# Patient Record
Sex: MALE | Race: OTHER | Employment: OTHER | URBAN - METROPOLITAN AREA
[De-identification: names, ages, dates, MRNs, and addresses within clinical notes are randomized per-mention and may not be internally consistent; named-entity substitution may affect disease eponyms.]

---

## 2017-01-03 ENCOUNTER — HOSPITAL ENCOUNTER (OUTPATIENT)
Dept: RADIOLOGY | Facility: HOSPITAL | Age: 62
Discharge: HOME | End: 2017-01-03
Attending: INTERNAL MEDICINE
Payer: COMMERCIAL

## 2017-01-03 ENCOUNTER — OFFICE VISIT (OUTPATIENT)
Dept: INTERNAL MEDICINE | Facility: CLINIC | Age: 62
End: 2017-01-03
Payer: COMMERCIAL

## 2017-01-03 VITALS
SYSTOLIC BLOOD PRESSURE: 136 MMHG | WEIGHT: 224.63 LBS | DIASTOLIC BLOOD PRESSURE: 82 MMHG | HEART RATE: 89 BPM | TEMPERATURE: 99 F | BODY MASS INDEX: 31.45 KG/M2 | OXYGEN SATURATION: 95 % | HEIGHT: 71 IN

## 2017-01-03 DIAGNOSIS — R05.9 COUGH: ICD-10-CM

## 2017-01-03 DIAGNOSIS — J18.9 PNEUMONIA DUE TO INFECTIOUS ORGANISM, UNSPECIFIED LATERALITY, UNSPECIFIED PART OF LUNG: Primary | ICD-10-CM

## 2017-01-03 DIAGNOSIS — J18.9 PNEUMONIA DUE TO INFECTIOUS ORGANISM, UNSPECIFIED LATERALITY, UNSPECIFIED PART OF LUNG: ICD-10-CM

## 2017-01-03 DIAGNOSIS — J01.00 ACUTE MAXILLARY SINUSITIS, RECURRENCE NOT SPECIFIED: ICD-10-CM

## 2017-01-03 PROCEDURE — 99999 PR PBB SHADOW E&M-EST. PATIENT-LVL IV: CPT | Mod: PBBFAC,,, | Performed by: INTERNAL MEDICINE

## 2017-01-03 PROCEDURE — 1159F MED LIST DOCD IN RCRD: CPT | Mod: S$GLB,,, | Performed by: INTERNAL MEDICINE

## 2017-01-03 PROCEDURE — 99214 OFFICE O/P EST MOD 30 MIN: CPT | Mod: S$GLB,,, | Performed by: INTERNAL MEDICINE

## 2017-01-03 PROCEDURE — 71020 XR CHEST PA AND LATERAL: CPT | Mod: TC

## 2017-01-03 PROCEDURE — 71020 XR CHEST PA AND LATERAL: CPT | Mod: 26,,, | Performed by: RADIOLOGY

## 2017-01-03 RX ORDER — AMOXICILLIN 500 MG/1
CAPSULE ORAL
COMMUNITY
Start: 2016-12-31 | End: 2017-01-03

## 2017-01-03 NOTE — PROGRESS NOTES
Subjective:       Patient ID: Dhruv Fine is a 61 y.o. male.    Chief Complaint: Sinusitis    HPI Comments: UC appt    URI sx for > 1 week.  Went skiing.  Tried vitamin C and Nyquil, Dayquil, nasal sprays.  However had some pain R side of face and behind R eye.  Went to an  and got some prescriptions- was given a liquid cough syrup with codeine and amoxicillin and tessalon.  This was Saturday.    Overall feeling better but not 100%.    Had similar sx a few months ago. Got augmentin- this was in September.    Main sx now sniffling, cough, dizzy and tired.  Also has had cough.  Denies chest pain.  Minimal SOB.    Patient Active Problem List:     Vitamin D deficiency disease     Erectile dysfunction     Hyperlipidemia     Bleeding hemorrhoids     Anxiety     Sleep apnea needs CPAP 10 see report 2010: declines tx     Nuclear sclerosis - Both Eyes     DJD (degenerative joint disease) of lumbar spine     DJD (degenerative joint disease) of cervical spine     Genu varum (acquired)     Former smoker 1-2 packs daily for < 10 years     S/P L knee surgery, medial/lateral menisectomy, chondroplasty, synovectomy (2/10/15)     Primary localized osteoarthrosis, lower leg     Non morbid obesity due to excess calories          Sinusitis   Associated symptoms include congestion, coughing and ear pain. Pertinent negatives include no chills or shortness of breath.     Review of Systems   Constitutional: Positive for fatigue and fever. Negative for chills.   HENT: Positive for congestion, ear pain and postnasal drip.    Eyes: Negative.    Respiratory: Positive for cough. Negative for chest tightness, shortness of breath and wheezing.    Cardiovascular: Negative.    Gastrointestinal: Negative.        Objective:      Physical Exam   Constitutional: He is oriented to person, place, and time. He appears well-developed and well-nourished.   HENT:   Head: Normocephalic and atraumatic.   Right Ear: External ear normal.   Left Ear:  External ear normal.   Mouth/Throat: Oropharynx is clear and moist.   Neck: Normal range of motion. Neck supple. No thyromegaly present.   Cardiovascular: Normal rate and regular rhythm.    Pulmonary/Chest: No respiratory distress. He has no wheezes. He has no rales.   Abdominal: Soft. Bowel sounds are normal. He exhibits no distension. There is no tenderness.   Musculoskeletal: He exhibits no edema.   Neurological: He is alert and oriented to person, place, and time.   Skin: Skin is warm and dry. No rash noted. No erythema.   Psychiatric: He has a normal mood and affect.       Assessment:       1. Pneumonia due to infectious organism, unspecified laterality, unspecified part of lung    2. Acute maxillary sinusitis, recurrence not specified    3. Cough        Plan:         Pneumonia due to infectious organism, unspecified laterality, unspecified part of lung  -     CBC auto differential; Future; Expected date: 1/3/17  -     Comprehensive metabolic panel; Future; Expected date: 1/3/17  -     X-Ray Chest PA And Lateral; Future; Expected date: 1/3/17: reviewed with patient- acceptable as were labs    Acute maxillary sinusitis, recurrence not specified:  Continue meds and antibiotics, natural history reviewed    Cough: continue meds    Rest, fluids, acetaminophen, mucinex and follow up poor results.    Patient evaluated for over 30 minutes with this appt, all questions answered,  chart reviewed and care coordinated.

## 2017-01-03 NOTE — LETTER
January 3, 2017    Dhruv Fine  [address] [place] [address]         [facility]  [address]  [facility] [address]  Phone: [phone]  Fax: [phone] January 3, 2017     Patient: Dhruv Fine   YOB: 1955   Date of Visit: 1/3/2017       To Whom It May Concern:    It is my medical opinion that Dhruv Fine has been acutely ill.  He was seen today for acute medical illness.   He will need to be out of work from today through January 8th and may return to work Monday January 9th 2017.    If you have any questions or concerns, please don't hesitate to call.    Sincerely,        Cassandra Cruz MD, FACP

## 2017-01-03 NOTE — MR AVS SNAPSHOT
[facility]  [address]  [facility] [address]  Phone: [phone]  Fax: [phone]                  Dhruv Fine   1/3/2017 10:00 AM   Office Visit    Description:  Male : 1955   Provider:  Cassandra Cruz MD   Department:  [facility] Internal Medicine           Reason for Visit     Sinusitis           Diagnoses this Visit        Comments    Pneumonia due to infectious organism, unspecified laterality, unspecified part of lung    -  Primary            To Do List           Future Appointments        Provider Department Dept Phone    1/3/2017 10:30 AM LAB, SAME DAY [facility] INTMED [facility] [phone]    1/3/2017 10:45 AM [facility] [id number] LB LIMIT [facility] [phone]      Goals (5 Years of Data)     None      [facility]     [facility] Care [facility] -  Assistance  Registered nurses in the [facility] provide clinical advisement, health education, appointment booking, and other advisory services.  Call for this free service at [phone].             Medications           Message regarding Medications     Verify the changes and/or additions to your medication regime listed below are the same as discussed with your clinician today.  If any of these changes or additions are incorrect, please notify your healthcare provider.        STOP taking these medications     amoxicillin (AMOXIL) 500 MG capsule            Verify that the below list of medications is an accurate representation of the medications you are currently taking.  If none reported, the list may be blank. If incorrect, please contact your healthcare provider. Carry this list with you in case of emergency.           Current Medications     amoxicillin (AMOXIL) 500 MG Tab Take 1 tablet (500 mg total) by mouth every 8 (eight) hours.    aspirin 81 MG Chew Take 1 tablet (81 mg total) by mouth once daily.    atorvastatin (LIPITOR) 20 MG tablet  "Take 1 tablet (20 mg total) by mouth once daily.    benzonatate (TESSALON) 200 MG capsule Take 1 capsule (200 mg total) by mouth 3 (three) times daily as needed for Cough.    diazepam (VALIUM) 5 MG tablet Take 5 mg by mouth Use as needed. 1 Tablet Oral .  may take as needed for back spasm, no more than 3-4 times weekly    guaifenesin (MUCINEX) 600 mg 12 hr tablet Take 1 tablet (600 mg total) by mouth 2 (two) times daily.    levocetirizine (XYZAL) 5 MG tablet TAKE 1 TABLET(5 MG) BY MOUTH EVERY EVENING    lidocaine (LIDODERM) 5 %(700 mg/patch) Place 1-2 patches onto the skin once daily. Wear patch for 12 hours and  must have a 12 hour period without a patch    methocarbamol (ROBAXIN-750) 750 MG Tab Take 1 tablet (750 mg total) by mouth 3 (three) times daily.    methylPREDNISolone (MEDROL DOSEPACK) 4 mg tablet use as directed    multivitamin (ONE DAILY MULTIVITAMIN) per tablet Take 1 tablet by mouth once daily.    promethazine-codeine 6.25-10 mg/5 ml (PHENERGAN WITH CODEINE) 6.25-10 mg/5 mL syrup Take 5 mLs by mouth nightly as needed for Cough (May take every 6 hours as needed.).    valacyclovir (VALTREX) 1000 MG tablet TAKE 1 TABLET BY MOUTH THREE TIMES DAILY    VITAMIN D2 50,000 unit capsule TAKE ONE CAPSULE BY MOUTH EVERY 7 DAYS           Clinical Reference Information           Vital Signs - Last Recorded  Most recent update: 1/3/2017 10:05 AM by Kajal Nunez MA    BP Pulse Temp Ht Wt SpO2    (!) 134/102 89 98.7 °F (37.1 °C) (Oral) 5' 11" (1.803 m) 101.9 kg (224 lb 10.4 oz) 95%    BMI                31.33 kg/m2          Blood Pressure          Most Recent Value    BP  (!)  134/102      Allergies as of 1/3/2017     No Known Allergies      Immunizations Administered on Date of Encounter - 1/3/2017     None      Orders Placed During Today's Visit     Future Labs/Procedures Expected by Expires    CBC auto differential  1/3/2017 1/3/2018    Comprehensive metabolic panel  1/3/2017 1/3/2018    X-Ray Chest PA And " Lateral  1/3/2017 1/3/2018

## 2017-01-09 RX ORDER — PROMETHAZINE HYDROCHLORIDE AND CODEINE PHOSPHATE 6.25; 1 MG/5ML; MG/5ML
SOLUTION ORAL
Qty: 120 ML | Refills: 0 | OUTPATIENT
Start: 2017-01-09

## 2017-01-09 RX ORDER — ERGOCALCIFEROL 1.25 MG/1
CAPSULE ORAL
Qty: 12 CAPSULE | Refills: 0 | Status: SHIPPED | OUTPATIENT
Start: 2017-01-09 | End: 2017-04-03 | Stop reason: SDUPTHER

## 2017-01-10 ENCOUNTER — TELEPHONE (OUTPATIENT)
Dept: INTERNAL MEDICINE | Facility: CLINIC | Age: 62
End: 2017-01-10

## 2017-01-10 DIAGNOSIS — J32.4 CHRONIC PANSINUSITIS: ICD-10-CM

## 2017-01-10 DIAGNOSIS — R05.9 COUGH: ICD-10-CM

## 2017-01-10 RX ORDER — PROMETHAZINE HYDROCHLORIDE AND CODEINE PHOSPHATE 6.25; 1 MG/5ML; MG/5ML
5 SOLUTION ORAL EVERY 4 HOURS PRN
OUTPATIENT
Start: 2017-01-10 | End: 2017-01-20

## 2017-01-10 RX ORDER — PROMETHAZINE HYDROCHLORIDE AND CODEINE PHOSPHATE 6.25; 1 MG/5ML; MG/5ML
5 SOLUTION ORAL NIGHTLY PRN
Qty: 120 ML | Refills: 0 | Status: SHIPPED | OUTPATIENT
Start: 2017-01-10 | End: 2017-01-15

## 2017-01-10 NOTE — TELEPHONE ENCOUNTER
----- Message from Mary Ann Teresa sent at 1/10/2017  1:35 PM CST -----  Contact: call [phone]  Pt is requesting a cough medication to be called into his pharmacy, he states that he is coughing until vomits, and cough is keeping him up at night   [facility] [address] [place] [address] [facility] [phone] (Phone

## 2017-02-13 ENCOUNTER — OFFICE VISIT (OUTPATIENT)
Dept: SPORTS MEDICINE | Facility: CLINIC | Age: 62
End: 2017-02-13
Payer: COMMERCIAL

## 2017-02-13 ENCOUNTER — HOSPITAL ENCOUNTER (OUTPATIENT)
Dept: RADIOLOGY | Facility: HOSPITAL | Age: 62
Discharge: HOME | End: 2017-02-13
Attending: ORTHOPAEDIC SURGERY
Payer: COMMERCIAL

## 2017-02-13 VITALS
WEIGHT: 224 LBS | HEART RATE: 86 BPM | SYSTOLIC BLOOD PRESSURE: 137 MMHG | DIASTOLIC BLOOD PRESSURE: 86 MMHG | BODY MASS INDEX: 31.36 KG/M2 | HEIGHT: 71 IN

## 2017-02-13 DIAGNOSIS — G89.29 CHRONIC PAIN OF LEFT KNEE: Primary | ICD-10-CM

## 2017-02-13 DIAGNOSIS — G89.29 CHRONIC PAIN OF LEFT KNEE: ICD-10-CM

## 2017-02-13 DIAGNOSIS — M94.261 CHONDROMALACIA, RIGHT KNEE: ICD-10-CM

## 2017-02-13 DIAGNOSIS — M25.562 CHRONIC PAIN OF LEFT KNEE: ICD-10-CM

## 2017-02-13 DIAGNOSIS — M17.32 POST-TRAUMATIC OSTEOARTHRITIS OF LEFT KNEE: ICD-10-CM

## 2017-02-13 DIAGNOSIS — M25.562 CHRONIC PAIN OF LEFT KNEE: Primary | ICD-10-CM

## 2017-02-13 PROCEDURE — 73564 X-RAY EXAM KNEE 4 OR MORE: CPT | Mod: 26,50,, | Performed by: RADIOLOGY

## 2017-02-13 PROCEDURE — 99214 OFFICE O/P EST MOD 30 MIN: CPT | Mod: S$GLB,,, | Performed by: ORTHOPAEDIC SURGERY

## 2017-02-13 PROCEDURE — 73564 X-RAY EXAM KNEE 4 OR MORE: CPT | Mod: TC,50,PO,LT

## 2017-02-13 PROCEDURE — 99999 PR PBB SHADOW E&M-EST. PATIENT-LVL IV: CPT | Mod: PBBFAC,,, | Performed by: ORTHOPAEDIC SURGERY

## 2017-02-13 NOTE — PATIENT INSTRUCTIONS
DESCRIPTION  Synvisc-One (hylan G-F 20) is an elastoviscous high molecular weight fluid containing hylan A and hylan B polymers produced from chicken harrison. Hylans are derivatives of hyaluronan (sodium hyaluronate). Hylan G-F 20 is unique in that the hyaluronan is chemically cross linked. Hyaluronan is a long-chain polymer containing repeating disaccharide units of Nw-gcrtjepospe-M-acetylglucosamine.     INDICATIONS FOR USE  Synvisc-One is indicated for the treatment of pain in osteoarthritis (OA) of the knee in patients who have failed to respond adequately to conservative nonpharmacologic therapy and simple analgesics, e.g., acetaminophen.     Who is a candidate for Synvisc-One  Patients with knee osteoarthritis, who have tried diet, exercise and over-the-counter pain medication but still have pain, should talk to their doctor to see if Synvisc-One is right for them.     How Synvisc-One is administered  Synvisc-One is a single injection. It's a simple, in-office procedure that only takes a few minutes.     What you can expect following a Synvisc-One knee injection Synvisc-One can provide up to six months of osteoarthritis knee pain relief. Everyone responds differently, but in a medical study* patients experienced relief starting one month after the injection.     After the injection, you can resume normal day-to-day activities, but you should avoid any strenuous activities for about 48 hours.     Contraindication  Do not administer to patients with known hypersensitivity (allergy) to hyaluronan (sodium hyaluronate) preparations.   Do not inject Synvisc-One in the knees of patients having knee joint infections or skin diseases or infections in the area of theinjection site.    What are possible side effects?  Synvisc may occur short-term pain, swelling at the injection site and / or the appearance of synovial exudate after injection. In some cases, exudation may be significant and cause more prolonged pain.      Important Safety Information  Before trying Synvisc-One or SYNVISC, tell your doctor if you are allergic to products from birds - such as feathers, eggs or poultry - or if your leg is swollen or infected. Synvisc-One and SYNVISC are only for injection into the knee, performed by a doctor or other qualified health care professional. Synvisc-One and SYNVISC have not been tested to show pain relief in joints other than the knee. Talk to your doctor before resuming strenuous weight-bearing activities after treatment. Synvisc-One and SYNVISC have not been tested in children, pregnant women or women who are nursing. You should tell your doctor if you think you are pregnant or if you are nursing a child. The side effects most commonly seen when Synvisc-One or SYNVISC is injected into the knee were pain, swelling and/or fluid buildup in or around the knee. Cases where the swelling is extensive or painful should be discussed with your doctor. Allergic reactions such as rash and hives have been reported rarely.

## 2017-02-13 NOTE — PROGRESS NOTES
Subjective:          Chief Complaint: Dhruv Fine is a 61 y.o. male who had concerns including Follow-up of the Left Knee.    HPI Comments: Patient is here for a follow up for his left knee. Was doing good until snowboarding twisted his knee 1.5mo ago. Improved since.    DATE OF PROCEDURE:  02/10/2015     ATTENDING SURGEON:  Sam Fine M.D.     FIRST ASSISTANT:  Colton Guadarrama M.D. (RES)     SECOND ASSISTANT:  PARKER Abdalla.     PREOPERATIVE DIAGNOSES:  1.  Left knee lateral meniscus tear.  2.  Left knee medial meniscus tear.  3.  Left knee chondromalacia.  4.  Left knee synovitis.     OPERATION PERFORMED:  1.  Left knee medial and lateral arthroscopic meniscectomy.  2.  Left knee arthroscopic chondroplasty.  3.  Left knee arthroscopic synovectomy (complete).    Handedness: right-handed  Sport played:    Level:                Review of Systems   Constitution: Negative for fever and night sweats.   HENT: Negative for hearing loss.    Eyes: Negative for blurred vision and visual disturbance.   Cardiovascular: Negative for chest pain and leg swelling.   Respiratory: Negative for shortness of breath.    Endocrine: Negative for polyuria.   Hematologic/Lymphatic: Negative for bleeding problem.   Skin: Negative for rash.   Musculoskeletal: Negative for back pain, joint pain, joint swelling, muscle cramps and muscle weakness.   Gastrointestinal: Negative for melena.   Genitourinary: Negative for hematuria.   Neurological: Negative for loss of balance, numbness and paresthesias.   Psychiatric/Behavioral: Negative for altered mental status.       Pain Related Questions  Over the past 3 days, what was your average pain during activity? (I.e. running, jogging, walking, climbing stairs, getting dressed, ect.): 4  Over the past 3 days, what was your highest pain level?: 4  Over the past 3 days, what was your lowest pain level? : 2    Other  How many nights a week are you awakened by your affected body part?: 0  Was  the patient's HEIGHT measured or patient reported?: Patient Reported  Was the patient's WEIGHT measured or patient reported?: Measured      Objective:        General: Dhruv is well-developed, well-nourished, appears stated age, in no acute distress, alert and oriented to time, place and person.     General    Vitals reviewed.  Constitutional: He is oriented to person, place, and time. He appears well-developed and well-nourished. No distress.   HENT:   Head: Normocephalic and atraumatic.   Mouth/Throat: No oropharyngeal exudate.   Eyes: Right eye exhibits no discharge. Left eye exhibits no discharge.   Neck: Normal range of motion. No tracheal deviation present.   Cardiovascular: Normal rate and intact distal pulses.    Pulmonary/Chest: Effort normal and breath sounds normal. No respiratory distress.   Abdominal: He exhibits no distension.   Neurological: He is alert and oriented to person, place, and time. He has normal reflexes. No cranial nerve deficit. He exhibits normal muscle tone. Coordination normal.   Psychiatric: He has a normal mood and affect. His behavior is normal. Judgment and thought content normal.     General Musculoskeletal Exam   Gait: normal       Right Knee Exam     Inspection   Erythema: absent  Scars: absent  Swelling: absent  Effusion: effusion  Deformity: deformity  Bruising: absent    Tenderness   The patient is experiencing no tenderness.         Range of Motion   Extension: 0   Flexion: 140     Tests   Meniscus   Rudy:  Medial - negative Lateral - negative  Ligament Examination Lachman: normal (-1 to 2mm) PCL-Posterior Drawer: normal (0 to 2mm)     MCL - Valgus: normal (0 to 2mm)  LCL - Varus: normalPivot Shift: normal (Equal)Reverse Pivot Shift: normal (Equal)Dial Test at 30 degrees: normal (< 5 degrees)Dial Test at 90 degrees: normal (< 5 degrees)  Posterior Sag Test: negative  Posterolateral Corner: unstable (>15 degrees difference)  Patella   Patellar Apprehension:  negative  Passive Patellar Tilt: neutral  Patellar Tracking: normal  Patellar Glide (quadrants): Lateral - 1   Medial - 2  Q-Angle at 90 degrees: normal  Patellar Grind: negative  J-Sign: none    Other   Meniscal Cyst: absent  Popliteal (Baker's) Cyst: absent  Sensation: normal    Left Knee Exam     Inspection   Erythema: absent  Scars: present  Swelling: absent  Effusion: absent  Deformity: deformity  Bruising: absent    Tenderness   The patient tender to palpation of the condyle.    Range of Motion   Extension: 0 (0)   Flexion: 140     Tests   Meniscus   Rudy:  Medial - negative Lateral - negative  Stability Lachman: normal (-1 to 2mm) PCL-Posterior Drawer: normal (0 to 2mm)  MCL - Valgus: normal (0 to 2mm)  LCL - Varus: normal (0 to 2mm)Pivot Shift: normal (Equal)Reverse Pivot Shift: normal (Equal)Dial Test at 30 degrees: normal (< 5 degrees)Dial Test at 90 degrees: normal (< 5 degrees)  Posterior Sag Test: negative  Posterolateral Corner: unstable (>15 degrees difference)  Patella   Patellar Apprehension: negative  Passive Patellar Tilt: neutral  Patellar Tracking: normal  Patellar Glide (Quadrants): Lateral - 1 Medial - 2  Q-Angle at 90 degrees: normal  Patellar Grind: negative  J-Sign: J sign absent    Other   Meniscal Cyst: absent  Popliteal (Baker's) Cyst: absent  Sensation: normal    Comments:  Tender over medial femoral condyle      Right Hip Exam     Tests   Patricia: negative  Left Hip Exam     Tests   Patricia: negative          Muscle Strength   Right Lower Extremity   Hip Abduction: 5/5   Quadriceps:  5/5   Hamstrin/5   Left Lower Extremity   Hip Abduction: 5/5   Quadriceps:  5/5   Hamstrin/5     Reflexes     Left Side  Quadriceps:  2+  Achilles:  2+    Right Side   Quadriceps:  2+  Achilles:  2+    Vascular Exam     Right Pulses  Dorsalis Pedis:      2+  Posterior Tibial:      2+        Left Pulses  Dorsalis Pedis:      2+  Posterior Tibial:      2+        Edema  Right Lower Leg: absent  Left  Lower Leg: absent  Radiographs today:  Left knee KL 3 with moderate narrowing noted vs. Right knee with KL 2 (early osteophyte formation)        Assessment:       Encounter Diagnoses   Name Primary?    Chronic pain of left knee Yes    Chondromalacia, right knee     Post-traumatic osteoarthritis of left knee           Plan:         1. IKDC, SF-12 and KOOS was filled out today in clinic.     RTC in 2-3 weeks with Dr. Sam Fine for Synvisc one injection. Patient will fill out IKDC, SF-12 and KOOS on return.    2. Continue coasmine asu    3. [id number] authorization for synvisc one repeat recommended    4. Recommend that he start using the  brace again ( he has an  OSSUR brace)    5. [facility] to [facility]          Patient questionnaires may have been collected.

## 2017-02-13 NOTE — MR AVS SNAPSHOT
[facility]  [address]  [facility] [address]  Phone: [phone]                  Dhruv Fine   2017 4:15 PM   Office Visit    Description:  Male : 1955   Provider:  Sam Fine MD   Department:  [facility]           Reason for Visit     Left Knee - Follow-up           Diagnoses this Visit        Comments    Chronic pain of left knee    -  Primary     Chondromalacia, right knee         Post-traumatic osteoarthritis of left knee                To Do List           Goals (5 Years of Data)     None      Follow-Up and Disposition     Return in about 3 weeks (around 3/6/2017), or RTC in 2-3 weeks with Dr. Sam Fine for Synvisc one injection. Patient will fill out IKDC, SF-12 a, for RTC in 2-3 weeks with Dr. Sam Fine for Synvisc one injection. Patient will fill out IKDC, SF-12 a.      [facility]     [facility] [place] [facility] -  Assistance  Registered nurses in the [facility] provide clinical advisement, health education, appointment booking, and other advisory services.  Call for this free service at [phone].             Medications           Message regarding Medications     Verify the changes and/or additions to your medication regime listed below are the same as discussed with your clinician today.  If any of these changes or additions are incorrect, please notify your healthcare provider.             Verify that the below list of medications is an accurate representation of the medications you are currently taking.  If none reported, the list may be blank. If incorrect, please contact your healthcare provider. Carry this list with you in case of emergency.           Current Medications     aspirin 81 MG Chew Take 1 tablet (81 mg total) by mouth once daily.    atorvastatin (LIPITOR) 20 MG tablet Take 1 tablet (20 mg total) by mouth once daily.    diazepam (VALIUM) 5 MG tablet Take 5 mg by mouth Use as  "needed. 1 Tablet Oral .  may take as needed for back spasm, no more than 3-4 times weekly    ergocalciferol (ERGOCALCIFEROL) 50,000 unit Cap TAKE 1 CAPSULE BY MOUTH EVERY 7 DAYS    levocetirizine (XYZAL) 5 MG tablet TAKE 1 TABLET(5 MG) BY MOUTH EVERY EVENING    lidocaine (LIDODERM) 5 %(700 mg/patch) Place 1-2 patches onto the skin once daily. Wear patch for 12 hours and  must have a 12 hour period without a patch    methocarbamol (ROBAXIN-750) 750 MG Tab Take 1 tablet (750 mg total) by mouth 3 (three) times daily.    methylPREDNISolone (MEDROL DOSEPACK) 4 mg tablet use as directed    multivitamin (ONE DAILY MULTIVITAMIN) per tablet Take 1 tablet by mouth once daily.    valacyclovir (VALTREX) 1000 MG tablet TAKE 1 TABLET BY MOUTH THREE TIMES DAILY           Clinical Reference Information           Your Vitals Were     BP Pulse Height Weight BMI    137/86 86 5' 11" (1.803 m) 101.6 kg (224 lb) 31.24 kg/m2      Blood Pressure          Most Recent Value    BP  137/86      Allergies as of 2/13/2017     No Known Allergies      Immunizations Administered on Date of Encounter - 2/13/2017     None      Orders Placed During Today's Visit      Normal Orders This Visit    Ambulatory Referral to [facility]     Medication Pre-Authorization     Future Labs/Procedures Expected by Expires    X-ray Knee Ortho Bilateral with Flexion  2/13/2017 2/13/2018      Instructions        DESCRIPTION  Synvisc-One (hylan G-F 20) is an elastoviscous high molecular weight fluid containing hylan A and hylan B polymers produced from chicken harrison. Hylans are derivatives of hyaluronan (sodium hyaluronate). Hylan G-F 20 is unique in that the hyaluronan is chemically cross linked. Hyaluronan is a long-chain polymer containing repeating disaccharide units of Sz-tjveaoajlqk-F-acetylglucosamine.     INDICATIONS FOR USE  Synvisc-One is indicated for the treatment of pain in osteoarthritis (OA) of the knee in patients who have failed to " respond adequately to conservative nonpharmacologic therapy and simple analgesics, e.g., acetaminophen.     Who is a candidate for Synvisc-One  Patients with knee osteoarthritis, who have tried diet, exercise and over-the-counter pain medication but still have pain, should talk to their doctor to see if Synvisc-One is right for them.     How Synvisc-One is administered  Synvisc-One is a single injection. It's a simple, in-office procedure that only takes a few minutes.     What you can expect following a Synvisc-One knee injection Synvisc-One can provide up to six months of osteoarthritis knee pain relief. Everyone responds differently, but in a medical study* patients experienced relief starting one month after the injection.     After the injection, you can resume normal day-to-day activities, but you should avoid any strenuous activities for about 48 hours.     Contraindication  Do not administer to patients with known hypersensitivity (allergy) to hyaluronan (sodium hyaluronate) preparations.   Do not inject Synvisc-One in the knees of patients having knee joint infections or skin diseases or infections in the area of theinjection site.    What are possible side effects?  Synvisc may occur short-term pain, swelling at the injection site and / or the appearance of synovial exudate after injection. In some cases, exudation may be significant and cause more prolonged pain.     Important Safety Information  Before trying Synvisc-One or SYNVISC, tell your doctor if you are allergic to products from birds - such as feathers, eggs or poultry - or if your leg is swollen or infected. Synvisc-One and SYNVISC are only for injection into the knee, performed by a doctor or other qualified health care professional. Synvisc-One and SYNVISC have not been tested to show pain relief in joints other than the knee. Talk to your doctor before resuming strenuous weight-bearing activities after treatment. Synvisc-One and SYNVISC have  not been tested in children, pregnant women or women who are nursing. You should tell your doctor if you think you are pregnant or if you are nursing a child. The side effects most commonly seen when Synvisc-One or SYNVISC is injected into the knee were pain, swelling and/or fluid buildup in or around the knee. Cases where the swelling is extensive or painful should be discussed with your doctor. Allergic reactions such as rash and hives have been reported rarely.            Language Assistance Services     ATTENTION: Language assistance services are available, free of charge. Please call [phone].      ATENCIÓN: Si habla vivian, tiene a srivastava disposición servicios gratuitos de asistencia lingüística. Llame al [phone].     JOANNE Ý: N?u b?n nói Ti?ng Vi?t, có các d?ch v? h? tr? ngôn ng? mi?n phí dành cho b?n. G?i s? [phone].         [facility] complies with applicable Federal civil rights laws and does not discriminate on the basis of race, color, national origin, age, disability, or sex.

## 2017-03-06 ENCOUNTER — OFFICE VISIT (OUTPATIENT)
Dept: SPORTS MEDICINE | Facility: CLINIC | Age: 62
End: 2017-03-06
Payer: COMMERCIAL

## 2017-03-06 VITALS
BODY MASS INDEX: 31.36 KG/M2 | HEART RATE: 87 BPM | DIASTOLIC BLOOD PRESSURE: 92 MMHG | SYSTOLIC BLOOD PRESSURE: 144 MMHG | HEIGHT: 71 IN | WEIGHT: 224 LBS

## 2017-03-06 DIAGNOSIS — M17.12 PRIMARY OSTEOARTHRITIS OF LEFT KNEE: Primary | ICD-10-CM

## 2017-03-06 PROCEDURE — 20611 DRAIN/INJ JOINT/BURSA W/US: CPT | Mod: LT,S$GLB,, | Performed by: ORTHOPAEDIC SURGERY

## 2017-03-06 PROCEDURE — 99499 UNLISTED E&M SERVICE: CPT | Mod: S$GLB,,, | Performed by: ORTHOPAEDIC SURGERY

## 2017-03-06 PROCEDURE — 99999 PR PBB SHADOW E&M-EST. PATIENT-LVL III: CPT | Mod: PBBFAC,,, | Performed by: ORTHOPAEDIC SURGERY

## 2017-03-06 NOTE — PROGRESS NOTES
Subjective:          Chief Complaint: Dhruv Fine is a 61 y.o. male who had concerns including Injections of the Left Knee.    HPI Comments: Patient is here for a follow up for his left knee to receive a Synvisc-One injection.    DATE OF PROCEDURE:  02/10/2015     ATTENDING SURGEON:  Sam Fine M.D.     FIRST ASSISTANT:  Colton Guadarrama M.D. (RES)     SECOND ASSISTANT:  PARKER Abdalla.     PREOPERATIVE DIAGNOSES:  1.  Left knee lateral meniscus tear.  2.  Left knee medial meniscus tear.  3.  Left knee chondromalacia.  4.  Left knee synovitis.     OPERATION PERFORMED:  1.  Left knee medial and lateral arthroscopic meniscectomy.  2.  Left knee arthroscopic chondroplasty.  3.  Left knee arthroscopic synovectomy (complete).    Handedness: right-handed  Sport played:    Level:                Review of Systems   Constitution: Negative for fever and night sweats.   HENT: Negative for hearing loss.    Eyes: Negative for blurred vision and visual disturbance.   Cardiovascular: Negative for chest pain and leg swelling.   Respiratory: Negative for shortness of breath.    Endocrine: Negative for polyuria.   Hematologic/Lymphatic: Negative for bleeding problem.   Skin: Negative for rash.   Musculoskeletal: Negative for back pain, joint pain, joint swelling, muscle cramps and muscle weakness.   Gastrointestinal: Negative for melena.   Genitourinary: Negative for hematuria.   Neurological: Negative for loss of balance, numbness and paresthesias.   Psychiatric/Behavioral: Negative for altered mental status.       Pain Related Questions  Over the past 3 days, what was your average pain during activity? (I.e. running, jogging, walking, climbing stairs, getting dressed, ect.): 0  Over the past 3 days, what was your highest pain level?: 0  Over the past 3 days, what was your lowest pain level? : 0    Other  How many nights a week are you awakened by your affected body part?: 0  Was the patient's HEIGHT measured or patient  reported?: Patient Reported  Was the patient's WEIGHT measured or patient reported?: Measured      Objective:        General: Dhruv is well-developed, well-nourished, appears stated age, in no acute distress, alert and oriented to time, place and person.     General    Vitals reviewed.  Constitutional: He is oriented to person, place, and time. He appears well-developed and well-nourished. No distress.   HENT:   Head: Normocephalic and atraumatic.   Mouth/Throat: No oropharyngeal exudate.   Eyes: Right eye exhibits no discharge. Left eye exhibits no discharge.   Neck: Normal range of motion. No tracheal deviation present.   Cardiovascular: Normal rate and intact distal pulses.    Pulmonary/Chest: Effort normal and breath sounds normal. No respiratory distress.   Abdominal: He exhibits no distension.   Neurological: He is alert and oriented to person, place, and time. He has normal reflexes. No cranial nerve deficit. He exhibits normal muscle tone. Coordination normal.   Psychiatric: He has a normal mood and affect. His behavior is normal. Judgment and thought content normal.     General Musculoskeletal Exam   Gait: normal       Right Knee Exam     Inspection   Erythema: absent  Scars: absent  Swelling: absent  Effusion: effusion  Deformity: deformity  Bruising: absent    Tenderness   The patient is experiencing no tenderness.         Range of Motion   Extension: 0   Flexion: 140     Tests   Meniscus   Rudy:  Medial - negative Lateral - negative  Ligament Examination Lachman: normal (-1 to 2mm) PCL-Posterior Drawer: normal (0 to 2mm)     MCL - Valgus: normal (0 to 2mm)  LCL - Varus: normalPivot Shift: normal (Equal)Reverse Pivot Shift: normal (Equal)Dial Test at 30 degrees: normal (< 5 degrees)Dial Test at 90 degrees: normal (< 5 degrees)  Posterior Sag Test: negative  Posterolateral Corner: unstable (>15 degrees difference)  Patella   Patellar Apprehension: negative  Passive Patellar Tilt: neutral  Patellar  Tracking: normal  Patellar Glide (quadrants): Lateral - 1   Medial - 2  Q-Angle at 90 degrees: normal  Patellar Grind: negative  J-Sign: none    Other   Meniscal Cyst: absent  Popliteal (Baker's) Cyst: absent  Sensation: normal    Left Knee Exam     Inspection   Erythema: absent  Scars: present  Swelling: absent  Effusion: absent  Deformity: deformity  Bruising: absent    Tenderness   The patient tender to palpation of the condyle.    Range of Motion   Extension: 0 (0)   Flexion: 140     Tests   Meniscus   Rudy:  Medial - negative Lateral - negative  Stability Lachman: normal (-1 to 2mm) PCL-Posterior Drawer: normal (0 to 2mm)  MCL - Valgus: normal (0 to 2mm)  LCL - Varus: normal (0 to 2mm)Pivot Shift: normal (Equal)Reverse Pivot Shift: normal (Equal)Dial Test at 30 degrees: normal (< 5 degrees)Dial Test at 90 degrees: normal (< 5 degrees)  Posterior Sag Test: negative  Posterolateral Corner: unstable (>15 degrees difference)  Patella   Patellar Apprehension: negative  Passive Patellar Tilt: neutral  Patellar Tracking: normal  Patellar Glide (Quadrants): Lateral - 1 Medial - 2  Q-Angle at 90 degrees: normal  Patellar Grind: negative  J-Sign: J sign absent    Other   Meniscal Cyst: absent  Popliteal (Baker's) Cyst: absent  Sensation: normal    Comments:  Tender over medial femoral condyle      Right Hip Exam     Tests   Patricia: negative  Left Hip Exam     Tests   Patricia: negative          Muscle Strength   Right Lower Extremity   Hip Abduction: 5/5   Quadriceps:  5/5   Hamstrin/5   Left Lower Extremity   Hip Abduction: 5/5   Quadriceps:  5/5   Hamstrin/5     Reflexes     Left Side  Quadriceps:  2+  Achilles:  2+    Right Side   Quadriceps:  2+  Achilles:  2+    Vascular Exam     Right Pulses  Dorsalis Pedis:      2+  Posterior Tibial:      2+        Left Pulses  Dorsalis Pedis:      2+  Posterior Tibial:      2+        Edema  Right Lower Leg: absent  Left Lower Leg: absent          Assessment:        Encounter Diagnosis   Name Primary?    Primary osteoarthritis of left knee Yes          Plan:         1. IKDC, SF-12 and KOOS was filled out today in clinic.     RTC in 3 months with Dr. Sam Fine Patient will fill out IKDC, SF-12 and KOOS and bilateral knee series on return.    2. Injection Procedure left knee    After time out was performed, including verification of patient ID, procedure, site and side, availability of information and equipment, review of safety issues, and agreement with consent, the procedure site was marked and the patient was prepped aseptically. A diagnostic and therapeutic injection of 6cc SynviscOne and ethyl chloride spray was given under sterile technique using a 22g x 1.5 needle into the left Knee Joint in seated position.     The patient had no adverse reactions to the medication. Pain decreased. The patient was instructed to apply ice to the joint for 20 minutes and avoid strenuous activities for 24-36 hours following the injection. Patient was warned of possible blood sugar and/or blood pressure changes during that time. Following that time, patient can resume regular activities.    Patient was reminded to call the clinic immediately for any adverse side effects as explained in clinic today.    Ultrasound Guidance Procedure  left Knee Joint visualized with documented DJD. Dynamic visualization of the 22g x 1.5 needle performed.                Patient questionnaires may have been collected.

## 2017-03-06 NOTE — PATIENT INSTRUCTIONS
DESCRIPTION  Synvisc-One (hylan G-F 20) is an elastoviscous high molecular weight fluid containing hylan A and hylan B polymers produced from chicken harrison. Hylans are derivatives of hyaluronan (sodium hyaluronate). Hylan G-F 20 is unique in that the hyaluronan is chemically cross linked. Hyaluronan is a long-chain polymer containing repeating disaccharide units of Sx-orrrxlnvhyv-F-acetylglucosamine.     INDICATIONS FOR USE  Synvisc-One is indicated for the treatment of pain in osteoarthritis (OA) of the knee in patients who have failed to respond adequately to conservative nonpharmacologic therapy and simple analgesics, e.g., acetaminophen.     Who is a candidate for Synvisc-One  Patients with knee osteoarthritis, who have tried diet, exercise and over-the-counter pain medication but still have pain, should talk to their doctor to see if Synvisc-One is right for them.     How Synvisc-One is administered  Synvisc-One is a single injection. It's a simple, in-office procedure that only takes a few minutes.     What you can expect following a Synvisc-One knee injection Synvisc-One can provide up to six months of osteoarthritis knee pain relief. Everyone responds differently, but in a medical study* patients experienced relief starting one month after the injection.     After the injection, you can resume normal day-to-day activities, but you should avoid any strenuous activities for about 48 hours.     Contraindication  Do not administer to patients with known hypersensitivity (allergy) to hyaluronan (sodium hyaluronate) preparations.   Do not inject Synvisc-One in the knees of patients having knee joint infections or skin diseases or infections in the area of theinjection site.    What are possible side effects?  Synvisc may occur short-term pain, swelling at the injection site and / or the appearance of synovial exudate after injection. In some cases, exudation may be significant and cause more prolonged pain.      Important Safety Information  Before trying Synvisc-One or SYNVISC, tell your doctor if you are allergic to products from birds - such as feathers, eggs or poultry - or if your leg is swollen or infected. Synvisc-One and SYNVISC are only for injection into the knee, performed by a doctor or other qualified health care professional. Synvisc-One and SYNVISC have not been tested to show pain relief in joints other than the knee. Talk to your doctor before resuming strenuous weight-bearing activities after treatment. Synvisc-One and SYNVISC have not been tested in children, pregnant women or women who are nursing. You should tell your doctor if you think you are pregnant or if you are nursing a child. The side effects most commonly seen when Synvisc-One or SYNVISC is injected into the knee were pain, swelling and/or fluid buildup in or around the knee. Cases where the swelling is extensive or painful should be discussed with your doctor. Allergic reactions such as rash and hives have been reported rarely.

## 2017-03-06 NOTE — MR AVS SNAPSHOT
[facility]  [address]  [facility] [address]  Phone: [phone]                  Dhruv Fine   3/6/2017 4:45 PM   Office Visit    Description:  Male : 1955   Provider:  Sam Fine MD   Department:  [facility]           Reason for Visit     Left Knee - Injections           Diagnoses this Visit        Comments    Primary osteoarthritis of left knee    -  Primary            To Do List           Goals (5 Years of Data)     None      Follow-Up and Disposition     Return in about 3 months (around 2017) for RTC in 3 months with Dr. Sam Fine Patient will fill out IKDC, SF-12 and KOOS and bilateral knee s.      [facility] On Call     [facility] On Call Nurse [place] [facility] Line -  Assistance  Registered nurses in the [facility] On Call Center provide clinical advisement, health education, appointment booking, and other advisory services.  Call for this free service at [phone].             Medications           Message regarding Medications     Verify the changes and/or additions to your medication regime listed below are the same as discussed with your clinician today.  If any of these changes or additions are incorrect, please notify your healthcare provider.        These medications were administered today        Dose Freq    hylan g-f 20 48 mg/6 mL injection 48 mg 48 mg Clinic/HOD 1 time    Sig: Inject 48 mg into the articular space one time.    Class: Normal    Route: Intra-articular           Verify that the below list of medications is an accurate representation of the medications you are currently taking.  If none reported, the list may be blank. If incorrect, please contact your healthcare provider. Carry this list with you in case of emergency.           Current Medications     aspirin 81 MG Chew Take 1 tablet (81 mg total) by mouth once daily.    atorvastatin (LIPITOR) 20 MG tablet Take 1 tablet (20 mg total) by mouth once daily.    diazepam  "(VALIUM) 5 MG tablet Take 5 mg by mouth Use as needed. 1 Tablet Oral .  may take as needed for back spasm, no more than 3-4 times weekly    ergocalciferol (ERGOCALCIFEROL) 50,000 unit Cap TAKE 1 CAPSULE BY MOUTH EVERY 7 DAYS    levocetirizine (XYZAL) 5 MG tablet TAKE 1 TABLET(5 MG) BY MOUTH EVERY EVENING    lidocaine (LIDODERM) 5 %(700 mg/patch) Place 1-2 patches onto the skin once daily. Wear patch for 12 hours and  must have a 12 hour period without a patch    methocarbamol (ROBAXIN-750) 750 MG Tab Take 1 tablet (750 mg total) by mouth 3 (three) times daily.    methylPREDNISolone (MEDROL DOSEPACK) 4 mg tablet use as directed    multivitamin (ONE DAILY MULTIVITAMIN) per tablet Take 1 tablet by mouth once daily.    valacyclovir (VALTREX) 1000 MG tablet TAKE 1 TABLET BY MOUTH THREE TIMES DAILY           Clinical Reference Information           Your Vitals Were     BP Pulse Height Weight BMI    144/92 87 5' 11" (1.803 m) 101.6 kg (224 lb) 31.24 kg/m2      Blood Pressure          Most Recent Value    BP  (!)  144/92      Allergies as of 3/6/2017     No Known Allergies      Immunizations Administered on Date of Encounter - 3/6/2017     None      Orders Placed During Today's Visit      Normal Orders This Visit    Sports Medicine US - Guidance for Needle Placement       Administrations This Visit     hylan g-f 20 48 mg/6 mL injection 48 mg     Admin Date Action Dose Route Administered By             03/06/2017 Given 48 mg Intra-articular Sam Fine MD                      Instructions        DESCRIPTION  Synvisc-One (hylan G-F 20) is an elastoviscous high molecular weight fluid containing hylan A and hylan B polymers produced from chicken harrison. Hylans are derivatives of hyaluronan (sodium hyaluronate). Hylan G-F 20 is unique in that the hyaluronan is chemically cross linked. Hyaluronan is a long-chain polymer containing repeating disaccharide units of Xc-qsbfexstorx-H-acetylglucosamine.     INDICATIONS FOR " USE  Synvisc-One is indicated for the treatment of pain in osteoarthritis (OA) of the knee in patients who have failed to respond adequately to conservative nonpharmacologic therapy and simple analgesics, e.g., acetaminophen.     Who is a candidate for Synvisc-One  Patients with knee osteoarthritis, who have tried diet, exercise and over-the-counter pain medication but still have pain, should talk to their doctor to see if Synvisc-One is right for them.     How Synvisc-One is administered  Synvisc-One is a single injection. It's a simple, in-office procedure that only takes a few minutes.     What you can expect following a Synvisc-One knee injection Synvisc-One can provide up to six months of osteoarthritis knee pain relief. Everyone responds differently, but in a medical study* patients experienced relief starting one month after the injection.     After the injection, you can resume normal day-to-day activities, but you should avoid any strenuous activities for about 48 hours.     Contraindication  Do not administer to patients with known hypersensitivity (allergy) to hyaluronan (sodium hyaluronate) preparations.   Do not inject Synvisc-One in the knees of patients having knee joint infections or skin diseases or infections in the area of theinjection site.    What are possible side effects?  Synvisc may occur short-term pain, swelling at the injection site and / or the appearance of synovial exudate after injection. In some cases, exudation may be significant and cause more prolonged pain.     Important Safety Information  Before trying Synvisc-One or SYNVISC, tell your doctor if you are allergic to products from birds - such as feathers, eggs or poultry - or if your leg is swollen or infected. Synvisc-One and SYNVISC are only for injection into the knee, performed by a doctor or other qualified health care professional. Synvisc-One and SYNVISC have not been tested to show pain relief in joints other than the  knee. Talk to your doctor before resuming strenuous weight-bearing activities after treatment. Synvisc-One and SYNVISC have not been tested in children, pregnant women or women who are nursing. You should tell your doctor if you think you are pregnant or if you are nursing a child. The side effects most commonly seen when Synvisc-One or SYNVISC is injected into the knee were pain, swelling and/or fluid buildup in or around the knee. Cases where the swelling is extensive or painful should be discussed with your doctor. Allergic reactions such as rash and hives have been reported rarely.            Language Assistance Services     ATTENTION: Language assistance services are available, free of charge. Please call [phone].      ATENCIÓN: Si natalie park, tiene a srivastava disposición servicios gratuitos de asistencia lingüística. Llame al [phone].     JOANNE Ý: N?u b?n nói Ti?ng Vi?t, có các d?ch v? h? tr? ngôn ng? mi?n phí dành cho b?n. G?i s? [phone].         [facility] complies with applicable Federal civil rights laws and does not discriminate on the basis of race, color, national origin, age, disability, or sex.

## 2017-04-03 RX ORDER — VALACYCLOVIR HYDROCHLORIDE 1 G/1
1000 TABLET, FILM COATED ORAL 3 TIMES DAILY
Qty: 21 TABLET | Refills: 0 | Status: SHIPPED | OUTPATIENT
Start: 2017-04-03 | End: 2017-11-06 | Stop reason: SDUPTHER

## 2017-04-03 RX ORDER — ERGOCALCIFEROL 1.25 MG/1
CAPSULE ORAL
Qty: 12 CAPSULE | Refills: 0 | Status: SHIPPED | OUTPATIENT
Start: 2017-04-03 | End: 2017-06-19 | Stop reason: SDUPTHER

## 2017-04-03 NOTE — TELEPHONE ENCOUNTER
----- Message from Connor Vasquez sent at 4/3/2017  3:12 PM CDT -----  Contact: Self [phone]  Type: Rx    Name of medication(s): valacyclovir (VALTREX) 1000 MG tablet    Is this a refill? New rx? Refill    Who prescribed medication? Dr Cruz    Pharmacy Name, Phone, & Location:[facility]    Comments:Advice     Thanks

## 2017-05-31 ENCOUNTER — OFFICE VISIT (OUTPATIENT)
Dept: SPORTS MEDICINE | Facility: CLINIC | Age: 62
End: 2017-05-31
Payer: COMMERCIAL

## 2017-05-31 ENCOUNTER — HOSPITAL ENCOUNTER (OUTPATIENT)
Dept: RADIOLOGY | Facility: HOSPITAL | Age: 62
Discharge: HOME | End: 2017-05-31
Attending: ORTHOPAEDIC SURGERY
Payer: COMMERCIAL

## 2017-05-31 VITALS
BODY MASS INDEX: 31.5 KG/M2 | DIASTOLIC BLOOD PRESSURE: 82 MMHG | SYSTOLIC BLOOD PRESSURE: 130 MMHG | HEIGHT: 71 IN | HEART RATE: 82 BPM | WEIGHT: 225 LBS

## 2017-05-31 DIAGNOSIS — M25.562 LEFT KNEE PAIN, UNSPECIFIED CHRONICITY: ICD-10-CM

## 2017-05-31 DIAGNOSIS — M25.562 LEFT KNEE PAIN, UNSPECIFIED CHRONICITY: Primary | ICD-10-CM

## 2017-05-31 DIAGNOSIS — M17.12 PRIMARY LOCALIZED OSTEOARTHROSIS, LOWER LEG, LEFT: ICD-10-CM

## 2017-05-31 DIAGNOSIS — M21.169 GENU VARUM (ACQUIRED): ICD-10-CM

## 2017-05-31 PROCEDURE — 99214 OFFICE O/P EST MOD 30 MIN: CPT | Mod: S$GLB,,, | Performed by: ORTHOPAEDIC SURGERY

## 2017-05-31 PROCEDURE — 73564 X-RAY EXAM KNEE 4 OR MORE: CPT | Mod: 26,50,, | Performed by: RADIOLOGY

## 2017-05-31 PROCEDURE — 99999 PR PBB SHADOW E&M-EST. PATIENT-LVL III: CPT | Mod: PBBFAC,,, | Performed by: ORTHOPAEDIC SURGERY

## 2017-05-31 PROCEDURE — 73564 X-RAY EXAM KNEE 4 OR MORE: CPT | Mod: TC,50,PO

## 2017-05-31 NOTE — PROGRESS NOTES
Subjective:          Chief Complaint: Dhruv Fine is a 61 y.o. male who had concerns including Follow-up of the Left Knee.    Patient is here for a follow up for his left knee, had Synvisc-One injection 3mo ago.    DATE OF PROCEDURE:  02/10/2015     ATTENDING SURGEON:  Sam Fine M.D.     FIRST ASSISTANT:  Colton Guadarrama M.D. (RES)     SECOND ASSISTANT:  PARKER Abdalla.     PREOPERATIVE DIAGNOSES:  1.  Left knee lateral meniscus tear.  2.  Left knee medial meniscus tear.  3.  Left knee chondromalacia.  4.  Left knee synovitis.     OPERATION PERFORMED:  1.  Left knee medial and lateral arthroscopic meniscectomy.  2.  Left knee arthroscopic chondroplasty.  3.  Left knee arthroscopic synovectomy (complete).      Handedness: right-handed  Sport played:    Level:                Review of Systems   Constitution: Negative for fever and night sweats.   HENT: Negative for hearing loss.    Eyes: Negative for blurred vision and visual disturbance.   Cardiovascular: Negative for chest pain and leg swelling.   Respiratory: Negative for shortness of breath.    Endocrine: Negative for polyuria.   Hematologic/Lymphatic: Negative for bleeding problem.   Skin: Negative for rash.   Musculoskeletal: Negative for back pain, joint pain, joint swelling, muscle cramps and muscle weakness.   Gastrointestinal: Negative for melena.   Genitourinary: Negative for hematuria.   Neurological: Negative for loss of balance, numbness and paresthesias.   Psychiatric/Behavioral: Negative for altered mental status.       Pain Related Questions  Over the past 3 days, what was your average pain during activity? (I.e. running, jogging, walking, climbing stairs, getting dressed, ect.): 0  Over the past 3 days, what was your highest pain level?: 0  Over the past 3 days, what was your lowest pain level? : 0    Other  How many nights a week are you awakened by your affected body part?: 0  Was the patient's HEIGHT measured or patient reported?:  Patient Reported  Was the patient's WEIGHT measured or patient reported?: Measured      Objective:        General: Dhruv is well-developed, well-nourished, appears stated age, in no acute distress, alert and oriented to time, place and person.     General    Vitals reviewed.  Constitutional: He is oriented to person, place, and time. He appears well-developed and well-nourished. No distress.   HENT:   Head: Normocephalic and atraumatic.   Mouth/Throat: No oropharyngeal exudate.   Eyes: Right eye exhibits no discharge. Left eye exhibits no discharge.   Neck: Normal range of motion. No tracheal deviation present.   Cardiovascular: Normal rate and intact distal pulses.    Pulmonary/Chest: Effort normal and breath sounds normal. No respiratory distress.   Abdominal: He exhibits no distension.   Neurological: He is alert and oriented to person, place, and time. He has normal reflexes. No cranial nerve deficit. He exhibits normal muscle tone. Coordination normal.   Psychiatric: He has a normal mood and affect. His behavior is normal. Judgment and thought content normal.     General Musculoskeletal Exam   Gait: normal       Right Knee Exam     Inspection   Erythema: absent  Scars: absent  Swelling: absent  Effusion: effusion  Deformity: deformity  Bruising: absent    Tenderness   The patient is experiencing no tenderness.         Range of Motion   Extension: 0   Flexion: 140     Tests   Meniscus   Rudy:  Medial - negative Lateral - negative  Ligament Examination Lachman: normal (-1 to 2mm) PCL-Posterior Drawer: normal (0 to 2mm)     MCL - Valgus: normal (0 to 2mm)  LCL - Varus: normalPivot Shift: normal (Equal)Reverse Pivot Shift: normal (Equal)Dial Test at 30 degrees: normal (< 5 degrees)Dial Test at 90 degrees: normal (< 5 degrees)  Posterior Sag Test: negative  Posterolateral Corner: unstable (>15 degrees difference)  Patella   Patellar Apprehension: negative  Passive Patellar Tilt: neutral  Patellar Tracking:  normal  Patellar Glide (quadrants): Lateral - 1   Medial - 2  Q-Angle at 90 degrees: normal  Patellar Grind: negative  J-Sign: none    Other   Meniscal Cyst: absent  Popliteal (Baker's) Cyst: absent  Sensation: normal    Left Knee Exam     Inspection   Erythema: absent  Scars: present  Swelling: absent  Effusion: absent  Deformity: deformity  Bruising: absent    Tenderness   The patient tender to palpation of the condyle.    Range of Motion   Extension: 0 (0)   Flexion: 140     Tests   Meniscus   Rudy:  Medial - negative Lateral - negative  Stability Lachman: normal (-1 to 2mm) PCL-Posterior Drawer: normal (0 to 2mm)  MCL - Valgus: normal (0 to 2mm)  LCL - Varus: normal (0 to 2mm)Pivot Shift: normal (Equal)Reverse Pivot Shift: normal (Equal)Dial Test at 30 degrees: normal (< 5 degrees)Dial Test at 90 degrees: normal (< 5 degrees)  Posterior Sag Test: negative  Posterolateral Corner: unstable (>15 degrees difference)  Patella   Patellar Apprehension: negative  Passive Patellar Tilt: neutral  Patellar Tracking: normal  Patellar Glide (Quadrants): Lateral - 1 Medial - 2  Q-Angle at 90 degrees: normal  Patellar Grind: negative  J-Sign: J sign absent    Other   Meniscal Cyst: absent  Popliteal (Baker's) Cyst: absent  Sensation: normal    Comments:  Tender over medial femoral condyle      Right Hip Exam     Tests   Patricia: negative  Left Hip Exam     Tests   Patricia: negative          Muscle Strength   Right Lower Extremity   Hip Abduction: 5/5   Quadriceps:  5/5   Hamstrin/5   Left Lower Extremity   Hip Abduction: 5/5   Quadriceps:  5/5   Hamstrin/5     Reflexes     Left Side  Quadriceps:  2+  Achilles:  2+    Right Side   Quadriceps:  2+  Achilles:  2+    Vascular Exam     Right Pulses  Dorsalis Pedis:      2+  Posterior Tibial:      2+        Left Pulses  Dorsalis Pedis:      2+  Posterior Tibial:      2+        Edema  Right Lower Leg: absent  Left Lower Leg: absent          Assessment:       Encounter  Diagnoses   Name Primary?    Left knee pain, unspecified chronicity Yes    Primary localized osteoarthrosis, lower leg, left     Genu varum (acquired)           Plan:         1. IKDC, SF-12 and KOOS was filled out today in clinic.     RTC in 3 months with Dr. Sam Fine Patient will fill out IKDC, SF-12 and KOOS on return.    2. HEP 98466 - Dr. Fine and Holly Dalal, instructed and demonstrated a CORE HEP. The patient then demonstrated understanding of exercises and proper technique. This program was performed for 15 minutes.     3. New authorization placed for left knee Synvisc one injection            Patient questionnaires may have been collected.

## 2017-05-31 NOTE — PATIENT INSTRUCTIONS
DESCRIPTION  Synvisc-One (hylan G-F 20) is an elastoviscous high molecular weight fluid containing hylan A and hylan B polymers produced from chicken harrison. Hylans are derivatives of hyaluronan (sodium hyaluronate). Hylan G-F 20 is unique in that the hyaluronan is chemically cross linked. Hyaluronan is a long-chain polymer containing repeating disaccharide units of Qv-mmgapmcmked-M-acetylglucosamine.     INDICATIONS FOR USE  Synvisc-One is indicated for the treatment of pain in osteoarthritis (OA) of the knee in patients who have failed to respond adequately to conservative nonpharmacologic therapy and simple analgesics, e.g., acetaminophen.     Who is a candidate for Synvisc-One  Patients with knee osteoarthritis, who have tried diet, exercise and over-the-counter pain medication but still have pain, should talk to their doctor to see if Synvisc-One is right for them.     How Synvisc-One is administered  Synvisc-One is a single injection. It's a simple, in-office procedure that only takes a few minutes.     What you can expect following a Synvisc-One knee injection Synvisc-One can provide up to six months of osteoarthritis knee pain relief. Everyone responds differently, but in a medical study* patients experienced relief starting one month after the injection.     After the injection, you can resume normal day-to-day activities, but you should avoid any strenuous activities for about 48 hours.     Contraindication  Do not administer to patients with known hypersensitivity (allergy) to hyaluronan (sodium hyaluronate) preparations.   Do not inject Synvisc-One in the knees of patients having knee joint infections or skin diseases or infections in the area of theinjection site.    What are possible side effects?  Synvisc may occur short-term pain, swelling at the injection site and / or the appearance of synovial exudate after injection. In some cases, exudation may be significant and cause more prolonged  pain.     Important Safety Information  Before trying Synvisc-One or SYNVISC, tell your doctor if you are allergic to products from birds - such as feathers, eggs or poultry - or if your leg is swollen or infected. Synvisc-One and SYNVISC are only for injection into the knee, performed by a doctor or other qualified health care professional. Synvisc-One and SYNVISC have not been tested to show pain relief in joints other than the knee. Talk to your doctor before resuming strenuous weight-bearing activities after treatment. Synvisc-One and SYNVISC have not been tested in children, pregnant women or women who are nursing. You should tell your doctor if you think you are pregnant or if you are nursing a child. The side effects most commonly seen when Synvisc-One or SYNVISC is injected into the knee were pain, swelling and/or fluid buildup in or around the knee. Cases where the swelling is extensive or painful should be discussed with your doctor. Allergic reactions such as rash and hives have been reported rarely.

## 2017-06-19 DIAGNOSIS — E78.2 MIXED HYPERLIPIDEMIA: ICD-10-CM

## 2017-06-19 DIAGNOSIS — Z00.00 ANNUAL PHYSICAL EXAM: ICD-10-CM

## 2017-06-19 DIAGNOSIS — E55.9 VITAMIN D DEFICIENCY DISEASE: Primary | ICD-10-CM

## 2017-06-19 RX ORDER — ERGOCALCIFEROL 1.25 MG/1
CAPSULE ORAL
Qty: 12 CAPSULE | Refills: 0 | Status: SHIPPED | OUTPATIENT
Start: 2017-06-19 | End: 2018-02-26

## 2017-07-07 ENCOUNTER — OFFICE VISIT (OUTPATIENT)
Dept: OPTOMETRY | Facility: CLINIC | Age: 62
End: 2017-07-07
Payer: COMMERCIAL

## 2017-07-07 DIAGNOSIS — H52.03 HYPEROPIA WITH ASTIGMATISM AND PRESBYOPIA, BILATERAL: ICD-10-CM

## 2017-07-07 DIAGNOSIS — H52.203 HYPEROPIA WITH ASTIGMATISM AND PRESBYOPIA, BILATERAL: ICD-10-CM

## 2017-07-07 DIAGNOSIS — H52.4 HYPEROPIA WITH ASTIGMATISM AND PRESBYOPIA, BILATERAL: ICD-10-CM

## 2017-07-07 DIAGNOSIS — H25.13 NUCLEAR SCLEROSIS, BILATERAL: Primary | ICD-10-CM

## 2017-07-07 PROCEDURE — 92014 COMPRE OPH EXAM EST PT 1/>: CPT | Mod: S$GLB,,, | Performed by: OPTOMETRIST

## 2017-07-07 PROCEDURE — 99999 PR PBB SHADOW E&M-EST. PATIENT-LVL II: CPT | Mod: PBBFAC,,, | Performed by: OPTOMETRIST

## 2017-07-07 PROCEDURE — 92015 DETERMINE REFRACTIVE STATE: CPT | Mod: S$GLB,,, | Performed by: OPTOMETRIST

## 2017-07-07 NOTE — PROGRESS NOTES
SHEYLA SOLARES 05/2015. Glasses about 2 yr. Old.  Wants new glasses.    Last edited by Jena Bran on 7/7/2017  1:52 PM. (History)            Assessment /Plan     For exam results, see Encounter Report.    Nuclear sclerosis, bilateral    Hyperopia with astigmatism and presbyopia, bilateral      1. Educated pt on presence of cataracts and effects on vision. No surgery at this time. Recheck in one year.  2. Spec Rx given. Different lens options discussed with patient. RTC 1 year full exam.

## 2017-07-24 ENCOUNTER — TELEPHONE (OUTPATIENT)
Dept: INTERNAL MEDICINE | Facility: CLINIC | Age: 62
End: 2017-07-24

## 2017-07-24 DIAGNOSIS — K64.9 BLEEDING HEMORRHOIDS: Primary | ICD-10-CM

## 2017-07-24 NOTE — TELEPHONE ENCOUNTER
OK order signed thanks    Please make sure that he keeps his follow-up appointment with me which is scheduled for next month.

## 2017-07-24 NOTE — TELEPHONE ENCOUNTER
Spoke to pt---c/o hemorrhoid pain that has been increasing for 1 week. Pt denied active bleeding or blood in stool. Pt is requesting referral to Colon and Rectal dept.

## 2017-07-25 ENCOUNTER — OFFICE VISIT (OUTPATIENT)
Dept: SLEEP MEDICINE | Facility: CLINIC | Age: 62
End: 2017-07-25
Payer: COMMERCIAL

## 2017-07-25 ENCOUNTER — OFFICE VISIT (OUTPATIENT)
Dept: SURGERY | Facility: CLINIC | Age: 62
End: 2017-07-25
Payer: COMMERCIAL

## 2017-07-25 VITALS
OXYGEN SATURATION: 95 % | BODY MASS INDEX: 32.93 KG/M2 | DIASTOLIC BLOOD PRESSURE: 90 MMHG | WEIGHT: 230 LBS | HEIGHT: 70 IN | SYSTOLIC BLOOD PRESSURE: 122 MMHG | HEART RATE: 95 BPM

## 2017-07-25 VITALS
SYSTOLIC BLOOD PRESSURE: 127 MMHG | BODY MASS INDEX: 32.25 KG/M2 | DIASTOLIC BLOOD PRESSURE: 94 MMHG | HEIGHT: 71 IN | HEART RATE: 80 BPM | WEIGHT: 230.38 LBS

## 2017-07-25 DIAGNOSIS — K64.8 INTERNAL HEMORRHOIDS WITH COMPLICATION: Primary | ICD-10-CM

## 2017-07-25 DIAGNOSIS — E66.9 OBESITY, UNSPECIFIED OBESITY SEVERITY, UNSPECIFIED OBESITY TYPE: ICD-10-CM

## 2017-07-25 DIAGNOSIS — G47.33 OSA (OBSTRUCTIVE SLEEP APNEA): Primary | ICD-10-CM

## 2017-07-25 PROCEDURE — 99204 OFFICE O/P NEW MOD 45 MIN: CPT | Mod: S$GLB,,, | Performed by: INTERNAL MEDICINE

## 2017-07-25 PROCEDURE — 99999 PR PBB SHADOW E&M-EST. PATIENT-LVL III: CPT | Mod: PBBFAC,,, | Performed by: COLON & RECTAL SURGERY

## 2017-07-25 PROCEDURE — 99213 OFFICE O/P EST LOW 20 MIN: CPT | Mod: S$GLB,,, | Performed by: COLON & RECTAL SURGERY

## 2017-07-25 PROCEDURE — 99999 PR PBB SHADOW E&M-EST. PATIENT-LVL III: CPT | Mod: PBBFAC,,, | Performed by: INTERNAL MEDICINE

## 2017-07-25 NOTE — LETTER
July 25, 2017      Cassandra Cruz MD  [address]  [facility] [address]           [facility]  [address]  [facility] [address]  Phone: [phone]          Patient: Dhruv Fine   MR Number: [id number]   YOB: 1955   Date of Visit: 7/25/2017       Dear Dr. Cassandra Cruz:    Thank you for referring Dhruv Fine to me for evaluation. Attached you will find relevant portions of my assessment and plan of care.    If you have questions, please do not hesitate to call me. I look forward to following Dhruv Fine along with you.    Sincerely,    Brian Walker MD    Enclosure  CC:  No Recipients    If you would like to receive this communication electronically, please contact [email or web address] or [phone] to request more information on [employer] access.    For providers and/or their staff who would like to refer a patient to [facility], please contact us through our one-stop-shop provider referral line, [facility], at [phone].    If you feel you have received this communication in error or would no longer like to receive these types of communications, please e-mail [email or web address]

## 2017-07-25 NOTE — PROGRESS NOTES
CRS Office Visit    SUBJECTIVE:     Chief Complaint: Anal pain    History of Present Illness:  Patient is a 61 y.o. male presents with a one week history of anal pain that is now resolving.  Patient reports pain is similar to his past hemorrhoidal pain.  Patient states pain was a 10/10 1 week prior and is now down to a 3-4/10.  He denies any bleeding associated with the pain.  He believes his diet has worsened over the past months.  Had a normal CSP in 2014.      Review of patient's allergies indicates:   Allergen Reactions    No known allergies        Past Medical History:   Diagnosis Date    Anemia 10/11/2013    Anxiety     Bleeding hemorrhoids     Borderline hypertension 10/19/2013    Degenerative disc disease     Former smoker 1-2 packs daily for < 10 years 1/23/2015    Hyperlipidemia     Hypertension     Iron deficiency anemia 5/21/2015    Non morbid obesity due to excess calories 6/17/2016    Nuclear sclerosis - Both Eyes 2/25/2013    Other specified anemias 5/25/2015    Sleep apnea 7/26/2012     Past Surgical History:   Procedure Laterality Date    KNEE SURGERY Left 2/10/15    Dr. Fine     Family History   Problem Relation Age of Onset    Kidney disease Mother     Glaucoma Paternal Aunt     Cataracts Paternal Aunt     Cancer Sister     Heart disease Brother      rheumatic fever    Hepatitis Brother     Peripheral vascular disease Brother     Blindness Neg Hx     Amblyopia Neg Hx     Macular degeneration Neg Hx     Retinal detachment Neg Hx     Strabismus Neg Hx     Diabetes Neg Hx     Hypertension Neg Hx     Stroke Neg Hx     Thyroid disease Neg Hx      Social History   Substance Use Topics    Smoking status: Former Smoker    Smokeless tobacco: Not on file    Alcohol use Not on file        Review of Systems:  Constitutional: no fever or chills  Eyes: no visual changes  ENT: no nasal congestion or sore throat  Respiratory: no cough or shortness of breath  Cardiovascular: no  chest pain or palpitations  Gastrointestinal: no nausea or vomiting, tolerating diet    OBJECTIVE:     Vital Signs (Most Recent)  Pulse: 80 (07/25/17 0859)  BP: (!) 127/94 (07/25/17 0859)    Physical Exam:  General: Patient Refused male in NAD sitting in chair in clinic  Neuro: aaox4 maex4 perrl  Respiratory: resps even unlabored  Cardiac: cap refill <2 sec  Abdomen: Normal, benign.  Extremities: Warm dry and intact  Anorectal: tenderness, resolving thrombose external hemorrhoid noted, JUAN A with tenderness, no masses    ASSESSMENT/PLAN:     Resolving external thrombosed hemorrhoid    Patient will continue fiber therapy. A high fiber diet with plenty of fluids (up to 8 glasses of water daily) is suggested to relieve these symptoms.  Metamucil, 1 tablespoon once or twice daily can be used to keep bowels regular if needed.  Will prescribe Anusol cream today  Follow up as needed    Gaudencio Lang MD  Colon and Rectal Surgery Fellow  [phone]  Have seen and examined the patient with the fellow and agree with their plan.  ANTHONY BURCH

## 2017-07-25 NOTE — PROGRESS NOTES
Dhruv Fine  was seen as a new patient for the evaluation of  alan.    CHIEF COMPLAINT:    Chief Complaint   Patient presents with    Sleep Apnea       HISTORY OF PRESENT ILLNESS: Dhruv Fine is a 61 y.o. male is here for sleep evaluation.     S/p sleep study in 2010 at [facility].  Patient was started on cpap 10 cm H20 but did not tolerated cpap.  Machine was loud and uncomfortable.  Patient has seen Dr. Khan in 2013 but did not get along.       Currently, patient with loud snoring and witnessed apnea.  Feeling tired upon awake.  No parasomnia.  No cataplexy.  +bruxism.    +occasional leg discomfort.  Once every six months.    Kinde Sleepiness Scale score during initial sleep evaluation was 7.    SLEEP ROUTINE:  Activity the hour prior to sleep: watch tv in bed    Bed partner:  alone  Time to bed:  9 pm   Lights off:  tv is on timer   Sleep onset latency:  30 minutes of tv         Disruptions or awakenings:    1 time (no difficulty going back to sleep)  Wakeup time:      4 am   Perceived sleep quality:  tire       Daytime naps:      none  Weekend sleep routine:      12 am to 1 am till 8-9 am   Caffeine use: 1/2 coke per day  exercise habit:   minimal      PAST MEDICAL HISTORY:    Active Ambulatory Problems     Diagnosis Date Noted    Vitamin D deficiency disease 07/26/2012    Erectile dysfunction 07/26/2012    Mixed hyperlipidemia 07/26/2012    Bleeding hemorrhoids 07/26/2012    Anxiety     Sleep apnea needs CPAP 10 see report 2010: declines tx 07/26/2012    Nuclear sclerosis - Both Eyes 02/25/2013    DJD (degenerative joint disease) of lumbar spine 05/24/2013    DJD (degenerative joint disease) of cervical spine 05/24/2013    Genu varum (acquired) 01/19/2015    Former smoker 1-2 packs daily for < 10 years 01/23/2015    S/P L knee surgery, medial/lateral menisectomy, chondroplasty, synovectomy (2/10/15) 02/20/2015    Primary localized osteoarthrosis, lower leg 03/16/2015    Non morbid  obesity due to excess calories 06/17/2016    Chronic pain of left knee 02/13/2017    Chondromalacia, right knee 02/13/2017    Post-traumatic osteoarthritis of left knee 02/13/2017    Primary osteoarthritis of left knee 03/06/2017     Resolved Ambulatory Problems     Diagnosis Date Noted    Back pain 07/26/2012    Borderline hypertension 10/19/2013    Acute medial meniscal tear 01/19/2015    Knee internal derangement 02/05/2015    Other specified anemias 05/25/2015     Past Medical History:   Diagnosis Date    Anemia 10/11/2013    Anxiety     Bleeding hemorrhoids     Borderline hypertension 10/19/2013    Degenerative disc disease     Former smoker 1-2 packs daily for < 10 years 1/23/2015    Hyperlipidemia     Iron deficiency anemia 5/21/2015    Non morbid obesity due to excess calories 6/17/2016    Nuclear sclerosis - Both Eyes 2/25/2013    Sleep apnea 7/26/2012                PAST SURGICAL HISTORY:    Past Surgical History:   Procedure Laterality Date    KNEE SURGERY Left 2/10/15    Dr. Fine         FAMILY HISTORY:                Family History   Problem Relation Age of Onset    Kidney disease Mother     Glaucoma Paternal Aunt     Cataracts Paternal Aunt     Cancer Sister     Heart disease Brother      rheumatic fever    Hepatitis Brother     Peripheral vascular disease Brother     Blindness Neg Hx     Amblyopia Neg Hx     Macular degeneration Neg Hx     Retinal detachment Neg Hx     Strabismus Neg Hx     Diabetes Neg Hx     Hypertension Neg Hx     Stroke Neg Hx     Thyroid disease Neg Hx        SOCIAL HISTORY:          Tobacco:   History   Smoking Status    Former Smoker    Packs/day: 1.50    Years: 8.00    Quit date: 7/25/1984   Smokeless Tobacco    Never Used       alcohol use:    History   Alcohol Use    Yes     Comment: occasionally                 Occupation:  [employer]     ALLERGIES:    Review of patient's allergies indicates:   Allergen Reactions     "No known allergies        CURRENT MEDICATIONS:    Current Outpatient Prescriptions   Medication Sig Dispense Refill    diazepam (VALIUM) 5 MG tablet Take 5 mg by mouth Use as needed. 1 Tablet Oral .  may take as needed for back spasm, no more than 3-4 times weekly      ergocalciferol (ERGOCALCIFEROL) 50,000 unit Cap TAKE 1 CAPSULE BY MOUTH EVERY 7 DAYS 12 capsule 0    levocetirizine (XYZAL) 5 MG tablet TAKE 1 TABLET(5 MG) BY MOUTH EVERY EVENING 90 tablet 3    lidocaine (LIDODERM) 5 %(700 mg/patch) Place 1-2 patches onto the skin once daily. Wear patch for 12 hours and  must have a 12 hour period without a patch 60 patch 2    methocarbamol (ROBAXIN-750) 750 MG Tab Take 1 tablet (750 mg total) by mouth 3 (three) times daily. 90 tablet 2    methylPREDNISolone (MEDROL DOSEPACK) 4 mg tablet use as directed 1 Package 0    multivitamin (ONE DAILY MULTIVITAMIN) per tablet Take 1 tablet by mouth once daily.      valacyclovir (VALTREX) 1000 MG tablet Take 1 tablet (1,000 mg total) by mouth 3 (three) times daily. 21 tablet 0     Current Facility-Administered Medications   Medication Dose Route Frequency Provider Last Rate Last Dose    hylan g-f 20 48 mg/6 mL injection 48 mg  48 mg Intra-articular 1 time in Clinic/HOD Cate Maria NP                      REVIEW OF SYSTEMS:     Sleep related symptoms as per HPI.  CONST:Denies weight gain    HEENT: Denies sinus congestion  PULM: Denies dyspnea  CARD:  Denies palpitations   GI:  Denies acid reflux  : Denies polyuria  NEURO: Denies headaches  PSYCH: Denies mood disturbance  HEME: Denies anemia   Otherwise, a balance of systems reviewed is negative.          PHYSICAL EXAM:  Vitals:    07/25/17 1035   BP: (!) 122/90   Pulse: 95   SpO2: 95%   Weight: 104.3 kg (230 lb)   Height: 5' 10" (1.778 m)   PainSc: 0-No pain     Body mass index is 33 kg/m².     GENERAL: Normal development, well groomed  HEENT:  Conjunctivae are non-erythematous; Pupils equal, round, and reactive " to light; Nose is symmetrical; Nasal mucosa is pink and moist; Septum is midline; Inferior turbinates are normal; Nasal airflow is normal; Posterior pharynx is pink; Modified Mallampati: 4; Posterior palate is normal; Tonsils +1; Uvula is normal and pink;Tongue is normal; Dentition is fair; No TMJ tenderness; Jaw opening and protrusion without click and without discomfort.  NECK: Supple. Neck circumference is 17 inches. No thyromegaly. No palpable nodes.     SKIN: On face and neck: No abrasions, no rashes, no lesions.  No subcutaneous nodules are palpable.  RESPIRATORY: Chest is clear to auscultation.  Normal chest expansion and non-labored breathing at rest.  CARDIOVASCULAR: Normal S1, S2.  No murmurs, gallops or rubs. No carotid bruits bilaterally.  EXTREMITIES: No edema. No clubbing. No cyanosis. Station normal. Gait normal.        NEURO/PSYCH: Oriented to time, place and person. Normal attention span and concentration. Affect is full. Mood is normal.                                              DATA   PSG/ SPLIT night study  In 1/27/2010 showed significant RICKI with the AHI of 46.8/hour and SaO2 minimum of 76 %. Effective control of respiratory events was achieved at 10 cm H2O. Sleep efficiency was 76.6 %.    Lab Results   Component Value Date    TSH 1.940 06/17/2016     ASSESSMENT    ICD-10-CM ICD-9-CM    1. RICKI (obstructive sleep apnea) G47.33 327.23 Ambulatory consult to Dentistry      CPAP/BIPAP SUPPLIES   2. Obesity, unspecified obesity severity, unspecified obesity type E66.9 278.00        PLAN:    Sleep Apnea - result of sleep study d/w patient.  Recommend treatment due to severity.  Desensitization protocol d/w patient.  Will provide new cpap supplies.  Patient also inquire about oral appliance.  Patient is awared of lower efficacy of oral appliance in comparison to CPAP.  Advice patient to bring cpap to next visit.      Obesity - aware of need for weight loss.      Diagnostic: Polysomnogram. The nature  of this procedure and its indication was discussed with the patient.     Education: During our discussion today, we talked about the etiology of obstructive sleep apnea as well as the potential ramifications of untreated sleep apnea, which could include daytime sleepiness, hypertension, heart disease and/or stroke.     Precautions: The patient was advised to abstain from driving should they feel sleepy or drowsy.       Patient will Return in about 3 months (around 10/25/2017). with md/np.      This is 45 minutes visit, over 50% of time spent in direct consultation with patient.

## 2017-07-25 NOTE — PATIENT INSTRUCTIONS
Dentists for dental appliance  [facility]    - Dr. Carlos Potts     [phone], [address]  [place], [place] [address]    - Jefry Rivera Jr      [phone]  [address]   [place] [address]    - Dr. Nick Beaver       [phone]  Nick Beaver   [address] [place] [address]    -Dr. Dilan Austin   [phone]  [facility]    -Dr. Power Randall   [phone]      [facility]    -[facility]   [address]  [place] [address]    - Felecia Knapp [phone]      Step 1:  Wear the CPAP mask at home while awake for one hour each day. Practice breathing through the mask while watching television, reading, or performing another sedentary activity that keeps your mind off your anxiety.     Step 2: Use the CPAP mask  during scheduled one hour naps at home.     Step 3: Use CPAP mask  during the initial 3-4 hours of nocturnal sleep.     Step 4: Use CPAP mask  through the entire night of sleep.

## 2017-07-26 ENCOUNTER — TELEPHONE (OUTPATIENT)
Dept: SURGERY | Facility: CLINIC | Age: 62
End: 2017-07-26

## 2017-07-26 RX ORDER — HYDROCORTISONE 25 MG/G
CREAM TOPICAL 2 TIMES DAILY
Qty: 28 G | Refills: 1 | Status: SHIPPED | OUTPATIENT
Start: 2017-07-26 | End: 2018-08-15

## 2017-07-26 NOTE — TELEPHONE ENCOUNTER
----- Message from Mylene Salgado sent at 7/26/2017 11:01 AM CDT -----  Contact: pt#[phone]                                Prescription Refill Request  Name of medication and dose [facility] [id number] [id number] [address]    Are you completely out of your medication     Additional Information:

## 2017-07-30 RX ORDER — ATORVASTATIN CALCIUM 20 MG/1
TABLET, FILM COATED ORAL
Qty: 90 TABLET | Refills: 0 | Status: SHIPPED | OUTPATIENT
Start: 2017-07-30 | End: 2018-10-10

## 2017-08-18 ENCOUNTER — TELEPHONE (OUTPATIENT)
Dept: INTERNAL MEDICINE | Facility: CLINIC | Age: 62
End: 2017-08-18

## 2017-08-18 NOTE — TELEPHONE ENCOUNTER
----- Message from Ruben Mcdowell sent at 8/18/2017  9:05 AM CDT -----  Contact: self  Pt called in about wanting to see if there is any later appt on 8/21/17.Pt will be at work, if not pt would like to rs appt. Next appt is 11/2017. Pt would like to be seen before than. Pt would like the nurse to give him a call back in regards to this matter        Pt can be reached at [phone]      Thank You

## 2017-08-21 ENCOUNTER — LAB VISIT (OUTPATIENT)
Dept: LAB | Facility: HOSPITAL | Age: 62
End: 2017-08-21
Attending: INTERNAL MEDICINE
Payer: COMMERCIAL

## 2017-08-21 ENCOUNTER — OFFICE VISIT (OUTPATIENT)
Dept: INTERNAL MEDICINE | Facility: CLINIC | Age: 62
End: 2017-08-21
Payer: COMMERCIAL

## 2017-08-21 VITALS
WEIGHT: 229.25 LBS | DIASTOLIC BLOOD PRESSURE: 80 MMHG | SYSTOLIC BLOOD PRESSURE: 120 MMHG | HEIGHT: 71 IN | BODY MASS INDEX: 32.1 KG/M2

## 2017-08-21 DIAGNOSIS — G47.33 OBSTRUCTIVE SLEEP APNEA SYNDROME: ICD-10-CM

## 2017-08-21 DIAGNOSIS — E66.09 NON MORBID OBESITY DUE TO EXCESS CALORIES: ICD-10-CM

## 2017-08-21 DIAGNOSIS — E78.2 MIXED HYPERLIPIDEMIA: ICD-10-CM

## 2017-08-21 DIAGNOSIS — Z00.00 ANNUAL PHYSICAL EXAM: Primary | ICD-10-CM

## 2017-08-21 DIAGNOSIS — Z87.891 FORMER SMOKER: ICD-10-CM

## 2017-08-21 DIAGNOSIS — Z00.00 ANNUAL PHYSICAL EXAM: ICD-10-CM

## 2017-08-21 PROBLEM — G89.29 CHRONIC PAIN OF LEFT KNEE: Status: RESOLVED | Noted: 2017-02-13 | Resolved: 2017-08-21

## 2017-08-21 PROBLEM — M25.562 CHRONIC PAIN OF LEFT KNEE: Status: RESOLVED | Noted: 2017-02-13 | Resolved: 2017-08-21

## 2017-08-21 PROBLEM — M17.32 POST-TRAUMATIC OSTEOARTHRITIS OF LEFT KNEE: Status: RESOLVED | Noted: 2017-02-13 | Resolved: 2017-08-21

## 2017-08-21 LAB
25(OH)D3+25(OH)D2 SERPL-MCNC: 46 NG/ML
ALBUMIN SERPL BCP-MCNC: 3.8 G/DL
ALP SERPL-CCNC: 103 U/L
ALT SERPL W/O P-5'-P-CCNC: 35 U/L
ANION GAP SERPL CALC-SCNC: 10 MMOL/L
AST SERPL-CCNC: 42 U/L
BASOPHILS # BLD AUTO: 0.02 K/UL
BASOPHILS NFR BLD: 0.4 %
BILIRUB SERPL-MCNC: 0.3 MG/DL
BUN SERPL-MCNC: 9 MG/DL
CALCIUM SERPL-MCNC: 9.3 MG/DL
CHLORIDE SERPL-SCNC: 107 MMOL/L
CHOLEST/HDLC SERPL: 4.4 {RATIO}
CO2 SERPL-SCNC: 26 MMOL/L
COMPLEXED PSA SERPL-MCNC: 0.84 NG/ML
CREAT SERPL-MCNC: 1 MG/DL
DIFFERENTIAL METHOD: ABNORMAL
EOSINOPHIL # BLD AUTO: 0.1 K/UL
EOSINOPHIL NFR BLD: 2.4 %
ERYTHROCYTE [DISTWIDTH] IN BLOOD BY AUTOMATED COUNT: 16.6 %
EST. GFR  (AFRICAN AMERICAN): >60 ML/MIN/1.73 M^2
EST. GFR  (NON AFRICAN AMERICAN): >60 ML/MIN/1.73 M^2
GLUCOSE SERPL-MCNC: 84 MG/DL
HCT VFR BLD AUTO: 44 %
HDL/CHOLESTEROL RATIO: 22.5 %
HDLC SERPL-MCNC: 173 MG/DL
HDLC SERPL-MCNC: 39 MG/DL
HGB BLD-MCNC: 14.5 G/DL
LDLC SERPL CALC-MCNC: 98 MG/DL
LYMPHOCYTES # BLD AUTO: 2.2 K/UL
LYMPHOCYTES NFR BLD: 48.7 %
MCH RBC QN AUTO: 28.6 PG
MCHC RBC AUTO-ENTMCNC: 33 G/DL
MCV RBC AUTO: 87 FL
MONOCYTES # BLD AUTO: 0.4 K/UL
MONOCYTES NFR BLD: 8.1 %
NEUTROPHILS # BLD AUTO: 1.8 K/UL
NEUTROPHILS NFR BLD: 40.4 %
NONHDLC SERPL-MCNC: 134 MG/DL
PLATELET # BLD AUTO: 246 K/UL
PMV BLD AUTO: 9.2 FL
POTASSIUM SERPL-SCNC: 4.2 MMOL/L
PROT SERPL-MCNC: 8.3 G/DL
RBC # BLD AUTO: 5.07 M/UL
SODIUM SERPL-SCNC: 143 MMOL/L
TRIGL SERPL-MCNC: 180 MG/DL
TSH SERPL DL<=0.005 MIU/L-ACNC: 2.91 UIU/ML
WBC # BLD AUTO: 4.56 K/UL

## 2017-08-21 PROCEDURE — 83036 HEMOGLOBIN GLYCOSYLATED A1C: CPT

## 2017-08-21 PROCEDURE — 80061 LIPID PANEL: CPT

## 2017-08-21 PROCEDURE — 84153 ASSAY OF PSA TOTAL: CPT

## 2017-08-21 PROCEDURE — 36415 COLL VENOUS BLD VENIPUNCTURE: CPT

## 2017-08-21 PROCEDURE — 80053 COMPREHEN METABOLIC PANEL: CPT

## 2017-08-21 PROCEDURE — 84443 ASSAY THYROID STIM HORMONE: CPT

## 2017-08-21 PROCEDURE — 85025 COMPLETE CBC W/AUTO DIFF WBC: CPT

## 2017-08-21 PROCEDURE — 82306 VITAMIN D 25 HYDROXY: CPT

## 2017-08-21 PROCEDURE — 99396 PREV VISIT EST AGE 40-64: CPT | Mod: S$GLB,,, | Performed by: INTERNAL MEDICINE

## 2017-08-21 PROCEDURE — 99999 PR PBB SHADOW E&M-EST. PATIENT-LVL III: CPT | Mod: PBBFAC,,, | Performed by: INTERNAL MEDICINE
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
## 2017-08-22 ENCOUNTER — PATIENT MESSAGE (OUTPATIENT)
Dept: INTERNAL MEDICINE | Facility: CLINIC | Age: 62
End: 2017-08-22

## 2017-08-22 LAB
ESTIMATED AVG GLUCOSE: 126 MG/DL
HBA1C MFR BLD HPLC: 6 %

## 2017-08-28 ENCOUNTER — OFFICE VISIT (OUTPATIENT)
Dept: SPORTS MEDICINE | Facility: CLINIC | Age: 62
End: 2017-08-28
Payer: COMMERCIAL

## 2017-08-28 VITALS
BODY MASS INDEX: 30.8 KG/M2 | WEIGHT: 220 LBS | HEART RATE: 76 BPM | HEIGHT: 71 IN | DIASTOLIC BLOOD PRESSURE: 93 MMHG | SYSTOLIC BLOOD PRESSURE: 134 MMHG

## 2017-08-28 DIAGNOSIS — M17.12 PRIMARY LOCALIZED OSTEOARTHROSIS, LOWER LEG, LEFT: Primary | ICD-10-CM

## 2017-08-28 DIAGNOSIS — M94.261 CHONDROMALACIA, RIGHT KNEE: ICD-10-CM

## 2017-08-28 DIAGNOSIS — E66.09 NON MORBID OBESITY DUE TO EXCESS CALORIES: ICD-10-CM

## 2017-08-28 DIAGNOSIS — M21.169 GENU VARUM (ACQUIRED): ICD-10-CM

## 2017-08-28 DIAGNOSIS — M17.12 PRIMARY OSTEOARTHRITIS OF LEFT KNEE: ICD-10-CM

## 2017-08-28 PROCEDURE — 3008F BODY MASS INDEX DOCD: CPT | Mod: S$GLB,,, | Performed by: ORTHOPAEDIC SURGERY

## 2017-08-28 PROCEDURE — 99999 PR PBB SHADOW E&M-EST. PATIENT-LVL III: CPT | Mod: PBBFAC,,, | Performed by: ORTHOPAEDIC SURGERY

## 2017-08-28 PROCEDURE — 99214 OFFICE O/P EST MOD 30 MIN: CPT | Mod: S$GLB,,, | Performed by: ORTHOPAEDIC SURGERY

## 2017-08-28 NOTE — PATIENT INSTRUCTIONS
DESCRIPTION  Synvisc-One (hylan G-F 20) is an elastoviscous high molecular weight fluid containing hylan A and hylan B polymers produced from chicken harrison. Hylans are derivatives of hyaluronan (sodium hyaluronate). Hylan G-F 20 is unique in that the hyaluronan is chemically cross linked. Hyaluronan is a long-chain polymer containing repeating disaccharide units of Uj-tvtvxvoyfxu-I-acetylglucosamine.     INDICATIONS FOR USE  Synvisc-One is indicated for the treatment of pain in osteoarthritis (OA) of the knee in patients who have failed to respond adequately to conservative nonpharmacologic therapy and simple analgesics, e.g., acetaminophen.     Who is a candidate for Synvisc-One  Patients with knee osteoarthritis, who have tried diet, exercise and over-the-counter pain medication but still have pain, should talk to their doctor to see if Synvisc-One is right for them.     How Synvisc-One is administered  Synvisc-One is a single injection. It's a simple, in-office procedure that only takes a few minutes.     What you can expect following a Synvisc-One knee injection Synvisc-One can provide up to six months of osteoarthritis knee pain relief. Everyone responds differently, but in a medical study* patients experienced relief starting one month after the injection.     After the injection, you can resume normal day-to-day activities, but you should avoid any strenuous activities for about 48 hours.     Contraindication  Do not administer to patients with known hypersensitivity (allergy) to hyaluronan (sodium hyaluronate) preparations.   Do not inject Synvisc-One in the knees of patients having knee joint infections or skin diseases or infections in the area of theinjection site.    What are possible side effects?  Synvisc may occur short-term pain, swelling at the injection site and / or the appearance of synovial exudate after injection. In some cases, exudation may be significant and cause more prolonged pain.      Important Safety Information  Before trying Synvisc-One or SYNVISC, tell your doctor if you are allergic to products from birds - such as feathers, eggs or poultry - or if your leg is swollen or infected. Synvisc-One and SYNVISC are only for injection into the knee, performed by a doctor or other qualified health care professional. Synvisc-One and SYNVISC have not been tested to show pain relief in joints other than the knee. Talk to your doctor before resuming strenuous weight-bearing activities after treatment. Synvisc-One and SYNVISC have not been tested in children, pregnant women or women who are nursing. You should tell your doctor if you think you are pregnant or if you are nursing a child. The side effects most commonly seen when Synvisc-One or SYNVISC is injected into the knee were pain, swelling and/or fluid buildup in or around the knee. Cases where the swelling is extensive or painful should be discussed with your doctor. Allergic reactions such as rash and hives have been reported rarely.

## 2017-08-28 NOTE — PROGRESS NOTES
Subjective:          Chief Complaint: Dhruv Fine is a 61 y.o. male who had concerns including Follow-up of the Left Knee.    Patient is here for a follow up for his left knee.     DATE OF PROCEDURE:  02/10/2015     ATTENDING SURGEON:  Sam Fine M.D.     FIRST ASSISTANT:  Colton Guadarrama M.D. (RES)     SECOND ASSISTANT:  PARKER Abdalla.     PREOPERATIVE DIAGNOSES:  1.  Left knee lateral meniscus tear.  2.  Left knee medial meniscus tear.  3.  Left knee chondromalacia.  4.  Left knee synovitis.     OPERATION PERFORMED:  1.  Left knee medial and lateral arthroscopic meniscectomy.  2.  Left knee arthroscopic chondroplasty.  3.  Left knee arthroscopic synovectomy (complete).      Handedness: right-handed  Sport played:    Level:                Review of Systems   Constitution: Negative for fever and night sweats.   HENT: Negative for hearing loss.    Eyes: Negative for blurred vision and visual disturbance.   Cardiovascular: Negative for chest pain and leg swelling.   Respiratory: Negative for shortness of breath.    Endocrine: Negative for polyuria.   Hematologic/Lymphatic: Negative for bleeding problem.   Skin: Negative for rash.   Musculoskeletal: Negative for back pain, joint pain, joint swelling, muscle cramps and muscle weakness.   Gastrointestinal: Negative for melena.   Genitourinary: Negative for hematuria.   Neurological: Negative for loss of balance, numbness and paresthesias.   Psychiatric/Behavioral: Negative for altered mental status.       Pain Related Questions  Over the past 3 days, what was your average pain during activity? (I.e. running, jogging, walking, climbing stairs, getting dressed, ect.): 1  Over the past 3 days, what was your highest pain level?: 2  Over the past 3 days, what was your lowest pain level? : 1    Other  How many nights a week are you awakened by your affected body part?: 0  Was the patient's HEIGHT measured or patient reported?: Patient Reported  Was the patient's  WEIGHT measured or patient reported?: Measured      Objective:        General: Dhruv is well-developed, well-nourished, appears stated age, in no acute distress, alert and oriented to time, place and person.     General    Vitals reviewed.  Constitutional: He is oriented to person, place, and time. He appears well-developed and well-nourished. No distress.   HENT:   Head: Normocephalic and atraumatic.   Mouth/Throat: No oropharyngeal exudate.   Eyes: Right eye exhibits no discharge. Left eye exhibits no discharge.   Neck: Normal range of motion. No tracheal deviation present.   Cardiovascular: Normal rate and intact distal pulses.    Pulmonary/Chest: Effort normal and breath sounds normal. No respiratory distress.   Abdominal: He exhibits no distension.   Neurological: He is alert and oriented to person, place, and time. He has normal reflexes. No cranial nerve deficit. He exhibits normal muscle tone. Coordination normal.   Psychiatric: He has a normal mood and affect. His behavior is normal. Judgment and thought content normal.     General Musculoskeletal Exam   Gait: normal       Right Knee Exam     Inspection   Erythema: absent  Scars: absent  Swelling: absent  Effusion: effusion  Deformity: deformity  Bruising: absent    Tenderness   The patient is experiencing no tenderness.         Range of Motion   Extension: 0   Flexion: 140     Tests   Meniscus   Rudy:  Medial - negative Lateral - negative  Ligament Examination Lachman: normal (-1 to 2mm) PCL-Posterior Drawer: normal (0 to 2mm)     MCL - Valgus: normal (0 to 2mm)  LCL - Varus: normalPivot Shift: normal (Equal)Reverse Pivot Shift: normal (Equal)Dial Test at 30 degrees: normal (< 5 degrees)Dial Test at 90 degrees: normal (< 5 degrees)  Posterior Sag Test: negative  Posterolateral Corner: unstable (>15 degrees difference)  Patella   Patellar Apprehension: negative  Passive Patellar Tilt: neutral  Patellar Tracking: normal  Patellar Glide (quadrants):  Lateral - 1   Medial - 2  Q-Angle at 90 degrees: normal  Patellar Grind: negative  J-Sign: none    Other   Meniscal Cyst: absent  Popliteal (Baker's) Cyst: absent  Sensation: normal    Left Knee Exam     Inspection   Erythema: absent  Scars: present  Swelling: absent  Effusion: absent  Deformity: deformity  Bruising: absent    Tenderness   The patient tender to palpation of the condyle.    Range of Motion   Extension: 0 (0)   Flexion: 140     Tests   Meniscus   Rudy:  Medial - negative Lateral - negative  Stability Lachman: normal (-1 to 2mm) PCL-Posterior Drawer: normal (0 to 2mm)  MCL - Valgus: normal (0 to 2mm)  LCL - Varus: normal (0 to 2mm)Pivot Shift: normal (Equal)Reverse Pivot Shift: normal (Equal)Dial Test at 30 degrees: normal (< 5 degrees)Dial Test at 90 degrees: normal (< 5 degrees)  Posterior Sag Test: negative  Posterolateral Corner: unstable (>15 degrees difference)  Patella   Patellar Apprehension: negative  Passive Patellar Tilt: neutral  Patellar Tracking: normal  Patellar Glide (Quadrants): Lateral - 1 Medial - 2  Q-Angle at 90 degrees: normal  Patellar Grind: negative  J-Sign: J sign absent    Other   Meniscal Cyst: absent  Popliteal (Baker's) Cyst: absent  Sensation: normal    Comments:  Tender over medial femoral condyle      Right Hip Exam     Tests   Patricia: negative  Left Hip Exam     Tests   Patricia: negative          Muscle Strength   Right Lower Extremity   Hip Abduction: 5/5   Quadriceps:  5/5   Hamstrin/5   Left Lower Extremity   Hip Abduction: 5/5   Quadriceps:  5/5   Hamstrin/5     Reflexes     Left Side  Quadriceps:  2+  Achilles:  2+    Right Side   Quadriceps:  2+  Achilles:  2+    Vascular Exam     Right Pulses  Dorsalis Pedis:      2+  Posterior Tibial:      2+        Left Pulses  Dorsalis Pedis:      2+  Posterior Tibial:      2+        Edema  Right Lower Leg: absent  Left Lower Leg: absent          Assessment:       Encounter Diagnoses   Name Primary?    Primary  localized osteoarthrosis, lower leg, left Yes    Primary osteoarthritis of left knee     Chondromalacia, right knee     Genu varum (acquired)     Non morbid obesity due to excess calories           Plan:         1. IKDC, SF-12 and KOOS was filled out today in clinic.     RTC in 3 months with Dr. Sam Fine Patient will fill out IKDC, SF-12 and KOOS and bilateral knee series on return.    2. Synvisc-One injection left knee at next visit    3. Continue with activities as tolerated              Patient questionnaires may have been collected.

## 2017-11-06 RX ORDER — VALACYCLOVIR HYDROCHLORIDE 1 G/1
TABLET, FILM COATED ORAL
Qty: 21 TABLET | Refills: 0 | Status: SHIPPED | OUTPATIENT
Start: 2017-11-06 | End: 2018-01-05 | Stop reason: SDUPTHER

## 2017-11-06 RX ORDER — ATORVASTATIN CALCIUM 20 MG/1
TABLET, FILM COATED ORAL
Qty: 90 TABLET | Refills: 0 | Status: SHIPPED | OUTPATIENT
Start: 2017-11-06 | End: 2018-01-05 | Stop reason: SDUPTHER

## 2017-11-06 RX ORDER — VALACYCLOVIR HYDROCHLORIDE 1 G/1
TABLET, FILM COATED ORAL
Qty: 21 TABLET | Refills: 0 | Status: SHIPPED | OUTPATIENT
Start: 2017-11-06 | End: 2018-12-07

## 2017-11-20 ENCOUNTER — PATIENT MESSAGE (OUTPATIENT)
Dept: PULMONOLOGY | Facility: CLINIC | Age: 62
End: 2017-11-20

## 2018-01-05 ENCOUNTER — OFFICE VISIT (OUTPATIENT)
Dept: INTERNAL MEDICINE | Facility: CLINIC | Age: 63
End: 2018-01-05
Payer: COMMERCIAL

## 2018-01-05 VITALS
WEIGHT: 227.06 LBS | RESPIRATION RATE: 16 BRPM | SYSTOLIC BLOOD PRESSURE: 138 MMHG | BODY MASS INDEX: 31.79 KG/M2 | TEMPERATURE: 99 F | HEIGHT: 71 IN | DIASTOLIC BLOOD PRESSURE: 82 MMHG | HEART RATE: 89 BPM

## 2018-01-05 DIAGNOSIS — R09.82 POST-NASAL DRIP: ICD-10-CM

## 2018-01-05 DIAGNOSIS — J20.8 ACUTE BACTERIAL BRONCHITIS: Primary | ICD-10-CM

## 2018-01-05 DIAGNOSIS — B96.89 ACUTE BACTERIAL BRONCHITIS: Primary | ICD-10-CM

## 2018-01-05 PROCEDURE — 99213 OFFICE O/P EST LOW 20 MIN: CPT | Mod: S$GLB,,, | Performed by: INTERNAL MEDICINE

## 2018-01-05 PROCEDURE — 99999 PR PBB SHADOW E&M-EST. PATIENT-LVL III: CPT | Mod: PBBFAC,,, | Performed by: INTERNAL MEDICINE

## 2018-01-05 RX ORDER — DOXYCYCLINE 100 MG/1
100 CAPSULE ORAL 2 TIMES DAILY
Qty: 20 CAPSULE | Refills: 0 | Status: SHIPPED | OUTPATIENT
Start: 2018-01-05 | End: 2018-01-15

## 2018-01-05 NOTE — PROGRESS NOTES
"Subjective:       Patient ID: Dhruv Fine is a 62 y.o. male.    Chief Complaint: Cough    HPI    Pt reports onset of coughing several weeks ago. It has been gradually worsening. He felt really bad after judd so started taking vitamin C, otc NSAIDs, warm liquids. He takes xyzal. He initially had improvement, but then had sudden worsening. A/w voice change. Cough is productive. He had a fever of 100.5F Wednesday.     Review of Systems   Constitutional: Positive for fever. Negative for chills.   HENT: Positive for congestion. Negative for postnasal drip, rhinorrhea, sinus pain and sore throat.    Eyes: Negative for discharge and redness.   Respiratory: Positive for cough, chest tightness, shortness of breath and wheezing.    Cardiovascular: Negative for chest pain.   Gastrointestinal: Negative for diarrhea, nausea and vomiting.   Musculoskeletal: Negative for arthralgias and myalgias.   Skin: Negative for rash.   Allergic/Immunologic: Negative for immunocompromised state.   Neurological: Negative for headaches.   Hematological: Negative for adenopathy.       Objective:        Vitals:    01/05/18 1143   BP: 138/82   BP Location: Right arm   Patient Position: Sitting   BP Method: Medium (Manual)   Pulse: 89   Resp: 16   Temp: 99 °F (37.2 °C)   TempSrc: Oral   Weight: 103 kg (227 lb 1.2 oz)   Height: 5' 11" (1.803 m)       Body mass index is 31.67 kg/m².    Physical Exam   Constitutional: He is oriented to person, place, and time. He appears well-developed and well-nourished.   HENT:   Head: Normocephalic and atraumatic.   Right Ear: External ear and ear canal normal. Tympanic membrane is not injected, not erythematous and not bulging.   Left Ear: External ear and ear canal normal. Tympanic membrane is not injected, not erythematous and not bulging.   Nose: Mucosal edema and rhinorrhea present. Right sinus exhibits no maxillary sinus tenderness and no frontal sinus tenderness. Left sinus exhibits no maxillary " sinus tenderness and no frontal sinus tenderness.   Mouth/Throat: Posterior oropharyngeal erythema present. No posterior oropharyngeal edema. No tonsillar exudate.   Cobblestoning of posterior oropharynx     Eyes: Conjunctivae are normal. Right eye exhibits no discharge. Left eye exhibits no discharge. Right conjunctiva is not injected. Left conjunctiva is not injected.   Neck: Normal range of motion.   Cardiovascular: Normal rate, regular rhythm, normal heart sounds and intact distal pulses.    Pulmonary/Chest: Effort normal. No respiratory distress. He has no wheezes. He has rhonchi.   Abdominal: Soft. Bowel sounds are normal.   Lymphadenopathy:     He has no cervical adenopathy.   Neurological: He is alert and oriented to person, place, and time.   Skin: Skin is warm and dry. No rash noted.   Psychiatric: He has a normal mood and affect.       Assessment:     1. Acute bacterial bronchitis    2. Post-nasal drip           Plan:         1. Acute bacterial bronchitis  - doxycycline (VIBRAMYCIN) 100 MG Cap; Take 1 capsule (100 mg total) by mouth 2 (two) times daily.  Dispense: 20 capsule; Refill: 0    2. Post-nasal drip  - discussed otc symptomatic treatment: antihistamine, nasal steroid spray, nasal saline rinse; chloraseptic spray for sore throat; dextromethorphan to suppress coughing.

## 2018-01-05 NOTE — PATIENT INSTRUCTIONS
Take an antihistamine daily (allegra/ zyrtec/ xyzal/ claritin). You may also take a benadryl at night if symptoms are uncontrolled or affecting sleep- only do this for a few nights. Generic medications are okay.  Use a nasal steroid spray such as Flonase or Nasacort daily.   Nasal saline rinse or spray (Ocean spray/ Nasal Mist) will help with congestion. Only use sterile water if using a neti-pot. If using the spray, lean head back and spray each nostril for 2-3 seconds.   Continue this regimen for at least 2 weeks. Expect symptoms to last 7-10 days.   Avoid decongestant medications if you have hypertension, heart disease or abnormal heart rhythm.   Dextromethorphan (DM, delsym) will help to suppress coughing. Do night drive if drowsy.  Warm salt water gargles to alleviate sore throat or lozenges or chloraseptic spray.

## 2018-01-16 ENCOUNTER — OFFICE VISIT (OUTPATIENT)
Dept: INTERNAL MEDICINE | Facility: CLINIC | Age: 63
End: 2018-01-16
Payer: COMMERCIAL

## 2018-01-16 VITALS
HEIGHT: 71 IN | TEMPERATURE: 99 F | HEART RATE: 101 BPM | SYSTOLIC BLOOD PRESSURE: 130 MMHG | BODY MASS INDEX: 31.79 KG/M2 | DIASTOLIC BLOOD PRESSURE: 88 MMHG | WEIGHT: 227.06 LBS | OXYGEN SATURATION: 98 %

## 2018-01-16 DIAGNOSIS — H65.113 ACUTE ALLERGIC MUCOID OTITIS MEDIA, BILATERAL: Primary | ICD-10-CM

## 2018-01-16 DIAGNOSIS — R05.9 COUGH: ICD-10-CM

## 2018-01-16 DIAGNOSIS — R09.81 NASAL CONGESTION: ICD-10-CM

## 2018-01-16 PROCEDURE — 99999 PR PBB SHADOW E&M-EST. PATIENT-LVL IV: CPT | Mod: PBBFAC,,, | Performed by: NURSE PRACTITIONER

## 2018-01-16 PROCEDURE — 99213 OFFICE O/P EST LOW 20 MIN: CPT | Mod: S$GLB,,, | Performed by: NURSE PRACTITIONER

## 2018-01-16 RX ORDER — AMOXICILLIN 500 MG/1
500 TABLET, FILM COATED ORAL EVERY 12 HOURS
Qty: 20 TABLET | Refills: 0 | Status: SHIPPED | OUTPATIENT
Start: 2018-01-16 | End: 2018-01-26

## 2018-01-16 RX ORDER — FLUTICASONE PROPIONATE 50 MCG
1 SPRAY, SUSPENSION (ML) NASAL DAILY
Qty: 1 BOTTLE | Refills: 0 | Status: SHIPPED | OUTPATIENT
Start: 2018-01-16 | End: 2018-01-16 | Stop reason: SDUPTHER

## 2018-01-16 NOTE — PATIENT INSTRUCTIONS
Mucinex (Plain) 600mg twice a day with full glass of water.     Amoxicillin twice daily with food      Flonase 2 squirts to each nostril daily as demonstrated.

## 2018-01-16 NOTE — PROGRESS NOTES
INTERNAL MEDICINE PROGRESS/URGENT CARE NOTE    CHIEF COMPLAINT     Chief Complaint   Patient presents with    Ear Fullness     R ear     Nasal Congestion     post nasal drip, medication is complete       HPI     Dhruv Fine is a 62 y.o. male who presents for an urgent/follow up visit today.  He is an est pt of Dr. Cruz.     Here for follow up after seeing urgent care 1/5/2018 for bronchitis - treated with doxycycline x10 days. Completed yesterday     Still with sinus pain and pressure and left ear fullness. Still with PND and nasal congestion.   +headache.   +sinus pain and pressure  No fever or chills.   +cough - improving   Not using nasal spray or antihistamine.     Past Medical History:  Past Medical History:   Diagnosis Date    Anemia 10/11/2013    Anxiety     Bleeding hemorrhoids     Borderline hypertension 10/19/2013    Degenerative disc disease     cervical and lumbar    Former smoker 1-2 packs daily for < 10 years 1/23/2015    Hyperlipidemia     Iron deficiency anemia 5/21/2015    Non morbid obesity due to excess calories 6/17/2016    Nuclear sclerosis - Both Eyes 2/25/2013    Sleep apnea 7/26/2012       Home Medications:  Prior to Admission medications    Medication Sig Start Date End Date Taking? Authorizing Provider   atorvastatin (LIPITOR) 20 MG tablet TAKE 1 TABLET BY MOUTH EVERY DAY 7/30/17  Yes Cassandra Cruz MD   ergocalciferol (ERGOCALCIFEROL) 50,000 unit Cap TAKE 1 CAPSULE BY MOUTH EVERY 7 DAYS 6/19/17  Yes Cassandra Cruz MD   levocetirizine (XYZAL) 5 MG tablet TAKE 1 TABLET(5 MG) BY MOUTH EVERY EVENING 9/1/16  Yes Linda Pritchett MD   lidocaine (LIDODERM) 5 %(700 mg/patch) Place 1-2 patches onto the skin once daily. Wear patch for 12 hours and  must have a 12 hour period without a patch 1/23/13  Yes Jena Small MD   multivitamin (ONE DAILY MULTIVITAMIN) per tablet Take 1 tablet by mouth once daily.   Yes Historical Provider, MD   valACYclovir (VALTREX) 1000 MG  "tablet TAKE 1 TABLET(1000 MG) BY MOUTH THREE TIMES DAILY 11/6/17  Yes Cassandra Cruz MD   doxycycline (VIBRAMYCIN) 100 MG Cap Take 1 capsule (100 mg total) by mouth 2 (two) times daily. 1/5/18 1/15/18  Tamiko Null MD   hydrocortisone 2.5 % cream Apply topically 2 (two) times daily. 7/26/17 8/5/17  Ion Bass MD       Review of Systems:  Review of Systems   Constitutional: Negative for chills, fatigue and fever.   HENT: Positive for congestion, ear pain, postnasal drip, rhinorrhea, sinus pain, sinus pressure and sore throat.    Respiratory: Positive for cough. Negative for shortness of breath and wheezing.    Cardiovascular: Negative for chest pain and palpitations.   Gastrointestinal: Negative for abdominal pain, constipation, diarrhea, nausea and vomiting.   Skin: Negative for rash.   Neurological: Positive for headaches. Negative for dizziness and light-headedness.       Health Maintainence:   Immunizations:  Health Maintenance       Date Due Completion Date    Zoster Vaccine 10/20/2013 ---    Influenza Vaccine 08/01/2017 1/23/2015 (Declined)    Override on 1/23/2015: Declined    Override on 10/18/2013: Declined    Override on 9/26/2011: Done    Lipid Panel 08/21/2018 8/21/2017    Colonoscopy 07/17/2024 7/17/2014    Override on 7/26/2005: Done    TETANUS VACCINE 08/21/2024 8/21/2014           PHYSICAL EXAM     /88 (BP Location: Right arm, Patient Position: Sitting, BP Method: Large (Manual))   Pulse 101   Temp 98.9 °F (37.2 °C)   Ht 5' 11" (1.803 m)   Wt 103 kg (227 lb 1.2 oz)   SpO2 98%   BMI 31.67 kg/m²     Physical Exam   Constitutional: He is oriented to person, place, and time. He appears well-developed and well-nourished.   HENT:   Head: Normocephalic.   Right Ear: External ear normal. Tympanic membrane is erythematous and retracted.   Left Ear: External ear normal. Tympanic membrane is erythematous and bulging.   Nose: Mucosal edema and rhinorrhea present. Right sinus exhibits " maxillary sinus tenderness and frontal sinus tenderness. Left sinus exhibits maxillary sinus tenderness and frontal sinus tenderness.   Mouth/Throat: Oropharyngeal exudate (Pnd ) and posterior oropharyngeal erythema present. No posterior oropharyngeal edema.   Eyes: Pupils are equal, round, and reactive to light.   Neck: No JVD present. No tracheal deviation present. No thyromegaly present.   Cardiovascular: Normal rate, regular rhythm and intact distal pulses.  Exam reveals no gallop and no friction rub.    No murmur heard.  Pulmonary/Chest: Breath sounds normal. No respiratory distress. He has no wheezes. He has no rales. He exhibits no tenderness.   Abdominal: Soft. Bowel sounds are normal. He exhibits no distension. There is no tenderness.   Musculoskeletal: Normal range of motion. He exhibits no edema or tenderness.   Lymphadenopathy:     He has no cervical adenopathy.   Neurological: He is alert and oriented to person, place, and time.   Skin: Skin is warm and dry. No rash noted.   Psychiatric: He has a normal mood and affect. His behavior is normal.   Vitals reviewed.      LABS     Lab Results   Component Value Date    HGBA1C 6.0 (H) 08/21/2017     CMP  Sodium   Date Value Ref Range Status   08/21/2017 143 136 - 145 mmol/L Final     Potassium   Date Value Ref Range Status   08/21/2017 4.2 3.5 - 5.1 mmol/L Final     Chloride   Date Value Ref Range Status   08/21/2017 107 95 - 110 mmol/L Final     CO2   Date Value Ref Range Status   08/21/2017 26 23 - 29 mmol/L Final     Glucose   Date Value Ref Range Status   08/21/2017 84 70 - 110 mg/dL Final     BUN, Bld   Date Value Ref Range Status   08/21/2017 9 8 - 23 mg/dL Final     Creatinine   Date Value Ref Range Status   08/21/2017 1.0 0.5 - 1.4 mg/dL Final     Calcium   Date Value Ref Range Status   08/21/2017 9.3 8.7 - 10.5 mg/dL Final     Total Protein   Date Value Ref Range Status   08/21/2017 8.3 6.0 - 8.4 g/dL Final     Albumin   Date Value Ref Range Status    08/21/2017 3.8 3.5 - 5.2 g/dL Final     Total Bilirubin   Date Value Ref Range Status   08/21/2017 0.3 0.1 - 1.0 mg/dL Final     Comment:     For infants and newborns, interpretation of results should be based  on gestational age, weight and in agreement with clinical  observations.  Premature Infant recommended reference ranges:  Up to 24 hours.............<8.0 mg/dL  Up to 48 hours............<12.0 mg/dL  3-5 days..................<15.0 mg/dL  6-29 days.................<15.0 mg/dL       Alkaline Phosphatase   Date Value Ref Range Status   08/21/2017 103 55 - 135 U/L Final     AST   Date Value Ref Range Status   08/21/2017 42 (H) 10 - 40 U/L Final     ALT   Date Value Ref Range Status   08/21/2017 35 10 - 44 U/L Final     Anion Gap   Date Value Ref Range Status   08/21/2017 10 8 - 16 mmol/L Final     eGFR if    Date Value Ref Range Status   08/21/2017 >60.0 >60 mL/min/1.73 m^2 Final     eGFR if non    Date Value Ref Range Status   08/21/2017 >60.0 >60 mL/min/1.73 m^2 Final     Comment:     Calculation used to obtain the estimated glomerular filtration  rate (eGFR) is the CKD-EPI equation. Since race is unknown   in our information system, the eGFR values for   -American and Non--American patients are given   for each creatinine result.       Lab Results   Component Value Date    WBC 4.56 08/21/2017    HGB 14.5 08/21/2017    HCT 44.0 08/21/2017    MCV 87 08/21/2017     08/21/2017     Lab Results   Component Value Date    CHOL 173 08/21/2017    CHOL 213 (H) 06/17/2016    CHOL 295 (H) 05/27/2015     Lab Results   Component Value Date    HDL 39 (L) 08/21/2017    HDL 43 06/17/2016    HDL 41 05/27/2015     Lab Results   Component Value Date    LDLCALC 98.0 08/21/2017    LDLCALC 145.6 06/17/2016    LDLCALC 219.6 (H) 05/27/2015     Lab Results   Component Value Date    TRIG 180 (H) 08/21/2017    TRIG 122 06/17/2016    TRIG 172 (H) 05/27/2015     Lab Results   Component  Value Date    CHOLHDL 22.5 08/21/2017    CHOLHDL 20.2 06/17/2016    CHOLHDL 13.9 (L) 05/27/2015     Lab Results   Component Value Date    TSH 2.911 08/21/2017       ASSESSMENT/PLAN     Dhruv Fine is a 62 y.o. male with  Past Medical History:   Diagnosis Date    Anemia 10/11/2013    Anxiety     Bleeding hemorrhoids     Borderline hypertension 10/19/2013    Degenerative disc disease     cervical and lumbar    Former smoker 1-2 packs daily for < 10 years 1/23/2015    Hyperlipidemia     Iron deficiency anemia 5/21/2015    Non morbid obesity due to excess calories 6/17/2016    Nuclear sclerosis - Both Eyes 2/25/2013    Sleep apnea 7/26/2012     Acute allergic mucoid otitis media, bilateral- will start amoxicillin bid x 1 week. Increase fluids and rest. mcuinex with full glass of water.   -     amoxicillin (AMOXIL) 500 MG Tab; Take 1 tablet (500 mg total) by mouth every 12 (twelve) hours.  Dispense: 20 tablet; Refill: 0    Cough- may use Flonase for PND likely cause of cough. Pulmonary PE WNL     Nasal congestion- flonase 2 squirts as demonstrated   -     fluticasone (FLONASE) 50 mcg/actuation nasal spray; 1 spray (50 mcg total) by Each Nare route once daily.  Dispense: 1 Bottle; Refill: 0          Follow up as  Needed     Patient education provided from [facility]. Patient was counseled on when and how to seek emergent care.       Susana Costa-Ayan MELO, APRN, FNP-c   Department of Internal Medicine - [facility]  4:22 PM

## 2018-01-17 RX ORDER — FLUTICASONE PROPIONATE 50 MCG
SPRAY, SUSPENSION (ML) NASAL
Qty: 1 BOTTLE | Refills: 2 | Status: SHIPPED | OUTPATIENT
Start: 2018-01-17 | End: 2018-11-07 | Stop reason: SDUPTHER

## 2018-02-26 ENCOUNTER — OFFICE VISIT (OUTPATIENT)
Dept: INTERNAL MEDICINE | Facility: CLINIC | Age: 63
End: 2018-02-26
Payer: COMMERCIAL

## 2018-02-26 VITALS
DIASTOLIC BLOOD PRESSURE: 80 MMHG | SYSTOLIC BLOOD PRESSURE: 130 MMHG | TEMPERATURE: 100 F | HEIGHT: 72 IN | WEIGHT: 224.88 LBS | BODY MASS INDEX: 30.46 KG/M2

## 2018-02-26 DIAGNOSIS — J01.00 ACUTE NON-RECURRENT MAXILLARY SINUSITIS: Primary | ICD-10-CM

## 2018-02-26 PROCEDURE — 99213 OFFICE O/P EST LOW 20 MIN: CPT | Mod: S$GLB,,, | Performed by: INTERNAL MEDICINE

## 2018-02-26 PROCEDURE — 3008F BODY MASS INDEX DOCD: CPT | Mod: S$GLB,,, | Performed by: INTERNAL MEDICINE

## 2018-02-26 PROCEDURE — 99999 PR PBB SHADOW E&M-EST. PATIENT-LVL III: CPT | Mod: PBBFAC,,, | Performed by: INTERNAL MEDICINE

## 2018-02-26 RX ORDER — AMOXICILLIN AND CLAVULANATE POTASSIUM 875; 125 MG/1; MG/1
1 TABLET, FILM COATED ORAL 2 TIMES DAILY
Qty: 14 TABLET | Refills: 0 | Status: SHIPPED | OUTPATIENT
Start: 2018-02-26 | End: 2018-03-05

## 2018-02-26 NOTE — PATIENT INSTRUCTIONS
Sinusitis (Antibiotic Treatment)    The sinuses are air-filled spaces within the bones of the face. They connect to the inside of the nose. Sinusitis is an inflammation of the tissue lining the sinus cavity. Sinus inflammation can occur during a cold. It can also be due to allergies to pollens and other particles in the air. Sinusitis can cause symptoms of sinus congestion and fullness. A sinus infection causes fever, headache and facial pain. There is often green or yellow drainage from the nose or into the back of the throat (post-nasal drip). You have been given antibiotics to treat this condition.  Home care:  · Take the full course of antibiotics as instructed. Do not stop taking them, even if you feel better.  · Drink plenty of water, hot tea, and other liquids. This may help thin mucus. It also may promote sinus drainage.  · Heat may help soothe painful areas of the face. Use a towel soaked in hot water. Or,  the shower and direct the hot spray onto your face. Using a vaporizer along with a menthol rub at night may also help.   · An expectorant containing guaifenesin may help thin the mucus and promote drainage from the sinuses.  · Over-the-counter decongestants may be used unless a similar medicine was prescribed. Nasal sprays work the fastest. Use one that contains phenylephrine or oxymetazoline. First blow the nose gently. Then use the spray. Do not use these medicines more often than directed on the label or symptoms may get worse. You may also use tablets containing pseudoephedrine. Avoid products that combine ingredients, because side effects may be increased. Read labels. You can also ask the pharmacist for help. (NOTE: Persons with high blood pressure should not use decongestants. They can raise blood pressure.)  · Over-the-counter antihistamines may help if allergies contributed to your sinusitis.    · Do not use nasal rinses or irrigation during an acute sinus infection, unless told to by  your health care provider. Rinsing may spread the infection to other sinuses.  · Use acetaminophen or ibuprofen to control pain, unless another pain medicine was prescribed. (If you have chronic liver or kidney disease or ever had a stomach ulcer, talk with your doctor before using these medicines. Aspirin should never be used in anyone under 18 years of age who is ill with a fever. It may cause severe liver damage.)  · Don't smoke. This can worsen symptoms.  Follow-up care  Follow up with your healthcare provider or our staff if you are not improving within the next week.  When to seek medical advice  Call your healthcare provider if any of these occur:  · Facial pain or headache becoming more severe  · Stiff neck  · Unusual drowsiness or confusion  · Swelling of the forehead or eyelids  · Vision problems, including blurred or double vision  · Fever of 100.4ºF (38ºC) or higher, or as directed by your healthcare provider  · Seizure  · Breathing problems  · Symptoms not resolving within 10 days  Date Last Reviewed: 4/13/2015  © 5093-1346 [employer]. [address] [place] [address], [place] [address]. All rights reserved. This information is not intended as a substitute for professional medical care. Always follow your healthcare professional's instructions.

## 2018-02-26 NOTE — PROGRESS NOTES
Subjective:       Patient ID: Dhruv Fine is a 62 y.o. male.    Chief Complaint: URI and Cough    [facility] appt    URI sx, cough for a few days to weeks worsening on Friday.  Throat pain, worsening cough.  PND.   Low grade fever.  No chest pain or tightness.  Fever high on Sunday to today (100.0 now).   Also pressure in R ear.  Some ill contacts.    No nausea, vomiting or diarrhea.    Dry cough for the most part.    Labs stable, BP doing well.    Patient Active Problem List:     Vitamin D deficiency disease     Erectile dysfunction     Mixed hyperlipidemia     Bleeding hemorrhoids     Anxiety     Obstructive sleep apnea syndrome: needs CPAP- see sleep assessment 7/17     Nuclear sclerosis - Both Eyes     DJD (degenerative joint disease) of lumbar spine     DJD (degenerative joint disease) of cervical spine     Genu varum (acquired)     Former smoker 1-2 packs daily for < 10 years     S/P L knee surgery, medial/lateral menisectomy, chondroplasty, synovectomy (2/10/15)     Primary localized osteoarthrosis, lower leg     Non morbid obesity due to excess calories     Chondromalacia, right knee     Primary osteoarthritis of left knee          Review of Systems   Constitutional: Positive for fatigue and fever. Negative for chills.   HENT: Positive for congestion, ear pain and postnasal drip.    Eyes: Negative.    Respiratory: Positive for cough. Negative for chest tightness, shortness of breath and wheezing.    Cardiovascular: Negative.    Gastrointestinal: Negative.        Objective:      Physical Exam   Constitutional: He is oriented to person, place, and time. He appears well-developed and well-nourished.   HENT:   Head: Normocephalic and atraumatic.   Right Ear: External ear normal.   Left Ear: External ear normal.   Mouth/Throat: Oropharynx is clear and moist.   Eyes: Pupils are equal, round, and reactive to light.   Neck: Normal range of motion. Neck supple. No thyromegaly present.   Cardiovascular: Normal rate and  regular rhythm.    Pulmonary/Chest: No respiratory distress. He has wheezes. He has no rales.   Abdominal: Soft. Bowel sounds are normal. He exhibits no distension. There is no tenderness.   Musculoskeletal: He exhibits no edema.   Neurological: He is alert and oriented to person, place, and time.   Skin: Skin is warm and dry. No rash noted. No erythema.   Psychiatric: He has a normal mood and affect.       Assessment:       1. Acute non-recurrent maxillary sinusitis        Plan:         Acute non-recurrent maxillary sinusitis    Other orders  -     amoxicillin-clavulanate 875-125mg (AUGMENTIN) 875-125 mg per tablet; Take 1 tablet by mouth 2 (two) times daily.  Dispense: 14 tablet; Refill: 0    Rest, fluids, acetaminophen, mucinex and follow up poor results.  He declines flu shot

## 2018-07-19 ENCOUNTER — OFFICE VISIT (OUTPATIENT)
Dept: INTERNAL MEDICINE | Facility: CLINIC | Age: 63
End: 2018-07-19
Payer: COMMERCIAL

## 2018-07-19 VITALS
WEIGHT: 229.25 LBS | SYSTOLIC BLOOD PRESSURE: 120 MMHG | HEIGHT: 71 IN | BODY MASS INDEX: 32.1 KG/M2 | DIASTOLIC BLOOD PRESSURE: 70 MMHG

## 2018-07-19 DIAGNOSIS — G47.33 OBSTRUCTIVE SLEEP APNEA SYNDROME: ICD-10-CM

## 2018-07-19 DIAGNOSIS — Z00.00 ROUTINE GENERAL MEDICAL EXAMINATION AT A HEALTH CARE FACILITY: Primary | ICD-10-CM

## 2018-07-19 DIAGNOSIS — R53.83 FATIGUE, UNSPECIFIED TYPE: ICD-10-CM

## 2018-07-19 DIAGNOSIS — E55.9 VITAMIN D DEFICIENCY DISEASE: ICD-10-CM

## 2018-07-19 DIAGNOSIS — Z87.891 FORMER SMOKER: ICD-10-CM

## 2018-07-19 DIAGNOSIS — E78.2 MIXED HYPERLIPIDEMIA: ICD-10-CM

## 2018-07-19 DIAGNOSIS — Z00.00 ANNUAL PHYSICAL EXAM: Primary | ICD-10-CM

## 2018-07-19 DIAGNOSIS — Z12.5 ENCOUNTER FOR SCREENING FOR MALIGNANT NEOPLASM OF PROSTATE: ICD-10-CM

## 2018-07-19 PROCEDURE — 99999 PR PBB SHADOW E&M-EST. PATIENT-LVL III: CPT | Mod: PBBFAC,,, | Performed by: INTERNAL MEDICINE

## 2018-07-19 PROCEDURE — 99396 PREV VISIT EST AGE 40-64: CPT | Mod: S$GLB,,, | Performed by: INTERNAL MEDICINE

## 2018-07-19 NOTE — PROGRESS NOTES
Subjective:       Patient ID: Dhruv Fine is a 62 y.o. male.    Chief Complaint: Annual Exam    Annual exam    BP doing well.    Due for labs.    Has not been taking his statin (herbalist girlfriend had concerns; he had no side effects).     Trying to work on sleep apnea treatment, has followed in the sleep clinic.  Last seen July 2017 can't tolerate machine.     No syncope, chest pain, pressure, tightness or shortness of breath.  Would like to do the [employer] program.     Ongoing hemorrhoid problems, chronic and stable.  No new issues.    Patient Active Problem List:     Vitamin D deficiency disease     Erectile dysfunction     Mixed hyperlipidemia     Bleeding hemorrhoids     Anxiety     Obstructive sleep apnea syndrome: needs CPAP- see sleep assessment 7/17     Nuclear sclerosis - Both Eyes     DJD (degenerative joint disease) of lumbar spine     DJD (degenerative joint disease) of cervical spine     Genu varum (acquired)     Former smoker 1-2 packs daily for < 10 years     S/P L knee surgery, medial/lateral menisectomy, chondroplasty, synovectomy (2/10/15)     Primary localized osteoarthrosis, lower leg     Non morbid obesity due to excess calories     Chondromalacia, right knee     Primary osteoarthritis of left knee        Review of Systems   Constitutional: Positive for fatigue.        Some tiredness he thinks from sleep issues   HENT: Negative for congestion, postnasal drip, rhinorrhea and sinus pressure.    Eyes: Negative for redness and visual disturbance.   Respiratory: Negative for apnea, choking, shortness of breath and stridor.         Unable to tolerate CPAP- is willing to see Sleep clinic   Cardiovascular: Negative for chest pain, palpitations and leg swelling.   Gastrointestinal: Negative for abdominal pain, constipation, diarrhea and nausea.   Genitourinary: Negative for difficulty urinating, flank pain, frequency, testicular pain and urgency.   Musculoskeletal: Negative for  arthralgias, back pain and myalgias.   Skin: Negative for color change and rash.   Neurological: Negative.    Psychiatric/Behavioral: Negative.        Objective:      Physical Exam   Constitutional: He is oriented to person, place, and time. He appears well-developed and well-nourished.   HENT:   Head: Normocephalic and atraumatic.   Right Ear: External ear normal.   Left Ear: External ear normal.   Eyes: Conjunctivae and EOM are normal. Pupils are equal, round, and reactive to light.   Neck: Normal range of motion. Neck supple. No thyromegaly present.   Cardiovascular: Normal rate and regular rhythm.    No murmur heard.  Pulmonary/Chest: Effort normal and breath sounds normal. No respiratory distress. He has no wheezes.   Abdominal: Soft. He exhibits no distension. There is no tenderness.   Musculoskeletal: He exhibits no edema or tenderness.   Lymphadenopathy:     He has no cervical adenopathy.   Neurological: He is alert and oriented to person, place, and time. No cranial nerve deficit.   Skin: Skin is warm and dry.   Psychiatric: He has a normal mood and affect. His behavior is normal.       Assessment:       1. Annual physical exam    2. Vitamin D deficiency disease    3. Mixed hyperlipidemia    4. Encounter for screening for malignant neoplasm of prostate    5. Obstructive sleep apnea syndrome: needs CPAP- see sleep assessment 7/17    6. Fatigue, unspecified type        Plan:         Annual physical exam    Vitamin D deficiency disease  -     Vitamin D; Future; Expected date: 07/19/2018    Mixed hyperlipidemia  -     CBC auto differential; Future; Expected date: 07/19/2018  -     Comprehensive metabolic panel; Future; Expected date: 07/19/2018  -     Lipid panel; Future; Expected date: 07/19/2018    Encounter for screening for malignant neoplasm of prostate  -     PSA, Screening; Future; Expected date: 07/19/2018    Obstructive sleep apnea syndrome: needs CPAP- see sleep assessment 7/17  -     Ambulatory  consult to Sleep Disorders    Fatigue, unspecified type  -     TSH; Future; Expected date: 07/19/2018    Exercise  Weight loss; 25-30-# weight loss in next 2 years  RICKI issue discussed  ETT or coronary CT recommended  Strongly urged him to resume his statin  Shingrix when available 2019    I will review all studies and determine further tx depending on findings

## 2018-08-07 ENCOUNTER — OFFICE VISIT (OUTPATIENT)
Dept: OPTOMETRY | Facility: CLINIC | Age: 63
End: 2018-08-07
Payer: COMMERCIAL

## 2018-08-07 DIAGNOSIS — H52.203 HYPEROPIA WITH ASTIGMATISM AND PRESBYOPIA, BILATERAL: ICD-10-CM

## 2018-08-07 DIAGNOSIS — H52.03 HYPEROPIA WITH ASTIGMATISM AND PRESBYOPIA, BILATERAL: ICD-10-CM

## 2018-08-07 DIAGNOSIS — H25.13 NUCLEAR SCLEROSIS, BILATERAL: Primary | ICD-10-CM

## 2018-08-07 DIAGNOSIS — H52.4 HYPEROPIA WITH ASTIGMATISM AND PRESBYOPIA, BILATERAL: ICD-10-CM

## 2018-08-07 PROCEDURE — 92015 DETERMINE REFRACTIVE STATE: CPT | Mod: S$GLB,,, | Performed by: OPTOMETRIST

## 2018-08-07 PROCEDURE — 92014 COMPRE OPH EXAM EST PT 1/>: CPT | Mod: S$GLB,,, | Performed by: OPTOMETRIST

## 2018-08-07 PROCEDURE — 99999 PR PBB SHADOW E&M-EST. PATIENT-LVL II: CPT | Mod: PBBFAC,,, | Performed by: OPTOMETRIST

## 2018-08-07 NOTE — PROGRESS NOTES
HPI     One episode of blurred vision  No eye pain  No drops used  No problems driving at night    Last edited by Elias Shepard, OD on 8/7/2018  3:27 PM. (History)            Assessment /Plan     For exam results, see Encounter Report.    Nuclear sclerosis, bilateral    Hyperopia with astigmatism and presbyopia, bilateral      1. Educated pt on presence of cataracts and effects on vision. No surgery at this time. Recheck in one year.  2. Spec Rx given. Different lens options discussed with patient. RTC 1 year full exam.

## 2018-08-09 ENCOUNTER — TELEPHONE (OUTPATIENT)
Dept: INTERNAL MEDICINE | Facility: CLINIC | Age: 63
End: 2018-08-09

## 2018-08-10 NOTE — TELEPHONE ENCOUNTER
----- Message from Juana Moreno sent at 8/10/2018 12:49 PM CDT -----  Contact: self/[phone]  Patient is returning a phone call.  Who left a message for the patient: Rosario  Does patient know what this is regarding: no   Comments: thank you

## 2018-08-14 ENCOUNTER — TELEPHONE (OUTPATIENT)
Dept: SLEEP MEDICINE | Facility: CLINIC | Age: 63
End: 2018-08-14

## 2018-08-14 ENCOUNTER — LAB VISIT (OUTPATIENT)
Dept: LAB | Facility: HOSPITAL | Age: 63
End: 2018-08-14
Attending: INTERNAL MEDICINE
Payer: COMMERCIAL

## 2018-08-14 DIAGNOSIS — R53.83 FATIGUE, UNSPECIFIED TYPE: ICD-10-CM

## 2018-08-14 DIAGNOSIS — E78.2 MIXED HYPERLIPIDEMIA: ICD-10-CM

## 2018-08-14 DIAGNOSIS — Z12.5 ENCOUNTER FOR SCREENING FOR MALIGNANT NEOPLASM OF PROSTATE: ICD-10-CM

## 2018-08-14 DIAGNOSIS — E55.9 VITAMIN D DEFICIENCY DISEASE: ICD-10-CM

## 2018-08-14 LAB
25(OH)D3+25(OH)D2 SERPL-MCNC: 30 NG/ML
ALBUMIN SERPL BCP-MCNC: 4.1 G/DL
ALP SERPL-CCNC: 82 U/L
ALT SERPL W/O P-5'-P-CCNC: 30 U/L
ANION GAP SERPL CALC-SCNC: 6 MMOL/L
AST SERPL-CCNC: 27 U/L
BASOPHILS # BLD AUTO: 0.01 K/UL
BASOPHILS NFR BLD: 0.2 %
BILIRUB SERPL-MCNC: 0.4 MG/DL
BUN SERPL-MCNC: 11 MG/DL
CALCIUM SERPL-MCNC: 10.1 MG/DL
CHLORIDE SERPL-SCNC: 107 MMOL/L
CHOLEST SERPL-MCNC: 301 MG/DL
CHOLEST/HDLC SERPL: 7.7 {RATIO}
CO2 SERPL-SCNC: 27 MMOL/L
COMPLEXED PSA SERPL-MCNC: 0.73 NG/ML
CREAT SERPL-MCNC: 1.1 MG/DL
DIFFERENTIAL METHOD: ABNORMAL
EOSINOPHIL # BLD AUTO: 0.1 K/UL
EOSINOPHIL NFR BLD: 2 %
ERYTHROCYTE [DISTWIDTH] IN BLOOD BY AUTOMATED COUNT: 14.6 %
EST. GFR  (AFRICAN AMERICAN): >60 ML/MIN/1.73 M^2
EST. GFR  (NON AFRICAN AMERICAN): >60 ML/MIN/1.73 M^2
ESTIMATED AVG GLUCOSE: 120 MG/DL
GLUCOSE SERPL-MCNC: 119 MG/DL
HBA1C MFR BLD HPLC: 5.8 %
HCT VFR BLD AUTO: 46.2 %
HDLC SERPL-MCNC: 39 MG/DL
HDLC SERPL: 13 %
HGB BLD-MCNC: 15.3 G/DL
LDLC SERPL CALC-MCNC: 211.4 MG/DL
LYMPHOCYTES # BLD AUTO: 2.4 K/UL
LYMPHOCYTES NFR BLD: 52 %
MCH RBC QN AUTO: 29.7 PG
MCHC RBC AUTO-ENTMCNC: 33.1 G/DL
MCV RBC AUTO: 90 FL
MONOCYTES # BLD AUTO: 0.3 K/UL
MONOCYTES NFR BLD: 7.5 %
NEUTROPHILS # BLD AUTO: 1.7 K/UL
NEUTROPHILS NFR BLD: 38.3 %
NONHDLC SERPL-MCNC: 262 MG/DL
PLATELET # BLD AUTO: 233 K/UL
PMV BLD AUTO: 9.4 FL
POTASSIUM SERPL-SCNC: 5.3 MMOL/L
PROT SERPL-MCNC: 8.3 G/DL
RBC # BLD AUTO: 5.16 M/UL
SODIUM SERPL-SCNC: 140 MMOL/L
TRIGL SERPL-MCNC: 253 MG/DL
TSH SERPL DL<=0.005 MIU/L-ACNC: 2.88 UIU/ML
WBC # BLD AUTO: 4.54 K/UL

## 2018-08-14 PROCEDURE — 36415 COLL VENOUS BLD VENIPUNCTURE: CPT

## 2018-08-14 PROCEDURE — 80053 COMPREHEN METABOLIC PANEL: CPT

## 2018-08-14 PROCEDURE — 82306 VITAMIN D 25 HYDROXY: CPT

## 2018-08-14 PROCEDURE — 84443 ASSAY THYROID STIM HORMONE: CPT

## 2018-08-14 PROCEDURE — 85025 COMPLETE CBC W/AUTO DIFF WBC: CPT

## 2018-08-14 PROCEDURE — 80061 LIPID PANEL: CPT

## 2018-08-14 PROCEDURE — 83036 HEMOGLOBIN GLYCOSYLATED A1C: CPT

## 2018-08-14 PROCEDURE — 84153 ASSAY OF PSA TOTAL: CPT

## 2018-08-15 ENCOUNTER — OFFICE VISIT (OUTPATIENT)
Dept: SLEEP MEDICINE | Facility: CLINIC | Age: 63
End: 2018-08-15
Payer: COMMERCIAL

## 2018-08-15 ENCOUNTER — PATIENT MESSAGE (OUTPATIENT)
Dept: INTERNAL MEDICINE | Facility: CLINIC | Age: 63
End: 2018-08-15

## 2018-08-15 VITALS
WEIGHT: 230.63 LBS | BODY MASS INDEX: 32.29 KG/M2 | DIASTOLIC BLOOD PRESSURE: 85 MMHG | HEART RATE: 74 BPM | SYSTOLIC BLOOD PRESSURE: 130 MMHG | HEIGHT: 71 IN

## 2018-08-15 DIAGNOSIS — G47.33 OSA (OBSTRUCTIVE SLEEP APNEA): Primary | ICD-10-CM

## 2018-08-15 PROCEDURE — 99213 OFFICE O/P EST LOW 20 MIN: CPT | Mod: S$GLB,,, | Performed by: PSYCHIATRY & NEUROLOGY

## 2018-08-15 PROCEDURE — 99999 PR PBB SHADOW E&M-EST. PATIENT-LVL III: CPT | Mod: PBBFAC,,, | Performed by: PSYCHIATRY & NEUROLOGY

## 2018-08-15 PROCEDURE — 3008F BODY MASS INDEX DOCD: CPT | Mod: CPTII,S$GLB,, | Performed by: PSYCHIATRY & NEUROLOGY

## 2018-08-15 NOTE — LETTER
August 15, 2018      Cassandra Cruz MD  [address]  [facility] [address]           [facility]  [address]  [facility] [address]  Phone: [phone]          Patient: Dhruv Fine   MR Number: [id number]   YOB: 1955   Date of Visit: 8/15/2018       Dear Dr. Cassandra Cruz:    Thank you for referring Dhruv Fine to me for evaluation. Attached you will find relevant portions of my assessment and plan of care.    If you have questions, please do not hesitate to call me. I look forward to following Dhruv Fine along with you.    Sincerely,    Mcihael Kelley MD    Enclosure  CC:  No Recipients    If you would like to receive this communication electronically, please contact [email or web address] or [phone] to request more information on [facility] Link access.    For providers and/or their staff who would like to refer a patient to [facility], please contact us through our one-stop-shop provider referral line, [facility], at [phone].    If you feel you have received this communication in error or would no longer like to receive these types of communications, please e-mail [email or web address]

## 2018-08-15 NOTE — PROGRESS NOTES
This male is a 62 y.o. male is here for management of obstructive sleep apnea    Difficulty sleeping with CPAP.  Tends to remove it while sleeping  Sometimes difficulty with exhalation    Denies nasal congestion.    CPAP set at 6 cm; Nuance nasal mask    SPLIT SLEEP STUDY    1/27/2010    AHI 46.8, low sat 76%, split to effective pressure 10 cm, but not in supine REM        EPWORTH SLEEPINESS SCALE 8/15/2018   Sitting and reading 2   Watching TV 3   Sitting, inactive in a public place (e.g. a theatre or a meeting) 1   As a passenger in a car for an hour without a break 0   Lying down to rest in the afternoon when circumstances permit 2   Sitting and talking to someone 1   Sitting quietly after a lunch without alcohol 2   In a car, while stopped for a few minutes in traffic 0   Total score 11     Sleep Clinic New Patient 8/15/2018   What time do you go to bed on a week day? (Give a range) 9pm   What time do you go to bed on a day off? (Give a range) varies when go out.  but try to do 9pm   How long does it take you to fall asleep? (Give a range) half an hour to 45 mins   On average, how many times per night do you wake up? atleast once   How long does it take you to fall back into sleep? (Give a range) it depends. half an hour   What time do you wake up to start your day on a week day? (Give a range) 4 or 5:30am   What time do you wake up to start your day on a day off? (Give a range) 9-10am   What time do you get out of bed? (Give a range) weekday 4-5:30, when off wake up at 9-10 or later   On average, how many hours do you sleep? 7hours   On average, how many naps do you take per day? 0   Rate your sleep quality from 0 to 5 (0-poor, 5-great). 3   Have you experienced:  N/a   How much weight have you lost or gained (in lbs.) in the last year? 0   On average, how many times per night do you go to the bathroom?  atleast once   Have you ever had a sleep study/CPAP machine/surgery for sleep apnea? Yes   Have you ever had  a CPAP machine for sleep apnea? Yes   Have you ever had surgery for sleep apnea? No       PRIOR SLEEP HISTORY:  S/p sleep study in 2010 at [facility].    Patient was started on cpap 10 cm H20 but did not tolerated cpap.  Machine was loud and uncomfortable.  Patient has seen Dr. Khan in 2013 but did not get along.     Currently, patient with loud snoring and witnessed apnea.  Feeling tired upon awake.  No parasomnia.  No cataplexy.  +bruxism.  +occasional leg discomfort.  Once every six months.    [place] Sleepiness Scale score during initial sleep evaluation was 7.    SLEEP ROUTINE:  Activity the hour prior to sleep: watch tv in bed    Bed partner:  alone  Time to bed:  9 pm   Lights off:  tv is on timer   Sleep onset latency:  30 minutes of tv         Disruptions or awakenings:    1 time (no difficulty going back to sleep)  Wakeup time:      4 am   Perceived sleep quality:  tire       Daytime naps:      none  Weekend sleep routine:      12 am to 1 am till 8-9 am   Caffeine use: 1/2 coke per day  exercise habit:   minimal      Past Medical History:   Diagnosis Date    Anemia 10/11/2013    Anxiety     Bleeding hemorrhoids     Borderline hypertension 10/19/2013    Degenerative disc disease     cervical and lumbar    Former smoker 1-2 packs daily for < 10 years 1/23/2015    Hyperlipidemia     Iron deficiency anemia 5/21/2015    Non morbid obesity due to excess calories 6/17/2016    Nuclear sclerosis - Both Eyes 2/25/2013    Sleep apnea 7/26/2012       Past Surgical History:   Procedure Laterality Date    KNEE SURGERY Left 2/10/15    Dr. Fine       Family History   Problem Relation Age of Onset    Kidney disease Mother     Glaucoma Paternal Aunt     Cataracts Paternal Aunt     Cancer Sister     Heart disease Brother         rheumatic fever    Hepatitis Brother     Peripheral vascular disease Brother     Blindness Neg Hx     Amblyopia Neg Hx     Macular degeneration Neg Hx     Retinal detachment  "Neg Hx     Strabismus Neg Hx     Diabetes Neg Hx     Hypertension Neg Hx     Stroke Neg Hx     Thyroid disease Neg Hx        Social History     Tobacco Use    Smoking status: Never Smoker    Smokeless tobacco: Never Used   Substance Use Topics    Alcohol use: Not on file    Drug use: No       ALLERGIES: Reviewed in EPIC    CURRENT MEDICATIONS: Reviewed in EPIC      REVIEW OF SYSTEMS:   Sleep Clinic ROS  8/15/2018   Difficulty breathing through the nose?  Sometimes   Sore throat or dry mouth in the morning? No   Irregular or very fast heart beat?  Sometimes   Shortness of breath?  No   Acid reflux? No   Body aches and pains?  Yes   Morning headaches? No   Dizziness? No   Mood changes?  Sometimes   Do you exercise?  Sometimes   Do you feel like moving your legs a lot?  No    Otherwise, a balance of systems reviewed is negative.          PHYSICAL EXAM:  /85 (BP Location: Left arm, Patient Position: Sitting, BP Method: Large (Automatic))   Pulse 74   Ht 5' 11" (1.803 m)   Wt 104.6 kg (230 lb 9.6 oz)   BMI 32.16 kg/m²                                           ASSESSMENT    Obstructive Sleep Apnea -  Severe by AHI, snoring and excessive daytime sleepiness. INtolerant of CPAP due to difficulty with exhalation.    PLAN:    1. Loaner BIPAP 5EPAP -15IPAP, PS 2-6  2. If intolerant of BIPAP then send for oral appliance.     Education: During our discussion today, we talked about the etiology of obstructive sleep apnea as well as the potential ramifications of untreated sleep apnea, which could include daytime sleepiness, hypertension, heart disease and/or stroke.     Follow up 4 weeks.           "

## 2018-08-23 NOTE — TELEPHONE ENCOUNTER
He has not read his [facility] message.  Please call him.  Is he taking his Lipitor regularly?    If so, we need to increase it to at least 40 mg.  If not, he needs to be compliant with the medication.  Please let me know.

## 2018-08-27 ENCOUNTER — TELEPHONE (OUTPATIENT)
Dept: INTERNAL MEDICINE | Facility: CLINIC | Age: 63
End: 2018-08-27

## 2018-08-27 NOTE — TELEPHONE ENCOUNTER
Pt has not been on Lipitor for 6 months. Pt requesting medication , if possible no statins. [facility] on [address]

## 2018-08-27 NOTE — TELEPHONE ENCOUNTER
Really, the best option is a statin.    If he feels he cannot tolerate a statin, I can refer him to Cardiology for one of the new injections for cholesterol.

## 2018-10-10 ENCOUNTER — CLINICAL SUPPORT (OUTPATIENT)
Dept: INTERNAL MEDICINE | Facility: CLINIC | Age: 63
End: 2018-10-10
Payer: COMMERCIAL

## 2018-10-10 ENCOUNTER — OFFICE VISIT (OUTPATIENT)
Dept: INTERNAL MEDICINE | Facility: CLINIC | Age: 63
End: 2018-10-10

## 2018-10-10 ENCOUNTER — HOSPITAL ENCOUNTER (OUTPATIENT)
Dept: CARDIOLOGY | Facility: CLINIC | Age: 63
Discharge: HOME | End: 2018-10-10

## 2018-10-10 VITALS
HEART RATE: 87 BPM | HEIGHT: 71 IN | WEIGHT: 229.94 LBS | SYSTOLIC BLOOD PRESSURE: 126 MMHG | BODY MASS INDEX: 32.19 KG/M2 | DIASTOLIC BLOOD PRESSURE: 80 MMHG

## 2018-10-10 DIAGNOSIS — Z00.00 ROUTINE GENERAL MEDICAL EXAMINATION AT A HEALTH CARE FACILITY: Primary | ICD-10-CM

## 2018-10-10 DIAGNOSIS — Z00.00 ANNUAL PHYSICAL EXAM: Primary | ICD-10-CM

## 2018-10-10 DIAGNOSIS — E66.09 NON MORBID OBESITY DUE TO EXCESS CALORIES: ICD-10-CM

## 2018-10-10 DIAGNOSIS — M47.816 SPONDYLOSIS OF LUMBAR REGION WITHOUT MYELOPATHY OR RADICULOPATHY: ICD-10-CM

## 2018-10-10 DIAGNOSIS — R94.6 BORDERLINE ABNORMAL THYROID FUNCTION TEST: ICD-10-CM

## 2018-10-10 DIAGNOSIS — Z00.00 ROUTINE GENERAL MEDICAL EXAMINATION AT A HEALTH CARE FACILITY: ICD-10-CM

## 2018-10-10 DIAGNOSIS — T46.6X5A STATIN MYOPATHY: ICD-10-CM

## 2018-10-10 DIAGNOSIS — G72.0 STATIN MYOPATHY: ICD-10-CM

## 2018-10-10 DIAGNOSIS — E78.2 MIXED HYPERLIPIDEMIA: ICD-10-CM

## 2018-10-10 PROBLEM — R73.01 IFG (IMPAIRED FASTING GLUCOSE): Status: ACTIVE | Noted: 2018-10-10

## 2018-10-10 LAB
ALBUMIN SERPL BCP-MCNC: 3.9 G/DL
ALP SERPL-CCNC: 87 U/L
ALT SERPL W/O P-5'-P-CCNC: 45 U/L
ANION GAP SERPL CALC-SCNC: 9 MMOL/L
AST SERPL-CCNC: 38 U/L
BILIRUB SERPL-MCNC: 0.4 MG/DL
BUN SERPL-MCNC: 9 MG/DL
CALCIUM SERPL-MCNC: 10.2 MG/DL
CHLORIDE SERPL-SCNC: 103 MMOL/L
CHOLEST SERPL-MCNC: 298 MG/DL
CHOLEST/HDLC SERPL: 7.8 {RATIO}
CO2 SERPL-SCNC: 27 MMOL/L
COMPLEXED PSA SERPL-MCNC: 0.62 NG/ML
CREAT SERPL-MCNC: 0.9 MG/DL
DIASTOLIC DYSFUNCTION: NO
ERYTHROCYTE [DISTWIDTH] IN BLOOD BY AUTOMATED COUNT: 14.6 %
EST. GFR  (AFRICAN AMERICAN): >60 ML/MIN/1.73 M^2
EST. GFR  (NON AFRICAN AMERICAN): >60 ML/MIN/1.73 M^2
ESTIMATED AVG GLUCOSE: 126 MG/DL
GLUCOSE SERPL-MCNC: 105 MG/DL
HBA1C MFR BLD HPLC: 6 %
HCT VFR BLD AUTO: 46.3 %
HDLC SERPL-MCNC: 38 MG/DL
HDLC SERPL: 12.8 %
HGB BLD-MCNC: 14.8 G/DL
LDLC SERPL CALC-MCNC: 208 MG/DL
MCH RBC QN AUTO: 29.6 PG
MCHC RBC AUTO-ENTMCNC: 32 G/DL
MCV RBC AUTO: 93 FL
NONHDLC SERPL-MCNC: 260 MG/DL
PLATELET # BLD AUTO: 230 K/UL
PMV BLD AUTO: 9.2 FL
POTASSIUM SERPL-SCNC: 4.6 MMOL/L
PROT SERPL-MCNC: 8.1 G/DL
RBC # BLD AUTO: 5 M/UL
SODIUM SERPL-SCNC: 139 MMOL/L
T4 FREE SERPL-MCNC: 0.87 NG/DL
TRIGL SERPL-MCNC: 260 MG/DL
TSH SERPL DL<=0.005 MIU/L-ACNC: 4.49 UIU/ML
WBC # BLD AUTO: 4.57 K/UL

## 2018-10-10 PROCEDURE — 80061 LIPID PANEL: CPT

## 2018-10-10 PROCEDURE — 84153 ASSAY OF PSA TOTAL: CPT

## 2018-10-10 PROCEDURE — 84443 ASSAY THYROID STIM HORMONE: CPT

## 2018-10-10 PROCEDURE — 83036 HEMOGLOBIN GLYCOSYLATED A1C: CPT

## 2018-10-10 PROCEDURE — 93015 CV STRESS TEST SUPVJ I&R: CPT | Mod: S$GLB,,, | Performed by: INTERNAL MEDICINE

## 2018-10-10 PROCEDURE — 85027 COMPLETE CBC AUTOMATED: CPT

## 2018-10-10 PROCEDURE — 99386 PREV VISIT NEW AGE 40-64: CPT | Mod: S$GLB,,, | Performed by: INTERNAL MEDICINE

## 2018-10-10 PROCEDURE — 80053 COMPREHEN METABOLIC PANEL: CPT

## 2018-10-10 PROCEDURE — 84439 ASSAY OF FREE THYROXINE: CPT

## 2018-10-10 PROCEDURE — 99999 PR PBB SHADOW E&M-EST. PATIENT-LVL IV: CPT | Mod: PBBFAC,,, | Performed by: INTERNAL MEDICINE

## 2018-10-10 NOTE — PROGRESS NOTES
"Nutrition Assessment  This is a general nutrition consultation as per the contractual agreement of the client employer's insurance provider.    Dhruv Fine  :  1955  Age:  62 y.o.  Gender:  male    Client states:  While enjoying coffee in the [facility] area, he noticed a box of Doughnuts on top of the refrigerator that were calling his name, although he did not partake. He mentions the last month he has been volunteering and cooking for the [employer] and eating average of 5 donuts on both Saturday and . Additionally most of his meals are eaten out in restaurants and favors Hamburgers and fries. Admits that he is unable to pass by [employer] and not be a customer. Has awareness of elevated Cholesterol and was prescribed a statin which he took for a short period and discontinued against the advice of his MD due to experiencing every side effect documented. He experienced muscle cramps and insomnia among others. For the past 7 months he made a decision to pursue the "natural" route and has been taking supplemental cinnamon, red rice yeast and fish oil 2,000mg daily. Recently he double the doses of cinnamon and yeast based on lack of positive changes in lipids.  After MD visit today and due to continuing elevated lipid levels, Dr. Cruz highly encouraged a statin again today and client refused. She offered the possibility of an injectable medication for cholesterol and in spite of having a fear of needles, he will read the literature provided and make a decision. Has history of DJD and knee pain and recently picked up a bottle of water to replace in the dispenser in his home and injured his back. Dr. Cruz wrote a script for PT. Due to his limitations with exercise, he began swimming this week and plans to increase to 2x/wk. History of RICKI and uses CPAP, and in process of using a different face mask for comfort. Shares that "his stomach has to go" and he wants to lose wt.  Has a specific " "nutrition question re: white vs. Sweet potatoes. By next visit his goals are to lose weight, swim with regularity, improve lipid values and complete his PT orders.    Anthropometrics  Height:  5'10"  Weight: 228  BMI:  32.78  % Body Fat:  33.11    Clinical Signs/Symptoms  N/V/D:  none  Appetite (Good, Fair, or Poor):  good      Past Medical History:   Diagnosis Date    Anemia 10/11/2013    Anxiety     Bleeding hemorrhoids     Borderline hypertension 10/19/2013    Degenerative disc disease     cervical and lumbar    Former smoker 1-2 packs daily for < 10 years 1/23/2015    Hyperlipidemia     Iron deficiency anemia 5/21/2015    Non morbid obesity due to excess calories 6/17/2016    Nuclear sclerosis - Both Eyes 2/25/2013    Sleep apnea 7/26/2012       Past Surgical History:   Procedure Laterality Date    CHONDROPLASTY-KNEE Left 2/10/2015    [facility]    colonoscopy N/A 7/17/2014    [facility]    KNEE SURGERY Left 2/10/15    Dr. Fine    MENISCECTOMY-MEDIAL OR LATERAL Left 2/10/2015    [facility] at [facility]    SYNOVECTOMY-KNEE Left 2/10/2015    [facility] by [facility] at [facility] OR       Medications    has a current medication list which includes the following prescription(s): fluticasone, levocetirizine, lidocaine, multivitamin, and valacyclovir, and the following Facility-Administered Medications: hylan g-f 20.    Vitamins, Minerals, and/or Supplements:  Fish oil 2,000 mg, Glucosamine ASU, Cinnamon, Red rice yeast, gummy MVI, Nutrigenics (promote workout endurance)     Food Allergies or Intolerances:  none     Social History    Marital status:  Legally   Employment:  Networked reference to record [employer]    Social History     Tobacco Use    Smoking status: Never Smoker    Smokeless tobacco: Never Used   Substance Use Topics    Alcohol use: Not on file        Lab " "Reports   Total Cholesterol:  299    Triglycerides:  260  HDL:  38  LDL:  208   Glucose:  105  HbA1c:  6.0  BP:  130/90     Food History  Past month 10+ donuts per week  Dines out: 10+ times week,  Hamburgers and fries, frequents [employer]   Breakfast: frosted mini wheat's with whole milk  Cooks with butter  Loves all sweets and consumes daily  *Fluid intake:  Per CMP - adequate hydration     Exercise History:  This week started swimming 1x/wk    Cultural/Spiritual/Personal Preferences:  None identified     Support System:  Healthcare providers, [facility]    State of Change:  action    Barriers to Change:  Discipline, planning, organization, cooking at home    Diagnosis    Altered nutrition related laboratory values related to excessive saturated fat, simple sugars and lack of aerobic exercise as verbalized by client as evidenced by Chol: 299, Triglycerides: 260, LDL: 208, HAIC: 6.0.    Intervention    RMR (Method:  Body Grand Chain):  2080 kcal  Activity Factor:  1.3  MICHAEL:  2704 - 500 = 2204    Goals:  1.  Body fat: <33.11%, wt. Goal: 201#  2.  Cook and pack lunch from home 2x/wk  3.  Swimming at fitness center - advance to 2x/wk and increase as tolerated  4.  Attend PT appointments  5.  Chol: <299, Trig: <260, HDL: 38, LDL: 208, HAIC: 6.0    Nutrition Education  Reviewed nutrition related labs with client and trending of lipids and a focus of HAIC indicating a pre-diabetes level. Client easily admits to having a sweet tooth and indulging several times daily. Explained how to improve values via food and behaviors using the handout "Choosing Heart Healthy Fats" - reviewing foods with saturated and trans fats. Encouraged plant based oils for butter and less dining out and more cooking at home and bringing leftovers to work. Client agreed to do this minimum of 2x/wk. Explained the three categories of meat with focus on purchasing lean cuts of various animals. Answered and explained the advantages " "of sweet potatoes and whole grain carbohydrates over white for improved lipids, wt. And HAIC. Explained the benefits of aerobic exercise on fat loss and lipid improvement and client agreed to increase swimming from one to 2x/wk. Explained benefits of center cut montesinos and showed visual of packaging. Provided brand names of healthier ice cream and snack bars that contain chocolate and encouraged the use of "Naturals" in place of sugar. Client dislikes 2% milk, however over time was convinced and agreed to trial 1 bottle of Faiflife 2% milk. At conclusion, client thanked me for the information.      Patient verbalized understanding of nutrition education and recommendations received.    Handouts Provided  Meal Planning Guide  Restaurant Guide  Eat Fit Shopping List  Eat Fit [place]  Fast Food Guide  Vitamin/Mineral Guide    Monitoring/Evaluation    Monitor the following:  Weight  BMI  % Body Fat  Caloric intake  Labs:  CMP/Lipids/HAIC    Follow Up Plan:  Communication with referring healthcare provider is unnecessary at this time as patient presented as part of annual wellness exam.  However, will follow up with patient in 1-2 years.  "

## 2018-10-10 NOTE — PATIENT INSTRUCTIONS
Back Basics: A Healthy Spine  A healthy spine supports the body while letting it move freely. It does this with the help of three natural curves. Strong, flexible muscles help, too. They support the spine by keeping its curves properly aligned. The disks that cushion the bones of your spine also play a role in back fitness.    Three natural curves  The spine is made of bones (vertebrae) and pads of soft tissue (disks). These parts are arranged in three curves: cervical, thoracic, and lumbar. When properly aligned, these curves keep your body balanced. They also support your body when you move. By distributing your weight throughout your spine, the curves make back injuries less likely.  Strong, flexible muscles  Strong, flexible back muscles help support the three curves of the spine. They do so by holding the vertebrae and disks in proper alignment. Strong, flexible abdominal, hip, and leg muscles also reduce strain on the back.  The lumbar curve  The lumbar curve is the hardest-working part of the spine. It carries more weight and moves the most. Aligning this curve helps prevent damage to vertebrae, disks, and other parts of the spine.  Cushioning disks  Disks are the soft pads of tissue between the vertebrae. The disks absorb shock caused by movement. Each disk has a spongy center (nucleus) and a tougher outer ring (annulus). Movement within the nucleus allows the vertebrae to rock back and forth on the disks. This provides the flexibility needed to bend and move.       Date Last Reviewed: 10/18/2015  © 7409-5305 [employer]. [address] [place] [address], [place] [address]. All rights reserved. This information is not intended as a substitute for professional medical care. Always follow your healthcare professional's instructions.        Exercises to Strengthen Your Lower Back  Strong lower back and abdominal muscles work together to support your spine. The exercises below will help strengthen the  lower back. It is important that you begin exercising slowly and increase levels gradually.  Always begin any exercise program with stretching. If you feel pain while doing any of these exercises, stop and talk to your doctor about a more specific exercise program that better suits your condition.   Low back stretch  The point of stretching is to make you more flexible and increase your range of motion. Stretch only as much as you are able. Stretch slowly. Do not push your stretch to the limit. If at any point you feel pain while stretching, this is your (temporary) limit.  · Lie on your back with your knees bent and both feet on the ground.  · Slowly raise your left knee to your chest as you flatten your lower back against the floor. Hold for 5 seconds.  · Relax and repeat the exercise with your right knee.  · Do 10 of these exercises for each leg.  · Repeat hugging both knees to your chest at the same time.  Building lower back strength  Start your exercise routine with 10 to 30 minutes a day, 1 to 3 times a day.  Initial exercises  Lying on your back:  1. Ankle pumps: Move your foot up and down, towards your head, and then away. Repeat 10 times with each foot.  2. Heel slides: Slowly bend your knee, drawing the heel of your foot towards you. Then slide your heel/foot from you, straightening your knee. Do not lift your foot off the floor (this is not a leg lift).  3. Abdominal contraction: Bend your knees and put your hands on your stomach. Tighten your stomach muscles. Hold for 5 seconds, then relax. Repeat 10 times.  4. Straight leg raise: Bend one leg at the knee and keep the other leg straight. Tighten your stomach muscles. Slowly lift your straight leg 6 to 12 inches off the floor and hold for up to 5 seconds. Repeat 10 times on each side.  Standin. Wall squats: Stand with your back against the wall. Move your feet about 12 inches away from the wall. Tighten your stomach muscles, and slowly bend your  knees until they are at about a 45 degree angle. Do not go down too far. Hold about 5 seconds. Then slowly return to your starting position. Repeat 10 times.  2. Heel raises: Stand facing the wall. Slowly raise the heels of your feet up and down, while keeping your toes on the floor. If you have trouble balancing, you can touch the wall with your hands. Repeat 10 times.  More advanced exercises  When you feel comfortable enough, try these exercises.  1. Kneeling lumbar extension: Begin on your hands and knees. At the same time, raise and straighten your right arm and left leg until they are parallel to the ground. Hold for 2 seconds and come back slowly to a starting position. Repeat with left arm and right leg, alternating 10 times.  2. Prone lumbar extension: Lie face down, arms extended overhead, palms on the floor. At the same time, raise your right arm and left leg as high as comfortably possible. Hold for 10 seconds and slowly return to start. Repeat with left arm and right leg, alternating 10 times. Gradually build up to 20 times. (Advanced: Repeat this exercise raising both arms and both legs a few inches off the floor at the same time. Hold for 5 seconds and release.)  3. Pelvic tilt: Lie on the floor on your back with your knees bent at 90 degrees. Your feet should be flat on the floor. Inhale, exhale, then slowly contract your abdominal muscles bringing your navel toward your spine. Let your pelvis rock back until your lower back is flat on the floor. Hold for 10 seconds while breathing smoothly.  4. Abdominal crunch: Perform a pelvic tilt (above) flattening your lower back against the floor. Holding the tension in your abdominal muscles, take another breath and raise your shoulder blades off the ground (this is not a full sit-up). Keep your head in line with your body (dont bend your neck forward). Hold for 2 seconds, then slowly lower.  Date Last Reviewed: 6/1/2016  © 6390-6179 [employer],  [employer]. [address] [place] [address]. All rights reserved. This information is not intended as a substitute for professional medical care. Always follow your healthcare professional's instructions.        Back Exercises: Abdominal Lift Brace with Marching    The abdominal lift brace with march strengthens your lower abdominal muscles, helping you keep your pelvis and back stable:  · Lie on the floor with both knees bent. Put your feet flat on the floor and your arms by your sides. Tighten your abdominal muscles. Be sure to continue to breathe.  · Lift one bent knee about 2 inches then return it to the floor and lift the other about 2 inches. Keep your abdominal muscles tight and continue to breathe. These motions should be slow and controlled without your pelvis rocking side to side.  · Repeat 10 times.  Date Last Reviewed: 8/16/2015  © 6825-1969 [employer]. [address] [place] [address]. All rights reserved. This information is not intended as a substitute for professional medical care. Always follow your healthcare professional's instructions.        Back Exercises: Arm Reach    Do this exercise on your hands and knees. Keep your knees under your hips and your hands under your shoulders. Keep your spine in a neutral position (not arched or sagging). Be sure to maintain your necks natural curve:  · Stretch one arm straight out in front of you. Dont raise your head or let your supporting shoulder sag.  · Hold for 5 seconds.  · Return to starting position.  · Repeat 5 times.  · Switch arms.  Date Last Reviewed: 8/16/2015  © 3630-1015 [employer]. [address] [place] [address]. All rights reserved. This information is not intended as a substitute for professional medical care. Always follow your healthcare professional's instructions.        Back Exercises: Back Press    Do this exercise on your hands and knees. Keep your knees under your hips and your  hands under your shoulders. Keep your spine in a neutral position (not arched or sagging). Be sure to maintain your necks natural curve:  · Tighten your stomach and buttock muscles to press your back upward. Let your head drop slightly.  · Hold for 5 seconds. Return to starting position.  · Repeat 5 times.  Date Last Reviewed: 10/11/2015  © 8523-1809 [employer]. [address] [place] [address]. All rights reserved. This information is not intended as a substitute for professional medical care. Always follow your healthcare professional's instructions.        Back Exercises: Hip Lift    To start, lie on your back with your knees bent and feet flat on the floor. Dont press your neck or lower back to the floor. Breathe deeply. You should feel comfortable and relaxed in this position:  · Tighten your abdomen and buttocks.  · Slowly raise your hips upward. Be careful not to arch your back.  · Hold for 5 seconds. Lower your hips to the floor.  · Repeat 10 times.  For your safety, check with your healthcare provider before starting an exercise program.   Date Last Reviewed: 8/16/2015  © 4945-3278 [employer]. [address] [place] [address]. All rights reserved. This information is not intended as a substitute for professional medical care. Always follow your healthcare professional's instructions.        Back Exercises: Lower Back Rotation    To start, lie on your back with your knees bent and feet flat on the floor. Dont press your neck or lower back to the floor. Breathe deeply. You should feel comfortable and relaxed in this position.  · Drop both knees to one side. Turn your head to the other side. Keep your shoulders flat on the floor.  · Do not push through pain.  · Hold for 20 seconds.  · Slowly switch sides.  · Repeat 2 to 5 times.  Date Last Reviewed: 10/11/2015  © 9764-6788 [employer]. [address] [place] [address]. All rights  reserved. This information is not intended as a substitute for professional medical care. Always follow your healthcare professional's instructions.        Back Exercises: Partial Curl-Ups    To start, lie on your back with your knees bent and feet flat on the floor. Dont press your neck or lower back to the floor. Breathe deeply. You should feel comfortable and relaxed in this position:  · Cross your arms loosely.  · Tighten your abdomen and curl [facility] up, keeping your head in line with your shoulders.  · Hold for 5 seconds. Uncurl to lie down.  · Repeat 2 sets of 10.   Date Last Reviewed: 8/16/2015  © 3645-7170 [employer]. [address] [place] [address]. All rights reserved. This information is not intended as a substitute for professional medical care. Always follow your healthcare professional's instructions.        Lumbar Stretch (Flexibility)    1. Lie on your back on the floor, with your knees bent and your feet flat on the floor. Dont press your neck or lower back to the floor.  2. Pull one knee up toward your chest. Clasp your hands under your thigh to help pull.  3. Hold for 30 to 60 seconds. Lower your leg back down to the floor.  4. Repeat 2 times, or as instructed.  5. Switch legs and repeat.  Date Last Reviewed: 3/10/2016  © 0823-8208 [employer]. [address] [place] [address]. All rights reserved. This information is not intended as a substitute for professional medical care. Always follow your healthcare professional's instructions.        Evolocumab injection  What is this medicine?  EVOLOCUMAB (tiki villasenore ДМИТРИЙ ue mab) is known as a PCSK9 inhibitor. It is used to lower the level of cholesterol in the blood. This medicine is only for patients whose cholesterol is not controlled by diet and other cholesterol-lowering therapy.  How should I use this medicine?  This medicine is for injection under the skin. You will be taught how to prepare and give this  medicine. Use exactly as directed. Take your medicine at regular intervals. Do not take your medicine more often than directed.  It is important that you put your used needles and syringes in a special sharps container. Do not put them in a trash can. If you do not have a sharps container, call your pharmacist or health care provider to get one.  Talk to your pediatrician regarding the use of this medicine in children. While this drug may be prescribed for children as young as 13 years for selected conditions, precautions do apply.  What side effects may I notice from receiving this medicine?  Side effects that you should report to your doctor or health care professional as soon as possible:  · allergic reactions like skin rash, itching or hives, swelling of the face, lips, or tongue  · signs and symptoms of infection like fever or chills; cough; sore throat; pain or trouble passing urine  Side effects that usually do not require medical attention (report these to your doctor or health care professional if they continue or are bothersome):  · diarrhea  · nausea  · muscle pain  · pain, redness, or irritation at site where injected  What may interact with this medicine?  Interactions are not expected.  What if I miss a dose?  If you miss a dose, take it as soon as you can if there are more than 7 days until the next scheduled dose, or skip the missed dose and take the next dose according to your original schedule. Do not take double or extra doses.  Where should I keep my medicine?  Keep out of the reach of children.  You will be instructed on how to store this medicine. Throw away any unused medicine after the expiration date on the label.  What should I tell my health care provider before I take this medicine?  They need to know if you have any of these conditions:  · an unusual or allergic reaction to evolocumab, other medicines, foods, dyes, or preservatives  · pregnant or trying to get  pregnant  · breast-feeding  What should I watch for while using this medicine?  You may need blood work while you are taking this medicine.  NOTE:This sheet is a summary. It may not cover all possible information. If you have questions about this medicine, talk to your doctor, pharmacist, or health care provider. Copyright© 2017 [employer]        Alirocumab injection  What is this medicine?  ALIROCUMAB (al i ZBIGNIEW ue mab) is known as a PCSK9 inhibitor. It is used to lower the level of cholesterol in the blood. This medicine is only for patients whose cholesterol is not controlled by diet and statin therapy.  How should I use this medicine?  This medicine is for injection under the skin. You will be taught how to prepare and give this medicine. Use exactly as directed. Take your medicine at regular intervals. Do not take your medicine more often than directed.  It is important that you put your used needles and syringes in a special sharps container. Do not put them in a trash can. If you do not have a sharps container, call your pharmacist or healthcare provider to get one.  Talk to your pediatrician regarding the use of this medicine in children. Special care may be needed.  What side effects may I notice from receiving this medicine?  Side effects that you should report to your doctor or health care professional as soon as possible:  · allergic reactions like skin rash, itching or hives, swelling of the face, lips, or tongue  · signs and symptoms of infection like fever or chills; cough; sore throat; pain or trouble passing urine  · signs and symptoms of liver injury like dark yellow or brown urine; general ill feeling or flu-like symptoms; light-colored stools; loss of appetite; nausea; right upper belly pain; unusually weak or tired; yellowing of the eyes or skin  Side effects that usually do not require medical attention (report these to your doctor or health care professional if they continue or are  bothersome):  · diarrhea  · muscle cramps  · muscle pain  · pain, redness, or irritation at site where injected  What may interact with this medicine?  Interactions are not expected.  What if I miss a dose?  If you miss a dose, take it as soon as you can. If your next dose is to be taken in less than 7 days, then do not take the missed dose. Take the next dose at your regular time. Do not take double or extra doses.  Where should I keep my medicine?  Keep out of the reach of children.  You will be instructed on how to store this medicine. Throw away any unused medicine after the expiration date on the label.  What should I tell my health care provider before I take this medicine?  They need to know if you have any of these conditions:  · any unusual or allergic reaction to alirocumab, other medicines, foods, dyes, or preservatives  · pregnant or trying to get pregnant  · breast-feeding  What should I watch for while using this medicine?  You may need blood work done while you are taking this medicine.  NOTE:This sheet is a summary. It may not cover all possible information. If you have questions about this medicine, talk to your doctor, pharmacist, or health care provider. Copyright© 2017 [employer]

## 2018-10-10 NOTE — PROGRESS NOTES
Subjective:       Patient ID: Dhruv Fine is a 62 y.o. male.    Chief Complaint: No chief complaint on file.    HPI   Pt. Has no significant cardiovascular or pulmonary history. Pt. Blood pressure was elevated and counsled him to keep a blood pressure log and discuss it with Dr. Cruz.     Physical Limitations: Pt. Strained his back yesterday and is in acute pain. He is seeing a chiropractor. The push up, sit up, and sit and reach tests have been deferred due to this issue. Pt. Stated that he had a left ligament repair for which he receives bi-yearly injections. This issue does not physically limit him in any way.  Counseled the patient to ask for a referral to see a physical therapist for his back and to get resistance training and flexibility exercise routine.     Current exercise routine: Patient does not follow any formal exercise or flexibility routine at the current time.    Goals: Pt. Has a long term goal of 185 pounds, and a year goal weight of 208 pounds.   Fun Facts: Pt. Was friendly and engaged. 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 He was not very knowledgeable about exercise.  Pt. Was receptive of all recommendations.     Review of Systems    Objective:     The fitness evaluation results are as follows:  D.O.S. 10/10/2018   Height (in): 70   Weight (lbs): 228   BMI: 32.6641922   Body Fat (%): 33.11   Waist (cm): 116   Hip (cm): 109   WHR: 1.06   RBP (mmHg): 130/90   RHR (bpm): 84    Strength R (lbs)t: 100    Strength Lt (lbs): 103.786635   Push-up Assessment: deferred    Curl-up Assessment: deferred    Flexibility Testing (cm): deferred    REE (kcals): 2080       Physical Exam    Assessment:     Age/gender stratified assessment:  Resting BP: Elevated   Body Fat %: poor   WHR Risk Factor: high risk    Strength R: average    Strength L: above average   Upper Body Endurance: deferred    Abdominal Endurance: deferred    Lower body Flexibiltiy:  deferred        1. Routine general medical examination at a health care facility        Plan:       Recommended fitness guidelines:    -150 minutes of moderate intensity aerobic exercise per week or 75 minutes of vigorous intensity aerobic exercise per week.  Start to swim 2 days a week and supplement with walking or the stationary bike to reach a minimum of a 150 minutes of moderate intensity aerobic activity per week.  Try to reach a minimum of 10,000 steps per day.    -2 to 4 days per week of resistance training for each muscle group.  Consult a physical therapist about a upper and lower body, and core resistance training program.     -Daily stretching with a hold of at least 30 seconds per muscle group.  Consult a physical therapist about a proper stretching routine to practice daily.

## 2018-10-10 NOTE — LETTER
October 10, 2018    Dhruv Fine  [address] [place] [address]             [facility]  [address]  [facility] [address]  Phone: [phone]  Fax: [phone] Dear Mr. Fine:    Thank you for allowing me to serve you and perform your Executive Health exam on 10/10/2018.  This letter will serve a brief summary of the history, physical findings, and laboratory/studies performed and recommendations at that time.    Reason for Visit: Executive Health Preventive Physical Examination    Past Medical History:   Diagnosis Date    Anemia 10/11/2013    Anxiety     Bleeding hemorrhoids     Borderline hypertension 10/19/2013    Degenerative disc disease     cervical and lumbar    Former smoker 1-2 packs daily for < 10 years 1/23/2015    Hyperlipidemia     Iron deficiency anemia 5/21/2015    Non morbid obesity due to excess calories 6/17/2016    Nuclear sclerosis - Both Eyes 2/25/2013    Sleep apnea 7/26/2012       Past Surgical History:   Procedure Laterality Date    CHONDROPLASTY-KNEE Left 2/10/2015    Performed by Sam Fine [facility] at [facility] OR    colonoscopy N/A 7/17/2014    Performed by [facility] at [facility] (4TH F[facility])    KNEE SURGERY Left 2/10/15    Dr. Fine    MENISCECTOMY-MEDIAL OR LATERAL Left 2/10/2015    [facility] at [facility] OR    SYNOVECTOMY-KNEE Left 2/10/2015    [facility] by [facility] at [facility] OR       Family History   Problem Relation Age of Onset    Kidney disease Mother     Glaucoma Paternal Aunt     Cataracts Paternal Aunt     Cancer Sister     Heart disease Brother         rheumatic fever    Hepatitis Brother     Peripheral vascular disease Brother     Blindness Neg Hx     Amblyopia Neg Hx     Macular degeneration Neg Hx     Retinal detachment Neg Hx     Strabismus Neg Hx     Diabetes Neg Hx     Hypertension Neg Hx     Stroke Neg Hx     Thyroid disease Neg Hx             Review of  patient's allergies indicates:   Allergen Reactions    No known allergies          Current Outpatient Medications:     fluticasone (FLONASE) 50 mcg/actuation nasal spray, SHAKE LIQUID AND USE 1 SPRAY(50 MCG) IN EACH NOSTRIL EVERY DAY, Disp: 1 Bottle, Rfl: 2    levocetirizine (XYZAL) 5 MG tablet, TAKE 1 TABLET(5 MG) BY MOUTH EVERY EVENING, Disp: 90 tablet, Rfl: 3    lidocaine (LIDODERM) 5 %(700 mg/patch), Place 1-2 patches onto the skin once daily. Wear patch for 12 hours and  must have a 12 hour period without a patch, Disp: 60 patch, Rfl: 2    multivitamin (ONE DAILY MULTIVITAMIN) per tablet, Take 1 tablet by mouth once daily., Disp: , Rfl:     valACYclovir (VALTREX) 1000 MG tablet, TAKE 1 TABLET(1000 MG) BY MOUTH THREE TIMES DAILY, Disp: 21 tablet, Rfl: 0    Current Facility-Administered Medications:     hylan g-f 20 48 mg/6 mL injection 48 mg, 48 mg, Intra-articular, 1 time in Clinic/HOD, Cate Maria NP     Review of Systems  Review of Systems - Negative except for some ongoing back pain.    Physical Exam:  General: General appearance: alert, well appearing, and in no distress.   Skin: Skin exam - normal coloration and turgor, no rashes, no suspicious skin lesions noted.  HEENT: Ears - bilateral TM's and external ear canals normal. , ENT exam reveals - ENT exam normal, no neck nodes or sinus tenderness.   Lungs: Chest: clear to auscultation, no wheezes, rales or rhonchi, symmetric air entry.   Heart: CVS exam: normal rate, regular rhythm, normal S1, S2, no murmurs, rubs, clicks or gallops.   Extremities: Exam of extremities: peripheral pulses normal, no pedal edema, no clubbing or cyanosis    Labs:  Acceptable other than elevated cholesterol, slightly elevated sugar and borderline thyroid.    Stress test was acceptable.      Assessment/Recommendations:  Routine Health Maintenance is up-to-date.  I recommend an annual flu vaccine.  I also recommend the shingles vaccine after the age of 60  (shingrix).    We discussed physical therapy for your back.  In addition, we discussed a plant based portion controlled low-carbohydrate eating plan and regular exercise with an attempt at 15-20 pounds of weight loss in the next year.  Lastly, I recommend a consultation in Cardiology given your intolerance of statins in the face of very high cholesterol.  A referral is in place, so please schedule that appointment and if you need any help, please contact my office.    We will do follow-up blood work for your thyroid in 3 months.  Please contact me should you have any questions or concerns regarding physical findings, or my recommendations.        Sincerely,            Cassandra Cruz MD, FACP

## 2018-10-10 NOTE — PROGRESS NOTES
Subjective:       Patient ID: Dhruv Fine is a 62 y.o. male.    Chief Complaint: Executive Health    EH PE     BP doing well.     Labs reviewed.  Lipids up.  He really does not feel that he can tolerate statin therapy.  This was discussed.  Other options reviewed.    Stress test done today, results acceptable thus far.      Trying to work on sleep apnea treatment, has followed in the sleep clinic.  Last seen August 2018 and adjustments beng made.     No syncope, chest pain, pressure, tightness or shortness of breath.      Weight issues reviewed.  Borderline thyroid labs.     Ongoing hemorrhoid problems, chronic and stable.  No new issues.    Patient Active Problem List:     Vitamin D deficiency disease     Erectile dysfunction     Mixed hyperlipidemia     Bleeding hemorrhoids     Anxiety     Obstructive sleep apnea syndrome: needs CPAP- see sleep assessment 7/17     Nuclear sclerosis - Both Eyes     Osteoarthritis of lumbar spine     DJD (degenerative joint disease) of cervical spine     Genu varum (acquired)     Former smoker 1-2 packs daily for < 10 years     S/P L knee surgery, medial/lateral menisectomy, chondroplasty, synovectomy (2/10/15)     Primary localized osteoarthrosis, lower leg     Non morbid obesity due to excess calories     Chondromalacia, right knee     Primary osteoarthritis of left knee     IFG (impaired fasting glucose)     Statin myopathy            Review of Systems   Constitutional: Negative.    HENT: Negative for congestion, postnasal drip, rhinorrhea and sinus pressure.    Eyes: Negative for redness and visual disturbance.   Respiratory: Negative for apnea, choking, shortness of breath and stridor.    Cardiovascular: Negative for chest pain, palpitations and leg swelling.   Gastrointestinal: Negative for abdominal pain, constipation, diarrhea and nausea.        Hemorrhoids   Genitourinary: Negative for difficulty urinating, flank pain, frequency, testicular pain and urgency.    Musculoskeletal: Positive for back pain. Negative for arthralgias and myalgias.        Some mild back pain, lifted a bottled water.  Would like some physical therapy.  No fever, chills, sweats, bladder or bowel symptoms.  No radiation or weakness.   Skin: Negative for color change and rash.   Neurological: Negative.    Psychiatric/Behavioral: Negative.        Objective:      Physical Exam   Constitutional: He is oriented to person, place, and time. He appears well-developed and well-nourished.   HENT:   Head: Normocephalic and atraumatic.   Right Ear: External ear normal.   Left Ear: External ear normal.   Eyes: Conjunctivae and EOM are normal. Pupils are equal, round, and reactive to light.   Neck: Normal range of motion. Neck supple. No thyromegaly present.   Cardiovascular: Normal rate and regular rhythm.   No murmur heard.  Pulmonary/Chest: Effort normal and breath sounds normal. No respiratory distress. He has no wheezes.   Abdominal: Soft. He exhibits no distension. There is no tenderness.   Musculoskeletal: He exhibits no edema or tenderness.   Lymphadenopathy:     He has no cervical adenopathy.   Neurological: He is alert and oriented to person, place, and time. No cranial nerve deficit.   Skin: Skin is warm and dry.   Psychiatric: He has a normal mood and affect. His behavior is normal.       Assessment:       1. Annual physical exam    2. Spondylosis of lumbar region without myelopathy or radiculopathy    3. Non morbid obesity due to excess calories    4. Borderline abnormal thyroid function test    5. Mixed hyperlipidemia    6. Statin myopathy        Plan:         Annual physical exam    Spondylosis of lumbar region without myelopathy or radiculopathy  -     Ambulatory consult to Physical Therapy    Non morbid obesity due to excess calories:  Exercise and lifestyle issues reviewed, sleep apnea compliance reviewed    Borderline abnormal thyroid function test:  Signs and symptoms reviewed.  He does not  want medication currently but agrees to follow closely  -     TSH; Future; Expected date: 10/10/2018    Mixed hyperlipidemia  -     Ambulatory referral to Cardiology:  He would like to discuss options including Repatha at this time    Statin myopathy: see above    Declines flu vaccine    I will review all studies and determine further tx depending on findings

## 2018-10-11 ENCOUNTER — TELEPHONE (OUTPATIENT)
Dept: SLEEP MEDICINE | Facility: CLINIC | Age: 63
End: 2018-10-11

## 2018-10-11 DIAGNOSIS — G47.33 OSA (OBSTRUCTIVE SLEEP APNEA): Primary | ICD-10-CM

## 2018-10-11 NOTE — TELEPHONE ENCOUNTER
----- Message from Cate Gomez, CRT sent at 10/11/2018  9:43 AM CDT -----  Regarding: New RX  Contact: [phone]  Good morning. Can you place a new order for Mr. Bartlett for a FFM. I thought we had it on file when he came but it was for a nasal mask. We need it dated for 10/5/2018. Thanks in advance.

## 2018-10-24 ENCOUNTER — TELEPHONE (OUTPATIENT)
Dept: SLEEP MEDICINE | Facility: CLINIC | Age: 63
End: 2018-10-24

## 2018-10-24 DIAGNOSIS — G47.33 OSA (OBSTRUCTIVE SLEEP APNEA): Primary | ICD-10-CM

## 2018-10-25 ENCOUNTER — PATIENT MESSAGE (OUTPATIENT)
Dept: SLEEP MEDICINE | Facility: CLINIC | Age: 63
End: 2018-10-25

## 2018-10-25 NOTE — TELEPHONE ENCOUNTER
Left message for the patient to return the call in order to schedule an appointment with a sleep provider. MD/NP at the earliest available. LOV 08/15/2018

## 2018-10-25 NOTE — TELEPHONE ENCOUNTER
----- Message from Gertrudis Khan MD sent at 10/24/2018  5:45 PM CDT -----  Regarding: RE: New RX and Sleep Study  Contact: [phone]   Serena,    Please schedule with the first available provider.    SONY Frazier  ----- Message -----  From: Cate Gomez, CRT  Sent: 10/5/2018   2:33 PM  To: Gertrudis Khan MD  Subject: New RX and Sleep Study                           Good afternoon. Mr. Fine came in for a mask and decided he wants to go for a sleep study after he did the loaner bipap for 2 weeks. He needs a new RX for FFM instead of nasal. He was a [facility] pt but now he needs to see a new provider and he would like to see someone soon. Thanks in advance

## 2018-10-29 ENCOUNTER — OFFICE VISIT (OUTPATIENT)
Dept: CARDIOLOGY | Facility: CLINIC | Age: 63
End: 2018-10-29
Payer: COMMERCIAL

## 2018-10-29 VITALS
HEART RATE: 70 BPM | BODY MASS INDEX: 31.89 KG/M2 | HEIGHT: 71 IN | WEIGHT: 227.75 LBS | SYSTOLIC BLOOD PRESSURE: 138 MMHG | DIASTOLIC BLOOD PRESSURE: 80 MMHG

## 2018-10-29 DIAGNOSIS — E78.49 FAMILIAL COMBINED HYPERLIPIDEMIA: Primary | ICD-10-CM

## 2018-10-29 PROCEDURE — 3008F BODY MASS INDEX DOCD: CPT | Mod: CPTII,S$GLB,, | Performed by: INTERNAL MEDICINE

## 2018-10-29 PROCEDURE — 1101F PT FALLS ASSESS-DOCD LE1/YR: CPT | Mod: CPTII,S$GLB,, | Performed by: INTERNAL MEDICINE

## 2018-10-29 PROCEDURE — 99999 PR PBB SHADOW E&M-EST. PATIENT-LVL III: CPT | Mod: PBBFAC,,, | Performed by: INTERNAL MEDICINE

## 2018-10-29 PROCEDURE — 99203 OFFICE O/P NEW LOW 30 MIN: CPT | Mod: S$GLB,,, | Performed by: INTERNAL MEDICINE

## 2018-10-29 NOTE — PROGRESS NOTES
"Subjective:   Patient ID:  Dhruv Fine is a 63 y.o. male who presents for follow-up of Hyperlipidemia      HPI:     Has tried lipitor  Has tried crestor  Has tried pravachol    Gets myalgias    Vitals:    10/29/18 0908   BP: 138/80   Pulse: 70   Weight: 103.3 kg (227 lb 11.8 oz)   Height: 5' 11" (1.803 m)     Body mass index is 31.76 kg/m².  CrCl cannot be calculated (Patient's most recent lab result is older than the maximum 7 days allowed.).    Lab Results   Component Value Date     10/10/2018    K 4.6 10/10/2018     10/10/2018    CO2 27 10/10/2018    BUN 9 10/10/2018    CREATININE 0.9 10/10/2018     10/10/2018    HGBA1C 6.0 (H) 10/10/2018    AST 38 10/10/2018    ALT 45 (H) 10/10/2018    ALBUMIN 3.9 10/10/2018    PROT 8.1 10/10/2018    BILITOT 0.4 10/10/2018    WBC 4.57 10/10/2018    HGB 14.8 10/10/2018    HCT 46.3 10/10/2018    MCV 93 10/10/2018     10/10/2018    PSA 0.62 10/10/2018    TSH 4.488 (H) 10/10/2018    CHOL 298 (H) 10/10/2018    HDL 38 (L) 10/10/2018    LDLCALC 208.0 (H) 10/10/2018    TRIG 260 (H) 10/10/2018       Current Outpatient Medications   Medication Sig    fluticasone (FLONASE) 50 mcg/actuation nasal spray SHAKE LIQUID AND USE 1 SPRAY(50 MCG) IN EACH NOSTRIL EVERY DAY    levocetirizine (XYZAL) 5 MG tablet TAKE 1 TABLET(5 MG) BY MOUTH EVERY EVENING    lidocaine (LIDODERM) 5 %(700 mg/patch) Place 1-2 patches onto the skin once daily. Wear patch for 12 hours and  must have a 12 hour period without a patch    multivitamin (ONE DAILY MULTIVITAMIN) per tablet Take 1 tablet by mouth once daily.    valACYclovir (VALTREX) 1000 MG tablet TAKE 1 TABLET(1000 MG) BY MOUTH THREE TIMES DAILY     Current Facility-Administered Medications   Medication    hylan g-f 20 48 mg/6 mL injection 48 mg       ROS    Objective:   Physical Exam    Assessment:     1. Familial combined hyperlipidemia        Plan:   Literature and get back to me            No orders of the defined types " were placed in this encounter.

## 2018-10-29 NOTE — PROGRESS NOTES
"Subjective:   Patient ID:  Dhruv Fine is a 63 y.o. male who presents for follow-up of Hyperlipidemia      HPI:   Pt here to discuss hyperlipidemia therapy.  Baseline Tchol ~ 300, LDL~210, TG ~250, HDL~40.  He denies any exertional chest discomfort, exertional dyspnea, palpitations, TIA's, syncope or presyncope.  Recent treadmill was negative for ischemia.  He states that he has tried atorvastaton, rosuvstatin and pravastatin.  All has cause myalgias and he doesn't want to use statins.  Is interested in injectables.        Vitals:    10/29/18 0908   BP: 138/80   Pulse: 70   Weight: 103.3 kg (227 lb 11.8 oz)   Height: 5' 11" (1.803 m)     Body mass index is 31.76 kg/m².  CrCl cannot be calculated (Patient's most recent lab result is older than the maximum 7 days allowed.).    Lab Results   Component Value Date     10/10/2018    K 4.6 10/10/2018     10/10/2018    CO2 27 10/10/2018    BUN 9 10/10/2018    CREATININE 0.9 10/10/2018     10/10/2018    HGBA1C 6.0 (H) 10/10/2018    AST 38 10/10/2018    ALT 45 (H) 10/10/2018    ALBUMIN 3.9 10/10/2018    PROT 8.1 10/10/2018    BILITOT 0.4 10/10/2018    WBC 4.57 10/10/2018    HGB 14.8 10/10/2018    HCT 46.3 10/10/2018    MCV 93 10/10/2018     10/10/2018    PSA 0.62 10/10/2018    TSH 4.488 (H) 10/10/2018    CHOL 298 (H) 10/10/2018    HDL 38 (L) 10/10/2018    LDLCALC 208.0 (H) 10/10/2018    TRIG 260 (H) 10/10/2018       Current Outpatient Medications   Medication Sig    fluticasone (FLONASE) 50 mcg/actuation nasal spray SHAKE LIQUID AND USE 1 SPRAY(50 MCG) IN EACH NOSTRIL EVERY DAY    levocetirizine (XYZAL) 5 MG tablet TAKE 1 TABLET(5 MG) BY MOUTH EVERY EVENING    lidocaine (LIDODERM) 5 %(700 mg/patch) Place 1-2 patches onto the skin once daily. Wear patch for 12 hours and  must have a 12 hour period without a patch    multivitamin (ONE DAILY MULTIVITAMIN) per tablet Take 1 tablet by mouth once daily.    valACYclovir (VALTREX) 1000 MG tablet " TAKE 1 TABLET(1000 MG) BY MOUTH THREE TIMES DAILY     Current Facility-Administered Medications   Medication    hylan g-f 20 48 mg/6 mL injection 48 mg       Review of Systems   Constitution: Negative for decreased appetite, weakness, malaise/fatigue, weight gain and weight loss.   Eyes: Negative for visual disturbance.   Cardiovascular: Negative for chest pain, claudication, dyspnea on exertion, irregular heartbeat, orthopnea, palpitations, paroxysmal nocturnal dyspnea and syncope.   Respiratory: Negative for cough, shortness of breath and snoring.    Skin: Negative for rash.   Musculoskeletal: Negative for arthritis, muscle cramps, muscle weakness and myalgias.   Gastrointestinal: Negative for abdominal pain, anorexia, change in bowel habit and nausea.   Genitourinary: Negative for dysuria and frequency.   Neurological: Negative for excessive daytime sleepiness, dizziness, headaches, loss of balance and numbness.   Psychiatric/Behavioral: Negative for depression.       Objective:   Physical Exam   Constitutional: He is oriented to person, place, and time. He appears well-developed and well-nourished.   HENT:   Head: Normocephalic and atraumatic.   Cardiovascular: Normal rate, regular rhythm, normal heart sounds and intact distal pulses. Exam reveals no gallop and no friction rub.   No murmur heard.  Pulmonary/Chest: Effort normal and breath sounds normal.   Neurological: He is alert and oriented to person, place, and time.   Psychiatric: He has a normal mood and affect. His behavior is normal. Judgment and thought content normal.       Assessment:     1. Familial combined hyperlipidemia        Plan:   DiscussedPCSK9 inhibitors. Gave literature on praluent and repatha.  Pt will review and let me know if he wants to try.  He will message me through [facility] and if agreeable, will send prescription through [facility].            No orders of the defined types were placed in this encounter.

## 2018-10-29 NOTE — LETTER
October 29, 2018      Cassandra Cruz MD  [address]  [facility] [address]           [facility]  [address]  [facility] [address]  Phone: [phone]          Patient: Dhruv Fine   MR Number: [id number]   YOB: 1955   Date of Visit: 10/29/2018       Dear Dr. Cassandra Cruz:    Thank you for referring Dhruv Fine to me for evaluation. Attached you will find relevant portions of my assessment and plan of care.    If you have questions, please do not hesitate to call me. I look forward to following Dhruv Fine along with you.    Sincerely,    Juan J Amos MD    Enclosure  CC:  No Recipients    If you would like to receive this communication electronically, please contact [email or web address] or [phone] to request more information on [employer] access.    For providers and/or their staff who would like to refer a patient to [facility], please contact us through our one-stop-shop provider referral line, [facility], at [phone].    If you feel you have received this communication in error or would no longer like to receive these types of communications, please e-mail [email or web address]

## 2018-10-31 ENCOUNTER — CLINICAL SUPPORT (OUTPATIENT)
Dept: REHABILITATION | Facility: HOSPITAL | Age: 63
End: 2018-10-31
Attending: INTERNAL MEDICINE
Payer: COMMERCIAL

## 2018-10-31 DIAGNOSIS — G89.29 CHRONIC BILATERAL LOW BACK PAIN WITHOUT SCIATICA: ICD-10-CM

## 2018-10-31 DIAGNOSIS — M54.50 CHRONIC BILATERAL LOW BACK PAIN WITHOUT SCIATICA: ICD-10-CM

## 2018-10-31 PROCEDURE — 97140 MANUAL THERAPY 1/> REGIONS: CPT | Mod: PO

## 2018-10-31 PROCEDURE — 97161 PT EVAL LOW COMPLEX 20 MIN: CPT | Mod: PO

## 2018-10-31 PROCEDURE — 97110 THERAPEUTIC EXERCISES: CPT | Mod: PO
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
## 2018-11-06 ENCOUNTER — TELEPHONE (OUTPATIENT)
Dept: INTERNAL MEDICINE | Facility: CLINIC | Age: 63
End: 2018-11-06

## 2018-11-06 NOTE — TELEPHONE ENCOUNTER
----- Message from Pio Warner sent at 11/6/2018 12:52 PM CST -----  Contact: Patient [phone]  Urgent    Patient calling stating has re-accuring Ear Infection again, also bad tooth ache, no fever would like to know what Dr suggest patient to do? Please send Rx to pharmacy below    [facility] [address] [place] [address] [address] [phone] (Phone) [phone] (Fax    Please call an advise  Thank you

## 2018-11-07 ENCOUNTER — OFFICE VISIT (OUTPATIENT)
Dept: INTERNAL MEDICINE | Facility: CLINIC | Age: 63
End: 2018-11-07
Payer: COMMERCIAL

## 2018-11-07 VITALS
OXYGEN SATURATION: 98 % | HEIGHT: 71 IN | BODY MASS INDEX: 31.89 KG/M2 | DIASTOLIC BLOOD PRESSURE: 88 MMHG | HEART RATE: 76 BPM | SYSTOLIC BLOOD PRESSURE: 134 MMHG | TEMPERATURE: 99 F | WEIGHT: 227.75 LBS

## 2018-11-07 DIAGNOSIS — R09.81 NASAL CONGESTION: ICD-10-CM

## 2018-11-07 DIAGNOSIS — H66.001 ACUTE SUPPURATIVE OTITIS MEDIA OF RIGHT EAR WITHOUT SPONTANEOUS RUPTURE OF TYMPANIC MEMBRANE, RECURRENCE NOT SPECIFIED: Primary | ICD-10-CM

## 2018-11-07 DIAGNOSIS — M54.06 PANNICULITIS INVOLVING LUMBAR REGION: Primary | ICD-10-CM

## 2018-11-07 PROCEDURE — 1101F PT FALLS ASSESS-DOCD LE1/YR: CPT | Mod: CPTII,S$GLB,, | Performed by: NURSE PRACTITIONER

## 2018-11-07 PROCEDURE — 99999 PR PBB SHADOW E&M-EST. PATIENT-LVL IV: CPT | Mod: PBBFAC,,, | Performed by: NURSE PRACTITIONER

## 2018-11-07 PROCEDURE — 3008F BODY MASS INDEX DOCD: CPT | Mod: CPTII,S$GLB,, | Performed by: NURSE PRACTITIONER

## 2018-11-07 PROCEDURE — 99213 OFFICE O/P EST LOW 20 MIN: CPT | Mod: S$GLB,,, | Performed by: NURSE PRACTITIONER

## 2018-11-07 RX ORDER — FLUTICASONE PROPIONATE 50 MCG
SPRAY, SUSPENSION (ML) NASAL
Qty: 1 BOTTLE | Refills: 2 | Status: SHIPPED | OUTPATIENT
Start: 2018-11-07 | End: 2019-08-31 | Stop reason: SDUPTHER

## 2018-11-07 RX ORDER — AMOXICILLIN 875 MG/1
875 TABLET, FILM COATED ORAL 2 TIMES DAILY
Qty: 14 TABLET | Refills: 0 | Status: SHIPPED | OUTPATIENT
Start: 2018-11-07 | End: 2018-12-07

## 2018-11-07 NOTE — PROGRESS NOTES
"  Physical Therapy Daily Treatment Note     Name: Dhruv Fine  Clinic Number: [id number]    Therapy Diagnosis:   Encounter Diagnosis   Name Primary?    Chronic bilateral low back pain without sciatica Yes     Physician: Cassandra Cruz MD    Visit Date: 11/8/2018    Evaluation Date: 10/31/2018  Authorization period Expiration: 12/31/2018  Plan of Care Certification Period: 12/26/2018     Visit #: 2/ Visits authorized: 20  Time In:  9:05  Time Out: 10:00  Treatment time: 50  Total Billable Time: 40 minutes     Precautions: Standard    Subjective     Pt reports: chronic lower back pain over the years. He reports recent aggravated of symptoms after picking up a vzaar water bottle.He reports  Increased pain with carrying objects ,bending over and prolonged standing.    Pain: 5/10 across lower back     Objective     Dhruv received therapeutic exercises to develop lumbar/core, LE  strength, endurance, ROM, flexibility, posture and core stabilization for 30 minutes including:    Mat ex's:   Hamstring stretch 2 x 30" with strap  Piriformis stretch KTOS 2 x 30"  LTR 20x  DKTC 2 x 30 sec  Pelvic tilt x 10 with 5 sec hold  Pelvic tilt with Hooklying clam with GTB x 20  Pelvic tilt with Hooklying hip add iso x 20  Pelvic tilt with march x 10 each  Bridging   x 10 ( stopped due to increased pain)    Dhruv received the following manual therapy techniques: Soft tissue Mobilization were applied to the: lumbar paraspinals for 10 minutes, including:  STM lower lumbar paraspinals      Patient receives IFC electrical stimulation for pain control applied to lower lumbar paraspinals,   frequency = 80-150Hz,  @ 40% scan,  for 10 minutes in conjunction with moist heat.        Written Home Exercises Provided: Patient educated to continue with previously issued HEP to tolerance along with updated HEP for TrA set/PPT.  Exercises were reviewed and Dhruv was able to demonstrate them prior to the end of the session.  Dhruv demonstrated good  " understanding of the education provided.     Assessment     Patient nghia Tx fair. Patient appearing guarded with ex's and mobility due to persistent lower back pain. Min cues for TA activation with ex's. He received trial of IFC with moist heat however, pain level remained about the same post session. He is scheduled to receive an MRI next week. WIll monitor and attempt to progress as tolerated. .    Pt will continue to benefit from skilled outpatient physical therapy to address the deficits listed in the problem list box on initial evaluation, provide pt/family education and to maximize pt's level of independence in the home and community environment.     GOALS: from eval  Short Term Goals:  4 weeks  1.  Dhruv will report 3/10 pain at worst with going from sit to stand.  2.  Dhruv will report 1-2 sleep disturbances per night.  3. Dhruv will demonstrate increased MMT to 4+/5 throughout LEs to increase tolerance for ADL and work activities.  4. Dhruv will demonstrate moderate flexibility limitations in the lower extremities.  5. Dhruv to tolerate HEP to improve ROM and independence with ADL's.     Long Term Goals: 8 weeks  1.  Dhruv will report 1-2/10 pain at worst with prolonged standing or sitting.  2.  Dhruv will report 0-1 sleep disturbances per night.  3.  Dhruv will demonstrate increased MMT to 5/5 throughout LEs to increase tolerance for ADL and work activities.  4.Pt will report at CI level (1-20% impaired) on FOTO lumbar score for low back pain disability to demonstrate decrease in disability and improvement in back pain.  5. Pt to be Independent with final HEP to improve ROM and independence with ADL's    Plan     Continue PT toward established plan of care and goals.     Fuentes Whittington, PTA

## 2018-11-07 NOTE — PROGRESS NOTES
INTERNAL MEDICINE URGENT CARE NOTE    CHIEF COMPLAINT     Chief Complaint   Patient presents with    Otalgia     right       HPI     Dhruv Fine is a 63 y.o. male with vit d def, ED, HLD, anxiety, RICKI with CPAP, OA, DJD of cervical spine, obesity, IFG and statin myopathy who presents for an urgent visit today.  He is an established pt of Dr Cruz     Here with c/o right ear pain x5 days. Dull pain in the right ear. Triggered by pushing on back teeth. No fever. +nasal congestion and rhinorrhea. Denies treatment.  +muffled hearing     Also with continued complaints of lower back pain after lifting 5 gallon water bottle. Dicussed with PCP at annual 10/10/2018- referred for PT. One visit 10/31/2018    Past Medical History:  Past Medical History:   Diagnosis Date    Anemia 10/11/2013    Anxiety     Bleeding hemorrhoids     Borderline hypertension 10/19/2013    Degenerative disc disease     cervical and lumbar    Former smoker 1-2 packs daily for < 10 years 1/23/2015    Hyperlipidemia     Iron deficiency anemia 5/21/2015    Non morbid obesity due to excess calories 6/17/2016    Nuclear sclerosis - Both Eyes 2/25/2013    Sleep apnea 7/26/2012       Home Medications:  Prior to Admission medications    Medication Sig Start Date End Date Taking? Authorizing Provider   fluticasone (FLONASE) 50 mcg/actuation nasal spray SHAKE LIQUID AND USE 1 SPRAY(50 MCG) IN EACH NOSTRIL EVERY DAY 1/17/18   Susana Mays NP   levocetirizine (XYZAL) 5 MG tablet TAKE 1 TABLET(5 MG) BY MOUTH EVERY EVENING 9/1/16   Linda Pritchett MD   lidocaine (LIDODERM) 5 %(700 mg/patch) Place 1-2 patches onto the skin once daily. Wear patch for 12 hours and  must have a 12 hour period without a patch 1/23/13   Jena Small MD   multivitamin (ONE DAILY MULTIVITAMIN) per tablet Take 1 tablet by mouth once daily.    Historical Provider, MD   valACYclovir (VALTREX) 1000 MG tablet TAKE 1 TABLET(1000 MG) BY MOUTH THREE TIMES  DAILY 11/6/17   Cassandra Cruz MD       Review of Systems:  Review of Systems   Constitutional: Negative for chills and fever.   HENT: Positive for congestion (at night ), ear pain and rhinorrhea. Negative for ear discharge, postnasal drip, sinus pressure, sinus pain and sore throat.    Respiratory: Positive for cough. Negative for shortness of breath and wheezing.    Skin: Negative for rash.   Neurological: Positive for dizziness (when first standing in the morning x 2 mornings. ). Negative for light-headedness and headaches.       Health Maintainence:   Immunizations:  Health Maintenance       Date Due Completion Date    Pneumococcal (65+) (1 of 2 - PCV13) 10/20/2018 ---    Abdominal Aortic Aneurysm Screening 10/20/2018 ---    Zoster Vaccine 07/11/2019 (Originally 10/20/2013) ---    Influenza Vaccine 11/20/2019 (Originally 8/1/2018) 2/26/2018 (Declined)    Override on 2/26/2018: Declined    Override on 1/23/2015: Declined    Override on 10/18/2013: Declined    Override on 9/26/2011: Done    PROSTATE-SPECIFIC ANTIGEN 10/10/2019 10/10/2018    Lipid Panel 10/10/2019 10/10/2018    Colonoscopy 07/17/2024 7/17/2014    Override on 7/26/2005: Done    TETANUS VACCINE 08/21/2024 8/21/2014           PHYSICAL EXAM     There were no vitals taken for this visit.    Physical Exam   Constitutional: He is oriented to person, place, and time. He appears well-developed and well-nourished.   HENT:   Head: Normocephalic.   Right Ear: External ear normal. There is tenderness. Tympanic membrane is erythematous and retracted. A middle ear effusion is present.   Left Ear: Tympanic membrane and external ear normal.   Nose: Nose normal. No mucosal edema or rhinorrhea. Right sinus exhibits no maxillary sinus tenderness and no frontal sinus tenderness. Left sinus exhibits no frontal sinus tenderness.   Mouth/Throat: Oropharynx is clear and moist. Dental caries present. No dental abscesses. No oropharyngeal exudate.   Eyes: Pupils are equal,  round, and reactive to light.   Neck: No JVD present. No tracheal deviation present. No thyromegaly present.   Cardiovascular: Normal rate, regular rhythm and intact distal pulses. Exam reveals no gallop and no friction rub.   No murmur heard.  Pulmonary/Chest: Breath sounds normal. No respiratory distress. He has no wheezes. He has no rales. He exhibits no tenderness.   Abdominal: Soft. Bowel sounds are normal. He exhibits no distension. There is no tenderness.   Musculoskeletal: Normal range of motion. He exhibits no edema or tenderness.   Lymphadenopathy:     He has no cervical adenopathy.   Neurological: He is alert and oriented to person, place, and time.   Skin: Skin is warm and dry. No rash noted.   Psychiatric: He has a normal mood and affect. His behavior is normal.   Vitals reviewed.      LABS     Lab Results   Component Value Date    HGBA1C 6.0 (H) 10/10/2018     CMP  Sodium   Date Value Ref Range Status   10/10/2018 139 136 - 145 mmol/L Final     Potassium   Date Value Ref Range Status   10/10/2018 4.6 3.5 - 5.1 mmol/L Final     Chloride   Date Value Ref Range Status   10/10/2018 103 95 - 110 mmol/L Final     CO2   Date Value Ref Range Status   10/10/2018 27 23 - 29 mmol/L Final     Glucose   Date Value Ref Range Status   10/10/2018 105 70 - 110 mg/dL Final     BUN, Bld   Date Value Ref Range Status   10/10/2018 9 8 - 23 mg/dL Final     Creatinine   Date Value Ref Range Status   10/10/2018 0.9 0.5 - 1.4 mg/dL Final     Calcium   Date Value Ref Range Status   10/10/2018 10.2 8.7 - 10.5 mg/dL Final     Total Protein   Date Value Ref Range Status   10/10/2018 8.1 6.0 - 8.4 g/dL Final     Albumin   Date Value Ref Range Status   10/10/2018 3.9 3.5 - 5.2 g/dL Final     Total Bilirubin   Date Value Ref Range Status   10/10/2018 0.4 0.1 - 1.0 mg/dL Final     Comment:     For infants and newborns, interpretation of results should be based  on gestational age, weight and in agreement with  clinical  observations.  Premature Infant recommended reference ranges:  Up to 24 hours.............<8.0 mg/dL  Up to 48 hours............<12.0 mg/dL  3-5 days..................<15.0 mg/dL  6-29 days.................<15.0 mg/dL       Alkaline Phosphatase   Date Value Ref Range Status   10/10/2018 87 55 - 135 U/L Final     AST   Date Value Ref Range Status   10/10/2018 38 10 - 40 U/L Final     ALT   Date Value Ref Range Status   10/10/2018 45 (H) 10 - 44 U/L Final     Anion Gap   Date Value Ref Range Status   10/10/2018 9 8 - 16 mmol/L Final     eGFR if    Date Value Ref Range Status   10/10/2018 >60.0 >60 mL/min/1.73 m^2 Final     eGFR if non    Date Value Ref Range Status   10/10/2018 >60.0 >60 mL/min/1.73 m^2 Final     Comment:     Calculation used to obtain the estimated glomerular filtration  rate (eGFR) is the CKD-EPI equation.        Lab Results   Component Value Date    WBC 4.57 10/10/2018    HGB 14.8 10/10/2018    HCT 46.3 10/10/2018    MCV 93 10/10/2018     10/10/2018     Lab Results   Component Value Date    CHOL 298 (H) 10/10/2018    CHOL 301 (H) 08/14/2018    CHOL 173 08/21/2017     Lab Results   Component Value Date    HDL 38 (L) 10/10/2018    HDL 39 (L) 08/14/2018    HDL 39 (L) 08/21/2017     Lab Results   Component Value Date    LDLCALC 208.0 (H) 10/10/2018    LDLCALC 211.4 (H) 08/14/2018    LDLCALC 98.0 08/21/2017     Lab Results   Component Value Date    TRIG 260 (H) 10/10/2018    TRIG 253 (H) 08/14/2018    TRIG 180 (H) 08/21/2017     Lab Results   Component Value Date    CHOLHDL 12.8 (L) 10/10/2018    CHOLHDL 13.0 (L) 08/14/2018    CHOLHDL 22.5 08/21/2017     Lab Results   Component Value Date    TSH 4.488 (H) 10/10/2018       ASSESSMENT/PLAN     Camilo Rody is a 63 y.o. male with  Past Medical History:   Diagnosis Date    Anemia 10/11/2013    Anxiety     Bleeding hemorrhoids     Borderline hypertension 10/19/2013    Degenerative disc disease      cervical and lumbar    Former smoker 1-2 packs daily for < 10 years 1/23/2015    Hyperlipidemia     Iron deficiency anemia 5/21/2015    Non morbid obesity due to excess calories 6/17/2016    Nuclear sclerosis - Both Eyes 2/25/2013    Sleep apnea 7/26/2012     Acute suppurative otitis media of right ear without spontaneous rupture of tympanic membrane, recurrence not specified- will start flonase as directed daily. Amoxicillin bid x 1 week. mucienx as needed.   -     fluticasone (FLONASE) 50 mcg/actuation nasal spray; SHAKE LIQUID AND USE 1 SPRAY(50 MCG) IN EACH NOSTRIL EVERY DAY  Dispense: 1 Bottle; Refill: 2  -     amoxicillin (AMOXIL) 875 MG tablet; Take 1 tablet (875 mg total) by mouth 2 (two) times daily.  Dispense: 14 tablet; Refill: 0    Nasal congestion  -     fluticasone (FLONASE) 50 mcg/actuation nasal spray; SHAKE LIQUID AND USE 1 SPRAY(50 MCG) IN EACH NOSTRIL EVERY DAY  Dispense: 1 Bottle; Refill: 2      Follow up as needed and with PCP     Patient education provided from [place]. Patient was counseled on when and how to seek emergent care.       Susana MELO, APRN, FNP-c   Department of Internal Medicine - [facility]  8:36 AM

## 2018-11-07 NOTE — PATIENT INSTRUCTIONS
flonase nasal spray 2 squirts daily as directed.     Mucinex 600-1200 mg twice daily with full glass of water.     Increase fluids and rest.    Alert/Awake/Cooperative

## 2018-11-08 ENCOUNTER — CLINICAL SUPPORT (OUTPATIENT)
Dept: REHABILITATION | Facility: HOSPITAL | Age: 63
End: 2018-11-08
Attending: INTERNAL MEDICINE
Payer: COMMERCIAL

## 2018-11-08 DIAGNOSIS — G89.29 CHRONIC BILATERAL LOW BACK PAIN WITHOUT SCIATICA: Primary | ICD-10-CM

## 2018-11-08 DIAGNOSIS — M54.50 CHRONIC BILATERAL LOW BACK PAIN WITHOUT SCIATICA: Primary | ICD-10-CM

## 2018-11-08 PROCEDURE — 97140 MANUAL THERAPY 1/> REGIONS: CPT | Mod: PO

## 2018-11-08 PROCEDURE — 97110 THERAPEUTIC EXERCISES: CPT | Mod: PO

## 2018-11-08 PROCEDURE — 97014 ELECTRIC STIMULATION THERAPY: CPT | Mod: PO

## 2018-11-12 ENCOUNTER — HOSPITAL ENCOUNTER (OUTPATIENT)
Dept: RADIOLOGY | Facility: HOSPITAL | Age: 63
Discharge: HOME | End: 2018-11-12
Attending: CHIROPRACTOR
Payer: COMMERCIAL

## 2018-11-12 DIAGNOSIS — M54.06 PANNICULITIS INVOLVING LUMBAR REGION: ICD-10-CM

## 2018-11-12 PROCEDURE — A9585 GADOBUTROL INJECTION: HCPCS

## 2018-11-12 PROCEDURE — 25500020 PHARM REV CODE 255

## 2018-11-12 PROCEDURE — 72158 MRI LUMBAR SPINE W/O & W/DYE: CPT | Mod: TC

## 2018-11-12 PROCEDURE — 72158 MRI LUMBAR SPINE W/O & W/DYE: CPT | Mod: 26,,, | Performed by: RADIOLOGY

## 2018-11-12 RX ORDER — GADOBUTROL 604.72 MG/ML
10 INJECTION INTRAVENOUS
Status: COMPLETED | OUTPATIENT
Start: 2018-11-12 | End: 2018-11-12

## 2018-11-12 RX ADMIN — GADOBUTROL 10 ML: 604.72 INJECTION INTRAVENOUS at 06:11

## 2018-11-14 ENCOUNTER — CLINICAL SUPPORT (OUTPATIENT)
Dept: REHABILITATION | Facility: HOSPITAL | Age: 63
End: 2018-11-14
Attending: INTERNAL MEDICINE
Payer: COMMERCIAL

## 2018-11-14 DIAGNOSIS — M54.50 CHRONIC BILATERAL LOW BACK PAIN WITHOUT SCIATICA: ICD-10-CM

## 2018-11-14 DIAGNOSIS — G89.29 CHRONIC BILATERAL LOW BACK PAIN WITHOUT SCIATICA: ICD-10-CM

## 2018-11-14 PROCEDURE — 97140 MANUAL THERAPY 1/> REGIONS: CPT | Mod: PO

## 2018-11-14 PROCEDURE — 97110 THERAPEUTIC EXERCISES: CPT | Mod: PO

## 2018-11-14 NOTE — PROGRESS NOTES
"  Physical Therapy Daily Treatment Note     Name: Dhruv Fine  Clinic Number: [id number]    Therapy Diagnosis:   Encounter Diagnosis   Name Primary?    Chronic bilateral low back pain without sciatica      Physician: Cassandra Cruz MD    Visit Date: 11/14/2018    Evaluation Date: 10/31/2018  Authorization period Expiration: 12/31/2018  Plan of Care Certification Period: 12/26/2018     Visit #: 3/ Visits authorized: 20  Time In:  0205  Time Out: 0258  Treatment time: 52  Total Billable Time: 52 minutes     Precautions: Standard    Subjective     Pt reports: that his back does not feel too good today after driving to and from work.    Pain: 5/10 across lower back     Objective     Dhruv received therapeutic exercises to develop lumbar/core, LE  strength, endurance, ROM, flexibility, posture and core stabilization for 30 minutes including:    Mat ex's:   Hamstring stretch 3 x 30" with strap  Piriformis stretch KTOS 2 x 30"  LTR 20x-NP  DKTC 2 x 30 sec  Pelvic tilt x 10 with 5 sec hold  Pelvic tilt with Hooklying clam with GTB 2 x 10  Pelvic tilt with Hooklying hip add iso x 20  Pelvic tilt with march x 10 each  Bridging   x 10    Dhruv received the following manual therapy techniques: Soft tissue Mobilization & LE stretching were applied to the: lumbar paraspinals for 12 minutes, including:  STM lower lumbar paraspinals, LE stretching      Patient receives IFC electrical stimulation for pain control applied to lower lumbar paraspinals,   frequency = 80-150Hz,  @ 40% scan,  for 10 minutes in conjunction with moist heat.        Written Home Exercises Provided: Patient educated that it is important for him to perform his exercise at home to help improve the low back.  Exercises were reviewed and Dhruv was able to demonstrate them prior to the end of the session.  Dhruv demonstrated good  understanding of the education provided.     Assessment     Patient able to tolerate all therex during tx session.  Tightness noted in " L QL proximal to the L SI joint.  Patient also demonstrates significant hip tightness bilaterally.  Patient reported decreased pain levels to 0/10 following treatment session.     Pt will continue to benefit from skilled outpatient physical therapy to address the deficits listed in the problem list box on initial evaluation, provide pt/family education and to maximize pt's level of independence in the home and community environment.     GOALS: from eval  Short Term Goals:  4 weeks  1.  Dhruv will report 3/10 pain at worst with going from sit to stand.  2.  Dhruv will report 1-2 sleep disturbances per night.  3. Dhruv will demonstrate increased MMT to 4+/5 throughout LEs to increase tolerance for ADL and work activities.  4. Dhruv will demonstrate moderate flexibility limitations in the lower extremities.  5. Dhruv to tolerate HEP to improve ROM and independence with ADL's.     Long Term Goals: 8 weeks  1.  Dhruv will report 1-2/10 pain at worst with prolonged standing or sitting.  2.  Dhruv will report 0-1 sleep disturbances per night.  3.  Dhruv will demonstrate increased MMT to 5/5 throughout LEs to increase tolerance for ADL and work activities.  4.Pt will report at CI level (1-20% impaired) on FOTO lumbar score for low back pain disability to demonstrate decrease in disability and improvement in back pain.  5. Pt to be Independent with final HEP to improve ROM and independence with ADL's    Plan     Continue PT toward established plan of care and goals.     Savanna Baumann, PT

## 2018-11-16 ENCOUNTER — CLINICAL SUPPORT (OUTPATIENT)
Dept: REHABILITATION | Facility: HOSPITAL | Age: 63
End: 2018-11-16
Attending: INTERNAL MEDICINE
Payer: COMMERCIAL

## 2018-11-16 DIAGNOSIS — M54.50 CHRONIC BILATERAL LOW BACK PAIN WITHOUT SCIATICA: ICD-10-CM

## 2018-11-16 DIAGNOSIS — G89.29 CHRONIC BILATERAL LOW BACK PAIN WITHOUT SCIATICA: ICD-10-CM

## 2018-11-16 PROCEDURE — 97140 MANUAL THERAPY 1/> REGIONS: CPT | Mod: PO

## 2018-11-16 PROCEDURE — 97110 THERAPEUTIC EXERCISES: CPT | Mod: PO

## 2018-11-16 NOTE — PROGRESS NOTES
"  Physical Therapy Daily Treatment Note     Name: Dhruv Fine  Clinic Number: [id number]    Therapy Diagnosis:   Encounter Diagnosis   Name Primary?    Chronic bilateral low back pain without sciatica      Physician: Cassandra Cruz MD    Visit Date: 11/16/2018    Evaluation Date: 10/31/2018  Authorization period Expiration: 12/31/2018  Plan of Care Certification Period: 12/26/2018     Visit #: 3/ Visits authorized: 20  Time In:  0904  Time Out: 0955  Treatment time: 51  Total Billable Time: 41 minutes     Precautions: Standard    Subjective     Pt reports: that he feels okay today but still has some pain in his back.  Patient reported that he is no longer working because they don't want him to work again until he feels 100%.  Pain: 3/10 across lower back     Objective     Dhruv received therapeutic exercises to develop lumbar/core, LE  strength, endurance, ROM, flexibility, posture and core stabilization for 31 minutes including:    Mat ex's:   Hamstring stretch 3 x 30" with strap  Piriformis stretch KTOS 2 x 30"  LTR 20x-NP  DKTC 2 x 30 sec  Pelvic tilt x 10 with 5 sec hold  Pelvic tilt with Hooklying clam with GTB 2 x 10  Pelvic tilt with Hooklying hip add iso x 20  Pelvic tilt with march x 10 each  Bridging   x 10  Clamshells OTB 3 x 10    Dhruv received the following manual therapy techniques: Soft tissue Mobilization & LE stretching were applied to the: lumbar paraspinals for 1) minutes, including:  STM lower lumbar paraspinals (NP), LE stretching      Patient receives IFC electrical stimulation for pain control applied to lower lumbar paraspinals,   frequency = 80-150Hz,  @ 40% scan,  for 10 minutes in conjunction with moist heat.        Written Home Exercises Provided: Patient educated that it is important for him to perform his exercise at home to help improve the low back.  Exercises were reviewed and Dhruv was able to demonstrate them prior to the end of the session.  Dhruv demonstrated good  " understanding of the education provided.     Assessment     Dhruv was able to tolerate all therex during tx session today.  Continued LE tightness present bilaterally.  Patient again encouraged and educated on the importance of performance of HEP at home.  Patient noted less pain at end of session today.      Pt will continue to benefit from skilled outpatient physical therapy to address the deficits listed in the problem list box on initial evaluation, provide pt/family education and to maximize pt's level of independence in the home and community environment.     GOALS: from eval  Short Term Goals:  4 weeks  1.  Dhruv will report 3/10 pain at worst with going from sit to stand.  2.  Dhruv will report 1-2 sleep disturbances per night.  3. Dhruv will demonstrate increased MMT to 4+/5 throughout LEs to increase tolerance for ADL and work activities.  4. Dhruv will demonstrate moderate flexibility limitations in the lower extremities.  5. Dhruv to tolerate HEP to improve ROM and independence with ADL's.     Long Term Goals: 8 weeks  1.  Dhruv will report 1-2/10 pain at worst with prolonged standing or sitting.  2.  Dhruv will report 0-1 sleep disturbances per night.  3.  Dhruv will demonstrate increased MMT to 5/5 throughout LEs to increase tolerance for ADL and work activities.  4.Pt will report at CI level (1-20% impaired) on FOTO lumbar score for low back pain disability to demonstrate decrease in disability and improvement in back pain.  5. Pt to be Independent with final HEP to improve ROM and independence with ADL's    Plan     Continue PT toward established plan of care and goals.     Svaanna Baumann, PT

## 2018-11-19 ENCOUNTER — TELEPHONE (OUTPATIENT)
Dept: INTERNAL MEDICINE | Facility: CLINIC | Age: 63
End: 2018-11-19

## 2018-11-19 ENCOUNTER — CLINICAL SUPPORT (OUTPATIENT)
Dept: REHABILITATION | Facility: HOSPITAL | Age: 63
End: 2018-11-19
Attending: INTERNAL MEDICINE
Payer: COMMERCIAL

## 2018-11-19 DIAGNOSIS — N28.89 RENAL MASS: Primary | ICD-10-CM

## 2018-11-19 DIAGNOSIS — G89.29 CHRONIC BILATERAL LOW BACK PAIN WITHOUT SCIATICA: ICD-10-CM

## 2018-11-19 DIAGNOSIS — M54.50 CHRONIC BILATERAL LOW BACK PAIN WITHOUT SCIATICA: ICD-10-CM

## 2018-11-19 PROCEDURE — 97140 MANUAL THERAPY 1/> REGIONS: CPT | Mod: PO

## 2018-11-19 PROCEDURE — 97110 THERAPEUTIC EXERCISES: CPT | Mod: PO

## 2018-11-19 NOTE — PROGRESS NOTES
"  Physical Therapy Daily Treatment Note     Name: Dhruv Fine  Clinic Number: [id number]    Therapy Diagnosis:   Encounter Diagnosis   Name Primary?    Chronic bilateral low back pain without sciatica      Physician: Cassandra Cruz MD    Visit Date: 11/19/2018    Evaluation Date: 10/31/2018  Authorization period Expiration: 12/31/2018  Plan of Care Certification Period: 12/26/2018     Visit #: 3/ Visits authorized: 20  Time In:  0207  Time Out: 0302  Treatment time: 55  Total Billable Time: 35 minutes     Precautions: Standard    Subjective     Pt reports: that he just came from the chiropractor where he got adjusted so he doesn't feel too bad.  Pain: 3/10 across lower back     Objective     Dhruv received therapeutic exercises to develop lumbar/core, LE  strength, endurance, ROM, flexibility, posture and core stabilization for 25 minutes including:    Mat ex's:   Hamstring stretch 3 x 30" with strap  Piriformis stretch KTOS 2 x 30"  LTR 20x-NP  DKTC 2 x 30 sec  Pelvic tilt x 10 with 5 sec hold  Pelvic tilt with Hooklying clam with GTB 2 x 10  Pelvic tilt with Hooklying hip add iso x 20  Pelvic tilt with march x 10 each  Bridging   x 10  Clamshells OTB 3 x 10    Dhruv received the following manual therapy techniques: Soft tissue Mobilization & LE stretching were applied to the: lumbar paraspinals for 1) minutes, including:  STM lower lumbar paraspinals (NP), LE stretching for 10 minutes.        Patient receives IFC electrical stimulation for pain control applied to lower lumbar paraspinals,   frequency = 80-150Hz,  @ 40% scan,  for 10 minutes in conjunction with moist heat.        Written Home Exercises Provided: Patient educated that it is important for him to perform his exercise at home to help improve the low back.  Exercises were reviewed and Dhruv was able to demonstrate them prior to the end of the session.  Dhruv demonstrated good  understanding of the education provided.     Assessment     Dhruv noted " increased pain at the end of the session today.  Patient continues to have significant LE tightness and notes he is not fully compliant with HEP.     Pt will continue to benefit from skilled outpatient physical therapy to address the deficits listed in the problem list box on initial evaluation, provide pt/family education and to maximize pt's level of independence in the home and community environment.     GOALS: from eval  Short Term Goals:  4 weeks  1.  Dhruv will report 3/10 pain at worst with going from sit to stand.  2.  Dhruv will report 1-2 sleep disturbances per night.  3. Dhruv will demonstrate increased MMT to 4+/5 throughout LEs to increase tolerance for ADL and work activities.  4. Dhruv will demonstrate moderate flexibility limitations in the lower extremities.  5. Dhruv to tolerate HEP to improve ROM and independence with ADL's.     Long Term Goals: 8 weeks  1.  Dhruv will report 1-2/10 pain at worst with prolonged standing or sitting.  2.  Dhruv will report 0-1 sleep disturbances per night.  3.  Dhruv will demonstrate increased MMT to 5/5 throughout LEs to increase tolerance for ADL and work activities.  4.Pt will report at CI level (1-20% impaired) on FOTO lumbar score for low back pain disability to demonstrate decrease in disability and improvement in back pain.  5. Pt to be Independent with final HEP to improve ROM and independence with ADL's    Plan     Continue PT toward established plan of care and goals.     Savanna Baumann, PT

## 2018-11-19 NOTE — TELEPHONE ENCOUNTER
----- Message from Juana Moreno sent at 11/19/2018  1:53 PM CST -----  Contact: self/[phone]  Patient called in regards needing to place order for ultrasound. Patient stated that PCP can go into his charge and see what is going on with him. Please call and advise. Thank you

## 2018-11-19 NOTE — TELEPHONE ENCOUNTER
"Pt stated that he went to have an MRI for his back   And he was told he needs an Ultrasound because they saw something on his Kidney,   According" to the MRI report pt have     "a large right renal cyst, although follow-up assessment with renal ultrasound is advised given this is only partially visualized and lack of prior cross-sectional imaging for comparison.  Pt have an appointment schedule with you but he wants to know should he have the Ultrasound first before seeing you?  Please advise   "

## 2018-11-21 ENCOUNTER — CLINICAL SUPPORT (OUTPATIENT)
Dept: REHABILITATION | Facility: HOSPITAL | Age: 63
End: 2018-11-21
Attending: INTERNAL MEDICINE
Payer: COMMERCIAL

## 2018-11-21 DIAGNOSIS — M54.50 CHRONIC BILATERAL LOW BACK PAIN WITHOUT SCIATICA: ICD-10-CM

## 2018-11-21 DIAGNOSIS — G89.29 CHRONIC BILATERAL LOW BACK PAIN WITHOUT SCIATICA: ICD-10-CM

## 2018-11-21 PROCEDURE — 97110 THERAPEUTIC EXERCISES: CPT | Mod: PO

## 2018-11-21 PROCEDURE — 97140 MANUAL THERAPY 1/> REGIONS: CPT | Mod: PO

## 2018-11-21 NOTE — PROGRESS NOTES
"  Physical Therapy Daily Treatment Note     Name: Dhruv Fine  Clinic Number: [id number]    Therapy Diagnosis:   Encounter Diagnosis   Name Primary?    Chronic bilateral low back pain without sciatica      Physician: Cassandra Cruz MD    Visit Date: 11/21/2018    Evaluation Date: 10/31/2018  Authorization period Expiration: 12/31/2018  Plan of Care Certification Period: 12/26/2018     Visit #: 4/ Visits authorized: 20  Time In:  0200  Time Out: 0256  Treatment time: 56  Total Billable Time: 36 minutes     Precautions: Standard    Subjective     Pt reports:  That his back was very sore after the last session and thinks it may be due to going straight from the chiropractor to PT.  Patient reports that he feels okay at this moment because he has a lidocaine patch on his back.    Pain: 3/10 across lower back     Objective     Dhruv received therapeutic exercises to develop lumbar/core, LE  strength, endurance, ROM, flexibility, posture and core stabilization for 26 minutes including:    Mat ex's:   Hamstring stretch 3 x 30" with strap  Piriformis stretch KTOS 2 x 30"  LTR 20x-NP  DKTC 2 x 30 sec  Pelvic tilt x 10 with 5 sec hold-NP  Pelvic tilt with Hooklying clam with GTB 2 x 10-NP  Pelvic tilt with Hooklying hip add iso x 20-NP  Pelvic tilt with march x 10 each  Bridging   x 10-NP  Clamshells OTB 3 x 10-NP    Dhruv received the following manual therapy techniques: Soft tissue Mobilization & LE stretching were applied to the: lumbar paraspinals for 1) minutes, including:  STM lower lumbar paraspinals, LE stretching for 10 minutes (NP).        Patient receives IFC electrical stimulation for pain control applied to lower lumbar paraspinals,   frequency = 80-150Hz,  @ 40% scan,  for 10 minutes in conjunction with moist heat.        Written Home Exercises Provided: Patient educated on better scheduling with chiropractics and PT on different days.    Exercises were reviewed and Dhruv was able to demonstrate them prior to " the end of the session.  Dhruv demonstrated good  understanding of the education provided.     Assessment     Dhruv came in to therapy today with reports that he did not feel well after the last session.  Limited therex program performed secondary to elevated pain levels following last session.  Generalized muscle tightness noted in lumbar paraspinals and QL bilaterally.  Continued significant limitations in hamstring flexibility noted today.     Pt will continue to benefit from skilled outpatient physical therapy to address the deficits listed in the problem list box on initial evaluation, provide pt/family education and to maximize pt's level of independence in the home and community environment.     GOALS: from eval  Short Term Goals:  4 weeks  1.  Dhruv will report 3/10 pain at worst with going from sit to stand.  2.  Dhruv will report 1-2 sleep disturbances per night.  3. Dhruv will demonstrate increased MMT to 4+/5 throughout LEs to increase tolerance for ADL and work activities.  4. Dhruv will demonstrate moderate flexibility limitations in the lower extremities.  5. Dhruv to tolerate HEP to improve ROM and independence with ADL's.     Long Term Goals: 8 weeks  1.  Dhruv will report 1-2/10 pain at worst with prolonged standing or sitting.  2.  Dhruv will report 0-1 sleep disturbances per night.  3.  Dhruv will demonstrate increased MMT to 5/5 throughout LEs to increase tolerance for ADL and work activities.  4.Pt will report at CI level (1-20% impaired) on FOTO lumbar score for low back pain disability to demonstrate decrease in disability and improvement in back pain.  5. Pt to be Independent with final HEP to improve ROM and independence with ADL's    Plan     Continue PT toward established plan of care and goals.     Savanna Baumann, PT

## 2018-11-26 ENCOUNTER — CLINICAL SUPPORT (OUTPATIENT)
Dept: REHABILITATION | Facility: HOSPITAL | Age: 63
End: 2018-11-26
Attending: INTERNAL MEDICINE
Payer: COMMERCIAL

## 2018-11-26 DIAGNOSIS — M54.50 CHRONIC BILATERAL LOW BACK PAIN WITHOUT SCIATICA: ICD-10-CM

## 2018-11-26 DIAGNOSIS — G89.29 CHRONIC BILATERAL LOW BACK PAIN WITHOUT SCIATICA: ICD-10-CM

## 2018-11-26 PROCEDURE — 97014 ELECTRIC STIMULATION THERAPY: CPT | Mod: PO

## 2018-11-26 PROCEDURE — 97140 MANUAL THERAPY 1/> REGIONS: CPT | Mod: PO

## 2018-11-26 PROCEDURE — 97110 THERAPEUTIC EXERCISES: CPT | Mod: PO

## 2018-11-26 NOTE — PROGRESS NOTES
"  Physical Therapy Daily Treatment Note     Name: Dhruv Fine  Clinic Number: [id number]    Therapy Diagnosis:   Encounter Diagnosis   Name Primary?    Chronic bilateral low back pain without sciatica      Physician: Cassandra Cruz MD    Visit Date: 11/26/2018    Evaluation Date: 10/31/2018  Authorization period Expiration: 12/31/2018  Plan of Care Certification Period: 12/26/2018     Visit #: 4/ Visits authorized: 20  Time In:  0304  Time Out: 0402  Treatment time: 58  Total Billable Time: 38 minutes     Precautions: Standard    Subjective     Pt reports:  That his back does not feel much better overall.  Patient said that the other day he was having back pain and went on his inversion table and felt much better.  Patient also reported that he went into the pool yesterday and that also gave him some relief.  Pain: 4/10 across lower back     Objective     Dhruv received therapeutic exercises to develop lumbar/core, LE  strength, endurance, ROM, flexibility, posture and core stabilization for 18 minutes including:    Mat ex's:   Hamstring stretch 3 x 30" with strap  Piriformis stretch KTOS 2 x 30"  LTR 20x-NP  DKTC 5" x 20 sec  SKTC 3 x 30"  Pelvic tilt x 10 with 5 sec hold  Pelvic tilt with Hooklying clam with GTB 2 x 10-NP  Pelvic tilt with Hooklying hip add iso x 20-NP  Pelvic tilt with march x 10 each-NP  Bridging   x 10-NP  Clamshells OTB 3 x 10-NP    Dhruv received the following manual therapy techniques: Soft tissue Mobilization & LE stretching were applied to the: lumbar paraspinals for 1) minutes, including:  STM lower lumbar paraspinals, LE stretching for 10 minutes (NP).        Patient receives IFC electrical stimulation for pain control applied to lower lumbar paraspinals,   frequency = 80-150Hz,  @ 40% scan,  for 10 minutes in conjunction with moist heat.        Written Home Exercises Provided: Patient educated on better scheduling with chiropractics and PT on different days.    Exercises were " reviewed and Dhruv was able to demonstrate them prior to the end of the session.  Dhruv demonstrated good  understanding of the education provided.     Assessment     Dhruv continues to report similar pain levels overall since initiating PT.  Given the information he gave in the subjective portion of the session it was suggested to Dhruv to attempt pool therapy to see if that would be more beneficial for him.  Dhruv was in agreement with this plan.       Pt will continue to benefit from skilled outpatient physical therapy to address the deficits listed in the problem list box on initial evaluation, provide pt/family education and to maximize pt's level of independence in the home and community environment.     GOALS: from eval  Short Term Goals:  4 weeks  1.  Dhruv will report 3/10 pain at worst with going from sit to stand.  2.  Dhruv will report 1-2 sleep disturbances per night.  3. Dhruv will demonstrate increased MMT to 4+/5 throughout LEs to increase tolerance for ADL and work activities.  4. Dhruv will demonstrate moderate flexibility limitations in the lower extremities.  5. Dhruv to tolerate HEP to improve ROM and independence with ADL's.     Long Term Goals: 8 weeks  1.  Dhruv will report 1-2/10 pain at worst with prolonged standing or sitting.  2.  Dhruv will report 0-1 sleep disturbances per night.  3.  Dhruv will demonstrate increased MMT to 5/5 throughout LEs to increase tolerance for ADL and work activities.  4.Pt will report at CI level (1-20% impaired) on FOTO lumbar score for low back pain disability to demonstrate decrease in disability and improvement in back pain.  5. Pt to be Independent with final HEP to improve ROM and independence with ADL's    Plan     Continue PT toward established plan of care and goals.     Savanna Baumann, PT

## 2018-11-29 ENCOUNTER — HOSPITAL ENCOUNTER (OUTPATIENT)
Dept: RADIOLOGY | Facility: HOSPITAL | Age: 63
Discharge: HOME | End: 2018-11-29
Attending: INTERNAL MEDICINE
Payer: COMMERCIAL

## 2018-11-29 ENCOUNTER — CLINICAL SUPPORT (OUTPATIENT)
Dept: REHABILITATION | Facility: HOSPITAL | Age: 63
End: 2018-11-29
Attending: INTERNAL MEDICINE
Payer: COMMERCIAL

## 2018-11-29 DIAGNOSIS — N28.89 RENAL MASS: ICD-10-CM

## 2018-11-29 DIAGNOSIS — M54.50 CHRONIC BILATERAL LOW BACK PAIN WITHOUT SCIATICA: ICD-10-CM

## 2018-11-29 DIAGNOSIS — G89.29 CHRONIC BILATERAL LOW BACK PAIN WITHOUT SCIATICA: ICD-10-CM

## 2018-11-29 PROCEDURE — 76770 US EXAM ABDO BACK WALL COMP: CPT | Mod: 26,,, | Performed by: RADIOLOGY

## 2018-11-29 PROCEDURE — 97113 AQUATIC THERAPY/EXERCISES: CPT

## 2018-11-29 PROCEDURE — 76770 US EXAM ABDO BACK WALL COMP: CPT | Mod: TC

## 2018-11-29 NOTE — PROGRESS NOTES
Physical Therapy Aquatic Treatment Note     Name: Dhruv Fine  Clinic Number: [id number]    Therapy Diagnosis:   Encounter Diagnosis   Name Primary?   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 States that he has been using his inversion table and it has helped decrease his sx    Pain: 0/10  Location:  back     Objective     Dhruv received aquatic exercises to develop strength, endurance, ROM, flexibility, posture and core stabilization for 60 minutes including:  Warm-up Laps 3 x each  FWD,BWD,Side    Stretches 3 x 20 sec each  HSS  Quad    LE exs 20x each  Mini Squat with QS  Heel Raise with GS  Hip ext with HS curl  Hip flex with LAQ  Lunges Side/FWD    UE exs 20x each  Shld flex/ext  Shld Horiz ABD/ADD  Lat pull yellow t-band  Horiz Row yellow t-band    Cool Down Laps 1 x each  FWD,BWD,Side    Home Exercises Provided and Patient Education Provided     Education provided:   - progress towards goals   - role of therapy     Written Home Exercises Provided: Patient was not issued HEP for pool.  Exercises were reviewed and Dhruv was able to demonstrate them prior to the end of the session.   Pt received a written copy of exercises to perform at home.     Dhruv demonstrated good  understanding of the education provided.     Assessment     Pt nghia tx with ther ex well, good effort with all activities, few VCs required post instruction. Pt with some minor LB  stiffness post tx but denies pn.  Dhruv is progressing well towards his goals.   Pt prognosis is Good.     Pt will continue to benefit from skilled outpatient physical therapy to address the deficits listed in the problem list box on initial evaluation, provide pt/family education and to maximize pt's level of independence in the home and community environment.     Pt's spiritual, cultural and educational needs considered and pt agreeable to plan of care and goals.    Anticipated barriers to physical therapy: none    Goals:   Short Term Goals:  4 weeks  1.  Dhruv will report 3/10 pain at worst with going from sit to stand.  2.  Dhruv will report 1-2 sleep disturbances per night.  3. Dhruv will demonstrate increased MMT to 4+/5 throughout LEs to increase tolerance for ADL and work activities.  4. Dhruv will demonstrate moderate flexibility limitations in the lower extremities.  5. Dhruv to tolerate HEP to improve ROM and independence with ADL's.     Long Term Goals: 8 weeks  1.  Dhruv will report 1-2/10 pain at worst with prolonged standing or sitting.  2.  Dhruv will report 0-1 sleep disturbances per night.  3.  Dhruv will demonstrate increased MMT to 5/5 throughout LEs to increase tolerance for ADL and work activities.  4.Pt will report at CI level (1-20% impaired) on FOTO lumbar score for low back pain disability to demonstrate decrease in disability and improvement in back pain.  5. Pt to be Independent with final HEP to improve ROM and independence with ADL's    Plan     Continue 2x per week.     Ronen Arana, PTA

## 2018-11-30 ENCOUNTER — PATIENT MESSAGE (OUTPATIENT)
Dept: INTERNAL MEDICINE | Facility: CLINIC | Age: 63
End: 2018-11-30

## 2018-12-03 ENCOUNTER — CLINICAL SUPPORT (OUTPATIENT)
Dept: REHABILITATION | Facility: HOSPITAL | Age: 63
End: 2018-12-03
Attending: INTERNAL MEDICINE
Payer: COMMERCIAL

## 2018-12-03 DIAGNOSIS — G89.29 CHRONIC BILATERAL LOW BACK PAIN WITHOUT SCIATICA: ICD-10-CM

## 2018-12-03 DIAGNOSIS — M54.50 CHRONIC BILATERAL LOW BACK PAIN WITHOUT SCIATICA: ICD-10-CM

## 2018-12-03 PROCEDURE — 97113 AQUATIC THERAPY/EXERCISES: CPT

## 2018-12-03 NOTE — PROGRESS NOTES
Physical Therapy Aquatic Treatment Note     Name: Dhruv Fine  Clinic Number: [id number]    Therapy Diagnosis:   Encounter Diagnosis   Name Primary?    Chronic bilateral low back pain without sciatica      Physician: Cassandra Cruz MD  Visit Date: 12/3/2018  Physician Orders: PT Eval and Treat   Medical Diagnosis:  M47.816 (ICD-10-CM) - Spondylosis of lumbar region without myelopathy or radiculopathy  Evaluation Date: 10/31/2018  Authorization period Expiration: 12/31/2018  Plan of Care Certification Period: 12/26/2018     Visit #:/ Visits authorized: 3/20  Time In: 1430  Time Out: 1530  Total Billable Time: 60 minutes     Precautions: Standard        Subjective     Pt reports: . States that he has been using his inversion table and it has helped decrease his sx 3-4 minutes(not completely upside down)    Pain: 2/10  Location:  back     Objective     Dhruv received aquatic exercises to develop strength, endurance, ROM, flexibility, posture and core stabilization for 60 minutes including:  Warm-up Laps 3 x each  FWD,BWD,Side    Stretches 3 x 20 sec each  HSS  QS L only    LE exs 20x each  Mini Squat with QS  Heel Raise with GS  Hip ext with HS curl  Hip flex with LAQ  Lunges Side/FWD  Hip abd/add  Mini squat  With truncal forward flexion with noodle    UE exs 20x each  Shld flex/ext apo  Shld Horiz ABD/ADD apo  Lat pull yellow t-band-np  Row with red tubing    Cool Down Laps 1 x each  FWD,BWD,Side    Home Exercises Provided and Patient Education Provided     Education provided:   - progress towards goals   - role of therapy     Written Home Exercises Provided: Patient was not issued HEP for pool.  Exercises were reviewed and Dhruv was able to demonstrate them prior to the end of the session.   Pt received a written copy of exercises to perform at home.     Dhruv demonstrated good  understanding of the education provided.     Assessment     Pt nghia tx with ther ex well, good effort with all activities, few VCs  required post instruction. Pt with some minor LB stiffness post tx but denies pn.  Dhruv is progressing well towards his goals.   Pt prognosis is Good.     Pt will continue to benefit from skilled outpatient aquatic physical therapy to address the deficits listed in the problem list box on initial evaluation, provide pt/family education and to maximize pt's level of independence in the home and community environment.     Pt's spiritual, cultural and educational needs considered and pt agreeable to plan of care and goals.    Anticipated barriers to physical therapy: none    Goals:   Short Term Goals:  4 weeks  1.  Dhruv will report 3/10 pain at worst with going from sit to stand.  2.  Dhruv will report 1-2 sleep disturbances per night.  3. Dhruv will demonstrate increased MMT to 4+/5 throughout LEs to increase tolerance for ADL and work activities.  4. Dhruv will demonstrate moderate flexibility limitations in the lower extremities.  5. Dhruv to tolerate HEP to improve ROM and independence with ADL's.     Long Term Goals: 8 weeks  1.  Dhruv will report 1-2/10 pain at worst with prolonged standing or sitting.  2.  Dhruv will report 0-1 sleep disturbances per night.  3.  Dhruv will demonstrate increased MMT to 5/5 throughout LEs to increase tolerance for ADL and work activities.  4.Pt will report at CI level (1-20% impaired) on FOTO lumbar score for low back pain disability to demonstrate decrease in disability and improvement in back pain.  5. Pt to be Independent with final HEP to improve ROM and independence with ADL's    Plan     Continue 2x per week.     Magdalene Jackson, PT

## 2018-12-05 ENCOUNTER — CLINICAL SUPPORT (OUTPATIENT)
Dept: REHABILITATION | Facility: HOSPITAL | Age: 63
End: 2018-12-05
Attending: INTERNAL MEDICINE
Payer: COMMERCIAL

## 2018-12-05 DIAGNOSIS — G89.29 CHRONIC BILATERAL LOW BACK PAIN WITHOUT SCIATICA: ICD-10-CM

## 2018-12-05 DIAGNOSIS — M54.50 CHRONIC BILATERAL LOW BACK PAIN WITHOUT SCIATICA: ICD-10-CM

## 2018-12-05 PROCEDURE — 97113 AQUATIC THERAPY/EXERCISES: CPT

## 2018-12-05 NOTE — PROGRESS NOTES
Physical Therapy Aquatic Treatment Note     Name: Dhruv Fine  Clinic Number: [id number]    Therapy Diagnosis:   No diagnosis found.  Physician: Cassandra Cruz MD  Visit Date: 12/5/2018  Physician Orders: PT Eval and Treat   Medical Diagnosis:  M47.816 (ICD-10-CM) - Spondylosis of lumbar region without myelopathy or radiculopathy  Evaluation Date: 10/31/2018  Authorization period Expiration: 12/31/2018  Plan of Care Certification Period: 12/26/2018     Visit #:/ Visits authorized: 4/20  Time In: 1437  Time Out: 1530  Total Billable Time: 53 minutes     Precautions: Standard        Subjective     Pt reports: . States that he has been using his inversion table and it has helped decrease his sx 3-4 minutes(not completely upside down); fell asleep in recliner chair last night & a bit stiffer    Pain: 2/10  Location:  back     Objective     Dhruv received aquatic exercises to develop strength, endurance, ROM, flexibility, posture and core stabilization for 53 minutes including:  Warm-up Laps 2 x each  FWD,BWD,Side    Stretches 3 x 20 sec each  HSS    LE exs 15x each  Mini Squat with QS  Heel Raise with GS  Hip ext with HS curl  Hip flex with LAQ  Lunges Side/FWD with DB BUE laterally for support  Hip abd/add  Mini squat Hold - chest press with disc    UE exs 20x each  Shld flex/ext alternating apo(narrow RANDY)  Shld Horiz ABD/ADD alternating  apo(narrow RANDY)  Shoulder lateral ext alternating apo  Lat pull orange t-band- staggered stance  Row with red tubing    Cool Down Laps 1 x each  FWD,BWD,Side    Home Exercises Provided and Patient Education Provided none for pool; continue his back stretches daily    Education provided:   - progress towards goals   - role of therapy     Written Home Exercises Provided: Patient was not issued HEP for pool.  Exercises were reviewed and Dhruv was able to demonstrate them prior to the end of the session.   Pt received a written copy of exercises to perform at home.     Dhruv  demonstrated good  understanding of the education provided.     Assessment     Pt nghia tx with ther ex well, good effort with all activities, intermittent VCs required for core stabilization instruction. Denies any increased pain post treatment  Dhruv is progressing well towards his goals.   Pt prognosis is Good.     Pt will continue to benefit from skilled outpatient aquatic physical therapy to address the deficits listed in the problem list box on initial evaluation, provide pt/family education and to maximize pt's level of independence in the home and community environment.     Pt's spiritual, cultural and educational needs considered and pt agreeable to plan of care and goals.    Anticipated barriers to physical therapy: none    Goals:   Short Term Goals:  4 weeks  1.  Dhruv will report 3/10 pain at worst with going from sit to stand.  2.  Dhruv will report 1-2 sleep disturbances per night.  3. Dhruv will demonstrate increased MMT to 4+/5 throughout LEs to increase tolerance for ADL and work activities.  4. Dhruv will demonstrate moderate flexibility limitations in the lower extremities.  5. Dhruv to tolerate HEP to improve ROM and independence with ADL's.     Long Term Goals: 8 weeks  1.  Dhruv will report 1-2/10 pain at worst with prolonged standing or sitting.  2.  Dhruv will report 0-1 sleep disturbances per night.  3.  Dhruv will demonstrate increased MMT to 5/5 throughout LEs to increase tolerance for ADL and work activities.  4.Pt will report at CI level (1-20% impaired) on FOTO lumbar score for low back pain disability to demonstrate decrease in disability and improvement in back pain.  5. Pt to be Independent with final HEP to improve ROM and independence with ADL's    Plan     Continue 2x per week.     Magdalene Jackson, PT

## 2018-12-07 ENCOUNTER — OFFICE VISIT (OUTPATIENT)
Dept: INTERNAL MEDICINE | Facility: CLINIC | Age: 63
End: 2018-12-07
Payer: COMMERCIAL

## 2018-12-07 VITALS
HEIGHT: 71 IN | SYSTOLIC BLOOD PRESSURE: 122 MMHG | WEIGHT: 226.63 LBS | DIASTOLIC BLOOD PRESSURE: 80 MMHG | BODY MASS INDEX: 31.73 KG/M2

## 2018-12-07 DIAGNOSIS — Z87.891 FORMER SMOKER: ICD-10-CM

## 2018-12-07 DIAGNOSIS — G72.0 STATIN MYOPATHY: ICD-10-CM

## 2018-12-07 DIAGNOSIS — N20.0 RENAL STONES: ICD-10-CM

## 2018-12-07 DIAGNOSIS — T46.6X5A STATIN MYOPATHY: ICD-10-CM

## 2018-12-07 DIAGNOSIS — B96.89 ACUTE BACTERIAL SINUSITIS: Primary | ICD-10-CM

## 2018-12-07 DIAGNOSIS — J01.90 ACUTE BACTERIAL SINUSITIS: Primary | ICD-10-CM

## 2018-12-07 DIAGNOSIS — H69.90 DISORDER OF EUSTACHIAN TUBE, UNSPECIFIED LATERALITY: ICD-10-CM

## 2018-12-07 DIAGNOSIS — N28.1 RENAL CYST, ACQUIRED, RIGHT: ICD-10-CM

## 2018-12-07 DIAGNOSIS — E78.49 FAMILIAL COMBINED HYPERLIPIDEMIA: ICD-10-CM

## 2018-12-07 PROCEDURE — 99999 PR PBB SHADOW E&M-EST. PATIENT-LVL III: CPT | Mod: PBBFAC,,, | Performed by: INTERNAL MEDICINE

## 2018-12-07 PROCEDURE — 3008F BODY MASS INDEX DOCD: CPT | Mod: CPTII,S$GLB,, | Performed by: INTERNAL MEDICINE

## 2018-12-07 PROCEDURE — 99214 OFFICE O/P EST MOD 30 MIN: CPT | Mod: S$GLB,,, | Performed by: INTERNAL MEDICINE

## 2018-12-07 PROCEDURE — 1101F PT FALLS ASSESS-DOCD LE1/YR: CPT | Mod: CPTII,S$GLB,, | Performed by: INTERNAL MEDICINE

## 2018-12-07 RX ORDER — AMOXICILLIN AND CLAVULANATE POTASSIUM 875; 125 MG/1; MG/1
1 TABLET, FILM COATED ORAL 2 TIMES DAILY
Qty: 20 TABLET | Refills: 0 | Status: SHIPPED | OUTPATIENT
Start: 2018-12-07 | End: 2019-08-31

## 2018-12-07 NOTE — PATIENT INSTRUCTIONS

## 2018-12-07 NOTE — PROGRESS NOTES
"Subjective:       Patient ID: Dhruv Fine is a 63 y.o. male.    Chief Complaint: Follow-up (ear ache)    Follow-up multiple issues    Had a scan done which revealed a lesion on his kidney, ultrasound confirmed a cyst and nonobstructing renal stones: "Simple right renal cyst.  Possible subcentimeter nonobstructing left nephrolithiasis."    Anticipate recehck 1 year.    Due for AAA screening.    Discussed PCSK9 inhibitors. He was seen in [facility] and given literature on praluent and repatha; he is still quite ambivalent.    Has some ear discomfort  as well; R > L.   He was seen about a month ago.  He denies fever, chills or sweats.  He continues to have sinus congestion. He denies cough, wheezing or shortness of breath.    Patient Active Problem List:     Vitamin D deficiency disease     Erectile dysfunction     Familial combined hyperlipidemia     Bleeding hemorrhoids     Anxiety     Obstructive sleep apnea syndrome: needs CPAP- see sleep assessment 7/17     Nuclear sclerosis - Both Eyes     DJD (degenerative joint disease) of cervical spine     Genu varum (acquired)     Former smoker 1-2 packs daily for < 10 years     S/P L knee surgery, medial/lateral menisectomy, chondroplasty, synovectomy (2/10/15)     Non morbid obesity due to excess calories     Chondromalacia, right knee     Primary osteoarthritis of left knee     IFG (impaired fasting glucose)     Statin myopathy     Chronic bilateral low back pain without sciatica     Renal cyst, acquired, right: see u/s 11/18     Renal stones: left side non obstructing see u/s 11/18        Review of Systems   Constitutional: Positive for fatigue. Negative for chills and fever.   HENT: Positive for congestion, ear pain and postnasal drip.    Eyes: Negative.    Respiratory: Negative for cough, chest tightness, shortness of breath and wheezing.    Cardiovascular: Negative.    Gastrointestinal: Negative.    Musculoskeletal: Positive for back pain.        Ongoing back " issues, overall slightly better       Objective:      Physical Exam   Constitutional: He is oriented to person, place, and time. He appears well-developed and well-nourished.   HENT:   Head: Normocephalic and atraumatic.   Right Ear: External ear normal.   Left Ear: External ear normal.   TMs dull   Eyes: Conjunctivae and EOM are normal. Pupils are equal, round, and reactive to light.   Neck: Normal range of motion. Neck supple. No thyromegaly present.   Cardiovascular: Normal rate and regular rhythm.   No murmur heard.  Pulmonary/Chest: Effort normal and breath sounds normal. No respiratory distress. He has no wheezes.   Abdominal: Soft. He exhibits no distension. There is no tenderness.   Musculoskeletal: He exhibits no edema or tenderness.   Lymphadenopathy:     He has no cervical adenopathy.   Neurological: He is alert and oriented to person, place, and time. No cranial nerve deficit.   Skin: Skin is warm and dry.   Psychiatric: He has a normal mood and affect. His behavior is normal.       Assessment:       1. Acute bacterial sinusitis    2. Renal cyst, acquired, right: see u/s 11/18    3. Renal stones: left side non obstructing see u/s 11/18    4. Former smoker 1-2 packs daily for < 10 years    5. Statin myopathy    6. Familial combined hyperlipidemia    7. Disorder of Eustachian tube, unspecified laterality        Plan:         Dhruv was seen today for follow-up.    Diagnoses and all orders for this visit:    Acute bacterial sinusitis:  Antibiotics, fluids, rest, Mucinex, sparing months of decongestant, follow up poor results    Renal cyst, acquired, right: see u/s 11/18:  Reviewed, anticipate recheck in 1 year    Renal stones: left side non obstructing see u/s 11/18:  No symptoms, he declines urology assessment.  Hydration, will continue to monitor.  Alarm symptoms reviewed    Former smoker 1-2 packs daily for < 10 years  -      Abdominal Aorta; Future    Statin myopathy:  Review    Familial combined  hyperlipidemia:  He is ambivalent about treatment    Disorder of Eustachian tube, unspecified laterality:  Natural history reviewed.  Sparing amounts of low-dose pseudoephedrine, cautions and side effects reviewed    Other orders  -     amoxicillin-clavulanate 875-125mg (AUGMENTIN) 875-125 mg per tablet; Take 1 tablet by mouth 2 (two) times daily.    Rest, fluids, acetaminophen, mucinex and follow up poor results.  Lifestyle issues reviewed, exercise and weight loss which may help with cholesterol although I still think he should be on medication    Patient counseled for over 50% of the 25 minute appt, all questions answered,  chart reviewed and care coordinated.

## 2018-12-10 ENCOUNTER — CLINICAL SUPPORT (OUTPATIENT)
Dept: REHABILITATION | Facility: HOSPITAL | Age: 63
End: 2018-12-10
Attending: INTERNAL MEDICINE
Payer: COMMERCIAL

## 2018-12-10 DIAGNOSIS — M54.50 CHRONIC BILATERAL LOW BACK PAIN WITHOUT SCIATICA: ICD-10-CM

## 2018-12-10 DIAGNOSIS — G89.29 CHRONIC BILATERAL LOW BACK PAIN WITHOUT SCIATICA: ICD-10-CM

## 2018-12-10 PROCEDURE — 97113 AQUATIC THERAPY/EXERCISES: CPT

## 2018-12-10 NOTE — PROGRESS NOTES
Physical Therapy Aquatic Treatment Note     Name: Dhruv Fine  Clinic Number: [id number]    Therapy Diagnosis:   No diagnosis found.  Physician: Cassandra Cruz MD  Visit Date: 12/10/2018  Physician Orders: PT Eval and Treat   Medical Diagnosis:  M47.816 (ICD-10-CM) - Spondylosis of lumbar region without myelopathy or radiculopathy  Evaluation Date: 10/31/2018  Authorization period Expiration: 12/31/2018  Plan of Care Certification Period: 12/26/2018     Visit #:/ Visits authorized: 5/20  Time In: 1545  Time Out: 1645  Total Billable Time: 30 minutes     Precautions: Standard        Subjective     Pt reports: States that he some stiffness/pain in across low back; did not use inversion table this weekend.     Pain: 3/10  Location:  back     Objective     Dhruv received aquatic exercises to develop strength, endurance, ROM, flexibility, posture and core stabilization for 53 minutes including:  Warm-up Laps 2 x each  FWD,BWD,Side    Stretches 3 x 20 sec each  HSS    LE exs 20x each  Mini Squat with QS  Heel Raise with GS  Hip flex with LAQ  Hip ext with HS curl  Hip abd/add  Lunges Side/FWD with DB BUE laterally for support  Mini squat Hold - chest press with disc    UE exs 20x each  Shld flex/ext alternating apo in SLS 10x each LE  Shld Horiz ABD/ADD alternating apo in SLS 2x10 10x each LE  Shoulder lateral ext alternating apo  Lat pull orange t-band- staggered stance  Row with red tubing  Wall sit with aquatic weights    Aquatic weights by side with TA activation 1x30 sec   Aquatic weights out in front with TA activation 1x30 sec    Cool Down Laps 1 x each  FWD,BWD,Side    Home Exercises Provided and Patient Education Provided none for pool; continue his back stretches daily    Education provided:   - progress towards goals   - role of therapy     Written Home Exercises Provided: Patient was not issued HEP for pool.  Exercises were reviewed and Dhruv was able to demonstrate them prior to the end of the session.    Pt received a written copy of exercises to perform at home.     Dhruv demonstrated good  understanding of the education provided.     Assessment     Pt nghia tx with ther ex well, good effort with all activities, intermittent VCs required for core stabilization instruction. Pt was able to to tolerate core exercise progression very well. Pt could benefit from continuing core progression in small increments given his history of increased LBP with land therapy. Denies any increased pain post treatment  Dhruv is progressing well towards his goals.     Pt prognosis is Good.     Pt will continue to benefit from skilled outpatient aquatic physical therapy to address the deficits listed in the problem list box on initial evaluation, provide pt/family education and to maximize pt's level of independence in the home and community environment.     Pt's spiritual, cultural and educational needs considered and pt agreeable to plan of care and goals.    Anticipated barriers to physical therapy: none    Goals:   Short Term Goals:  4 weeks  1.  Dhruv will report 3/10 pain at worst with going from sit to stand.  2.  Dhruv will report 1-2 sleep disturbances per night.  3. Dhruv will demonstrate increased MMT to 4+/5 throughout LEs to increase tolerance for ADL and work activities.  4. Dhruv will demonstrate moderate flexibility limitations in the lower extremities.  5. Dhruv to tolerate HEP to improve ROM and independence with ADL's.     Long Term Goals: 8 weeks  1.  Dhruv will report 1-2/10 pain at worst with prolonged standing or sitting.  2.  Dhruv will report 0-1 sleep disturbances per night.  3.  Dhruv will demonstrate increased MMT to 5/5 throughout LEs to increase tolerance for ADL and work activities.  4.Pt will report at CI level (1-20% impaired) on FOTO lumbar score for low back pain disability to demonstrate decrease in disability and improvement in back pain.  5. Pt to be Independent with final HEP to improve ROM and independence  with ADL's    Plan     Continue 2x per week.     Dhruv Owens, PTA

## 2018-12-12 ENCOUNTER — CLINICAL SUPPORT (OUTPATIENT)
Dept: REHABILITATION | Facility: HOSPITAL | Age: 63
End: 2018-12-12
Attending: INTERNAL MEDICINE
Payer: COMMERCIAL

## 2018-12-12 DIAGNOSIS — G89.29 CHRONIC BILATERAL LOW BACK PAIN WITHOUT SCIATICA: ICD-10-CM

## 2018-12-12 DIAGNOSIS — M54.50 CHRONIC BILATERAL LOW BACK PAIN WITHOUT SCIATICA: ICD-10-CM

## 2018-12-12 PROCEDURE — 97113 AQUATIC THERAPY/EXERCISES: CPT

## 2018-12-12 NOTE — PROGRESS NOTES
Physical Therapy Aquatic Treatment Note     Name: Dhruv Fine  Clinic Number: [id number]    Therapy Diagnosis:   Encounter Diagnosis   Name Primary?    Chronic bilateral low back pain without sciatica      Physician: Cassandra Cruz MD  Visit Date: 12/12/2018  Physician Orders: PT Eval and Treat   Medical Diagnosis:  M47.816 (ICD-10-CM) - Spondylosis of lumbar region without myelopathy or radiculopathy  Evaluation Date: 10/31/2018  Authorization period Expiration: 12/31/2018  Plan of Care Certification Period: 12/26/2018     Visit #:/ Visits authorized: 6/20  Time In: 1345  Time Out: 1445  Total Billable Time: 45 minutes     Precautions: Standard      Subjective     Pt reports: min pain across low back; also reports using inversion table the day before yesterday and yesterday for 3 min each day.   Pain: 1 or 2/10  Location:  back     Objective     Dhruv received aquatic exercises to develop strength, endurance, ROM, flexibility, posture and core stabilization for 53 minutes including:  Warm-up Laps 2 x each  FWD,BWD,Side    Stretches 3 x 20 sec each  HSS    LE exs 25x each  Heel Raise with GS  Mini Squat with QS  Hip ext with HS curl  Hip flex/ext  Hip abd/add  Hip abd walk in mini squat position 2 laps  Lunges Side/FWD with no aquatic DB in order to activate core 15 each LE in each direction     Core: 30x each  Shld flex/ext alternating apo in SLS 2x15 15x each LE  Shld Horiz ABD/ADD alternating apo in SLS 2x15 15x each LE  Shoulder lateral ext alternating apo  Lat pull orange t-band- staggered stance  Row with red tubing  Mini squat Hold - chest press with disc   Aquatic DB submersion with arms extended and TA activation 10x10 sec holds  Wall sit with aquatic weights    Aquatic weights by side with TA activation 1x30 sec   Aquatic weights out in front with TA activation 1x30 sec    Cool Down Laps 1 x each  FWD,BWD,Side    Home Exercises Provided and Patient Education Provided none for pool; continue his back  stretches daily    Education provided:   - progress towards goals   - role of therapy     Written Home Exercises Provided: Patient was not issued HEP for pool.  Exercises were reviewed and Dhruv was able to demonstrate them prior to the end of the session.   Pt received a written copy of exercises to perform at home.     Dhruv demonstrated good  understanding of the education provided.     Assessment     Pt nghia tx with ther ex well, good effort with all activities, intermittent VCs required for core stabilization instruction. Continued to strengthen core and hips in order to provide stability to lumbar spine. Pt was able to to tolerate core exercise progression very well. Pt could benefit from continuing core progression in small increments given his history of increased LBP with land therapy. Denies any increased pain post treatment  Dhruv is progressing well towards his goals.     Pt prognosis is Good.     Pt will continue to benefit from skilled outpatient aquatic physical therapy to address the deficits listed in the problem list box on initial evaluation, provide pt/family education and to maximize pt's level of independence in the home and community environment.     Pt's spiritual, cultural and educational needs considered and pt agreeable to plan of care and goals.    Anticipated barriers to physical therapy: none    Goals:   Short Term Goals:  4 weeks  1.  Dhruv will report 3/10 pain at worst with going from sit to stand.  2.  Dhruv will report 1-2 sleep disturbances per night.  3. Dhruv will demonstrate increased MMT to 4+/5 throughout LEs to increase tolerance for ADL and work activities.  4. Dhruv will demonstrate moderate flexibility limitations in the lower extremities.  5. Dhruv to tolerate HEP to improve ROM and independence with ADL's.     Long Term Goals: 8 weeks  1.  Dhruv will report 1-2/10 pain at worst with prolonged standing or sitting.  2.  Dhruv will report 0-1 sleep disturbances per night.  3.  Dhruv  will demonstrate increased MMT to 5/5 throughout LEs to increase tolerance for ADL and work activities.  4.Pt will report at CI level (1-20% impaired) on FOTO lumbar score for low back pain disability to demonstrate decrease in disability and improvement in back pain.  5. Pt to be Independent with final HEP to improve ROM and independence with ADL's    Plan     Continue 2x per week.     Dhruv Owens, PTA

## 2018-12-17 ENCOUNTER — CLINICAL SUPPORT (OUTPATIENT)
Dept: REHABILITATION | Facility: HOSPITAL | Age: 63
End: 2018-12-17
Attending: INTERNAL MEDICINE
Payer: COMMERCIAL

## 2018-12-17 DIAGNOSIS — M54.50 CHRONIC BILATERAL LOW BACK PAIN WITHOUT SCIATICA: ICD-10-CM

## 2018-12-17 DIAGNOSIS — G89.29 CHRONIC BILATERAL LOW BACK PAIN WITHOUT SCIATICA: ICD-10-CM

## 2018-12-17 PROCEDURE — 97113 AQUATIC THERAPY/EXERCISES: CPT

## 2018-12-17 NOTE — PROGRESS NOTES
Physical Therapy Aquatic Treatment Note     Name: Dhruv Fine  Clinic Number: [id number]    Therapy Diagnosis:   No diagnosis found.  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) - Spondylosis of lumbar region without myelopathy or radiculopathy  Evaluation Date: 10/31/2018  Authorization period Expiration: 12/31/2018  Plan of Care Certification Period: 12/26/2018     Visit #:/ Visits authorized: 7/20  Time In: 1440  Time Out: 1545  Total Billable Time: 30 minutes     Precautions: Standard    Subjective     Pt reports: min pain across low back; also reports using inversion this morning for 2 min    Pain: 2/10  Location: back     Objective     Dhruv received aquatic exercises to develop strength, endurance, ROM, flexibility, posture and core stabilization for 53 minutes including:  Warm-up Laps 2 x each  FWD,BWD,Side    Stretches 3 x 20 sec each  HSS    LE exs 30x each  Heel Raise with GS  Mini Squat with QS  Hip ext with HS curl  Hip flex/ext  Hip abd/add  Hip abd walk in mini squat position 2 laps  Lunges Side/FWD with no aquatic DB in order to activate core 15x each LE in each direction     Core: 30x each  Shld flex/ext alternating apo in SLS 2x15 15x each LE  Shld Horiz ABD/ADD alternating apo in SLS 2x15 15x each LE  Lat pull orange t-band- staggered stance  Row with red tubing  Mini squat Hold - chest press with disc   Wall sit with aquatic weights    Aquatic weights by side with TA activation 1x30 sec   Aquatic weights out in front with TA activation 1x30 sec    Cool Down Laps 1 x each  FWD,BWD,Side    Home Exercises Provided and Patient Education Provided none for pool; continue his back stretches daily    Education provided:   - progress towards goals   - role of therapy     Written Home Exercises Provided: Patient was not issued HEP for pool.  Exercises were reviewed and Dhruv was able to demonstrate them prior to  the end of the session.   Pt received a written copy of exercises to perform at home.     Dhruv demonstrated good  understanding of the education provided.     Assessment     Pt nghia tx progression well, good effort with all activities, intermittent VCs required for core stabilization instruction. Continued to strengthen core and hips in order to provide stability to lumbar spine. Pt was able to to tolerate core exercise progression very well. Pt could benefit from continuing core progression in small increments given his history of increased LBP with land therapy. Denies any increased pain post treatment.  Dhruv is progressing well towards his goals.     Pt prognosis is Good.     Pt will continue to benefit from skilled outpatient aquatic physical therapy to address the deficits listed in the problem list box on initial evaluation, provide pt/family education and to maximize pt's level of independence in the home and community environment.     Pt's spiritual, cultural and educational needs considered and pt agreeable to plan of care and goals.    Anticipated barriers to physical therapy: none    Goals:   Short Term Goals:  4 weeks  1.  Dhruv will report 3/10 pain at worst with going from sit to stand.  2.  Dhruv will report 1-2 sleep disturbances per night.  3. Dhruv will demonstrate increased MMT to 4+/5 throughout LEs to increase tolerance for ADL and work activities.  4. Dhruv will demonstrate moderate flexibility limitations in the lower extremities.  5. Dhruv to tolerate HEP to improve ROM and independence with ADL's.     Long Term Goals: 8 weeks  1.  Dhruv will report 1-2/10 pain at worst with prolonged standing or sitting.  2.  Dhruv will report 0-1 sleep disturbances per night.  3.  Dhruv will demonstrate increased MMT to 5/5 throughout LEs to increase tolerance for ADL and work activities.  4.Pt will report at CI level (1-20% impaired) on FOTO lumbar score for low back pain disability to demonstrate decrease in  disability and improvement in back pain.  5. Pt to be Independent with final HEP to improve ROM and independence with ADL's    Plan     Continue 2x per week.     Dhruv Owens, PTA

## 2018-12-19 ENCOUNTER — HOSPITAL ENCOUNTER (OUTPATIENT)
Dept: RADIOLOGY | Facility: HOSPITAL | Age: 63
Discharge: HOME | End: 2018-12-19
Attending: INTERNAL MEDICINE
Payer: COMMERCIAL

## 2018-12-19 ENCOUNTER — PATIENT MESSAGE (OUTPATIENT)
Dept: INTERNAL MEDICINE | Facility: CLINIC | Age: 63
End: 2018-12-19

## 2018-12-19 ENCOUNTER — CLINICAL SUPPORT (OUTPATIENT)
Dept: REHABILITATION | Facility: HOSPITAL | Age: 63
End: 2018-12-19
Attending: INTERNAL MEDICINE
Payer: COMMERCIAL

## 2018-12-19 DIAGNOSIS — G89.29 CHRONIC BILATERAL LOW BACK PAIN WITHOUT SCIATICA: ICD-10-CM

## 2018-12-19 DIAGNOSIS — M54.50 CHRONIC BILATERAL LOW BACK PAIN WITHOUT SCIATICA: ICD-10-CM

## 2018-12-19 DIAGNOSIS — Z87.891 FORMER SMOKER: ICD-10-CM

## 2018-12-19 PROCEDURE — 76775 US EXAM ABDO BACK WALL LIM: CPT | Mod: 26,,, | Performed by: RADIOLOGY

## 2018-12-19 PROCEDURE — 76775 US EXAM ABDO BACK WALL LIM: CPT | Mod: TC

## 2018-12-19 PROCEDURE — 97113 AQUATIC THERAPY/EXERCISES: CPT

## 2018-12-19 NOTE — PROGRESS NOTES
Physical Therapy Aquatic Treatment Note     Name: Dhruv Fine  Clinic Number: [id number]    Therapy Diagnosis:   Encounter Diagnosis   Name Primary?   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) - Spondylosis of lumbar region without myelopathy or radiculopathy  Evaluation Date: 10/31/2018  Authorization period Expiration: 12/31/2018  Plan of Care Certification Period: 12/26/2018     Visit #:/ Visits authorized: 8/20  Time In: 1450  Time Out: 1545  Total Billable Time: 30 minutes     Precautions: Standard    Subjective     Pt reports: min pain across low back; also reports using inversion yesterday for 2 min    Pain: 2/10  Location: back     Objective     Dhruv received aquatic exercises to develop strength, endurance, ROM, flexibility, posture and core stabilization for 53 minutes including:  Warm-up Laps 2 x each  FWD,BWD,Side    Stretches 3 x 20 sec each  HSS    LE exs 30x each with 2# ankle weights  Heel Raise with GS  Mini Squat with QS  Hip ext with HS curl  Hip flex/ext  Hip abd/add  Hip abd walk in mini squat position 2 laps np  Lunges Side/FWD with no aquatic DB in order to activate core 15x each LE in each direction     Core: 30x each  Shld flex/ext alternating apo in SLS 2x15 15x each LE  Shld Horiz ABD/ADD alternating apo in SLS 2x15 15x each LE  Lat pull orange t-band- staggered stance  Row with red tubing np  Mini squat Hold - chest press with disc np  Wall sit with aquatic weights out in front with TA activation 3x45 sec     Cool Down Laps 2 x each  FWD,BWD,Side    Home Exercises Provided and Patient Education Provided none for pool; continue his back stretches daily    Education provided:   - progress towards goals   - role of therapy     Written Home Exercises Provided: Patient was not issued HEP for pool.  Exercises were reviewed and Dhruv was able to  demonstrate them prior to the end of the session.   Pt received a written copy of exercises to perform at home.     Dhruv demonstrated good  understanding of the education provided.     Assessment     Pt continues to present with mild LBP upon arrival. Pt also continues to provide great effort with all activities and demonstrates good tolerance to aquatic exercises. Continued to strengthen core and hips in order to provide stability to lumbar spine. Pt was able to to tolerate core exercise progression very well. Pt could benefit from continuing core progression in small increments given his history of increased LBP with land therapy. Denies any increased pain post treatment.  Dhruv is progressing well towards his goals.     Pt prognosis is Good.     Pt will continue to benefit from skilled outpatient aquatic physical therapy to address the deficits listed in the problem list box on initial evaluation, provide pt/family education and to maximize pt's level of independence in the home and community environment.     Pt's spiritual, cultural and educational needs considered and pt agreeable to plan of care and goals.    Anticipated barriers to physical therapy: none    Goals:   Short Term Goals:  4 weeks  1.  Dhruv will report 3/10 pain at worst with going from sit to stand.  2.  Dhruv will report 1-2 sleep disturbances per night.  3. Dhruv will demonstrate increased MMT to 4+/5 throughout LEs to increase tolerance for ADL and work activities.  4. Dhruv will demonstrate moderate flexibility limitations in the lower extremities.  5. Dhruv to tolerate HEP to improve ROM and independence with ADL's.     Long Term Goals: 8 weeks  1.  Dhruv will report 1-2/10 pain at worst with prolonged standing or sitting.  2.  Dhruv will report 0-1 sleep disturbances per night.  3.  Dhruv will demonstrate increased MMT to 5/5 throughout LEs to increase tolerance for ADL and work activities.  4.Pt will report at CI level (1-20% impaired) on FOTO  lumbar score for low back pain disability to demonstrate decrease in disability and improvement in back pain.  5. Pt to be Independent with final HEP to improve ROM and independence with ADL's    Plan     Continue 2x per week.     Dhruv Owens, PTA

## 2018-12-26 ENCOUNTER — CLINICAL SUPPORT (OUTPATIENT)
Dept: REHABILITATION | Facility: HOSPITAL | Age: 63
End: 2018-12-26
Attending: INTERNAL MEDICINE
Payer: COMMERCIAL

## 2018-12-26 DIAGNOSIS — G89.29 CHRONIC BILATERAL LOW BACK PAIN WITHOUT SCIATICA: ICD-10-CM

## 2018-12-26 DIAGNOSIS — M54.50 CHRONIC BILATERAL LOW BACK PAIN WITHOUT SCIATICA: ICD-10-CM

## 2018-12-26 PROCEDURE — 97113 AQUATIC THERAPY/EXERCISES: CPT

## 2018-12-26 NOTE — PROGRESS NOTES
Physical Therapy Aquatic Treatment Note     Name: Dhruv Fine  Clinic Number: [id number]    Therapy Diagnosis:   Encounter Diagnosis   Name Primary?   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) - Spondylosis of lumbar region without myelopathy or radiculopathy  Evaluation Date: 10/31/2018  Authorization period Expiration: 12/31/2018  Plan of Care Certification Period: 2/26/2018      Visit #:/ Visits authorized: 9/20  Time In: 1405  Time Out: 1505  Total Billable Time: 30 minutes     Precautions: Standard    Subjective     Pt reports: min pain across low back; did not use inversion table last 4 days  Pain: 2/10  Location: back     Objective   6th visit reassessment: 5/5 B knee & hip strength;   ROM: lumbar ff:60 degrees, lumbar extension 25 degrees; lumbar rotation R 35 degrees,lumbar  Rotation left 20 degrees;   FOTO 44->47 with significant difficulty donning shoes/socks & lifting objects off floor- using long handles shoehorn to assist; made progress with ability to drive without radicular symptoms & ascend/descend steps      Dhruv received aquatic exercises to develop strength, endurance, ROM, flexibility, posture and core stabilization for 53 minutes including:  Warm-up Laps 2 x each  FWD,BWD,Side    Stretches 3 x 20 sec each  HSS    LE exs 30x each with 2# ankle weights  Heel Raise with GS  Mini Squat with QS  Hip ext with HS curl  Hip flex/ext  Hip abd/add  Hip abd walk in mini squat position 2 laps np  Lunges Side/FWD with no aquatic DB in order to activate core 15x each LE in each direction -np    Core: 30x each  Shld flex/ext alternating apo in SLS 2x15 15x each LE-np  Shld Horiz ABD/ADD alternating apo in SLS 2x15 15x each LE-np  Lat pull orange t-band- staggered stance- np  Row with red tubing np  Mini squat Hold - chest press with disc   Wall sit with  aquatic weights out in front with TA activation 3x45 sec  Walking fwd/sideways with @3 ankle weights & holding star board for resistance  Marching fwd/back/side with 2# ankle weights x 2 laps     Cool Down Laps 2 x each  FWD,BWD,Side    Home Exercises Provided and Patient Education Provided none for pool; continue his back stretches daily    Education provided:   - progress towards goals   - role of therapy     Written Home Exercises Provided: Patient was not issued HEP for pool.  Exercises were reviewed and Dhruv was able to demonstrate them prior to the end of the session.   Pt received a written copy of exercises to perform at home.     Dhruv demonstrated good  understanding of the education provided.     Assessment     Pt continues to present with mild LBP upon arrival. Pt also continues to provide great effort with all activities and demonstrates good tolerance to aquatic exercises. Continue to strengthen core & improve back & le flexibility.Pt could benefit from continuing core progression in small increments given his history of increased LBP with land therapy. Denies any increased pain post treatment.  Dhruv is progressing well towards his goals.     Pt prognosis is Good.     Pt will continue to benefit from skilled outpatient aquatic physical therapy to address the deficits listed in the problem list box on initial evaluation, provide pt/family education and to maximize pt's level of independence in the home and community environment.     Pt's spiritual, cultural and educational needs considered and pt agreeable to plan of care and goals.    Anticipated barriers to physical therapy: none    Goals:   Short Term Goals:  4 weeks  1.  Dhruv will report 3/10 pain at worst with going from sit to stand.(MET)  2.  Dhruv will report 1-2 sleep disturbances per night.MET  3. Dhruv will demonstrate increased MMT to 4+/5 throughout LEs to increase tolerance for ADL and work activities.(MET)  4. Dhruv will demonstrate moderate  flexibility limitations in the lower extremities.(progressing- not met)  5. Dhruv to tolerate HEP to improve ROM and independence with ADL's.(progressing- not met)     Long Term Goals: 8 weeks  1.  Dhruv will report 1-2/10 pain at worst with prolonged standing or sitting.(progressing- not met)  2.  Dhruv will report 0-1 sleep disturbances per night.(progressing- not met)  3.  Dhruv will demonstrate increased MMT to 5/5 throughout LEs to increase tolerance for ADL and work activities.(MET)  4.Pt will report at CI level (1-20% impaired) on FOTO lumbar score for low back pain disability to demonstrate decrease in disability and improvement in back pain.(progressing- not met)  5. Pt to be Independent with final HEP to improve ROM and independence with ADL's(progessing- not met)    Plan     Continue 2x per week.     Magdalene Jackson, PT

## 2018-12-28 ENCOUNTER — CLINICAL SUPPORT (OUTPATIENT)
Dept: REHABILITATION | Facility: HOSPITAL | Age: 63
End: 2018-12-28
Attending: INTERNAL MEDICINE
Payer: COMMERCIAL

## 2018-12-28 DIAGNOSIS — M54.50 CHRONIC BILATERAL LOW BACK PAIN WITHOUT SCIATICA: ICD-10-CM

## 2018-12-28 DIAGNOSIS — G89.29 CHRONIC BILATERAL LOW BACK PAIN WITHOUT SCIATICA: ICD-10-CM

## 2018-12-28 PROCEDURE — 97113 AQUATIC THERAPY/EXERCISES: CPT

## 2018-12-28 NOTE — PROGRESS NOTES
Physical Therapy Aquatic Treatment Note     Name: Dhruv Fine  Clinic Number: [id number]    Therapy Diagnosis:   Encounter Diagnosis   Name Primary?   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) - Spondylosis of lumbar region without myelopathy or radiculopathy  Evaluation Date: 10/31/2018  Authorization period Expiration: 12/31/2018  Plan of Care Certification Period: 2/26/2018      Visit #:/ Visits authorized: 9/20  Time In: 1405  Time Out: 1505  Total Billable Time: 30 minutes     Precautions: Standard    Subjective     Pt reports: min pain across low back; did not use inversion table today  Pain: 3/10  Location: back     Objective     Dhruv received aquatic exercises to develop strength, endurance, ROM, flexibility, posture and core stabilization for 53 minutes including:  Warm-up Laps 2 x each  FWD,BWD,Side    Stretches 3 x 20 sec each  HSS    LE exs 30x each with 2# ankle weights  Heel Raise with GS  Mini Squat with QS  Hip ext with HS curl  Hip flex/ext  Hip abd/add  Hip abd walk in mini squat position 2 laps   Lunges Side/FWD with no aquatic DB in order to activate core 15x each LE in each direction -np    Core: 30x each  Shld flex/ext alternating apo in SLS 2x15 15x each LE-np  Shld Horiz ABD/ADD alternating apo in SLS 2x15 15x each LE-np  Lat pull orange t-band- staggered stance- np  Row with red tubing np  Mini squat Hold - chest press with disc -np  Wall sit with aquatic weights out in front with TA activation 3x45 sec  Walking fwd/sideways with @3 ankle weights & holding star board for resistance-np  Marching fwd/back/side with 2# ankle weights x 2 laps     Cool Down Laps 2 x each  FWD,BWD,Side    Home Exercises Provided and Patient Education Provided none for pool; continue his back stretches daily    Education provided:   - progress towards goals   -  role of therapy     Written Home Exercises Provided: Patient was not issued HEP for pool.  Exercises were reviewed and Dhruv was able to demonstrate them prior to the end of the session.   Pt received a written copy of exercises to perform at home.     Dhruv demonstrated good  understanding of the education provided.     Assessment     Pt continues to present with mild LBP upon arrival. Pt also continues to provide great effort with all activities and demonstrates good tolerance to aquatic exercises. Continue to strengthen core & improve back & le flexibility.Pt could benefit from continuing core progression in small increments given his history of increased LBP with land therapy. Denies any increased pain post treatment.  Dhruv is progressing well towards his goals.     Pt prognosis is Good.     Pt will continue to benefit from skilled outpatient aquatic physical therapy to address the deficits listed in the problem list box on initial evaluation, provide pt/family education and to maximize pt's level of independence in the home and community environment.     Pt's spiritual, cultural and educational needs considered and pt agreeable to plan of care and goals.    Anticipated barriers to physical therapy: none    Goals:   Short Term Goals:  4 weeks  1.  Dhruv will report 3/10 pain at worst with going from sit to stand.(MET)  2.  Dhruv will report 1-2 sleep disturbances per night.MET  3. Dhruv will demonstrate increased MMT to 4+/5 throughout LEs to increase tolerance for ADL and work activities.(MET)  4. Dhruv will demonstrate moderate flexibility limitations in the lower extremities.(progressing- not met)  5. Dhruv to tolerate HEP to improve ROM and independence with ADL's.(progressing- not met)     Long Term Goals: 8 weeks  1.  Dhruv will report 1-2/10 pain at worst with prolonged standing or sitting.(progressing- not met)  2.  Dhruv will report 0-1 sleep disturbances per night.(progressing- not met)  3.  Dhruv will  demonstrate increased MMT to 5/5 throughout LEs to increase tolerance for ADL and work activities.(MET)  4.Pt will report at CI level (1-20% impaired) on FOTO lumbar score for low back pain disability to demonstrate decrease in disability and improvement in back pain.(progressing- not met)  5. Pt to be Independent with final HEP to improve ROM and independence with ADL's(progessing- not met)    Plan     Continue 2x per week.     Magdalene Jackson, PT

## 2019-01-04 ENCOUNTER — CLINICAL SUPPORT (OUTPATIENT)
Dept: REHABILITATION | Facility: HOSPITAL | Age: 64
End: 2019-01-04
Attending: INTERNAL MEDICINE
Payer: COMMERCIAL

## 2019-01-04 DIAGNOSIS — M54.50 CHRONIC BILATERAL LOW BACK PAIN WITHOUT SCIATICA: ICD-10-CM

## 2019-01-04 DIAGNOSIS — G89.29 CHRONIC BILATERAL LOW BACK PAIN WITHOUT SCIATICA: ICD-10-CM

## 2019-01-04 PROCEDURE — 97113 AQUATIC THERAPY/EXERCISES: CPT

## 2019-01-04 NOTE — PROGRESS NOTES
Physical Therapy Aquatic Treatment Note     Name: Dhruv Fine  Clinic Number: [id number]    Therapy Diagnosis:   Encounter Diagnosis   Name Primary?   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) - Spondylosis of lumbar region without myelopathy or radiculopathy  Evaluation Date: 10/31/2018  Authorization period Expiration: 12/31/2019  Plan of Care Certification Period: 2/26/2018      Visit #:/ Visits authorized: 1/20  Time In: 1433  Time Out: 1535  Total Billable Time: 30 minutes     Precautions: Standard    Subjective     Pt reports: min pain across low back; scheduled to see his chiropractor later today  Pain: 3/10  Location: back     Objective     Dhruv received aquatic exercises to develop strength, endurance, ROM, flexibility, posture and core stabilization including:  Warm-up Laps 2 x each  FWD,BWD,Side    Stretches 3 x 20 sec each  HSS    LE exs 30x each with 3# ankle weights  Heel Raise with GS  Mini Squat with QS  Hip ext with HS curl  Hip flex/ext  Hip abd/add  Hip abd walk in mini squat position 2 laps   Monster walks x 2 laps  Lunges Side/FWD with no aquatic DB in order to activate core 15x each LE in each direction -np    Core: 30x each  Shld flex/ext alternating apo in SLS 2x15 15x each LE-np  Shld Horiz ABD/ADD alternating apo in SLS 2x15 15x each LE-np  Lat pull orange t-band- staggered stance- np  Row with red tubing  Mini squat Hold - chest press with disc   Wall sit with aquatic weights out in front with TA activation 3x45 sec  Walking fwd/sideways with @3 ankle weights & holding star board for resistance-np  Marching fwd/back/side with 2# ankle weights x 2 laps-np     Cool Down Laps 2 x each  FWD,BWD,Side    Home Exercises Provided and Patient Education Provided none for pool; continue his back stretches daily    Education provided:   - progress  towards goals   - role of therapy     Written Home Exercises Provided: Patient was not issued HEP for pool.  Exercises were reviewed and Dhruv was able to demonstrate them prior to the end of the session.   Pt received a written copy of exercises to perform at home.     Dhruv demonstrated good  understanding of the education provided.     Assessment     Pt continues to present with mild LBP upon arrival. Pt also continues to provide great effort with all activities and demonstrates good tolerance to aquatic exercises. Continue to strengthen core & improve back & le flexibility.Pt could benefit from continuing core progression in small increments given his history of increased LBP with land therapy. Denies any increased pain post treatment.  Dhruv is progressing well towards his goals.     Pt prognosis is Good.     Pt will continue to benefit from skilled outpatient aquatic physical therapy to address the deficits listed in the problem list box on initial evaluation, provide pt/family education and to maximize pt's level of independence in the home and community environment.     Pt's spiritual, cultural and educational needs considered and pt agreeable to plan of care and goals.    Anticipated barriers to physical therapy: none    Goals:   Short Term Goals:  4 weeks  1.  Dhruv will report 3/10 pain at worst with going from sit to stand.(MET)  2.  Dhruv will report 1-2 sleep disturbances per night.MET  3. Dhruv will demonstrate increased MMT to 4+/5 throughout LEs to increase tolerance for ADL and work activities.(MET)  4. Dhruv will demonstrate moderate flexibility limitations in the lower extremities.(progressing- not met)  5. Dhruv to tolerate HEP to improve ROM and independence with ADL's.(progressing- not met)     Long Term Goals: 8 weeks  1.  Dhruv will report 1-2/10 pain at worst with prolonged standing or sitting.(progressing- not met)  2.  Dhruv will report 0-1 sleep disturbances per night.(progressing- not met)  3.   Dhruv will demonstrate increased MMT to 5/5 throughout LEs to increase tolerance for ADL and work activities.(MET)  4.Pt will report at CI level (1-20% impaired) on FOTO lumbar score for low back pain disability to demonstrate decrease in disability and improvement in back pain.(progressing- not met)  5. Pt to be Independent with final HEP to improve ROM and independence with ADL's(progessing- not met)    Plan     Continue 2x per week.     Magdalene Jackson, PT

## 2019-01-11 ENCOUNTER — PATIENT MESSAGE (OUTPATIENT)
Dept: INTERNAL MEDICINE | Facility: CLINIC | Age: 64
End: 2019-01-11

## 2019-01-11 ENCOUNTER — CLINICAL SUPPORT (OUTPATIENT)
Dept: REHABILITATION | Facility: HOSPITAL | Age: 64
End: 2019-01-11
Attending: INTERNAL MEDICINE
Payer: COMMERCIAL

## 2019-01-11 DIAGNOSIS — G89.29 CHRONIC BILATERAL LOW BACK PAIN WITHOUT SCIATICA: ICD-10-CM

## 2019-01-11 DIAGNOSIS — M54.50 CHRONIC BILATERAL LOW BACK PAIN WITHOUT SCIATICA: ICD-10-CM

## 2019-01-11 PROCEDURE — 97113 AQUATIC THERAPY/EXERCISES: CPT

## 2019-01-11 NOTE — PROGRESS NOTES
Physical Therapy Aquatic Treatment Note     Name: Dhruv Fine  Clinic Number: [id number]    Therapy Diagnosis:   Encounter Diagnosis   Name Primary?    Chronic bilateral low back pain without sciatica      Physician: Cassandra Cruz MD  Visit Date: 1/11/2019  Physician Orders: PT Eval and Treat   Medical Diagnosis:  M47.816 (ICD-10-CM) - Spondylosis of lumbar region without myelopathy or radiculopathy  Evaluation Date: 10/31/2018  Authorization period Expiration: 12/31/2019  Plan of Care Certification Period: 2/26/2019     Visit #:/ Visits authorized: 2/20  Time In: 0703  Time Out: 0806  Total Billable Time: 63 minutes     Precautions: Standard    Subjective     Pt reports:accupunture 1/10/2019 wht good relief of back pain  Pain: 0/10  Location: back     Objective     Dhruv received aquatic exercises to develop strength, endurance, ROM, flexibility, posture and core stabilization including:  Warm-up Laps 2 x each  FWD,BWD,Side    Stretches 3 x 20 sec each  HSS    LE exs 30x each with 3# ankle weights  Heel Raise with GS-np  Mini Squat with QS-np  Hip ext with HS curl-np  Hip flex/ext-np  Hip abd/add-np  Hip abd walk in mini squat position 2 laps   Monster walks x 2 laps  Lunges Side with ball chest press  Lunge walk with ball truncal rotation   Starboard for flutter kicking x 1 lap with rest @ 1/2 lap 2/2 L knee discomfort    Core: 30x each  Shld flex/ext alternating apo in SLS 2x15 15x each LE  Shld Horiz ABD/ADD alternating apo in SLS 2x15 15x each LE  Lat pull orange t-band- staggered stance- np  Row with green tubing  Mini squat Hold - chest press with disc   Wall sit with aquatic weights out in front with TA activation 3x45 sec  Walking fwd/sideways with @3 ankle weights & holding star board for resistance-np  Marching fwd/back/side with 2# ankle weights x 2 laps-np    Manual spinal stretch for 3x 15 sec     Cool Down Laps 2 x each  FWD,BWD,Side    Home Exercises Provided and Patient Education Provided  none for pool; continue his back stretches daily    Education provided:   - progress towards goals   - role of therapy     Written Home Exercises Provided: Patient was not issued HEP for pool.  Exercises were reviewed and Dhruv was able to demonstrate them prior to the end of the session.   Pt received a written copy of exercises to perform at home.     Dhruv demonstrated good  understanding of the education provided.     Assessment     Pt continues to present with no LBP upon arrival after receiving acupuncture yesterday Pt also continues to provide great effort with all activities and demonstrates good tolerance to aquatic exercises. Continue to strengthen core & improve back & le flexibility.Pt could benefit from continuing core progression in small increments given his history of increased LBP with land therapy. Denies any increased pain post treatment.  Dhruv is progressing well towards his goals.     Pt prognosis is Good.     Pt will continue to benefit from skilled outpatient aquatic physical therapy to address the deficits listed in the problem list box on initial evaluation, provide pt/family education and to maximize pt's level of independence in the home and community environment.     Pt's spiritual, cultural and educational needs considered and pt agreeable to plan of care and goals.    Anticipated barriers to physical therapy: none    Goals:   Short Term Goals:  4 weeks  1.  Dhruv will report 3/10 pain at worst with going from sit to stand.(MET)  2.  Dhruv will report 1-2 sleep disturbances per night.MET  3. Dhruv will demonstrate increased MMT to 4+/5 throughout LEs to increase tolerance for ADL and work activities.(MET)  4. Dhruv will demonstrate moderate flexibility limitations in the lower extremities.(progressing- not met)  5. Dhruv to tolerate HEP to improve ROM and independence with ADL's.(progressing- not met)     Long Term Goals: 8 weeks  1.  Dhruv will report 1-2/10 pain at worst with prolonged  standing or sitting.(progressing- not met)  2.  Dhruv will report 0-1 sleep disturbances per night.(progressing- not met)  3.  Dhruv will demonstrate increased MMT to 5/5 throughout LEs to increase tolerance for ADL and work activities.(MET)  4.Pt will report at CI level (1-20% impaired) on FOTO lumbar score for low back pain disability to demonstrate decrease in disability and improvement in back pain.(progressing- not met)  5. Pt to be Independent with final HEP to improve ROM and independence with ADL's(progessing- not met)    Plan     Continue 2x per week.     Magdalene Jackson, PT

## 2019-01-21 ENCOUNTER — CLINICAL SUPPORT (OUTPATIENT)
Dept: REHABILITATION | Facility: HOSPITAL | Age: 64
End: 2019-01-21
Attending: INTERNAL MEDICINE
Payer: COMMERCIAL

## 2019-01-21 DIAGNOSIS — M54.50 CHRONIC BILATERAL LOW BACK PAIN WITHOUT SCIATICA: ICD-10-CM

## 2019-01-21 DIAGNOSIS — G89.29 CHRONIC BILATERAL LOW BACK PAIN WITHOUT SCIATICA: ICD-10-CM

## 2019-01-21 PROCEDURE — 97113 AQUATIC THERAPY/EXERCISES: CPT

## 2019-01-21 NOTE — PROGRESS NOTES
Physical Therapy Aquatic Treatment Note     Name: Dhruv Fine  Clinic Number: [id number]    Therapy Diagnosis:   Encounter Diagnosis   Name Primary?    Chronic bilateral low back pain without sciatica      Physician: Cassandra Cruz MD  Visit Date: 1/21/2019  Physician Orders: PT Eval and Treat   Medical Diagnosis:  M47.816 (ICD-10-CM) - Spondylosis of lumbar region without myelopathy or radiculopathy  Evaluation Date: 10/31/2018  Authorization period Expiration: 12/31/2019  Plan of Care Certification Period: 2/26/2019     Visit #:/ Visits authorized: 3/20  Time In: 1500  Time Out: 1600  Total Billable Time: 60 minutes     Precautions: Standard    Subjective     Pt reports:accupunture 1/10/2019 wht good relief of back pain; had to cancel last week's session 2/2 death of family friend  Pain: 2/10  Location: back     Objective     Dhruv received aquatic exercises to develop strength, endurance, ROM, flexibility, posture and core stabilization including:  Warm-up Laps 2 x each  FWD,BWD,Side    Stretches 3 x 20 sec each  HSS    LE exs 30x each with 3# ankle weights  Heel Raise with GS  Mini Squat with QS  Hip ext with HS curl  Hip flex/ext  Hip abd/add  Hip abd walk in mini squat position 2 laps   Monster walks x 2 laps  Lunges Side with ball chest press x 30 each  Lunge walk with ball truncal rotation  X 2 laps  Starboard for flutter kicking x 1 lap with rest @ 1/2 lap 2/2 L knee discomfort-np    Core: 30x each  Shld flex/ext alternating apo in SLS 2x15 15x each LE  Shld Horiz ABD/ADD alternating apo in SLS 2x15 15x each LE  Lat pull orange t-band- staggered stance- np  Row with green tubing  Mini squat Hold - chest press with disc   Wall sit with aquatic weights out in front with TA activation 3x45 sec  Walking fwd/sideways with @3 ankle weights & holding star board for resistance-np  Marching fwd/back/side with 2# ankle weights x 2 laps-np    Manual spinal stretch for 3x 15 sec- np     Cool Down Laps 2 x  each  FWD,BWD,Side    Home Exercises Provided and Patient Education Provided none for pool; continue his back stretches daily    Education provided:   - progress towards goals   - role of therapy     Written Home Exercises Provided: Patient was not issued HEP for pool.  Exercises were reviewed and Dhruv was able to demonstrate them prior to the end of the session.   Pt received a written copy of exercises to perform at home.     Dhruv demonstrated good  understanding of the education provided.     Assessment      Pt also continues to provide great effort with all activities and demonstrates good tolerance to aquatic exercises. Continue to strengthen core & improve back & le flexibility.Pt could benefit from continuing core progression in small increments given his history of increased LBP with land therapy. Denies any increased pain post treatment.  Dhruv is progressing well towards his goals.     Pt prognosis is Good.     Pt will continue to benefit from skilled outpatient aquatic physical therapy to address the deficits listed in the problem list box on initial evaluation, provide pt/family education and to maximize pt's level of independence in the home and community environment.     Pt's spiritual, cultural and educational needs considered and pt agreeable to plan of care and goals.    Anticipated barriers to physical therapy: none    Goals:   Short Term Goals:  4 weeks  1.  Dhruv will report 3/10 pain at worst with going from sit to stand.(MET)  2.  Dhruv will report 1-2 sleep disturbances per night.MET  3. Dhruv will demonstrate increased MMT to 4+/5 throughout LEs to increase tolerance for ADL and work activities.(MET)  4. Dhruv will demonstrate moderate flexibility limitations in the lower extremities.(progressing- not met)  5. Dhruv to tolerate HEP to improve ROM and independence with ADL's.(progressing- not met)     Long Term Goals: 8 weeks  1.  Dhruv will report 1-2/10 pain at worst with prolonged standing or  sitting.(progressing- not met)  2.  Dhruv will report 0-1 sleep disturbances per night.(progressing- not met)  3.  Dhruv will demonstrate increased MMT to 5/5 throughout LEs to increase tolerance for ADL and work activities.(MET)  4.Pt will report at CI level (1-20% impaired) on FOTO lumbar score for low back pain disability to demonstrate decrease in disability and improvement in back pain.(progressing- not met)  5. Pt to be Independent with final HEP to improve ROM and independence with ADL's(progessing- not met)    Plan     Continue 2x per week.     Magdalene Jackson, PT

## 2019-01-25 NOTE — PROGRESS NOTES
Physical Therapy Aquatic Treatment Note     Name: Dhruv Fine  Clinic Number: [id number]    Therapy Diagnosis:   Low back pain  Physician: Cassandra Cruz MD  Visit Date: 1/28/2019  Physician Orders: PT Eval and Treat   Medical Diagnosis:  M47.816 (ICD-10-CM) - Spondylosis of lumbar region without myelopathy or radiculopathy  Evaluation Date: 10/31/2018  Authorization period Expiration: 12/31/2019  Plan of Care Certification Period: 2/26/2019     Visit #:/ Visits authorized: 3/20  Time In: 1510  Time Out: 1610  Total Billable Time: 60 minutes     Precautions: Standard    Subjective     Pt reports:improvement of low back pain & has received acupunture x2 with some relief   Pain: 1/10  Location: back     Objective     Dhruv received aquatic exercises to develop strength, endurance, ROM, flexibility, posture and core stabilization including:  Warm-up Laps 2 x each  FWD,BWD,Side    Stretches 3 x 20 sec each  HSS    LE exs 30x each with 30 oz ankle weights  Heel Raise with GS  Mini Squat with QS  Hip ext with HS curl  Hip flex/ext  Hip abd/add  Hip abd walk in mini squat position 2 laps   Monster walks x 2 laps  Lunges Side with ball chest press x 30 each  Lunge walk with ball truncal rotation  X 2 laps  Starboard for flutter kicking x 1 lap with rest @ 1/2 lap 2/2 L knee discomfort-np    Core: 30x each  Shld flex/ext alternating apo in SLS 2x15 15x each LE  Shld Horiz ABD/ADD alternating apo in SLS 2x15 15x each LE  Lat pull green t-band-  Row with green tubing  Mini squat Hold - chest press with disc   Wall sit with aquatic weights out in front with TA activation 3x45 sec-np  Walking fwd/sideways with @3 ankle weights & holding star board for resistance-np  Marching fwd/back/side with 2# ankle weights x 2 laps-np    Manual spinal stretch for 3x 15 sec- np     Cool Down Laps 1 x each  FWD,BWD,Side    Home Exercises Provided and Patient Education Provided none for pool; continue his back stretches daily    Education  provided:   - progress towards goals   - role of therapy     Written Home Exercises Provided: Patient was not issued HEP for pool.  Exercises were reviewed and Dhruv was able to demonstrate them prior to the end of the session.   Pt received a written copy of exercises to perform at home.     Dhruv demonstrated good  understanding of the education provided.     Assessment      Pt also continues to provide great effort with all activities and demonstrates good tolerance to aquatic exercises. Continue to strengthen core & improve back & le flexibility.Pt could benefit from continuing core progression in small increments given his history of increased LBP with land therapy. Denies any increased pain post treatment.  Dhruv is progressing well towards his goals.     Pt prognosis is Good.     Pt will continue to benefit from skilled outpatient aquatic physical therapy to address the deficits listed in the problem list box on initial evaluation, provide pt/family education and to maximize pt's level of independence in the home and community environment.     Pt's spiritual, cultural and educational needs considered and pt agreeable to plan of care and goals.    Anticipated barriers to physical therapy: none    Goals:   Short Term Goals:  4 weeks  1.  Dhruv will report 3/10 pain at worst with going from sit to stand.(MET)  2.  Dhruv will report 1-2 sleep disturbances per night.MET  3. hDruv will demonstrate increased MMT to 4+/5 throughout LEs to increase tolerance for ADL and work activities.(MET)  4. Dhruv will demonstrate moderate flexibility limitations in the lower extremities.(progressing- not met)  5. Dhruv to tolerate HEP to improve ROM and independence with ADL's.(progressing- not met)     Long Term Goals: 8 weeks  1.  Dhruv will report 1-2/10 pain at worst with prolonged standing or sitting.(progressing- not met)  2.  Dhruv will report 0-1 sleep disturbances per night.(progressing- not met)  3.  Dhruv will demonstrate  increased MMT to 5/5 throughout LEs to increase tolerance for ADL and work activities.(MET)  4.Pt will report at CI level (1-20% impaired) on FOTO lumbar score for low back pain disability to demonstrate decrease in disability and improvement in back pain.(progressing- not met)  5. Pt to be Independent with final HEP to improve ROM and independence with ADL's(progessing- not met)    Plan     Continue 2x per week.     Magdalene Jackson, PT

## 2019-01-28 ENCOUNTER — CLINICAL SUPPORT (OUTPATIENT)
Dept: REHABILITATION | Facility: HOSPITAL | Age: 64
End: 2019-01-28
Attending: INTERNAL MEDICINE
Payer: COMMERCIAL

## 2019-01-28 DIAGNOSIS — G89.29 CHRONIC BILATERAL LOW BACK PAIN WITHOUT SCIATICA: ICD-10-CM

## 2019-01-28 DIAGNOSIS — M54.50 CHRONIC BILATERAL LOW BACK PAIN WITHOUT SCIATICA: ICD-10-CM

## 2019-01-28 PROCEDURE — 97113 AQUATIC THERAPY/EXERCISES: CPT

## 2019-01-30 ENCOUNTER — CLINICAL SUPPORT (OUTPATIENT)
Dept: REHABILITATION | Facility: HOSPITAL | Age: 64
End: 2019-01-30
Attending: INTERNAL MEDICINE
Payer: COMMERCIAL

## 2019-01-30 DIAGNOSIS — M54.50 CHRONIC BILATERAL LOW BACK PAIN WITHOUT SCIATICA: ICD-10-CM

## 2019-01-30 DIAGNOSIS — G89.29 CHRONIC BILATERAL LOW BACK PAIN WITHOUT SCIATICA: ICD-10-CM

## 2019-01-30 PROCEDURE — 97113 AQUATIC THERAPY/EXERCISES: CPT

## 2019-01-30 NOTE — PROGRESS NOTES
Physical Therapy Aquatic Treatment Note     Name: Dhruv Fine  Clinic Number: [id number]    Therapy Diagnosis:   Low back pain  Physician: Cassandra Cruz MD  Visit Date: 1/30/2019  Physician Orders: PT Eval and Treat   Medical Diagnosis:  M47.816 (ICD-10-CM) - Spondylosis of lumbar region without myelopathy or radiculopathy  Evaluation Date: 10/31/2018  Authorization period Expiration: 12/31/2019  Plan of Care Certification Period: 2/26/2019     Visit #:/ Visits authorized: 4/20  Time In: 1500  Time Out: 1600  Total Billable Time: 60 minutes     Precautions: Standard    Subjective     Pt reports he is going back to work 2/1/2019 if cleared by doctor  Pain: 2/10  Location: back     Objective     Dhruv received aquatic exercises to develop strength, endurance, ROM, flexibility, posture and core stabilization including:  Warm-up Laps 2 x each  FWD,BWD,Side    Stretches 3 x 20 sec each  HSS    LE exs 30x each with 30 oz ankle weights  Heel Raise with GS-np  Mini Squat with QS-np  Hip ext with HS curl-np  Hip flex/ext-np  Hip abd/add-np  Hip abd walk in mini squat position 2 laps   Monster walks x 2 laps  Lunges Side with ball chest press x 30 each  Lunge walk with ball truncal rotation  X 2 laps  Starboard for flutter kicking x 1 lap with rest @ 1/2 lap 2/2 L knee discomfort-np  Seated wonderboard for propelling fwd/back/side using LE for 2 laps each    Core: 30x each  Shld flex/ext alternating apo in SLS 2x15 15x each LE  Shld Horiz ABD/ADD alternating apo in SLS 2x15 15x each LE  Lat pull green t-band-np  Row with green tubing  Mini squat Hold - chest press with disc   Wall sit with aquatic weights out in front with TA activation 3x60 sec  Walking fwd/sideways with @3 ankle weights & holding star board for resistance-np  Marching fwd/back/side with 2# ankle weights x 2 laps-np  Catch/throw modified tandem 10 each LE(no LOB)    Manual spinal stretch for 3x 15 sec- np     Cool Down Laps 1 x  each  FWD,BWD,Side    Home Exercises Provided and Patient Education Provided none for pool; continue his back stretches daily    Education provided:   - progress towards goals   - role of therapy     Written Home Exercises Provided: Patient was not issued HEP for pool.  Exercises were reviewed and Dhruv was able to demonstrate them prior to the end of the session.   Pt received a written copy of exercises to perform at home.     Dhruv demonstrated good  understanding of the education provided.     Assessment      Pt also continues to provide great effort with all activities and demonstrates good tolerance to aquatic exercises. Continue to strengthen core & improve back & le flexibility.Pt could benefit from continuing core progression in small increments given his history of increased LBP with land therapy. Denies any increased pain post treatment.  Dhruv is progressing well towards his goals.     Pt prognosis is Good.     Pt will continue to benefit from skilled outpatient aquatic physical therapy to address the deficits listed in the problem list box on initial evaluation, provide pt/family education and to maximize pt's level of independence in the home and community environment.     Pt's spiritual, cultural and educational needs considered and pt agreeable to plan of care and goals.    Anticipated barriers to physical therapy: none    Goals:   Short Term Goals:  4 weeks  1.  Dhruv will report 3/10 pain at worst with going from sit to stand.(MET)  2.  Dhruv will report 1-2 sleep disturbances per night.MET  3. Dhruv will demonstrate increased MMT to 4+/5 throughout LEs to increase tolerance for ADL and work activities.(MET)  4. Dhruv will demonstrate moderate flexibility limitations in the lower extremities.(progressing- not met)  5. Dhruv to tolerate HEP to improve ROM and independence with ADL's.(progressing- not met)     Long Term Goals: 8 weeks  1.  Dhruv will report 1-2/10 pain at worst with prolonged standing or  sitting.(progressing- not met)  2.  Dhruv will report 0-1 sleep disturbances per night.(progressing- not met)  3.  Dhruv will demonstrate increased MMT to 5/5 throughout LEs to increase tolerance for ADL and work activities.(MET)  4.Pt will report at CI level (1-20% impaired) on FOTO lumbar score for low back pain disability to demonstrate decrease in disability and improvement in back pain.(progressing- not met)  5. Pt to be Independent with final HEP to improve ROM and independence with ADL's(progessing- not met)    Plan     Continue 2x per week.     Magdalene Jackson, PT

## 2019-02-13 ENCOUNTER — CLINICAL SUPPORT (OUTPATIENT)
Dept: REHABILITATION | Facility: HOSPITAL | Age: 64
End: 2019-02-13
Attending: INTERNAL MEDICINE
Payer: COMMERCIAL

## 2019-02-13 DIAGNOSIS — M54.50 CHRONIC BILATERAL LOW BACK PAIN WITHOUT SCIATICA: ICD-10-CM

## 2019-02-13 DIAGNOSIS — G89.29 CHRONIC BILATERAL LOW BACK PAIN WITHOUT SCIATICA: ICD-10-CM

## 2019-02-13 PROCEDURE — 97113 AQUATIC THERAPY/EXERCISES: CPT

## 2019-02-13 NOTE — PROGRESS NOTES
Physical Therapy Aquatic Treatment Note     Name: Dhruv Fine  Clinic Number: [id number]    Therapy Diagnosis:   Low back pain  Physician: Cassandra Cruz MD  Visit Date: 2/13/2019  Physician Orders: PT Eval and Treat   Medical Diagnosis:  M47.816 (ICD-10-CM) - Spondylosis of lumbar region without myelopathy or radiculopathy  Evaluation Date: 10/31/2018  Authorization period Expiration: 12/31/2019  Plan of Care Certification Period: 2/26/2019     Visit #:/ Visits authorized: 4/20  Time In:1600  Time Out: 1700  Total Billable Time: 60 minutes     Precautions: Standard    Subjective     Pt reports he is back to work & has increased low back pain today  Pain: 4/10, 3/10 after therapy  Location: back     Objective     Dhruv received aquatic exercises to develop strength, endurance, ROM, flexibility, posture and core stabilization including:  Warm-up Laps 2 x each  FWD,BWD,Side    Stretches 3 x 20 sec each  HSS    LE exs 30x each with 30 oz ankle weights  Heel Raise with GS-  Mini Squat with QS-  Hip ext with HS curl-np  Hip flex/ext-np  Hip abd/add  Hip abd walk in mini squat position 2 laps   Monster walks x 2 laps  Lunges Side with ball chest press x 30 each  Lunge walk with ball truncal rotation  X 2 laps  Starboard for flutter kicking x 1 lap with rest @ 1/2 lap 2/2 L knee discomfort-np  Seated wonderboard for propelling fwd/back/side using LE for 2 laps each  Side plank with barbell x 20 each    Core: 30x each  Shld flex/ext alternating apo in SLS 2x15 15x each LE-np  Shld Horiz ABD/ADD alternating apo in SLS 2x15 15x each LE-np  Lat pull green t-band-np  Row with green tubing-np  Mini squat Hold - chest press with disc   Wall sit with blue disc out in front with TA activation 3x60 sec  Walking fwd/sideways with @3 ankle weights & holding star board for resistance-np  Marching fwd/back/side with 2# ankle weights x 2 laps-np  Catch/throw modified tandem 10 each LE(no LOB)-np  Kickboard with  Frog kicking x 2  laps(increased L knee pain)    Manual spinal stretch for 3x 15 sec- np     Cool Down Laps 1 x each  FWD,BWD,Side    Home Exercises Provided and Patient Education Provided none for pool; continue his back stretches daily    Education provided:   - progress towards goals   - role of therapy     Written Home Exercises Provided: Patient was not issued HEP for pool.  Exercises were reviewed and Dhruv was able to demonstrate them prior to the end of the session.   Pt received a written copy of exercises to perform at home.     Dhruv demonstrated good  understanding of the education provided.     Assessment      Pt also continues to provide great effort with all activities and demonstrates good tolerance to aquatic exercises. Continue to strengthen core & improve back & BLE flexibility Did get pain relief from aquatic environment..Pt could benefit from continuing core progression in small increments given his history of increased LBP with land therapy. Denies any increased pain post treatment.  Dhruv is progressing well towards his goals.     Pt prognosis is Good.     Pt will continue to benefit from skilled outpatient aquatic physical therapy to address the deficits listed in the problem list box on initial evaluation, provide pt/family education and to maximize pt's level of independence in the home and community environment.     Pt's spiritual, cultural and educational needs considered and pt agreeable to plan of care and goals.    Anticipated barriers to physical therapy: none    Goals:   Short Term Goals:  4 weeks  1.  Dhruv will report 3/10 pain at worst with going from sit to stand.(MET)  2.  Dhruv will report 1-2 sleep disturbances per night.MET  3. Dhruv will demonstrate increased MMT to 4+/5 throughout LEs to increase tolerance for ADL and work activities.(MET)  4. Dhruv will demonstrate moderate flexibility limitations in the lower extremities.(progressing- not met)  5. Dhruv to tolerate HEP to improve ROM and  independence with ADL's.(progressing- not met)     Long Term Goals: 8 weeks  1.  Dhruv will report 1-2/10 pain at worst with prolonged standing or sitting.(progressing- not met)  2.  Dhruv will report 0-1 sleep disturbances per night.(progressing- not met)  3.  Dhruv will demonstrate increased MMT to 5/5 throughout LEs to increase tolerance for ADL and work activities.(MET)  4.Pt will report at CI level (1-20% impaired) on FOTO lumbar score for low back pain disability to demonstrate decrease in disability and improvement in back pain.(progressing- not met)  5. Pt to be Independent with final HEP to improve ROM and independence with ADL's(progessing- not met)    Plan     Continue 2x per week.     Magdalene Jackson, PT

## 2019-02-18 ENCOUNTER — CLINICAL SUPPORT (OUTPATIENT)
Dept: REHABILITATION | Facility: HOSPITAL | Age: 64
End: 2019-02-18
Attending: INTERNAL MEDICINE
Payer: COMMERCIAL

## 2019-02-18 DIAGNOSIS — M54.50 CHRONIC BILATERAL LOW BACK PAIN WITHOUT SCIATICA: ICD-10-CM

## 2019-02-18 DIAGNOSIS — G89.29 CHRONIC BILATERAL LOW BACK PAIN WITHOUT SCIATICA: ICD-10-CM

## 2019-02-18 PROCEDURE — 97113 AQUATIC THERAPY/EXERCISES: CPT

## 2019-02-18 NOTE — PROGRESS NOTES
Physical Therapy Aquatic Treatment Note     Name: Dhruv Fine  Clinic Number: [id number]    Therapy Diagnosis:   Low back pain  Physician: Cassandra Cruz MD  Visit Date: 2/18/2019  Physician Orders: PT Eval and Treat   Medical Diagnosis:  M47.816 (ICD-10-CM) - Spondylosis of lumbar region without myelopathy or radiculopathy  Evaluation Date: 10/31/2018  Authorization period Expiration: 12/31/2019  Plan of Care Certification Period: 2/26/2019     Visit #:/ Visits authorized: 5/20  Time In:1500  Time Out: 1600    Total Billable Time: 60 minutes     Precautions: Standard    Subjective     Pt reports he is back to work  Pain: 2/10,   Location: back     Objective     Dhruv received aquatic exercises to develop strength, endurance, ROM, flexibility, posture and core stabilization including:  Warm-up Laps 2 x each  FWD,BWD,Side    Stretches 3 x 20 sec each  HSS    LE exs 30x each with 30 oz ankle weights  Heel Raise with GS-  Mini Squat with QS-  Hip ext with HS curl-np  Hip flex/ext-  Hip abd/add  Hip abd walk in mini squat position 2 laps   Monster walks x 2 laps  Lunges Side with ball chest press x 30 each-np  Lunge walk with ball truncal rotation  X 2 laps-np  Starboard for flutter kicking x 1 lap with rest @- 1/2 lap 2/2 L knee discomfort-np  Seated wonderboard for propelling fwd/back/side using LE for 2 laps each  Side plank with barbell x 20 each-np  Resistive walking orange tband fwd/side/back  X 3 each direction    Core: 30x each  Shld flex/ext alternating apo with single LE on box   Shld Horiz ABD/ADD alternating apo with single LE on box   Lat pull green t-band-np  Row with green tubing-np  Mini squat Hold - chest press with disc   Wall sit with blue disc out in front with TA activation 3x60 sec-np  Walking fwd/sideways with @3 ankle weights & holding star board for resistance-np  Marching fwd/back/side with 2# ankle weights x 2 laps-np  Catch/throw modified tandem 10 each LE(no LOB)-np  Kickboard with   Frog kicking x 2 laps(increased L knee pain)    Manual spinal stretch for 3x 15 sec- np     Cool Down Laps 1 x each  FWD,BWD,Side    Home Exercises Provided and Patient Education Provided none for pool; continue his back stretches daily    Education provided:   - progress towards goals   - role of therapy     Written Home Exercises Provided: Patient was not issued HEP for pool.  Exercises were reviewed and Dhruv was able to demonstrate them prior to the end of the session.   Pt received a written copy of exercises to perform at home.     Dhruv demonstrated good  understanding of the education provided.     Assessment      Pt  continues to provide great effort with all activities and demonstrates good tolerance to aquatic exercises. Continue to strengthen core & improve back & BLE flexibility Did get pain relief from aquatic environment..Pt could benefit from continuing core progression in small increments given his history of increased LBP with land therapy. Denies any increased pain post treatment.  Dhruv is progressing well towards his goals.     Pt prognosis is Good.     Pt will continue to benefit from skilled outpatient aquatic physical therapy to address the deficits listed in the problem list box on initial evaluation, provide pt/family education and to maximize pt's level of independence in the home and community environment.     Pt's spiritual, cultural and educational needs considered and pt agreeable to plan of care and goals.    Anticipated barriers to physical therapy: none    Goals:   Short Term Goals:  4 weeks  1.  Dhruv will report 3/10 pain at worst with going from sit to stand.(MET)  2.  Dhruv will report 1-2 sleep disturbances per night.MET  3. Dhruv will demonstrate increased MMT to 4+/5 throughout LEs to increase tolerance for ADL and work activities.(MET)  4. Dhruv will demonstrate moderate flexibility limitations in the lower extremities.(progressing- not met)  5. Dhruv to tolerate HEP to improve ROM  and independence with ADL's.(progressing- not met)     Long Term Goals: 8 weeks  1.  Dhruv will report 1-2/10 pain at worst with prolonged standing or sitting.(progressing- not met)  2.  Dhruv will report 0-1 sleep disturbances per night.(progressing- not met)  3.  Dhruv will demonstrate increased MMT to 5/5 throughout LEs to increase tolerance for ADL and work activities.(MET)  4.Pt will report at CI level (1-20% impaired) on FOTO lumbar score for low back pain disability to demonstrate decrease in disability and improvement in back pain.(progressing- not met)  5. Pt to be Independent with final HEP to improve ROM and independence with ADL's(progessing- not met)    Plan     Continue 2x per week.     Magdalene Jackson, PT

## 2019-02-19 ENCOUNTER — OFFICE VISIT (OUTPATIENT)
Dept: INTERNAL MEDICINE | Facility: CLINIC | Age: 64
End: 2019-02-19
Payer: COMMERCIAL

## 2019-02-19 VITALS
DIASTOLIC BLOOD PRESSURE: 70 MMHG | OXYGEN SATURATION: 97 % | BODY MASS INDEX: 32.84 KG/M2 | HEIGHT: 71 IN | TEMPERATURE: 98 F | SYSTOLIC BLOOD PRESSURE: 126 MMHG | HEART RATE: 72 BPM | WEIGHT: 234.56 LBS

## 2019-02-19 DIAGNOSIS — H91.91 DIMINISHED HEARING, RIGHT: ICD-10-CM

## 2019-02-19 DIAGNOSIS — Z20.2 EXPOSURE TO STD: Primary | ICD-10-CM

## 2019-02-19 PROCEDURE — 3008F PR BODY MASS INDEX (BMI) DOCUMENTED: ICD-10-PCS | Mod: CPTII,S$GLB,, | Performed by: INTERNAL MEDICINE

## 2019-02-19 PROCEDURE — 99213 OFFICE O/P EST LOW 20 MIN: CPT | Mod: S$GLB,,, | Performed by: INTERNAL MEDICINE

## 2019-02-19 PROCEDURE — 99213 PR OFFICE/OUTPT VISIT, EST, LEVL III, 20-29 MIN: ICD-10-PCS | Mod: S$GLB,,, | Performed by: INTERNAL MEDICINE

## 2019-02-19 PROCEDURE — 99999 PR PBB SHADOW E&M-EST. PATIENT-LVL IV: CPT | Mod: PBBFAC,,, | Performed by: INTERNAL MEDICINE

## 2019-02-19 PROCEDURE — 3008F BODY MASS INDEX DOCD: CPT | Mod: CPTII,S$GLB,, | Performed by: INTERNAL MEDICINE

## 2019-02-19 PROCEDURE — 1101F PT FALLS ASSESS-DOCD LE1/YR: CPT | Mod: CPTII,S$GLB,, | Performed by: INTERNAL MEDICINE

## 2019-02-19 PROCEDURE — 99999 PR PBB SHADOW E&M-EST. PATIENT-LVL IV: ICD-10-PCS | Mod: PBBFAC,,, | Performed by: INTERNAL MEDICINE

## 2019-02-19 PROCEDURE — 87491 CHLMYD TRACH DNA AMP PROBE: CPT

## 2019-02-19 PROCEDURE — 1101F PR PT FALLS ASSESS DOC 0-1 FALLS W/OUT INJ PAST YR: ICD-10-PCS | Mod: CPTII,S$GLB,, | Performed by: INTERNAL MEDICINE

## 2019-02-19 NOTE — PROGRESS NOTES
Subjective:       Patient ID: Dhruv Fine is a 63 y.o. male.    Chief Complaint: Otitis Media    Complains of right ear fullness and decreased hearing.  Has had antibiotics for otitis media, but is left with this problem.  No sore throat or headache or fever      Review of Systems   Constitutional: Negative for activity change, appetite change and fever.   HENT: Negative for congestion, postnasal drip and sore throat.    Respiratory: Negative for cough, shortness of breath and wheezing.    Cardiovascular: Negative for chest pain and palpitations.   Gastrointestinal: Negative for abdominal pain, blood in stool, constipation, diarrhea, nausea and vomiting.   Genitourinary: Negative for decreased urine volume, difficulty urinating, flank pain and frequency.   Musculoskeletal: Negative for arthralgias.   Neurological: Negative for dizziness, weakness and headaches.       Objective:      Physical Exam   Constitutional: He is oriented to person, place, and time. He appears well-developed and well-nourished. No distress.   HENT:   Head: Normocephalic and atraumatic.   Right Ear: External ear and ear canal normal. A middle ear effusion is present.   Left Ear: External ear and ear canal normal. A middle ear effusion is present.   Ears:    Mouth/Throat: Uvula is midline, oropharynx is clear and moist and mucous membranes are normal.   Eyes: Conjunctivae and EOM are normal. Pupils are equal, round, and reactive to light.   Neck: Normal range of motion. Neck supple. No thyromegaly present.   Cardiovascular: Normal rate and regular rhythm.   Pulmonary/Chest: Effort normal and breath sounds normal.   Abdominal: Soft. Bowel sounds are normal. He exhibits no mass. There is no tenderness. There is no rebound and no guarding.   Musculoskeletal: Normal range of motion.   Lymphadenopathy:     He has no cervical adenopathy.   Neurological: He is alert and oriented to person, place, and time. He has normal reflexes. He displays  normal reflexes. No cranial nerve deficit. He exhibits normal muscle tone. Coordination normal.   Skin: Skin is warm and dry.       Assessment:       1. Exposure to STD    2. Diminished hearing, right        Plan:   Dhruv was seen today for otitis media.    Diagnoses and all orders for this visit:    Exposure to STD  -     C. trachomatis/N. gonorrhoeae by AMP DNA  -     HIV 1/2 Ag/Ab (4th Gen); Future  -     HERPES SIMPLEX 1&2 IGG; Future  -     RPR; Future  -     Hepatitis C antibody; Future    Diminished hearing, right  -     Ambulatory consult to Audiology

## 2019-02-20 LAB
C TRACH DNA SPEC QL NAA+PROBE: NOT DETECTED
N GONORRHOEA DNA SPEC QL NAA+PROBE: NOT DETECTED

## 2019-02-26 ENCOUNTER — CLINICAL SUPPORT (OUTPATIENT)
Dept: REHABILITATION | Facility: HOSPITAL | Age: 64
End: 2019-02-26
Attending: INTERNAL MEDICINE
Payer: COMMERCIAL

## 2019-02-26 DIAGNOSIS — M54.50 CHRONIC BILATERAL LOW BACK PAIN WITHOUT SCIATICA: ICD-10-CM

## 2019-02-26 DIAGNOSIS — G89.29 CHRONIC BILATERAL LOW BACK PAIN WITHOUT SCIATICA: ICD-10-CM

## 2019-02-26 PROCEDURE — 97113 AQUATIC THERAPY/EXERCISES: CPT

## 2019-02-26 NOTE — PROGRESS NOTES
Physical Therapy Aquatic Treatment Note     Name: Dhruv Fine  Clinic Number: [id number]    Therapy Diagnosis:   Low back pain  Physician: Cassandra Cruz MD  Visit Date: 2/26/2019  Physician Orders: PT Eval and Treat   Medical Diagnosis:  M47.816 (ICD-10-CM) - Spondylosis of lumbar region without myelopathy or radiculopathy  Evaluation Date: 10/31/2018  Authorization period Expiration: 12/31/2019  Plan of Care Certification Period: 2/26/2019     Visit #:/ Visits authorized: 6/20  Time In:1550  Time Out: 1650    Total Billable Time: 60 minutes     Precautions: Standard    Subjective     Pt reports he is back to work; not using inversion table much  Pain: 2/10,   Location: back     Objective     Dhruv received aquatic exercises to develop strength, endurance, ROM, flexibility, posture and core stabilization including:  Warm-up Laps 2 x each  FWD,BWD,Side    Stretches 3 x 20 sec each  HSS    LE exs 30x each with 30 oz ankle weights( no weights 2/26/19)  Heel Raise with GS  Mini Squat with QS  Hip ext with HS curl-np  Hip flex/ext-np  Hip abd/add-np  Hip abd walk in mini squat position 2 laps   Monster walks x 2 laps  Lunges Side with ball chest press x 30 each-np  Lunge walk with ball truncal rotation  X 2 laps-np  Starboard for flutter kicking x 1 lap with rest @- 1/2 lap 2/2 L knee discomfort-np  Seated wonderboard for propelling fwd/back/side using LE for 2 laps each  Side plank with barbell x 20 each-np  Resistive walking orange tband fwd/side/back  X 3 each direction-np    Core: 30x each  Shld flex/ext modfied tandem DB   Shld Horiz Abd/add modified tandem DB  Lat pull green t-band  Row with green tubing  Mini squat Hold - chest press with disc   Wall sit with blue disc out in front with TA activation 3x60 sec-np  Walking fwd/sideways with @3 ankle weights & holding star board for resistance-np  Marching fwd/back/side with 2# ankle weights x 2 laps-np  Catch/throw modified tandem 10 each LE(no  LOB)-np  Kickboard with  Frog kicking x 2 laps(increased L knee pain)-np  Obliques- elbow to opposite knee 15 each  Obliques knee to chest with rotation 10 reps x 2  Abdominal: posterior support on railing for knee to chest 10 reps x 2          Manual spinal stretch for 3x 15 sec- np     Cool Down Laps 1 x each  FWD,BWD,Side    Home Exercises Provided and Patient Education Provided none for pool; continue his back stretches daily    Education provided:   - progress towards goals   - role of therapy     Written Home Exercises Provided: Patient was not issued HEP for pool.  Exercises were reviewed and Dhruv was able to demonstrate them prior to the end of the session.   Pt received a written copy of exercises to perform at home.     Dhrvu demonstrated good  understanding of the education provided.     Assessment      Pt  continues to provide great effort with all activities and demonstrates good tolerance to aquatic exercises. Continue to strengthen core & improve back & BLE flexibility Did get pain relief from aquatic environment..Pt able to tolerate increased core exercises. in small increments given his history of increased LBP with land therapy. Denies any increased pain post treatment.  Dhruv is progressing well towards his goals.     Pt prognosis is Good.     Pt will continue to benefit from skilled outpatient aquatic physical therapy to address the deficits listed in the problem list box on initial evaluation, provide pt/family education and to maximize pt's level of independence in the home and community environment.     Pt's spiritual, cultural and educational needs considered and pt agreeable to plan of care and goals.    Anticipated barriers to physical therapy: none    Goals:   Short Term Goals:  4 weeks  1.  Dhruv will report 3/10 pain at worst with going from sit to stand.(MET)  2.  Dhruv will report 1-2 sleep disturbances per night.MET  3. Dhruv will demonstrate increased MMT to 4+/5 throughout LEs to  increase tolerance for ADL and work activities.(MET)  4. Dhruv will demonstrate moderate flexibility limitations in the lower extremities.(progressing- not met)  5. Dhruv to tolerate HEP to improve ROM and independence with ADL's.(progressing- not met)     Long Term Goals: 8 weeks  1.  Dhruv will report 1-2/10 pain at worst with prolonged standing or sitting.(progressing- not met)  2.  Dhruv will report 0-1 sleep disturbances per night.(progressing- not met)  3.  Dhruv will demonstrate increased MMT to 5/5 throughout LEs to increase tolerance for ADL and work activities.(MET)  4.Pt will report at CI level (1-20% impaired) on FOTO lumbar score for low back pain disability to demonstrate decrease in disability and improvement in back pain.(progressing- not met)  5. Pt to be Independent with final HEP to improve ROM and independence with ADL's(progessing- not met)    Plan     PT 1-2x/wk for 8 weeks for aquatic therapy, therapeutic exercise, patient education     Magdalene Jackson, PT

## 2019-03-12 ENCOUNTER — CLINICAL SUPPORT (OUTPATIENT)
Dept: REHABILITATION | Facility: HOSPITAL | Age: 64
End: 2019-03-12
Attending: INTERNAL MEDICINE
Payer: COMMERCIAL

## 2019-03-12 DIAGNOSIS — G89.29 CHRONIC BILATERAL LOW BACK PAIN WITHOUT SCIATICA: ICD-10-CM

## 2019-03-12 DIAGNOSIS — M54.50 CHRONIC BILATERAL LOW BACK PAIN WITHOUT SCIATICA: ICD-10-CM

## 2019-03-12 PROCEDURE — 97113 AQUATIC THERAPY/EXERCISES: CPT

## 2019-03-12 NOTE — PROGRESS NOTES
Physical Therapy Aquatic Treatment Note     Name: Dhruv Fine  Clinic Number: [id number]    Therapy Diagnosis:   Low back pain  Physician: Cassandra Cruz MD  Visit Date: 3/12/2019  Physician Orders: PT Eval and Treat   Medical Diagnosis:  M47.816 (ICD-10-CM) - Spondylosis of lumbar region without myelopathy or radiculopathy  Evaluation Date: 10/31/2018  Authorization period Expiration: 12/31/2019  Plan of Care Certification Period: 2/26/2019     Visit #:/ Visits authorized: 7/20  Time In:1500  Time Out: 1600    Total Billable Time: 60 minutes     Precautions: Standard    Subjective     Pt reports he is back to work; not using inversion table much  Pain: 2/10,   Location: back     Objective     Dhruv received aquatic exercises to develop strength, endurance, ROM, flexibility, posture and core stabilization including:  Warm-up Laps 2 x each  FWD,BWD,Side    Stretches 3 x 20 sec each  HSS    LE exs 30x each with 30 oz ankle weights( no weights 2/26/19)  Heel Raise with GS  Mini Squat with QS  Hip ext with HS curl-np  Hip flex/ext-np  Hip abd/add-np  Hip abd walk in mini squat position 2 laps   Monster walks x 2 laps  Lunges Side with ball chest press x 30 each-np  Lunge walk with ball truncal rotation  X 2 laps-np  Starboard for flutter kicking x 1 lap with rest @- 1/2 lap 2/2 L knee discomfort-np  Seated wonderboard for propelling fwd using oar x 6 min  Side plank with barbell x 20 each-np  Resistive walking fwd/back using jogger belt & PT holding red tubing for resistance x 3 each direction    Core: 30x each  Shld flex/ext modfied tandem DB   Shld Horiz Abd/add modified tandem DB  Lat pull green t-band  Row with green tubing  Mini squat Hold - chest press with disc   Wall sit with blue disc out in front with TA activation 3x60 sec-np  Walking fwd/sideways with @3 ankle weights & holding star board for resistance-np  Marching fwd/back/side with 2# ankle weights x 2 laps-np  Catch/throw modified tandem 10 each  LE(no LOB)-np  Kickboard with  Frog kicking x 2 laps(increased L knee pain)-np  Obliques- elbow to opposite knee 15 each-np  Obliques knee to chest with rotation 10 reps x 2-np  Abdominal: posterior support on railing for knee to chest 10 reps x 2  Wobble board fwd/back x 20  Wobble board laterally x 20  Wobble board single leg fwd/back x 20 each LE  Wobble board single leg laterally x 20 each LE        Manual spinal stretch for 3x 15 sec- np     Cool Down Laps 1 x each  FWD,BWD,Side    Home Exercises Provided and Patient Education Provided none for pool; continue his back stretches daily    Education provided:   - progress towards goals   - role of therapy     Written Home Exercises Provided: Patient  issued HEP for pool.  Exercises were reviewed and Dhruv was able to demonstrate them prior to the end of the session.   Pt received a written copy of exercises to perform at home.     Dhruv demonstrated good  understanding of the education provided.   See Media section under EMR for exercises 3/12/2019    Assessment      Pt  continues to provide great effort with all activities and demonstrates good tolerance to aquatic exercises. Continue to strengthen core & improve back & BLE flexibility Did get pain relief from aquatic environment..Pt able to tolerate increased core exercises. in small increments given his history of increased LBP with land therapy. Denies any increased pain post treatment.  Dhruv is progressing well towards his goals.     Pt prognosis is Good.     Pt will continue to benefit from skilled outpatient aquatic physical therapy to address the deficits listed in the problem list box on initial evaluation, provide pt/family education and to maximize pt's level of independence in the home and community environment.     Pt's spiritual, cultural and educational needs considered and pt agreeable to plan of care and goals.    Anticipated barriers to physical therapy: none    Goals:   Short Term Goals:  4  weeks  1.  Dhruv will report 3/10 pain at worst with going from sit to stand.(MET)  2.  Dhruv will report 1-2 sleep disturbances per night.MET  3. Dhruv will demonstrate increased MMT to 4+/5 throughout LEs to increase tolerance for ADL and work activities.(MET)  4. Dhruv will demonstrate moderate flexibility limitations in the lower extremities.(progressing- not met)  5. Dhruv to tolerate HEP to improve ROM and independence with ADL's.(progressing- not met)     Long Term Goals: 8 weeks  1.  Dhruv will report 1-2/10 pain at worst with prolonged standing or sitting.(progressing- not met)  2.  Dhruv will report 0-1 sleep disturbances per night.(progressing- not met)  3.  Dhruv will demonstrate increased MMT to 5/5 throughout LEs to increase tolerance for ADL and work activities.(MET)  4.Pt will report at CI level (1-20% impaired) on FOTO lumbar score for low back pain disability to demonstrate decrease in disability and improvement in back pain.(progressing- not met)  5. Pt to be Independent with final HEP to improve ROM and independence with ADL's(progessing- not met)    Plan     PT 1-2x/wk for 8 weeks for aquatic therapy, therapeutic exercise, patient education     Magdalene Jackson, PT

## 2019-03-21 ENCOUNTER — CLINICAL SUPPORT (OUTPATIENT)
Dept: REHABILITATION | Facility: HOSPITAL | Age: 64
End: 2019-03-21
Attending: INTERNAL MEDICINE
Payer: COMMERCIAL

## 2019-03-21 DIAGNOSIS — G89.29 CHRONIC BILATERAL LOW BACK PAIN WITHOUT SCIATICA: ICD-10-CM

## 2019-03-21 DIAGNOSIS — M54.50 CHRONIC BILATERAL LOW BACK PAIN WITHOUT SCIATICA: ICD-10-CM

## 2019-03-21 PROCEDURE — 97113 AQUATIC THERAPY/EXERCISES: CPT

## 2019-03-21 NOTE — PROGRESS NOTES
Physical Therapy Aquatic Treatment Note     Name: Dhruv Fine  Clinic Number: [id number]    Therapy Diagnosis:   Low back pain  Physician: Cassandra Cruz MD  Visit Date: 3/21/2019  Physician Orders: PT Eval and Treat   Medical Diagnosis:  M47.816 (ICD-10-CM) - Spondylosis of lumbar region without myelopathy or radiculopathy  Evaluation Date: 10/31/2018  Authorization period Expiration: 12/31/2019  Plan of Care Certification Period: 2/26/2019     Visit #:/ Visits authorized: 8/20  Time In:1600  Time Out: 1700    Total Billable Time: 60 minutes     Precautions: Standard    Subjective     Pt reports he is back to working daily @ [employer] but has worked last 2 weeks @ night not using inversion table much  Pain: 3/10,   Location: back     Objective     Dhruv received aquatic exercises to develop strength, endurance, ROM, flexibility, posture and core stabilization including:  Warm-up Laps 2 x each  FWD,BWD,Side    Stretches 3 x 20 sec each  HSS    LE exs 30x each with 35oz weights  Heel Raise with GS  Mini Squat with QS  Hip ext with HS curl-np  Hip flex/ext-np  Hip abd/add-np  Hip abd walk in mini squat position 2 laps   Monster walks x 2 laps  Lunges Side with blue disc chest press x 30 each-np  Lunge walk with ball truncal rotation  X 2 laps  Starboard for flutter kicking x 1 lap with rest @- 1/2 lap 2/2 L knee discomfort-np  Seated wonderboard for propelling fwd using oar x 6 min-np  Side plank with barbell x 20 each-np  Resistive walking fwd/back using jogger belt & PT holding red tubing for resistance x 3 each direction-np  Step up on box & step down with reverse lunge x 20 each LE      Core: 30x each  Shld flex/ext modfied tandem DB alternating  Shld Horiz Abd/add modified tandem DB alternating  Lat pull green t-band-np  Row with green tubing-np  Mini squat Hold - chest press with disc   Wall sit with blue disc out in front with TA activation 3x60 sec-np  Walking fwd/sideways with @3 ankle weights &  holding star board for resistance-np  Marching fwd/back/side with 2# ankle weights x 2 laps-np  Catch/throw modified tandem 10 each LE(no LOB)-np  Kickboard with  Frog kicking x 2 laps(increased L knee pain)-np  Obliques- elbow/hand to opposite knee 15 each  Obliques knee to chest with rotation 10 reps x 2-np  Abdominal: posterior support on railing for knee to chest 10 reps x 2-np  Wobble board fwd/back x 20  Wobble board laterally x 20  Wobble board single leg fwd/back x 20 each LE-np  Wobble board single leg laterally x 20 each LE-np  Tennis racket with core stability stance to hit ball in all planes x 20         Cool Down Laps 1 x each  FWD,BWD,Side    Home Exercises Provided and Patient Education Provided none for pool; continue his back stretches daily    Education provided:   - progress towards goals   - role of therapy     Written Home Exercises Provided: Patient  issued HEP for pool.  Exercises were reviewed and Dhruv was able to demonstrate them prior to the end of the session.   Pt received a written copy of exercises to perform at home.     Dhruv demonstrated good  understanding of the education provided.   See Media section under EMR for exercises 3/12/2019    Assessment      Pt  continues to provide great effort with all activities and demonstrates good tolerance to aquatic exercises. Continue to strengthen core & improve back & BLE flexibility Did get pain relief from aquatic environment..Pt able to tolerate increased core exercises & increased weights.. Denies any increased pain post treatment.  Dhruv is progressing well towards his goals.     Pt prognosis is Good.     Pt will continue to benefit from skilled outpatient aquatic physical therapy to address the deficits listed in the problem list box on initial evaluation, provide pt/family education and to maximize pt's level of independence in the home and community environment.     Pt's spiritual, cultural and educational needs considered and pt  agreeable to plan of care and goals.    Anticipated barriers to physical therapy: none    Goals:   Short Term Goals:  4 weeks  1.  Dhruv will report 3/10 pain at worst with going from sit to stand.(MET)  2.  Dhruv will report 1-2 sleep disturbances per night.MET  3. Dhruv will demonstrate increased MMT to 4+/5 throughout LEs to increase tolerance for ADL and work activities.(MET)  4. Dhruv will demonstrate moderate flexibility limitations in the lower extremities.(progressing- not met)  5. Dhruv to tolerate HEP to improve ROM and independence with ADL's.(progressing- not met)     Long Term Goals: 8 weeks  1.  Dhruv will report 1-2/10 pain at worst with prolonged standing or sitting.(progressing- not met)  2.  Dhruv will report 0-1 sleep disturbances per night.(progressing- not met)  3.  Dhruv will demonstrate increased MMT to 5/5 throughout LEs to increase tolerance for ADL and work activities.(MET)  4.Pt will report at CI level (1-20% impaired) on FOTO lumbar score for low back pain disability to demonstrate decrease in disability and improvement in back pain.(progressing- not met)  5. Pt to be Independent with final HEP to improve ROM and independence with ADL's(progessing- not met)    Plan   Continue to increase weights & focus on cre stability  PT 1-2x/wk for 8 weeks for aquatic therapy, therapeutic exercise, patient education     Magdalene Jackson, PT

## 2019-08-01 ENCOUNTER — TELEPHONE (OUTPATIENT)
Dept: INTERNAL MEDICINE | Facility: CLINIC | Age: 64
End: 2019-08-01

## 2019-08-01 DIAGNOSIS — E55.9 VITAMIN D DEFICIENCY DISEASE: ICD-10-CM

## 2019-08-01 DIAGNOSIS — E78.2 MIXED HYPERLIPIDEMIA: ICD-10-CM

## 2019-08-01 DIAGNOSIS — Z12.5 ENCOUNTER FOR SCREENING FOR MALIGNANT NEOPLASM OF PROSTATE: ICD-10-CM

## 2019-08-01 DIAGNOSIS — R73.01 IFG (IMPAIRED FASTING GLUCOSE): Primary | ICD-10-CM

## 2019-08-01 NOTE — TELEPHONE ENCOUNTER
He is due for his and exam with me in December or January, orders in to get done before his visit, thank you (NOT PSA sorry) marichuy

## 2019-08-28 ENCOUNTER — OFFICE VISIT (OUTPATIENT)
Dept: OPTOMETRY | Facility: CLINIC | Age: 64
End: 2019-08-28
Payer: COMMERCIAL

## 2019-08-28 DIAGNOSIS — H25.13 NUCLEAR SCLEROSIS, BILATERAL: Primary | ICD-10-CM

## 2019-08-28 DIAGNOSIS — H52.203 HYPEROPIA WITH ASTIGMATISM AND PRESBYOPIA, BILATERAL: ICD-10-CM

## 2019-08-28 DIAGNOSIS — H52.03 HYPEROPIA WITH ASTIGMATISM AND PRESBYOPIA, BILATERAL: ICD-10-CM

## 2019-08-28 DIAGNOSIS — H52.4 HYPEROPIA WITH ASTIGMATISM AND PRESBYOPIA, BILATERAL: ICD-10-CM

## 2019-08-28 PROCEDURE — 92014 PR EYE EXAM, EST PATIENT,COMPREHESV: ICD-10-PCS | Mod: S$GLB,,, | Performed by: OPTOMETRIST

## 2019-08-28 PROCEDURE — 92014 COMPRE OPH EXAM EST PT 1/>: CPT | Mod: S$GLB,,, | Performed by: OPTOMETRIST

## 2019-08-28 PROCEDURE — 92015 DETERMINE REFRACTIVE STATE: CPT | Mod: S$GLB,,, | Performed by: OPTOMETRIST

## 2019-08-28 PROCEDURE — 99999 PR PBB SHADOW E&M-EST. PATIENT-LVL II: CPT | Mod: PBBFAC,,, | Performed by: OPTOMETRIST

## 2019-08-28 PROCEDURE — 99999 PR PBB SHADOW E&M-EST. PATIENT-LVL II: ICD-10-PCS | Mod: PBBFAC,,, | Performed by: OPTOMETRIST

## 2019-08-28 PROCEDURE — 92015 PR REFRACTION: ICD-10-PCS | Mod: S$GLB,,, | Performed by: OPTOMETRIST

## 2019-08-28 NOTE — PROGRESS NOTES
HPI     No problems  Getting eye exam before snf  No night vision problems    Last edited by Elias Shepard, OD on 8/28/2019  8:47 AM. (History)            Assessment /Plan     For exam results, see Encounter Report.    Nuclear sclerosis, bilateral    Hyperopia with astigmatism and presbyopia, bilateral      1. Educated pt on presence of cataracts and effects on vision. No surgery at this time. Recheck in one year.  2. New Spec Rx given. Different lens options discussed with patient. RTC 1 year full exam.

## 2019-08-30 ENCOUNTER — PATIENT MESSAGE (OUTPATIENT)
Dept: SPINE | Facility: CLINIC | Age: 64
End: 2019-08-30

## 2019-08-30 ENCOUNTER — TELEPHONE (OUTPATIENT)
Dept: INTERNAL MEDICINE | Facility: CLINIC | Age: 64
End: 2019-08-30

## 2019-08-30 NOTE — TELEPHONE ENCOUNTER
Spoke to pt----pt stated that he was stung by bees last Friday and is having trouble with his ear.  appt scheduled for tomorrow at 2pm.

## 2019-08-31 ENCOUNTER — OFFICE VISIT (OUTPATIENT)
Dept: INTERNAL MEDICINE | Facility: CLINIC | Age: 64
End: 2019-08-31
Payer: COMMERCIAL

## 2019-08-31 VITALS
HEART RATE: 100 BPM | WEIGHT: 223.75 LBS | OXYGEN SATURATION: 96 % | SYSTOLIC BLOOD PRESSURE: 112 MMHG | HEIGHT: 71 IN | DIASTOLIC BLOOD PRESSURE: 72 MMHG | BODY MASS INDEX: 31.32 KG/M2

## 2019-08-31 DIAGNOSIS — M54.2 NECK PAIN: ICD-10-CM

## 2019-08-31 DIAGNOSIS — M47.812 OSTEOARTHRITIS OF CERVICAL SPINE, UNSPECIFIED SPINAL OSTEOARTHRITIS COMPLICATION STATUS: ICD-10-CM

## 2019-08-31 DIAGNOSIS — H66.001 ACUTE SUPPURATIVE OTITIS MEDIA OF RIGHT EAR WITHOUT SPONTANEOUS RUPTURE OF TYMPANIC MEMBRANE, RECURRENCE NOT SPECIFIED: ICD-10-CM

## 2019-08-31 DIAGNOSIS — F41.9 ANXIETY: ICD-10-CM

## 2019-08-31 DIAGNOSIS — N20.0 RENAL STONES: ICD-10-CM

## 2019-08-31 DIAGNOSIS — N52.9 ERECTILE DYSFUNCTION, UNSPECIFIED ERECTILE DYSFUNCTION TYPE: ICD-10-CM

## 2019-08-31 DIAGNOSIS — J01.90 ACUTE BACTERIAL SINUSITIS: Primary | ICD-10-CM

## 2019-08-31 DIAGNOSIS — R73.01 IFG (IMPAIRED FASTING GLUCOSE): ICD-10-CM

## 2019-08-31 DIAGNOSIS — R09.81 NASAL CONGESTION: ICD-10-CM

## 2019-08-31 DIAGNOSIS — B96.89 ACUTE BACTERIAL SINUSITIS: Primary | ICD-10-CM

## 2019-08-31 DIAGNOSIS — N28.1 RENAL CYST, ACQUIRED, RIGHT: ICD-10-CM

## 2019-08-31 DIAGNOSIS — E55.9 VITAMIN D DEFICIENCY DISEASE: ICD-10-CM

## 2019-08-31 DIAGNOSIS — K64.9 BLEEDING HEMORRHOIDS: ICD-10-CM

## 2019-08-31 DIAGNOSIS — E66.09 NON MORBID OBESITY DUE TO EXCESS CALORIES: ICD-10-CM

## 2019-08-31 DIAGNOSIS — G47.33 OBSTRUCTIVE SLEEP APNEA SYNDROME: ICD-10-CM

## 2019-08-31 DIAGNOSIS — E78.2 MIXED HYPERLIPIDEMIA: ICD-10-CM

## 2019-08-31 PROCEDURE — 3008F BODY MASS INDEX DOCD: CPT | Mod: CPTII,S$GLB,, | Performed by: FAMILY MEDICINE

## 2019-08-31 PROCEDURE — 1101F PR PT FALLS ASSESS DOC 0-1 FALLS W/OUT INJ PAST YR: ICD-10-PCS | Mod: CPTII,S$GLB,, | Performed by: FAMILY MEDICINE

## 2019-08-31 PROCEDURE — 99213 OFFICE O/P EST LOW 20 MIN: CPT | Mod: S$GLB,,, | Performed by: FAMILY MEDICINE

## 2019-08-31 PROCEDURE — 99999 PR PBB SHADOW E&M-EST. PATIENT-LVL III: ICD-10-PCS | Mod: PBBFAC,,, | Performed by: FAMILY MEDICINE

## 2019-08-31 PROCEDURE — 99213 PR OFFICE/OUTPT VISIT, EST, LEVL III, 20-29 MIN: ICD-10-PCS | Mod: S$GLB,,, | Performed by: FAMILY MEDICINE

## 2019-08-31 PROCEDURE — 99999 PR PBB SHADOW E&M-EST. PATIENT-LVL III: CPT | Mod: PBBFAC,,, | Performed by: FAMILY MEDICINE

## 2019-08-31 PROCEDURE — 1101F PT FALLS ASSESS-DOCD LE1/YR: CPT | Mod: CPTII,S$GLB,, | Performed by: FAMILY MEDICINE

## 2019-08-31 PROCEDURE — 3008F PR BODY MASS INDEX (BMI) DOCUMENTED: ICD-10-PCS | Mod: CPTII,S$GLB,, | Performed by: FAMILY MEDICINE

## 2019-08-31 RX ORDER — MOXIFLOXACIN HYDROCHLORIDE 400 MG/1
400 TABLET ORAL DAILY
Qty: 10 TABLET | Refills: 0 | Status: SHIPPED | OUTPATIENT
Start: 2019-08-31 | End: 2019-09-20

## 2019-08-31 RX ORDER — LIDOCAINE 50 MG/G
1-2 PATCH TOPICAL DAILY
Qty: 60 PATCH | Refills: 2 | Status: SHIPPED | OUTPATIENT
Start: 2019-08-31 | End: 2021-03-15 | Stop reason: SDUPTHER

## 2019-08-31 RX ORDER — CYCLOBENZAPRINE HCL 10 MG
10 TABLET ORAL NIGHTLY
Qty: 30 TABLET | Refills: 0 | Status: SHIPPED | OUTPATIENT
Start: 2019-08-31

## 2019-08-31 RX ORDER — PROMETHAZINE HYDROCHLORIDE AND DEXTROMETHORPHAN HYDROBROMIDE 6.25; 15 MG/5ML; MG/5ML
5 SYRUP ORAL EVERY 4 HOURS PRN
Qty: 240 ML | Refills: 0 | Status: SHIPPED | OUTPATIENT
Start: 2019-08-31 | End: 2019-09-10

## 2019-08-31 RX ORDER — METHYLPREDNISOLONE 4 MG/1
TABLET ORAL
Qty: 1 PACKAGE | Refills: 0 | Status: SHIPPED | OUTPATIENT
Start: 2019-08-31 | End: 2019-09-20

## 2019-08-31 RX ORDER — FLUTICASONE PROPIONATE 50 MCG
SPRAY, SUSPENSION (ML) NASAL
Qty: 3 BOTTLE | Refills: 0 | Status: SHIPPED | OUTPATIENT
Start: 2019-08-31 | End: 2019-10-14 | Stop reason: SDUPTHER

## 2019-09-03 ENCOUNTER — TELEPHONE (OUTPATIENT)
Dept: PRIMARY CARE CLINIC | Facility: CLINIC | Age: 64
End: 2019-09-03

## 2019-09-03 PROBLEM — G72.0 STATIN MYOPATHY: Status: RESOLVED | Noted: 2018-10-10 | Resolved: 2019-09-03

## 2019-09-03 PROBLEM — G89.29 CHRONIC BILATERAL LOW BACK PAIN WITHOUT SCIATICA: Status: RESOLVED | Noted: 2018-10-31 | Resolved: 2019-09-03

## 2019-09-03 PROBLEM — M17.12 PRIMARY OSTEOARTHRITIS OF LEFT KNEE: Status: RESOLVED | Noted: 2017-03-06 | Resolved: 2019-09-03

## 2019-09-03 PROBLEM — T46.6X5A STATIN MYOPATHY: Status: RESOLVED | Noted: 2018-10-10 | Resolved: 2019-09-03

## 2019-09-03 PROBLEM — M54.50 CHRONIC BILATERAL LOW BACK PAIN WITHOUT SCIATICA: Status: RESOLVED | Noted: 2018-10-31 | Resolved: 2019-09-03

## 2019-09-03 PROBLEM — M94.261 CHONDROMALACIA, RIGHT KNEE: Status: RESOLVED | Noted: 2017-02-13 | Resolved: 2019-09-03

## 2019-09-03 NOTE — TELEPHONE ENCOUNTER
----- Message from Javad Oakes sent at 9/3/2019 10:50 AM CDT -----  Contact: self/[phone]  Pt is calling stating that when he came in to see the doctor her received all of the prescriptions except for the antibiotics  were supposed to send over for the pt. Please call and advise.        Thank You

## 2019-09-03 NOTE — TELEPHONE ENCOUNTER
Patient informed pharmacy needed to order prescription, it should be in tomorrow. Left voicemail.

## 2019-09-03 NOTE — PROGRESS NOTES
Subjective:   Patient ID: Dhruv Fine is a 63 y.o. male.    Chief Complaint: No chief complaint on file.      Sinusitis   This is a new problem. The current episode started 1 to 4 weeks ago. The problem has been gradually worsening since onset. There has been no fever. His pain is at a severity of 8/10. The pain is severe. Associated symptoms include chills, congestion and diaphoresis. Pertinent negatives include no coughing, ear pain, headaches, neck pain, shortness of breath, sneezing or sore throat. Past treatments include acetaminophen. The treatment provided no relief.       Patient queried and denies any further complaints.    LOCATION  DURATION  SEVERITY  QUALITY  TIMING  CAUSE  ASSOCIATED SYMPTOMS  MODIFIERS.    ALLERGIES AND MEDICATIONS: updated and reviewed.  Review of patient's allergies indicates:   Allergen Reactions    No known allergies        Current Outpatient Medications:     fluticasone propionate (FLONASE) 50 mcg/actuation nasal spray, SHAKE LIQUID AND USE 1 SPRAY(50 MCG) IN EACH NOSTRIL EVERY DAY, Disp: 3 Bottle, Rfl: 0    lidocaine (LIDODERM) 5 %, Place 1-2 patches onto the skin once daily. Wear patch for 12 hours and  must have a 12 hour period without a patch, Disp: 60 patch, Rfl: 2    multivitamin (ONE DAILY MULTIVITAMIN) per tablet, Take 1 tablet by mouth once daily., Disp: , Rfl:     cyclobenzaprine (FLEXERIL) 10 MG tablet, Take 1 tablet (10 mg total) by mouth nightly. Prn severe muscle spasms, Disp: 30 tablet, Rfl: 0    methylPREDNISolone (MEDROL DOSEPACK) 4 mg tablet, use as directed, Disp: 1 Package, Rfl: 0    moxifloxacin (AVELOX) 400 mg tablet, Take 1 tablet (400 mg total) by mouth once daily. X 10 days, Disp: 10 tablet, Rfl: 0    promethazine-dextromethorphan (PROMETHAZINE-DM) 6.25-15 mg/5 mL Syrp, Take 5 mLs by mouth every 4 (four) hours as needed. Do not take and drive. Do not take while working., Disp: 240 mL, Rfl: 0    Review of Systems   Constitutional: Positive for  "chills and diaphoresis. Negative for activity change, appetite change, fatigue, fever and unexpected weight change.   HENT: Positive for congestion. Negative for ear discharge, ear pain, postnasal drip, rhinorrhea, sneezing and sore throat.    Eyes: Negative for photophobia and discharge.   Respiratory: Negative for cough, chest tightness, shortness of breath and wheezing.    Cardiovascular: Negative for chest pain and palpitations.   Gastrointestinal: Negative for abdominal distention, abdominal pain, diarrhea, nausea and vomiting.   Genitourinary: Negative for dysuria.   Musculoskeletal: Negative for arthralgias and neck pain.   Skin: Negative for rash.   Neurological: Negative for headaches.       Objective:     Vitals:    08/31/19 1351   BP: 112/72   Pulse: 100   SpO2: 96%   Weight: 101.5 kg (223 lb 12.3 oz)   Height: 5' 11" (1.803 m)   PainSc:   7   PainLoc: Back     Body mass index is 31.21 kg/m².    Physical Exam   Constitutional: He appears well-developed and well-nourished.   HENT:   Head: Normocephalic and atraumatic.   Right Ear: Hearing, tympanic membrane, external ear and ear canal normal. No drainage or swelling. No decreased hearing is noted.   Left Ear: Hearing, tympanic membrane, external ear and ear canal normal. No drainage or swelling. No decreased hearing is noted.   Nose: Nose normal. No rhinorrhea.   Mouth/Throat: Oropharynx is clear and moist. No oropharyngeal exudate, posterior oropharyngeal edema or posterior oropharyngeal erythema.   Eyes: Pupils are equal, round, and reactive to light. Conjunctivae, EOM and lids are normal. Right eye exhibits no discharge and no exudate. Left eye exhibits no discharge and no exudate. Right conjunctiva is not injected. Left conjunctiva is not injected.   Neck: Trachea normal and full passive range of motion without pain. Normal carotid pulses, no hepatojugular reflux and no JVD present. Carotid bruit is not present. No neck rigidity. No edema and no " erythema present. No thyroid mass and no thyromegaly present.   Cardiovascular: Normal rate, regular rhythm and normal heart sounds.   Pulmonary/Chest: Effort normal. No respiratory distress.   Abdominal: Soft. Normal appearance and bowel sounds are normal. There is no tenderness. There is negative Blancas's sign.   Lymphadenopathy:     He has no cervical adenopathy.   Neurological: He is alert.   Skin: Skin is warm and dry.   Psychiatric: He has a normal mood and affect. His speech is normal and behavior is normal.   Nursing note and vitals reviewed.      Assessment and Plan:   Diagnoses and all orders for this visit:    Acute bacterial sinusitis    Neck pain  -     lidocaine (LIDODERM) 5 %; Place 1-2 patches onto the skin once daily. Wear patch for 12 hours and  must have a 12 hour period without a patch    Nasal congestion  -     fluticasone propionate (FLONASE) 50 mcg/actuation nasal spray; SHAKE LIQUID AND USE 1 SPRAY(50 MCG) IN EACH NOSTRIL EVERY DAY    Acute suppurative otitis media of right ear without spontaneous rupture of tympanic membrane, recurrence not specified  -     fluticasone propionate (FLONASE) 50 mcg/actuation nasal spray; SHAKE LIQUID AND USE 1 SPRAY(50 MCG) IN EACH NOSTRIL EVERY DAY    Osteoarthritis of cervical spine, unspecified spinal osteoarthritis complication status    Anxiety    Mixed hyperlipidemia    Erectile dysfunction, unspecified erectile dysfunction type    Renal cyst, acquired, right: see u/s 11/18    Renal stones: left side non obstructing see u/s 11/18    Vitamin D deficiency disease    Non morbid obesity due to excess calories    IFG (impaired fasting glucose)    Bleeding hemorrhoids    Obstructive sleep apnea syndrome: needs CPAP- see sleep assessment 7/17    Other orders  -     moxifloxacin (AVELOX) 400 mg tablet; Take 1 tablet (400 mg total) by mouth once daily. X 10 days  -     cyclobenzaprine (FLEXERIL) 10 MG tablet; Take 1 tablet (10 mg total) by mouth nightly. Prn  severe muscle spasms  -     promethazine-dextromethorphan (PROMETHAZINE-DM) 6.25-15 mg/5 mL Syrp; Take 5 mLs by mouth every 4 (four) hours as needed. Do not take and drive. Do not take while working.  -     methylPREDNISolone (MEDROL DOSEPACK) 4 mg tablet; use as directed    Hydrate, rest, OTC Mucinex Expectorant as directed, Nasal saline as needed.  OTC Zyrtec as directed.      Follow up in about 2 weeks (around 9/14/2019).    THIS NOTE WILL BE SHARED WITH THE PATIENT.

## 2019-09-05 ENCOUNTER — TELEPHONE (OUTPATIENT)
Dept: PRIMARY CARE CLINIC | Facility: CLINIC | Age: 64
End: 2019-09-05

## 2019-09-05 NOTE — TELEPHONE ENCOUNTER
"----- Message from Elizabeth Baker sent at 9/5/2019 10:11 AM CDT -----  Prior Authorization Needed    Rx: lidocaine (LIDODERM) 5 %    To submit the PA:    1: Go to " [email or web address] " and click "Enter a Key"    2. Enter the patient's last name and date of birth and the key.      KEY: [id number]    3. Complete the forms and click "send to Plan"    Note chart when prior authorization has been submitted.    Please notify pharmacy when prior authorization has been approved.    Thank You    "

## 2019-09-19 ENCOUNTER — PATIENT OUTREACH (OUTPATIENT)
Dept: ADMINISTRATIVE | Facility: HOSPITAL | Age: 64
End: 2019-09-19

## 2019-09-20 ENCOUNTER — OFFICE VISIT (OUTPATIENT)
Dept: INTERNAL MEDICINE | Facility: CLINIC | Age: 64
End: 2019-09-20
Payer: COMMERCIAL

## 2019-09-20 ENCOUNTER — HOSPITAL ENCOUNTER (OUTPATIENT)
Dept: RADIOLOGY | Facility: HOSPITAL | Age: 64
Discharge: HOME | End: 2019-09-20
Attending: INTERNAL MEDICINE
Payer: COMMERCIAL

## 2019-09-20 ENCOUNTER — PATIENT MESSAGE (OUTPATIENT)
Dept: INTERNAL MEDICINE | Facility: CLINIC | Age: 64
End: 2019-09-20

## 2019-09-20 VITALS — WEIGHT: 220 LBS | BODY MASS INDEX: 30.8 KG/M2 | HEIGHT: 71 IN

## 2019-09-20 DIAGNOSIS — E78.2 MIXED HYPERLIPIDEMIA: ICD-10-CM

## 2019-09-20 DIAGNOSIS — Z91.148 NON COMPLIANCE W MEDICATION REGIMEN: ICD-10-CM

## 2019-09-20 DIAGNOSIS — J01.90 ACUTE BACTERIAL SINUSITIS: Primary | ICD-10-CM

## 2019-09-20 DIAGNOSIS — E66.09 NON MORBID OBESITY DUE TO EXCESS CALORIES: ICD-10-CM

## 2019-09-20 DIAGNOSIS — R73.01 IFG (IMPAIRED FASTING GLUCOSE): ICD-10-CM

## 2019-09-20 DIAGNOSIS — G47.33 OBSTRUCTIVE SLEEP APNEA SYNDROME: ICD-10-CM

## 2019-09-20 DIAGNOSIS — B96.89 ACUTE BACTERIAL SINUSITIS: Primary | ICD-10-CM

## 2019-09-20 DIAGNOSIS — H92.01 RIGHT EAR PAIN: ICD-10-CM

## 2019-09-20 DIAGNOSIS — E55.9 VITAMIN D DEFICIENCY DISEASE: ICD-10-CM

## 2019-09-20 DIAGNOSIS — R05.9 COUGH: ICD-10-CM

## 2019-09-20 PROCEDURE — 99214 PR OFFICE/OUTPT VISIT, EST, LEVL IV, 30-39 MIN: ICD-10-PCS | Mod: S$GLB,,, | Performed by: INTERNAL MEDICINE

## 2019-09-20 PROCEDURE — 99999 PR PBB SHADOW E&M-EST. PATIENT-LVL IV: CPT | Mod: PBBFAC,,, | Performed by: INTERNAL MEDICINE

## 2019-09-20 PROCEDURE — 3008F PR BODY MASS INDEX (BMI) DOCUMENTED: ICD-10-PCS | Mod: CPTII,S$GLB,, | Performed by: INTERNAL MEDICINE

## 2019-09-20 PROCEDURE — 99214 OFFICE O/P EST MOD 30 MIN: CPT | Mod: S$GLB,,, | Performed by: INTERNAL MEDICINE

## 2019-09-20 PROCEDURE — 71046 X-RAY EXAM CHEST 2 VIEWS: CPT | Mod: 26,,, | Performed by: RADIOLOGY

## 2019-09-20 PROCEDURE — 71046 X-RAY EXAM CHEST 2 VIEWS: CPT | Mod: TC

## 2019-09-20 PROCEDURE — 71046 XR CHEST PA AND LATERAL: ICD-10-PCS | Mod: 26,,, | Performed by: RADIOLOGY

## 2019-09-20 PROCEDURE — 99999 PR PBB SHADOW E&M-EST. PATIENT-LVL IV: ICD-10-PCS | Mod: PBBFAC,,, | Performed by: INTERNAL MEDICINE

## 2019-09-20 PROCEDURE — 1101F PT FALLS ASSESS-DOCD LE1/YR: CPT | Mod: CPTII,S$GLB,, | Performed by: INTERNAL MEDICINE

## 2019-09-20 PROCEDURE — 1101F PR PT FALLS ASSESS DOC 0-1 FALLS W/OUT INJ PAST YR: ICD-10-PCS | Mod: CPTII,S$GLB,, | Performed by: INTERNAL MEDICINE

## 2019-09-20 PROCEDURE — 3008F BODY MASS INDEX DOCD: CPT | Mod: CPTII,S$GLB,, | Performed by: INTERNAL MEDICINE

## 2019-09-20 RX ORDER — AMOXICILLIN AND CLAVULANATE POTASSIUM 875; 125 MG/1; MG/1
1 TABLET, FILM COATED ORAL 2 TIMES DAILY
Qty: 20 TABLET | Refills: 0 | Status: SHIPPED | OUTPATIENT
Start: 2019-09-20 | End: 2019-10-14

## 2019-09-20 NOTE — PATIENT INSTRUCTIONS
Acute Bacterial Rhinosinusitis (ABRS)    Acute bacterial rhinosinusitis (ABRS) is an infection of your nasal cavity and sinuses. Its caused by bacteria. Acute means that youve had symptoms for less than 12 weeks.  Understanding your sinuses  The nasal cavity is the large air-filled space behind your nose. The sinuses are a group of spaces formed by the bones of your face. They connect with your nasal cavity. ABRS causes the tissue lining these spaces to become inflamed. Mucus may not drain normally. This leads to facial pain and other symptoms.  What causes ABRS?  ABRS most often follows an upper respiratory infection caused by a virus. Bacteria then infect the lining of your nasal cavity and sinuses. But you can also get ABRS if you have:  · Nasal allergies  · Long-term nasal swelling and congestion not caused by allergies  · Blockage in the nose  Symptoms of ABRS  The symptoms of ABRS may be different for each person, and can include:  · Nasal congestion  · Runny nose  · Fluid draining from the nose down the throat (postnasal drip)  · Headache  · Cough  · Pain in the sinuses  · Thick, colored fluid from the nose (mucus)  · Fever  Diagnosing ABRS  ABRS may be diagnosed if youve had an upper respiratory infection like a cold and cough for longer than 10 to 14 days. Your health care provider will ask about your symptoms and your medical history. The provider will check your vital signs, including your temperature. Youll have a physical exam. The health care provider will check your ears, nose, and throat. You likely wont need any tests. If ABRS comes back, you may have a culture or other tests.  Treatment for ABRS  Treatment may include:  · Antibiotic medicine. This is for symptoms that last for at least 10 to 14 days.  · Nasal corticosteroid medicine. Drops or spray used in the nose can lessen swelling and congestion.  · Over-the-counter pain medicine. This is to lessen sinus pain and pressure.  · Nasal  decongestant medicine. Spray or drops may help to lessen congestion. Do not use them for more than a few days.  · Salt wash (saline irrigation). This can help to loosen mucus.  Possible complications of ABRS  ABRS may come back or become long-term (chronic).  In rare cases, ABRS may cause complications such as:   · Inflamed tissue around the brain and spinal cord (meningitis)  · Inflamed tissue around the eyes (orbital cellulitis)  · Inflamed bones around the sinuses (osteitis)  These problems may need to be treated in a hospital with intravenous (IV) antibiotic medicine or surgery.  When to call the health care provider  Call your health care provider if you have any of the following:  · Symptoms that dont get better, or get worse  · Symptoms that dont get better after 3 to 5 days on antibiotics  · Trouble seeing  · Swelling around your eyes  · Confusion or trouble staying awake   Date Last Reviewed: 3/3/2015  © 5404-1814 [employer]. [address] [place] [address]. All rights reserved. This information is not intended as a substitute for professional medical care. Always follow your healthcare professional's instructions.      What Are Snoring and Obstructive Sleep Apnea?  If youve ever had a stuffed-up nose, you know the feeling of trying to breathe through a very narrow passageway. This is what happens in your throat when you snore. While you sleep, structures in your throat partially block your air passage, making the passage narrow and hard to breathe through. If the entire passage becomes blocked and you cant breathe at all, you have sleep apnea.      Snoring Obstructive sleep apnea   Snoring  If your throat structures are too large or the muscles relax too much during sleep, the air passage may be partially blocked. As air from the nose or mouth passes around this blockage, the throat structures vibrate, causing the familiar sound of snoring. At times, this sound can be so loud  that snorers wake up others, or even themselves, during the night. Snoring gets worse as more and more of the air passage is blocked.  Obstructive sleep apnea  If the structures completely block the throat, air cant flow to the lungs at all. This is called apnea (meaning no breathing). Since the lungs arent getting fresh air, the brain tells the body to wake up just enough to tighten the muscles and unblock the air passage. With a loud gasp, breathing begins again. This process may be repeated over and over again throughout the night, making your sleep fragmented with a lighter stage of sleep. Even though you do not remember waking up many times during the night to a lighter sleep, you feel tired the next day. The lack of sleep and fresh air can also strain your lungs, heart, and other organs, leading to problems such as high blood pressure, heart attack, or stroke.  Problems in the nose and jaw  Problems in the structure of the nose may obstruct breathing. A crooked (deviated) septum or swollen turbinates can make snoring worse or lead to apnea. Also, a receding jaw may make the tongue sit too far back, so its more likely to block the airway when youre asleep.        Date Last Reviewed: 7/18/2015  © 9626-4208 [employer]. [address] [place] [address], [place] [address]. All rights reserved. This information is not intended as a substitute for professional medical care. Always follow your healthcare professional's instructions.

## 2019-09-20 NOTE — PROGRESS NOTES
Subjective:       Patient ID: Dhruv Fine is a 63 y.o. male.    Chief Complaint: Ear Injury and Cough    Urgent visit    Seen a few weeks ago with sinusitis.   Given moxifloxacin and prednisone.  Symptoms have persisted.    Ear pain got better but still persistent.  Feeling of popping.  Not sure about hearing loss.    Cough persists.  Cough syrup is too sedating.  Can't take during the day but makes him too tired if he takes it too late.    No fever.  No shortness of breath or chest pain.    Blood pressure doing well    Lipids on last check very high, has not been compliant with medication.  Has tried lipitor, crestor and pravachol and got myalgias.  Also seen in Cardiology and Repatha recommended; he declines.     Sleep apnea- he has not been compliant with this and does not want to return.   He was seen in August 2018 and told to return in 4 weeks; he refuses to return.  Long discussion, he is ambivalent about any assessment or treatment.    Weight reviewed; BMI still 30.    Patient Active Problem List:     Vitamin D deficiency disease     Erectile dysfunction     Bleeding hemorrhoids     Anxiety     Obstructive sleep apnea syndrome: needs CPAP- see sleep assessment 7/17     Nuclear sclerosis - Both Eyes     DJD (degenerative joint disease) of cervical spine     Non morbid obesity due to excess calories     IFG (impaired fasting glucose)     Renal cyst, acquired, right: see u/s 11/18     Renal stones: left side non obstructing see u/s 11/18     Mixed hyperlipidemia          Review of Systems   Constitutional: Positive for fatigue. Negative for chills and fever.   HENT: Positive for congestion, ear pain, postnasal drip, sinus pressure and sinus pain.    Eyes: Negative.    Respiratory: Positive for cough. Negative for chest tightness, shortness of breath and wheezing.    Cardiovascular: Negative.    Gastrointestinal: Negative.        Objective:      Physical Exam   Constitutional: He is oriented to person,  place, and time. He appears well-developed and well-nourished.   HENT:   Head: Normocephalic and atraumatic.   Right Ear: External ear normal.   Left Ear: External ear normal.   Diffuse frontal sinus tenderness  Both TMs look clear   Eyes: Conjunctivae and EOM are normal.   Neck: Normal range of motion. Neck supple. No thyromegaly present.   Cardiovascular: Normal rate and regular rhythm.   No murmur heard.  Pulmonary/Chest: Effort normal and breath sounds normal. No respiratory distress. He has no wheezes.   Abdominal: Soft. He exhibits no distension. There is no tenderness.   Musculoskeletal: He exhibits no edema or tenderness.   Lymphadenopathy:     He has no cervical adenopathy.   Neurological: He is alert and oriented to person, place, and time. No cranial nerve deficit.   Skin: Skin is warm and dry.   Psychiatric: He has a normal mood and affect. His behavior is normal.       Assessment:       1. Acute bacterial sinusitis    2. Mixed hyperlipidemia    3. IFG (impaired fasting glucose)    4. Non morbid obesity due to excess calories    5. Vitamin D deficiency disease    6. Obstructive sleep apnea syndrome: needs CPAP- see sleep assessment 7/17    7. Cough    8. Right ear pain        Plan:         Dhruv was seen today for ear injury and cough.    Diagnoses and all orders for this visit:    Acute bacterial sinusitis  -     CT [employer] Sinuses without; Future  -     amoxicillin-clavulanate 875-125mg (AUGMENTIN) 875-125 mg per tablet; Take 1 tablet by mouth 2 (two) times daily.    Mixed hyperlipidemia:  Long discussion.  He does not want to pursue any statin therapy or any other alternatives.  He does not want to return to Cardiology.  Given these issues, it may be it more reasonable not to continue to check his cholesterol as I do not suspect it will improve without medication.  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  Significance of sleep apnea reviewed at length.  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  He is adamant about not wanting to pursue further assessment currently    Cough  -     X-Ray Chest PA And Lateral; Future    Right ear pain  -     Ambulatory referral to ENT    Rest, fluids, acetaminophen, mucinex and follow up poor results.  Immunizations recommended including flu shot, pneumonia vaccine series and shingles vaccine series.  He declines all today    Medication noncompliance complicates all above.    Patient counseled for over 50% of the 30 minute appt, all questions answered,  chart reviewed and care coordinated.

## 2019-09-27 ENCOUNTER — TELEPHONE (OUTPATIENT)
Dept: INTERNAL MEDICINE | Facility: CLINIC | Age: 64
End: 2019-09-27

## 2019-09-27 DIAGNOSIS — Z00.00 ROUTINE GENERAL MEDICAL EXAMINATION AT A HEALTH CARE FACILITY: Primary | ICD-10-CM

## 2019-09-27 DIAGNOSIS — Z91.89 AT RISK FOR CORONARY ARTERY DISEASE: ICD-10-CM

## 2019-09-27 NOTE — TELEPHONE ENCOUNTER
----- Message from Mandi Rodriguez sent at 9/27/2019 10:36 AM CDT -----  Contact: Patient [phone]  Patient is scheduled or a CT on 09/30/2019 and is requesting something to calm him for the procedure.    Please call and advise.    Thank You

## 2019-09-27 NOTE — TELEPHONE ENCOUNTER
LM for pt to return call. Will ask if he has had a CT scan in past- usually does not induce claustrophobia

## 2019-09-30 ENCOUNTER — TELEPHONE (OUTPATIENT)
Dept: INTERNAL MEDICINE | Facility: CLINIC | Age: 64
End: 2019-09-30

## 2019-09-30 ENCOUNTER — HOSPITAL ENCOUNTER (OUTPATIENT)
Dept: RADIOLOGY | Facility: HOSPITAL | Age: 64
Discharge: HOME | End: 2019-09-30
Attending: INTERNAL MEDICINE
Payer: COMMERCIAL

## 2019-09-30 ENCOUNTER — PATIENT MESSAGE (OUTPATIENT)
Dept: INTERNAL MEDICINE | Facility: CLINIC | Age: 64
End: 2019-09-30

## 2019-09-30 DIAGNOSIS — B96.89 ACUTE BACTERIAL SINUSITIS: Primary | ICD-10-CM

## 2019-09-30 DIAGNOSIS — J01.90 ACUTE BACTERIAL SINUSITIS: ICD-10-CM

## 2019-09-30 DIAGNOSIS — B96.89 ACUTE BACTERIAL SINUSITIS: ICD-10-CM

## 2019-09-30 DIAGNOSIS — J01.90 ACUTE BACTERIAL SINUSITIS: Primary | ICD-10-CM

## 2019-09-30 PROCEDURE — 70486 CT MEDTRONIC SINUSES WITHOUT: ICD-10-PCS | Mod: 26,,, | Performed by: RADIOLOGY

## 2019-09-30 PROCEDURE — 70486 CT MAXILLOFACIAL W/O DYE: CPT | Mod: 26,,, | Performed by: RADIOLOGY

## 2019-09-30 PROCEDURE — 70486 CT MAXILLOFACIAL W/O DYE: CPT | Mod: TC

## 2019-09-30 RX ORDER — AMOXICILLIN AND CLAVULANATE POTASSIUM 875; 125 MG/1; MG/1
1 TABLET, FILM COATED ORAL 2 TIMES DAILY
Qty: 20 TABLET | Refills: 0 | Status: SHIPPED | OUTPATIENT
Start: 2019-09-30 | End: 2019-10-14

## 2019-09-30 NOTE — TELEPHONE ENCOUNTER
Please let him know that it looks like he has got a really bad sinus infection and he may also have an infected tooth.  I am sending in another round of antibiotics to treat this.    Increase fluids, Mucinex and follow up poor results.    He needs to be seen in ENT as well as by his dentist.  Referral for ENT placed.  Please have him schedule an appointment with his dentist within the next week.  Keep appointment to see me October 14th.

## 2019-09-30 NOTE — TELEPHONE ENCOUNTER
----- Message from Rivera Lang sent at 9/30/2019  9:27 AM CDT -----  Contact: Self [phone]  Patient will like to speak to the nurse in regarding medication to do the MRI, please advise.

## 2019-10-05 ENCOUNTER — PATIENT OUTREACH (OUTPATIENT)
Dept: ADMINISTRATIVE | Facility: OTHER | Age: 64
End: 2019-10-05

## 2019-10-08 ENCOUNTER — TELEPHONE (OUTPATIENT)
Dept: OTOLARYNGOLOGY | Facility: CLINIC | Age: 64
End: 2019-10-08

## 2019-10-08 ENCOUNTER — OFFICE VISIT (OUTPATIENT)
Dept: OTOLARYNGOLOGY | Facility: CLINIC | Age: 64
End: 2019-10-08
Payer: COMMERCIAL

## 2019-10-08 ENCOUNTER — PATIENT MESSAGE (OUTPATIENT)
Dept: OTOLARYNGOLOGY | Facility: CLINIC | Age: 64
End: 2019-10-08

## 2019-10-08 VITALS — DIASTOLIC BLOOD PRESSURE: 88 MMHG | HEART RATE: 72 BPM | SYSTOLIC BLOOD PRESSURE: 129 MMHG

## 2019-10-08 DIAGNOSIS — J01.01 ACUTE RECURRENT MAXILLARY SINUSITIS: ICD-10-CM

## 2019-10-08 DIAGNOSIS — R93.0 ABNORMAL CT SCAN, SINUS: Primary | ICD-10-CM

## 2019-10-08 PROCEDURE — 99999 PR PBB SHADOW E&M-EST. PATIENT-LVL III: ICD-10-PCS | Mod: PBBFAC,,, | Performed by: NURSE PRACTITIONER

## 2019-10-08 PROCEDURE — 31231 NASAL/SINUS ENDOSCOPY: ICD-10-PCS | Mod: S$GLB,,, | Performed by: NURSE PRACTITIONER

## 2019-10-08 PROCEDURE — 1101F PT FALLS ASSESS-DOCD LE1/YR: CPT | Mod: CPTII,S$GLB,, | Performed by: NURSE PRACTITIONER

## 2019-10-08 PROCEDURE — 99213 PR OFFICE/OUTPT VISIT, EST, LEVL III, 20-29 MIN: ICD-10-PCS | Mod: S$GLB,,, | Performed by: NURSE PRACTITIONER

## 2019-10-08 PROCEDURE — 1101F PR PT FALLS ASSESS DOC 0-1 FALLS W/OUT INJ PAST YR: ICD-10-PCS | Mod: CPTII,S$GLB,, | Performed by: NURSE PRACTITIONER

## 2019-10-08 PROCEDURE — 31231 NASAL ENDOSCOPY DX: CPT | Mod: S$GLB,,, | Performed by: NURSE PRACTITIONER

## 2019-10-08 PROCEDURE — 99213 OFFICE O/P EST LOW 20 MIN: CPT | Mod: S$GLB,,, | Performed by: NURSE PRACTITIONER

## 2019-10-08 PROCEDURE — 99999 PR PBB SHADOW E&M-EST. PATIENT-LVL III: CPT | Mod: PBBFAC,,, | Performed by: NURSE PRACTITIONER

## 2019-10-08 RX ORDER — DOXYCYCLINE HYCLATE 100 MG
100 TABLET ORAL 2 TIMES DAILY
Qty: 20 TABLET | Refills: 0 | Status: SHIPPED | OUTPATIENT
Start: 2019-10-08 | End: 2019-10-18

## 2019-10-08 NOTE — PROGRESS NOTES
Subjective:      Dhruv Fine is a 63 y.o. male who was self-referred for sinusitis.    Mr. Fine presents with right ear pain for the past several months. He has associated intermittent post-nasal drip. He denies fever, sinus pressure, nasal drainage, itchy nose or eyes or sneezing. He has a [employer] Sinus CT scan that revealed bilateral maxillary sinus opacification (L>>R) and a possible migrated tooth in left maxillary sinus. He had been treated with two rounds of Augmentin with unknown benefit of his sinuses as he does not have many sinus symptoms, but he does continue with right ear pain.     Current sinonasal medications as above.  He does not regularly use nasal decongestant sprays.    He does not recall previously having allergy testing.    He denies a history of asthma.    He denies a history of reflux symptoms.  He has not previously had an EGD.    He relates have a diagnosis of obstructive sleep apnea without regular CPAP use.     He has previously had sinonasal surgery consisting of sinusplasty with closure of maxillary sinus following a tooth extraction in the 1970s or 1980s, per patient..    He does not recall a prior history of nasal trauma.      Past Medical History  He has a past medical history of Anemia, Anxiety, Bleeding hemorrhoids, Borderline hypertension, Degenerative disc disease, Former smoker 1-2 packs daily for < 10 years, Former smoker 1-2 packs daily for < 10 years, Hyperlipidemia, Iron deficiency anemia, Non morbid obesity due to excess calories, Nuclear sclerosis - Both Eyes, Sleep apnea, and Statin myopathy.    Past Surgical History  He has a past surgical history that includes Knee surgery (Left, 2/10/15).    Family History  His family history includes Cancer in his sister; Cataracts in his paternal aunt; Glaucoma in his paternal aunt; Heart disease in his brother; Hepatitis in his brother; Kidney disease in his mother; Peripheral vascular disease in his brother.    Social  History  He reports that he has never smoked. He has never used smokeless tobacco. He reports that he does not use drugs.    Allergies  He is allergic to no known allergies.    Medications   He has a current medication list which includes the following prescription(s): amoxicillin-clavulanate 875-125mg, amoxicillin-clavulanate 875-125mg, cyclobenzaprine, fluticasone propionate, lidocaine, multivitamin, and doxycycline.    Review of Systems  Review of Systems   Constitutional: Negative for chills, fatigue and fever.   HENT: Positive for congestion, ear pain and postnasal drip. Negative for facial swelling, nosebleeds, rhinorrhea, sinus pressure, sinus pain, sneezing, sore throat and tinnitus.    Eyes: Negative for photophobia, redness, itching and visual disturbance.   Respiratory: Negative for apnea, cough, shortness of breath, wheezing and stridor.    Cardiovascular: Negative for chest pain and palpitations.   Gastrointestinal: Negative for diarrhea, nausea and vomiting.   Endocrine: Negative.    Genitourinary: Negative for decreased urine volume, dysuria and frequency.   Musculoskeletal: Negative for arthralgias, myalgias and neck stiffness.   Skin: Negative for rash and wound.   Allergic/Immunologic: Negative for environmental allergies, food allergies and immunocompromised state.   Neurological: Negative for dizziness, syncope, weakness, light-headedness and headaches.   Hematological: Negative for adenopathy. Does not bruise/bleed easily.   Psychiatric/Behavioral: Negative for confusion, decreased concentration and sleep disturbance.          Objective:     /88   Pulse 72        Constitutional:   He is oriented to person, place, and time. Vital signs are normal. He appears well-developed and well-nourished. He appears alert. Normal speech.      Head:  Normocephalic and atraumatic.     Ears:    Right Ear: No lacerations. No drainage, swelling or tenderness. No foreign bodies. No mastoid tenderness.  Tympanic membrane is not injected, not scarred, not perforated, not erythematous, not retracted and not bulging. Tympanic membrane mobility is normal. No middle ear effusion. No hemotympanum.   Left Ear: No lacerations. No drainage, swelling or tenderness. No foreign bodies. No mastoid tenderness. Tympanic membrane is not injected, not scarred, not perforated, not erythematous, not retracted and not bulging. Tympanic membrane mobility is normal.  No middle ear effusion. No hemotympanum.     Nose:  Nose normal including turbinates, nasal mucosa, sinuses and nasal septum. Mucosal edema present. No rhinorrhea, nose lacerations, sinus tenderness, septal deviation, nasal septal hematoma or polyps. No epistaxis.  No foreign bodies. No turbinate hypertrophy.  Right sinus exhibits no maxillary sinus tenderness and no frontal sinus tenderness. Left sinus exhibits no maxillary sinus tenderness and no frontal sinus tenderness.         Mouth/Throat  Oropharynx clear and moist without lesions or asymmetry, normal uvula midline and lips, teeth, and gums normal. No uvula swelling, oral lesions, trismus, mucous membrane lesions or xerostomia. No oropharyngeal exudate or posterior oropharyngeal erythema.     Neck:  Neck normal without thyromegaly masses, asymmetry, normal tracheal structure, crepitus, and tenderness and no adenopathy.     Psychiatric:   He has a normal mood and affect. His speech is normal and behavior is normal.     Neurological:   He is alert and oriented to person, place, and time. No cranial nerve deficit.     Skin:   No abrasions, lacerations, lesions, or rashes.       Procedure    Nasal endoscopy performed.  See procedure note.      Data Reviewed    WBC (K/uL)   Date Value   02/21/2019 4.18     Eosinophil% (%)   Date Value   02/21/2019 4.8     Eos # (K/uL)   Date Value   02/21/2019 0.2     Platelets (K/uL)   Date Value   02/21/2019 238     Glucose (mg/dL)   Date Value   02/21/2019 112 (H)     No results found  for: IGE    I independently reviewed the images of the CT sinuses dated 9/30/19. Pertinent findings include moderate right and severe left maxillary opacification with high density mass in left maxillary sinus which may be possible migrated tooth. .       Assessment:     1. Abnormal CT scan, sinus    2. Acute recurrent maxillary sinusitis         Plan:     I had a long discussion with the patient regarding his condition and the further workup and management options.    Dhruv was seen today for sinusitis and sinus problem.    Diagnoses and all orders for this visit:    Abnormal CT scan, sinus  -     Nasal/sinus endoscopy  -     CT scan revealed evidence of maxillary sinusitis (worse in left) with evidence of a possible migratory tooth in left maxillary sinus.    Acute recurrent maxillary sinusitis  -     doxycycline (VIBRA-TABS) 100 MG tablet; Take 1 tablet (100 mg total) by mouth 2 (two) times daily. for 10 days  -     Patient has appointment scheduled with Dr. Curry on 10/22/19, I recommended he keep appointment for evaluation of maxillary sinusitis and possible migratory tooth in left maxillary sinus.

## 2019-10-08 NOTE — PROCEDURES
"Nasal/sinus endoscopy  Date/Time: 10/8/2019 9:30 AM    Time out: Immediately prior to procedure a "time out" was called to verify the correct patient, procedure, equipment, support staff and site/side marked as required.  Performed by: Lizbet Cavanaugh NP  Authorized by: Lizbet Cavanaugh NP     Consent Done?:  Yes (Verbal)  Anesthesia:     Local anesthetic:  Lidocaine 4% and Erick-Synephrine 1/2% and bupivacaine 0.25% with epinephrine    Type of Endoscope:  Flexible    Patient tolerance:  Patient tolerated the procedure well with no immediate complications  Nose:     Procedure Performed:  Nasal Endoscopy  External:      No external nasal deformity  Intranasal:      Mucosa no polyps     Mucosa ulcers not present     No mucosa lesions present     Turbinates not enlarged     No septum gross deformity  Nasopharynx:      No mucosa lesions     Adenoids present     Posterior choanae patent     Eustachian tube not patent     Purulence noted in left middle meatus flowing to back of nasopharynx  Right eustachian tube edema and erythema  No polyps  Examine with scope # [id number]      "

## 2019-10-14 ENCOUNTER — CLINICAL SUPPORT (OUTPATIENT)
Dept: INTERNAL MEDICINE | Facility: CLINIC | Age: 64
End: 2019-10-14

## 2019-10-14 ENCOUNTER — HOSPITAL ENCOUNTER (OUTPATIENT)
Dept: CARDIOLOGY | Facility: CLINIC | Age: 64
Discharge: HOME | End: 2019-10-14

## 2019-10-14 ENCOUNTER — OFFICE VISIT (OUTPATIENT)
Dept: INTERNAL MEDICINE | Facility: CLINIC | Age: 64
End: 2019-10-14

## 2019-10-14 VITALS
OXYGEN SATURATION: 98 % | WEIGHT: 225.5 LBS | HEIGHT: 70 IN | SYSTOLIC BLOOD PRESSURE: 132 MMHG | DIASTOLIC BLOOD PRESSURE: 78 MMHG | BODY MASS INDEX: 32.28 KG/M2 | HEART RATE: 71 BPM

## 2019-10-14 DIAGNOSIS — Z91.89 AT RISK FOR CORONARY ARTERY DISEASE: ICD-10-CM

## 2019-10-14 DIAGNOSIS — Z00.00 ROUTINE GENERAL MEDICAL EXAMINATION AT A HEALTH CARE FACILITY: Primary | ICD-10-CM

## 2019-10-14 DIAGNOSIS — Z00.00 ROUTINE GENERAL MEDICAL EXAMINATION AT A HEALTH CARE FACILITY: ICD-10-CM

## 2019-10-14 DIAGNOSIS — E78.2 MIXED HYPERLIPIDEMIA: ICD-10-CM

## 2019-10-14 DIAGNOSIS — N28.1 RENAL CYST, ACQUIRED, RIGHT: ICD-10-CM

## 2019-10-14 DIAGNOSIS — H66.001 ACUTE SUPPURATIVE OTITIS MEDIA OF RIGHT EAR WITHOUT SPONTANEOUS RUPTURE OF TYMPANIC MEMBRANE, RECURRENCE NOT SPECIFIED: ICD-10-CM

## 2019-10-14 DIAGNOSIS — R09.81 NASAL CONGESTION: ICD-10-CM

## 2019-10-14 DIAGNOSIS — E66.09 NON MORBID OBESITY DUE TO EXCESS CALORIES: ICD-10-CM

## 2019-10-14 DIAGNOSIS — N20.0 RENAL STONES: ICD-10-CM

## 2019-10-14 DIAGNOSIS — R73.01 IFG (IMPAIRED FASTING GLUCOSE): ICD-10-CM

## 2019-10-14 DIAGNOSIS — E55.9 VITAMIN D DEFICIENCY DISEASE: ICD-10-CM

## 2019-10-14 DIAGNOSIS — G47.33 OBSTRUCTIVE SLEEP APNEA SYNDROME: ICD-10-CM

## 2019-10-14 DIAGNOSIS — Z00.00 ANNUAL PHYSICAL EXAM: Primary | ICD-10-CM

## 2019-10-14 LAB
25(OH)D3+25(OH)D2 SERPL-MCNC: 39 NG/ML (ref 30–96)
ALBUMIN SERPL BCP-MCNC: 3.9 G/DL (ref 3.5–5.2)
ALP SERPL-CCNC: 78 U/L (ref 55–135)
ALT SERPL W/O P-5'-P-CCNC: 33 U/L (ref 10–44)
ANION GAP SERPL CALC-SCNC: 8 MMOL/L (ref 8–16)
AST SERPL-CCNC: 29 U/L (ref 10–40)
BILIRUB SERPL-MCNC: 0.3 MG/DL (ref 0.1–1)
BUN SERPL-MCNC: 12 MG/DL (ref 8–23)
CALCIUM SERPL-MCNC: 10 MG/DL (ref 8.7–10.5)
CHLORIDE SERPL-SCNC: 107 MMOL/L (ref 95–110)
CHOLEST SERPL-MCNC: 285 MG/DL (ref 120–199)
CHOLEST/HDLC SERPL: 7.5 {RATIO} (ref 2–5)
CO2 SERPL-SCNC: 29 MMOL/L (ref 23–29)
COMPLEXED PSA SERPL-MCNC: 0.76 NG/ML (ref 0–4)
CREAT SERPL-MCNC: 1 MG/DL (ref 0.5–1.4)
ERYTHROCYTE [DISTWIDTH] IN BLOOD BY AUTOMATED COUNT: 14.6 % (ref 11.5–14.5)
EST. GFR  (AFRICAN AMERICAN): >60 ML/MIN/1.73 M^2
EST. GFR  (NON AFRICAN AMERICAN): >60 ML/MIN/1.73 M^2
ESTIMATED AVG GLUCOSE: 123 MG/DL (ref 68–131)
GLUCOSE SERPL-MCNC: 99 MG/DL (ref 70–110)
HBA1C MFR BLD HPLC: 5.9 % (ref 4–5.6)
HCT VFR BLD AUTO: 45.5 % (ref 40–54)
HDLC SERPL-MCNC: 38 MG/DL (ref 40–75)
HDLC SERPL: 13.3 % (ref 20–50)
HGB BLD-MCNC: 14.6 G/DL (ref 14–18)
LDLC SERPL CALC-MCNC: 203.2 MG/DL (ref 63–159)
MCH RBC QN AUTO: 29.3 PG (ref 27–31)
MCHC RBC AUTO-ENTMCNC: 32.1 G/DL (ref 32–36)
MCV RBC AUTO: 91 FL (ref 82–98)
NONHDLC SERPL-MCNC: 247 MG/DL
PLATELET # BLD AUTO: 233 K/UL (ref 150–350)
PMV BLD AUTO: 9.3 FL (ref 9.2–12.9)
POTASSIUM SERPL-SCNC: 4.6 MMOL/L (ref 3.5–5.1)
PROT SERPL-MCNC: 7.8 G/DL (ref 6–8.4)
RBC # BLD AUTO: 4.98 M/UL (ref 4.6–6.2)
SODIUM SERPL-SCNC: 144 MMOL/L (ref 136–145)
TRIGL SERPL-MCNC: 219 MG/DL (ref 30–150)
TSH SERPL DL<=0.005 MIU/L-ACNC: 3.03 UIU/ML (ref 0.4–4)
WBC # BLD AUTO: 4.81 K/UL (ref 3.9–12.7)

## 2019-10-14 PROCEDURE — 99387 PR PREVENTIVE VISIT,NEW,65 & OVER: ICD-10-PCS | Mod: S$GLB,,, | Performed by: INTERNAL MEDICINE

## 2019-10-14 PROCEDURE — 93005 EKG 12-LEAD: ICD-10-PCS | Mod: S$GLB,,, | Performed by: INTERNAL MEDICINE

## 2019-10-14 PROCEDURE — 84153 ASSAY OF PSA TOTAL: CPT

## 2019-10-14 PROCEDURE — 80061 LIPID PANEL: CPT

## 2019-10-14 PROCEDURE — 99999 PR PBB SHADOW E&M-EST. PATIENT-LVL IV: CPT | Mod: PBBFAC,,, | Performed by: INTERNAL MEDICINE

## 2019-10-14 PROCEDURE — 93010 EKG 12-LEAD: ICD-10-PCS | Mod: S$GLB,,, | Performed by: INTERNAL MEDICINE

## 2019-10-14 PROCEDURE — 97802 PR MED NUTR THER, 1ST, INDIV, EA 15 MIN: ICD-10-PCS | Mod: S$GLB,,, | Performed by: INTERNAL MEDICINE

## 2019-10-14 PROCEDURE — 97802 MEDICAL NUTRITION INDIV IN: CPT | Mod: S$GLB,,, | Performed by: INTERNAL MEDICINE

## 2019-10-14 PROCEDURE — 97750 PR PHYSICAL PERFORMANCE TEST: ICD-10-PCS | Mod: S$GLB,,, | Performed by: INTERNAL MEDICINE

## 2019-10-14 PROCEDURE — 99387 INIT PM E/M NEW PAT 65+ YRS: CPT | Mod: S$GLB,,, | Performed by: INTERNAL MEDICINE

## 2019-10-14 PROCEDURE — 93010 ELECTROCARDIOGRAM REPORT: CPT | Mod: S$GLB,,, | Performed by: INTERNAL MEDICINE

## 2019-10-14 PROCEDURE — 99999 PR PBB SHADOW E&M-EST. PATIENT-LVL IV: ICD-10-PCS | Mod: PBBFAC,,, | Performed by: INTERNAL MEDICINE

## 2019-10-14 PROCEDURE — 97750 PHYSICAL PERFORMANCE TEST: CPT | Mod: S$GLB,,, | Performed by: INTERNAL MEDICINE

## 2019-10-14 PROCEDURE — 85027 COMPLETE CBC AUTOMATED: CPT

## 2019-10-14 PROCEDURE — 83036 HEMOGLOBIN GLYCOSYLATED A1C: CPT

## 2019-10-14 PROCEDURE — 80053 COMPREHEN METABOLIC PANEL: CPT

## 2019-10-14 PROCEDURE — 93005 ELECTROCARDIOGRAM TRACING: CPT | Mod: S$GLB,,, | Performed by: INTERNAL MEDICINE

## 2019-10-14 PROCEDURE — 84443 ASSAY THYROID STIM HORMONE: CPT

## 2019-10-14 PROCEDURE — 82306 VITAMIN D 25 HYDROXY: CPT

## 2019-10-14 RX ORDER — FLUTICASONE PROPIONATE 50 MCG
SPRAY, SUSPENSION (ML) NASAL
Qty: 3 BOTTLE | Refills: 0 | Status: SHIPPED | OUTPATIENT
Start: 2019-10-14 | End: 2021-02-03 | Stop reason: SDUPTHER

## 2019-10-14 NOTE — PROGRESS NOTES
Subjective:       Patient ID: Dhruv Fine is a 63 y.o. male.    Chief Complaint: No chief complaint on file.    HPI   Pt. Has no significant cardiovascular or pulmonary history.    Physical Limitations:  Patient had a left knee meniscus reapair in 2015.  Patient hurt his back two months ago when running away from bees.  Patient chose to defer the sit-up test but attempt the push up test and did so without issue.    Current exercise routine: Patient does not follow any formal exercise or flexibility routine at the current time.      Goals:  Patient set a goal weight of 200 lbs.    Fun Facts:  Patient was friendly and engaged.  Patient used to be a swimmer but has not been in a regular exercise routine for a long time.  Patient seemed semi-motivated to lose weight and begin a regular exercise routine.  He has already made adjustments to his diet in his weight-loss efforts.  Patient shared that his girlfriend got him a nutri-bullet to start making smoothies for breakfast instead of eating donuts.  Patient plans to retire soon and stated that he will have more time for exercise.  Patient was receptive to all recommendations made.        Review of Systems    Objective:     The fitness evaluation results are as follows:  D.O.S. 10/14/2019 10/10/2018   Height (in): 70.5 70   Weight (lbs): 225 228   BMI: 31.31007 32.482299   Body Fat (%): 34.00 33.11   Waist (cm): 114 116   Hip (cm): 105 109   WHR: 1.09 1.06   RBP (mmHg): 136/88 130/90   RHR (bpm): 60 84    Strength R (lbs)t: 93.535103 100    Strength Lt (lbs): 105 103.88574   Push-up Assessment: 9 deferred    Curl-up Assessment: Deferred deferred    Flexibility Testing (cm): -9 deferred    REE (kcals): 1740 2080       Physical Exam    Assessment:     Age/gender stratified assessment:  Resting BP: Within Normal Limits   Body Fat %: Poor   WHR Risk Factor: High Risk    Strength R: Average    Strength L: Above Average   Upper Body Endurance: Good    Abdominal Endurance: Deferred   Lower body Flexibiltiy: Needs Improvement       1. Routine general medical examination at a health care facility        Plan:       Recommended fitness guidelines:    -150 minutes of moderate intensity aerobic exercise per week or 75 minutes of vigorous intensity aerobic exercise per week.  Try to reach a minimum of 150 minutes of moderate intensity aerobic activity per week and 10,000 steps per day.  When you can't swim, try to walk or bike to reach the above goal.      -2 to 4 days per week of resistance training for each muscle group.      -Daily stretching with a hold of at least 30 seconds per muscle group.  Practice the seated hamstring stretch. Demonstrated during the evaluation, daily.

## 2019-10-14 NOTE — PATIENT INSTRUCTIONS
Prevention Guidelines, Men Ages 65 and Older  Screening tests and vaccines are an important part of managing your health. Health counseling is essential, too. Below are guidelines for these, for men ages 65 and older. Talk with your healthcare provider to make sure youre up-to-date on what you need.  Screening Who needs it How often   Abdominal aortic aneurysm Men ages 65 to 75 who have ever smoked 1 ultrasound   Alcohol misuse All men in this age group At routine exams   Blood pressure All men in this age group Every 2 years if your blood pressure is less than 120/80 mm Hg; yearly if your systolic blood pressure is 120 to 139 mm Hg, or your diastolic blood pressure reading is 80 to 89 mm Hg   Colorectal cancer All men in this age group Flexible sigmoidoscopy every 5 years, or colonoscopy every 10 years, or double-contrast barium enema every 5 years; yearly fecal occult blood test or fecal immunochemical test; or a stool DNA test as often as your healthcare provider advises; talk with your healthcare provider about which tests are best for you and when you no longer need colonoscopies (generally after age 75)   Depression All men in this age group At routine exams   Type 2 diabetes or prediabetes All adults beginning at age 45 and adults without symptoms at any age who are overweight or obese and have 1 or more other risk factors for diabetes At least every 3 years (yearly if your blood sugar has already begun to rise)   Hepatitis C Men at increased risk for infection - talk with your healthcare provider At routine exams   High cholesterol or triglycerides All men in this age group At least every 5 years   HIV Men at increased risk for infection - talk with your healthcare provider At routine exams   Lung cancer Adults ages 55 to 80 who have smoked Yearly screening in smokers with 30 pack-year history of smoking or who quit within 15 years   Obesity All men in this age group At routine exams   Prostate cancer All  men in this age group, talk to healthcare provider about risks and benefits of digital rectal exam (JUAN A) and prostate-specific antigen (PSA) screening1 At routine exams   Syphilis Men at increased risk for infection - talk with your healthcare provider At routine exams   Tuberculosis Men at increased risk for infection - talk with your healthcare provider Ask your healthcare provider   Vision All men in this age group Every 1 to 2 years; if you have a chronic health condition, ask your healthcare provider if you needs exams more often   Vaccine Who needs it How often   Chickenpox (varicella) All men in this age group who have no record of this infection or vaccine 2 doses; second dose should be given at least 4 weeks after the first dose   Hepatitis A Men at increased risk for infection - talk with your healthcare provider 2 doses given at least 6 months apart   Hepatitis B Men at increased risk for infection - talk with your healthcare provider 3 doses over 6 months; second dose should be given 1 month after the first dose; the third dose should be given at least 2 months after the second dose and at least 4 months after the first dose   Haemophilus influenzae Type B (HIB) Men at increased risk for infection - talk with your healthcare provider 1 to 3 doses   Influenza (flu) All men in this age group  Once a year   Meningococcal Men at increased risk for infection - talk with your healthcare provider 1 or more doses   Pneumococcal conjugate vaccine (PCV13) and pneumococcal polysaccharide vaccine (PPSV23) All men in this age group 1 dose of each vaccine   Tetanus/diphtheria/  pertussis (Td/Tdap) booster All men in this age group Td every 10 years, or Tdap if you will have contact with a child younger than 12 months old   Zoster All men in this age group 1 dose   Counseling Who needs it How often   Diet and exercise Men who are overweight or obese When diagnosed, and then at routine exams   Fall prevention (exercise,  vitamin D supplements) All men in this age group At routine exams   Sexually transmitted infection Men at increased risk for infection - talk with your healthcare provider At routine exams   Use of daily aspirin Men ages 45 to 79 at risk for cardiovascular health problems At routine exams   Use of tobacco and the health effects it can cause All men in this age group Every visit   [address] [place] [address] [facility]   Date Last Reviewed: 2/1/2017  © 9388-4376 [employer]. [address] [place] [address], [place] [address]. All rights reserved. This information is not intended as a substitute for professional medical care. Always follow your healthcare professional's instructions.

## 2019-10-14 NOTE — LETTER
October 15, 2019    Dhruv Fine  [address] [place] [address]             [facility]  [address]  [place] [address]  Phone: [phone]  Fax: [phone] Dear Mr. Fine:    Thank you for allowing me to serve you and perform your Executive Health exam on 10/14/2019.  This letter will serve a brief summary of the history, physical findings, and laboratory/studies performed and recommendations at that time.    Reason for Visit: Executive Health Preventive Physical Examination    Past Medical History:   Diagnosis Date    Anemia 10/11/2013    Anxiety     Bleeding hemorrhoids     Borderline hypertension 10/19/2013    Degenerative disc disease     cervical and lumbar    Former smoker 1-2 packs daily for < 10 years 1/23/2015    Former smoker 1-2 packs daily for < 10 years 1/23/2015    Hyperlipidemia     Iron deficiency anemia 5/21/2015    Non morbid obesity due to excess calories 6/17/2016    Nuclear sclerosis - Both Eyes 2/25/2013    Sleep apnea 7/26/2012    Statin myopathy 10/10/2018       Past Surgical History:   Procedure Laterality Date    KNEE SURGERY Left 2/10/15    Dr. Fine       Family History   Problem Relation Age of Onset    Kidney disease Mother     Glaucoma Paternal Aunt     Cataracts Paternal Aunt     Cancer Sister     Heart disease Brother         rheumatic fever    Hepatitis Brother     Peripheral vascular disease Brother     Blindness Neg Hx     Amblyopia Neg Hx     Macular degeneration Neg Hx     Retinal detachment Neg Hx     Strabismus Neg Hx     Diabetes Neg Hx     Hypertension Neg Hx     Stroke Neg Hx     Thyroid disease Neg Hx             Review of patient's allergies indicates:   Allergen Reactions    No known allergies          Current Outpatient Medications:     cyclobenzaprine (FLEXERIL) 10 MG tablet, Take 1 tablet (10 mg total) by mouth nightly. Prn severe muscle spasms, Disp: 30 tablet, Rfl: 0     doxycycline (VIBRA-TABS) 100 MG tablet, Take 1 tablet (100 mg total) by mouth 2 (two) times daily. for 10 days, Disp: 20 tablet, Rfl: 0    fluticasone propionate (FLONASE) 50 mcg/actuation nasal spray, SHAKE LIQUID AND USE 1 SPRAY(50 MCG) IN EACH NOSTRIL EVERY DAY, Disp: 3 Bottle, Rfl: 0    lidocaine (LIDODERM) 5 %, Place 1-2 patches onto the skin once daily. Wear patch for 12 hours and  must have a 12 hour period without a patch, Disp: 60 patch, Rfl: 2    multivitamin (ONE DAILY MULTIVITAMIN) per tablet, Take 1 tablet by mouth once daily., Disp: , Rfl:      Review of Systems  Review of Systems - Negative except for recent sinus issues which have required ongoing antibiotic therapy and ENT assessment.        Physical Exam:  General: General appearance: alert, well appearing, and in no distress.   Skin: Skin exam - normal coloration and turgor, no rashes, no suspicious skin lesions noted.  HEENT: Ears - bilateral TM's and external ear canals normal. , ENT exam reveals - ENT exam normal, no neck nodes or sinus tenderness.   Lungs: Chest: clear to auscultation, no wheezes, rales or rhonchi, symmetric air entry.   Heart: CVS exam: normal rate, regular rhythm, normal S1, S2, no murmurs, rubs, clicks or gallops.   Extremities: Exam of extremities: peripheral pulses normal, no pedal edema, no clubbing or cyanosis    Labs:  Results for orders placed or performed in visit on 10/14/19   Comprehensive metabolic panel   Result Value Ref Range    Sodium 144 136 - 145 mmol/L    Potassium 4.6 3.5 - 5.1 mmol/L    Chloride 107 95 - 110 mmol/L    CO2 29 23 - 29 mmol/L    Glucose 99 70 - 110 mg/dL    BUN, Bld 12 8 - 23 mg/dL    Creatinine 1.0 0.5 - 1.4 mg/dL    Calcium 10.0 8.7 - 10.5 mg/dL    Total Protein 7.8 6.0 - 8.4 g/dL    Albumin 3.9 3.5 - 5.2 g/dL    Total Bilirubin 0.3 0.1 - 1.0 mg/dL    Alkaline Phosphatase 78 55 - 135 U/L    AST 29 10 - 40 U/L    ALT 33 10 - 44 U/L    Anion Gap 8 8 - 16 mmol/L    eGFR if African American  >60.0 >60 mL/min/1.73 m^2    eGFR if non African American >60.0 >60 mL/min/1.73 m^2   CBC Without Differential   Result Value Ref Range    WBC 4.81 3.90 - 12.70 K/uL    RBC 4.98 4.60 - 6.20 M/uL    Hemoglobin 14.6 14.0 - 18.0 g/dL    Hematocrit 45.5 40.0 - 54.0 %    Mean Corpuscular Volume 91 82 - 98 fL    Mean Corpuscular Hemoglobin 29.3 27.0 - 31.0 pg    Mean Corpuscular Hemoglobin Conc 32.1 32.0 - 36.0 g/dL    RDW 14.6 (H) 11.5 - 14.5 %    Platelets 233 150 - 350 K/uL    MPV 9.3 9.2 - 12.9 fL   Lipid panel   Result Value Ref Range    Cholesterol 285 (H) 120 - 199 mg/dL    Triglycerides 219 (H) 30 - 150 mg/dL    HDL 38 (L) 40 - 75 mg/dL    LDL Cholesterol 203.2 (H) 63.0 - 159.0 mg/dL    Hdl/Cholesterol Ratio 13.3 (L) 20.0 - 50.0 %    Total Cholesterol/HDL Ratio 7.5 (H) 2.0 - 5.0    Non-HDL Cholesterol 247 mg/dL   TSH   Result Value Ref Range    TSH 3.033 0.400 - 4.000 uIU/mL   PSA, Screening (every year)   Result Value Ref Range    PSA, SCREEN 0.76 0.00 - 4.00 ng/mL   Hemoglobin A1c   Result Value Ref Range    Hemoglobin A1C 5.9 (H) 4.0 - 5.6 %    Estimated Avg Glucose 123 68 - 131 mg/dL   Vitamin D   Result Value Ref Range    Vit D, 25-Hydroxy 39 30 - 96 ng/mL      EKG was acceptable.    Your 10-year ASCVD risk score ([place] Jr., et al., 2013) is: 12.5%.  This is a very high risk and I strongly urge you to consider medication therapy for cholesterol.  I know you have difficulty tolerating statin therapy. Praluent or Repatha are options as have been reviewed by Cardiology.  Following up with the cardiologist in considering a coronary CT also may be valuable.    Assessment/Recommendations:  Routine Health Maintenance:  I recommend an annual flu shot in addition to the pneumonia vaccine series and the shingles vaccine series.    Please work on portion control, lifestyle modification, weight loss.  We discussed having you be re-evaluated in the Sleep Clinic with regard to your sleep apnea as that also may influence  your weight and your blood pressure and can have consequences regarding heart disease risk.  I look forward to seeing you again next year.  Please contact me should you have any questions or concerns regarding physical findings, or my recommendations.        Sincerely,            Cassandra Cruz MD, FACP

## 2019-10-14 NOTE — PROGRESS NOTES
"Nutrition Assessment  Client name:  Dhruv Fine  :  1955  Age:  63 y.o.  Gender:  male    Client states:  Very pleasant [employer] employee here for his annual Executive Health physical.  Has two children, ages 13 and 29.  Is excited to share that he is approaching [employer] in 2020, when he anticipates he will have more time to dedicate to his health.  Denies significant medical encounters this past year with the exception of low back pain as a result of a fall while running away from [place].  Was pressure washing his home when suddenly he discovered several wasps nests.  Thereafter, began running and tripped.  Consequently, has not been able to swim as in the past but plans to resume soon as he realizes the importance of physical activity.  Has also been suffering from a sinus infection recently and so, taking abx.  Does not take any routine rx medications although takes various supplements as outlined below.  Recalls previous history of hyperlipidemia although dislike of statins due to side effects.  Shares knowledge of the necessity and importance of weight loss, noting that although he lost weight over the past year, he regained some after attending multiple [employer] games.  Regarding dietary behaviors, typically eats 2 meals and 2 snacks daily, skipping dinner due to little hunger.  Recently began making homemade smoothies comprised of plant-based protein powder, various fruits, and yogurt in contrast to donuts, biscuits, and/or sausage as in the past.  Overall, desires to continue modifying dietary and fitness behaviors so as to lose weight and improve overall health.           Anthropometrics  Height:  5' 10.5"     Weight:  225#  BMI:  31.8  % Body Fat:  34%    Clinical Signs/Symptoms  N/V/D:  None  Appetite (Good, Fair, or Poor):  Good      Past Medical History:   Diagnosis Date    Anemia 10/11/2013    Anxiety     Bleeding hemorrhoids     Borderline hypertension 10/19/2013    Degenerative " disc disease     cervical and lumbar    Former smoker 1-2 packs daily for < 10 years 1/23/2015    Former smoker 1-2 packs daily for < 10 years 1/23/2015    Hyperlipidemia     Iron deficiency anemia 5/21/2015    Non morbid obesity due to excess calories 6/17/2016    Nuclear sclerosis - Both Eyes 2/25/2013    Sleep apnea 7/26/2012    Statin myopathy 10/10/2018       Past Surgical History:   Procedure Laterality Date    KNEE SURGERY Left 2/10/15    Dr. Fine       Medications    has a current medication list which includes the following prescription(s): cyclobenzaprine, doxycycline, fluticasone propionate, lidocaine, and multivitamin.    Vitamins, Minerals, and/or Supplements:  Cosamin ASU, MVI, Vitamin C, Cinnamon with Chromium, Red yeast rice, liver cleanser, appetite suppressant, fish oil, Vitamin B12, Focus Factor (brain health), black seed oil, Nugenixs     Food/Medication Interactions:  Reviewed     Food Allergies or Intolerances:  NKFA     Social History    Marital status:  Legally   Employment:  [employer])    Social History     Tobacco Use    Smoking status: Never Smoker    Smokeless tobacco: Never Used   Substance Use Topics    Alcohol use: Not on file        Lab Reports   Total Cholesterol:  285    Triglycerides:  219  HDL:  38  LDL:  203.2   Glucose:  99  HbA1c:  5.9%  BP:  132/78     Food History  Breakfast:  Homemade smoothie comprised of 1 scoop plant based protein powder + various fruits + [place]osa yogurt + water with lemon  Mid-morning Snack:  [employer] Protein bar  Lunch:  (typically eaten out) Chinese/lasagna/6 inch Bernadette cheese steak + soft drink   Mid-afternoon Snack:  Popcorn/nuts/[employer] Protein bar/trail mix/Noosa yogurt  Dinner:  Skips  H.S. Snack:  None  *Fluid intake:  Water, soft drink     Exercise History:  No formal exercise routine at this time    Cultural/Spiritual/Personal Preferences:  None identified    Support System:  Family and friends    State of  Change:  Contemplation    Barriers to Change:  None    Diagnosis    1.  Altered nutrition-related laboratory values related to improper food choices and inadequate physical activity as evidenced by TC:  285; HDL:  38; LDL:  230.2; HbA1c:  5.9%.    2.  Obesity related to improper food choices and inadequate physical activity as evidenced by BMI:  31.8.    Intervention    RMR (Method:  Body [place]):  1740 kcal  Activity Factor:  1.3  MICHAEL:  2262 - 500 = 1762 kcal    Goals:  1.  Lipid panel:  WNL; HbA1c < 5.7%  2.  Achieve 5% (10-11#) wt loss x 3 months and ultimate goal weight of 200# x 1 year   3.  Avoid skipping meals  4.  Defer further lipid management to MD/PCP  5.  Aim for 150 minutes of cardio/week as tolerated  6.  Aim for 3 servings of CHO/meal  7.  Reduce fruit in breakfast smoothie to 2 servings and switch to low fat or non fat yogurt  8.  Adhere to heart-healthy diet    Nutrition Education  Reviewed CMP, lipid panel, and HbA1c, noting abnormalities throughout.  Deferred lipid management to MD/PCP at this time as patient expressed reluctance toward lipid-lowering medications due to potential side effects.  However, reviewed heart healthy eating, including food sources of cholesterol, foods recommended and not, lean meats, low fat dairy, benefits of adequate fiber intake, and physical activity maintenance.  Encouraged patient to resume physical activity gradually as tolerated, ultimately aiming for 150 minutes of cardio/week.  Also, noted borderline high HbA1c, indicative of pre-diabetes.  Discussed factors affecting FBS and HbA1c, advising avoidance of meal skipping.  Also, encouraged patient to reduce intake of refined sugar, reducing CHO intake to 3 servings/meal.  Reviewed food sources of CHO, starchy versus non-starchy vegetables, and examples of common serving sizes to further enhance understanding.  Advised to reduce fruit intake in breakfast smoothie to 2 servings due to CHO and sugar content in addition  to consideration of low fat or non fat yogurt in place of full fat for reduced fat and cholesterol content.      Discussed the benefits and importance of creating sustainable weight loss via lifestyle modifications.  Collaborated with patient in developing short and long term weight loss goals, encouraging aim for 5% over the next 3 months.  Overall, expressed gratitude for RD's support and recommendations received.  Appeared in the Contemplation Phase of Change.      Patient verbalized understanding of nutrition education and recommendations received.    Handouts Provided  Meal Planning Guide  Restaurant Guide  Eat Fit Shopping List  Eat Fit [place]  Fast Food Guide  Vitamin/Mineral Guide  Heart Healthy Eating Nutrition Therapy    Monitoring/Evaluation    Monitor the following:  Weight  BMI  % Body Fat  Caloric intake  Lipid panel  Blood pressure  FBS  HbA1c    Follow Up Plan:  Communication with referring healthcare provider is unnecessary at this time as patient presented as part of annual wellness exam.  However, will follow up with patient in 1-2 years.

## 2019-10-14 NOTE — PROGRESS NOTES
Subjective:       Patient ID: Dhruv Fine is a 63 y.o. male.    Chief Complaint: Annual Exam    EH PE    Follow-up multiple medical issues    Lipids still very high.  Discussed PCSK9 inhibitors. He was seen in Cardiology 2018 and given literature on praluent and repatha; he is still quite ambivalent. He says he wants to exercise.  Limitations of this reviewed- I do not think this is going to be sufficient.      BP slightly high today.  120/80 at home regularly.  Better on recheck.      Recently seen in ENT with bad sinusitis and possible tooth infection, getting evaluated and treated for this.    ETT acceptable last year.  Coronary CT would be helpful but I still think he should be on lipid therapy no matter what.  He is again ambivalent, see above.    Renal cyst, possible stone 2018.  No current issues.    RICKI off treatment.  Has had trouble with the masks.   Is willing to be seen in Sleep clinic.    Patient Active Problem List:     Vitamin D deficiency disease     Erectile dysfunction     Bleeding hemorrhoids     Anxiety     Obstructive sleep apnea syndrome: needs CPAP- see sleep assessment 7/17     Nuclear sclerosis - Both Eyes     DJD (degenerative joint disease) of cervical spine     Non morbid obesity due to excess calories     IFG (impaired fasting glucose)     Renal cyst, acquired, right: see u/s 11/18     Renal stones: left side non obstructing see u/s 11/18     Mixed hyperlipidemia     Non compliance w medication regimen      Review of Systems   Constitutional: Negative.    HENT: Negative for congestion, postnasal drip, rhinorrhea and sinus pressure.    Eyes: Negative for redness and visual disturbance.   Respiratory: Negative for apnea, choking, shortness of breath and stridor.         Denies apnea symptoms   Cardiovascular: Negative for chest pain, palpitations and leg swelling.   Gastrointestinal: Negative for abdominal pain, constipation, diarrhea and nausea.   Genitourinary: Negative for  difficulty urinating, flank pain, frequency, testicular pain and urgency.   Musculoskeletal: Negative for arthralgias, back pain and myalgias.   Skin: Negative for color change and rash.   Neurological: Negative.    Psychiatric/Behavioral: Negative.        Objective:      Physical Exam   Constitutional: He is oriented to person, place, and time. He appears well-developed and well-nourished.   HENT:   Head: Normocephalic and atraumatic.   Right Ear: External ear normal.   Left Ear: External ear normal.   Eyes: Conjunctivae and EOM are normal.   Neck: Normal range of motion. Neck supple. No thyromegaly present.   Cardiovascular: Normal rate and regular rhythm.   No murmur heard.  Pulmonary/Chest: Effort normal and breath sounds normal. No respiratory distress. He has no wheezes.   Abdominal: Soft. He exhibits no distension. There is no tenderness.   Musculoskeletal: He exhibits no edema or tenderness.   Lymphadenopathy:     He has no cervical adenopathy.   Neurological: He is alert and oriented to person, place, and time. No cranial nerve deficit.   Skin: Skin is warm and dry.   Psychiatric: He has a normal mood and affect. His behavior is normal.       Assessment:       1. Annual physical exam    2. Mixed hyperlipidemia    3. Renal cyst, acquired, right: see u/s 11/18    4. Renal stones: left side non obstructing see u/s 11/18    5. IFG (impaired fasting glucose)    6. Non morbid obesity due to excess calories    7. Vitamin D deficiency disease    8. Obstructive sleep apnea syndrome: needs CPAP- see sleep assessment 7/17    9. Nasal congestion            Plan:         Dhruv was seen today for annual exam.    Diagnoses and all orders for this visit:    Annual physical exam    Mixed hyperlipidemia; see above.  ASCVD guidelines reviewed.  He declines treatment or assessment.  Consider cardiology follow-up versus coronary CT, he declines    Renal cyst, acquired, right: see u/s 11/18; reviewed, no symptoms.  Anticipate  periodic follow-up    Renal stones: left side non obstructing see u/s 11/18:  He denies any symptoms, does not desire urology assessment    IFG (impaired fasting glucose):  Exercise, lifestyle modification, weight loss reviewed    Non morbid obesity due to excess calories:  Exercise and weight loss issues reviewed.  Sleep apnea issues discussed    Vitamin D deficiency disease:  Labs and review    Obstructive sleep apnea syndrome: needs CPAP- see sleep assessment 7/17  -     Ambulatory referral to Sleep Disorders    Nasal congestion  -     fluticasone propionate (FLONASE) 50 mcg/actuation nasal spray; SHAKE LIQUID AND USE 1 SPRAY(50 MCG) IN EACH NOSTRIL EVERY DAY    Exercise and lifestyle issues reviewed  Statin therapy recommended  Consider coronary CT  Flu shot recommended, he declines  He declines pneumonia vaccines and shingles vaccine series  I will review all studies and determine further tx depending on findings

## 2019-10-17 ENCOUNTER — PATIENT OUTREACH (OUTPATIENT)
Dept: ADMINISTRATIVE | Facility: OTHER | Age: 64
End: 2019-10-17

## 2019-10-22 ENCOUNTER — CLINICAL SUPPORT (OUTPATIENT)
Dept: AUDIOLOGY | Facility: CLINIC | Age: 64
End: 2019-10-22
Payer: COMMERCIAL

## 2019-10-22 ENCOUNTER — OFFICE VISIT (OUTPATIENT)
Dept: OTOLARYNGOLOGY | Facility: CLINIC | Age: 64
End: 2019-10-22
Payer: COMMERCIAL

## 2019-10-22 VITALS
DIASTOLIC BLOOD PRESSURE: 87 MMHG | BODY MASS INDEX: 32.63 KG/M2 | WEIGHT: 227.94 LBS | SYSTOLIC BLOOD PRESSURE: 134 MMHG | HEART RATE: 77 BPM | HEIGHT: 70 IN

## 2019-10-22 DIAGNOSIS — H69.90 DYSFUNCTION OF EUSTACHIAN TUBE, UNSPECIFIED LATERALITY: Primary | ICD-10-CM

## 2019-10-22 DIAGNOSIS — J32.0 CHRONIC MAXILLARY SINUSITIS: ICD-10-CM

## 2019-10-22 DIAGNOSIS — H68.001 SALPINGITIS OF RIGHT EUSTACHIAN TUBE: Primary | ICD-10-CM

## 2019-10-22 DIAGNOSIS — Z01.10 ENCOUNTER FOR HEARING EXAMINATION, UNSPECIFIED WHETHER ABNORMAL FINDINGS: Primary | ICD-10-CM

## 2019-10-22 PROCEDURE — 1101F PR PT FALLS ASSESS DOC 0-1 FALLS W/OUT INJ PAST YR: ICD-10-PCS | Mod: CPTII,S$GLB,, | Performed by: OTOLARYNGOLOGY

## 2019-10-22 PROCEDURE — 99999 PR PBB SHADOW E&M-EST. PATIENT-LVL III: CPT | Mod: PBBFAC,,, | Performed by: OTOLARYNGOLOGY

## 2019-10-22 PROCEDURE — 99999 PR PBB SHADOW E&M-EST. PATIENT-LVL III: ICD-10-PCS | Mod: PBBFAC,,, | Performed by: OTOLARYNGOLOGY

## 2019-10-22 PROCEDURE — 92567 PR TYMPA2METRY: ICD-10-PCS | Mod: S$GLB,,, | Performed by: AUDIOLOGIST

## 2019-10-22 PROCEDURE — 99214 OFFICE O/P EST MOD 30 MIN: CPT | Mod: 25,S$GLB,, | Performed by: OTOLARYNGOLOGY

## 2019-10-22 PROCEDURE — 31231 NASAL ENDOSCOPY DX: CPT | Mod: S$GLB,,, | Performed by: OTOLARYNGOLOGY

## 2019-10-22 PROCEDURE — 99214 PR OFFICE/OUTPT VISIT, EST, LEVL IV, 30-39 MIN: ICD-10-PCS | Mod: 25,S$GLB,, | Performed by: OTOLARYNGOLOGY

## 2019-10-22 PROCEDURE — 99999 PR PBB SHADOW E&M-EST. PATIENT-LVL I: ICD-10-PCS | Mod: PBBFAC,,,

## 2019-10-22 PROCEDURE — 1101F PT FALLS ASSESS-DOCD LE1/YR: CPT | Mod: CPTII,S$GLB,, | Performed by: OTOLARYNGOLOGY

## 2019-10-22 PROCEDURE — 99999 PR PBB SHADOW E&M-EST. PATIENT-LVL I: CPT | Mod: PBBFAC,,,

## 2019-10-22 PROCEDURE — 92567 TYMPANOMETRY: CPT | Mod: S$GLB,,, | Performed by: AUDIOLOGIST

## 2019-10-22 PROCEDURE — 31231 NASAL/SINUS ENDOSCOPY: ICD-10-PCS | Mod: S$GLB,,, | Performed by: OTOLARYNGOLOGY

## 2019-10-22 RX ORDER — DIAZEPAM 10 MG/1
10 TABLET ORAL
COMMUNITY
End: 2021-03-05

## 2019-10-22 NOTE — PROGRESS NOTES
Dhruv Fine was seen today in the clinic for Tympanograms.      Tympanometry revealed Type Ad with 2.76 compliance in the right ear and Type Ad with 3.33 compliance in the left ear.     Recommendations:  1. Otologic evaluation  2. Annual audiogram

## 2019-10-22 NOTE — PROGRESS NOTES
Subjective:      Dhruv Fine is a 64 y.o. male who was self-referred for aural fullness.    He relates a long history for over 2 years of perennial, daily, right-sided ear pain and fullness.  This has persisted despite multiple treatments with antibiotics, and is exacerbated by flying.  He denies associated facial pain or pressure, nasal blockage, postnasal drip, hyposmia, rhinorrhea, pruritic symptoms or notable sinus infections.  He recalls having some dental extractions about 50 years ago, including a sinus fistula on the right side that was repaired.    Current sinonasal medications include fluticasone spray.  The last course of antibiotics was 2 courses of amoxicillin.  He does not regularly use nasal decongestant sprays.    He does not recall previously having allergy testing.    He denies a history of asthma.    He denies a history of reflux symptoms.  He has not previously had an EGD.    He denies a diagnosis of obstructive sleep apnea.     He has not had sinonasal surgery.    He does not recall a prior history of nasal trauma.    SNOT-22 score = 17, NOSE score = 0%, ETDQ-7 score = 1.4    Global QOL = 90%      Past Medical History  He has a past medical history of Anemia, Anxiety, Bleeding hemorrhoids, Borderline hypertension, Degenerative disc disease, Former smoker 1-2 packs daily for < 10 years, Former smoker 1-2 packs daily for < 10 years, Hyperlipidemia, Iron deficiency anemia, Non morbid obesity due to excess calories, Nuclear sclerosis - Both Eyes, Sleep apnea, and Statin myopathy.    Past Surgical History  He has a past surgical history that includes Knee surgery (Left, 2/10/15).    Family History  His family history includes Cancer in his sister; Cataracts in his paternal aunt; Glaucoma in his paternal aunt; Heart disease in his brother; Hepatitis in his brother; Kidney disease in his mother; Peripheral vascular disease in his brother.    Social History  He reports that he has never smoked.  "He has never used smokeless tobacco. He reports that he does not use drugs.    Allergies  He is allergic to no known allergies.    Medications   He has a current medication list which includes the following prescription(s): cyclobenzaprine, diazepam, fluticasone propionate, lidocaine, multivitamin, and beclomethasone dipropionate.    Review of Systems  Review of Systems   Constitutional: Positive for fatigue. Negative for fever and unexpected weight change.   HENT: Positive for ear pain and postnasal drip. Negative for congestion, dental problem, ear discharge, facial swelling, hearing loss, hoarse voice, nosebleeds, rhinorrhea, sinus pressure, sore throat, tinnitus, trouble swallowing and voice change.    Eyes: Negative for photophobia, discharge, itching and visual disturbance.   Respiratory: Positive for apnea. Negative for cough, shortness of breath and wheezing.    Cardiovascular: Negative for chest pain and palpitations.   Gastrointestinal: Positive for nausea. Negative for abdominal pain and vomiting.   Endocrine: Negative for cold intolerance and heat intolerance.   Genitourinary: Negative for difficulty urinating.   Musculoskeletal: Positive for back pain and neck pain. Negative for arthralgias and myalgias.   Skin: Negative for rash.   Allergic/Immunologic: Negative for environmental allergies and food allergies.   Neurological: Negative for dizziness, seizures, syncope, weakness and headaches.   Hematological: Negative for adenopathy. Does not bruise/bleed easily.   Psychiatric/Behavioral: Negative for decreased concentration, dysphoric mood and sleep disturbance. The patient is not nervous/anxious.    All other systems reviewed and are negative.         Objective:     /87   Pulse 77   Ht 5' 10" (1.778 m)   Wt 103.4 kg (227 lb 15.3 oz)   BMI 32.71 kg/m²        Constitutional:   He appears well-developed. He is cooperative. Normal speech.  No hoarse voice.      Head:  Normocephalic. Salivary " glands normal.  Facial strength is normal.      Ears:    Right Ear: No drainage or tenderness. Tympanic membrane is not perforated. Tympanic membrane mobility is normal. No middle ear effusion. No decreased hearing is noted.   Left Ear: No drainage or tenderness. Tympanic membrane is not perforated. Tympanic membrane mobility is normal.  No middle ear effusion. No decreased hearing is noted.     Nose:  No mucosal edema, rhinorrhea, septal deviation or polyps. No epistaxis. Turbinates normal, no turbinate masses and no turbinate hypertrophy.  Right sinus exhibits no maxillary sinus tenderness and no frontal sinus tenderness. Left sinus exhibits no maxillary sinus tenderness and no frontal sinus tenderness.     Mouth/Throat  Oropharynx clear and moist without lesions or asymmetry and normal uvula midline. He does not have dentures. Normal dentition. No oral lesions or mucous membrane lesions. No oropharyngeal exudate or posterior oropharyngeal erythema. Mirror exam not performed due to patient tolerance.  Mirror exam not performed due to patient tolerance.      Neck:  Neck normal without thyromegaly masses, asymmetry, normal tracheal structure, crepitus, and tenderness, thyroid normal, trachea normal and no adenopathy. Normal range of motion present.     He has no cervical adenopathy.     Cardiovascular:   Regular rhythm.      Pulmonary/Chest:   Effort normal.     Psychiatric:   He has a normal mood and affect. His speech is normal and behavior is normal.     Neurological:   No cranial nerve deficit.     Skin:   No rash noted.       Procedure    Nasal endoscopy performed.  See procedure note.     Left nasal valve     Left MT     Left ET     Right nasal valve     Right MT     Right ET        Data Reviewed    WBC (K/uL)   Date Value   10/14/2019 4.81     Eosinophil% (%)   Date Value   02/21/2019 4.8     Eos # (K/uL)   Date Value   02/21/2019 0.2     Platelets (K/uL)   Date Value   10/14/2019 233     Glucose (mg/dL)    Date Value   10/14/2019 99     No results found for: IGE    I independently reviewed the images of the CT sinuses dated 9/30/19. Pertinent findings include complete left maxillary opacification with calcified mass within lumen, and partial right maxillary opacification.    I independently reviewed the tracings of the tympanogram performed today.  I reviewed the audiogram with the patient as well.  Pertinent findings include a normal study.         Assessment:     1. Salpingitis of right eustachian tube    2. Chronic maxillary sinusitis         Plan:     I had a long discussion with the patient regarding his condition and the further workup and management options.  Although he has prominent findings on the CT, he emphasizes that he has been aware of those for many years, including the impacted tooth, and moreover they are producing no symptoms and are on the contralateral side.  Regarding his Eustachian tube issue, which is the predominant issue, I prescribed Qnasl to capitalize on the aerosol property which permits deposition in the posterior nasal cavity, which has not been adequately reached by linear nasal sprays such as Flonase.  We also discussed anti-reflux measures to reduce reactive inflammation at the ET.    Follow up if symptoms worsen or fail to improve.

## 2019-10-22 NOTE — PROCEDURES
Nasal/sinus endoscopy  Date/Time: 10/22/2019 4:00 PM  Performed by: Bruce Curry MD  Authorized by: Bruce Curry MD     Consent Done?:  Yes (Verbal)  Anesthesia:     Local anesthetic:  Lidocaine 4% and Erick-Synephrine 1/2%    Patient tolerance:  Patient tolerated the procedure well with no immediate complications  Nose:     Procedure Performed:  Nasal Endoscopy  External:      No external nasal deformity  Intranasal:      Mucosa no polyps     Mucosa ulcers not present     No mucosa lesions present     Turbinates not enlarged     No septum gross deformity  Nasopharynx:      No mucosa lesions     Adenoids not present     Posterior choanae patent     Eustachian tube patent     Normal sinonasal exam.  Moderate right ET edema/erythema.  No purulence or adenoids.

## 2019-10-25 ENCOUNTER — OFFICE VISIT (OUTPATIENT)
Dept: INTERNAL MEDICINE | Facility: CLINIC | Age: 64
End: 2019-10-25
Payer: COMMERCIAL

## 2019-10-25 VITALS
HEART RATE: 76 BPM | OXYGEN SATURATION: 96 % | HEIGHT: 71 IN | BODY MASS INDEX: 32.47 KG/M2 | DIASTOLIC BLOOD PRESSURE: 84 MMHG | WEIGHT: 231.94 LBS | SYSTOLIC BLOOD PRESSURE: 122 MMHG

## 2019-10-25 DIAGNOSIS — H92.01 RIGHT EAR PAIN: ICD-10-CM

## 2019-10-25 DIAGNOSIS — J31.0 RHINITIS, UNSPECIFIED TYPE: ICD-10-CM

## 2019-10-25 DIAGNOSIS — J32.0 CHRONIC MAXILLARY SINUSITIS: Primary | ICD-10-CM

## 2019-10-25 PROCEDURE — 99214 OFFICE O/P EST MOD 30 MIN: CPT | Mod: S$GLB,,, | Performed by: INTERNAL MEDICINE

## 2019-10-25 PROCEDURE — 99999 PR PBB SHADOW E&M-EST. PATIENT-LVL III: ICD-10-PCS | Mod: PBBFAC,,, | Performed by: INTERNAL MEDICINE

## 2019-10-25 PROCEDURE — 1101F PR PT FALLS ASSESS DOC 0-1 FALLS W/OUT INJ PAST YR: ICD-10-PCS | Mod: CPTII,S$GLB,, | Performed by: INTERNAL MEDICINE

## 2019-10-25 PROCEDURE — 99999 PR PBB SHADOW E&M-EST. PATIENT-LVL III: CPT | Mod: PBBFAC,,, | Performed by: INTERNAL MEDICINE

## 2019-10-25 PROCEDURE — 1101F PT FALLS ASSESS-DOCD LE1/YR: CPT | Mod: CPTII,S$GLB,, | Performed by: INTERNAL MEDICINE

## 2019-10-25 PROCEDURE — 99214 PR OFFICE/OUTPT VISIT, EST, LEVL IV, 30-39 MIN: ICD-10-PCS | Mod: S$GLB,,, | Performed by: INTERNAL MEDICINE

## 2019-10-25 RX ORDER — DOXYCYCLINE HYCLATE 100 MG
100 TABLET ORAL 2 TIMES DAILY
Qty: 14 TABLET | Refills: 0 | Status: SHIPPED | OUTPATIENT
Start: 2019-10-25 | End: 2021-02-03

## 2019-10-25 NOTE — PROGRESS NOTES
Subjective:       Patient ID: Dhruv Fine is a 64 y.o. male.    Chief Complaint: Follow-up    Follow-up multiple medical issues    Ongoing sinus problems.    Was seen August 31st with sinus infection and given moxifloxacin and prednisone.    Then presented to see me about a month later on September 19th at which time he had sinus inflammation and cough.  He was given Augmentin and supportive therapy.  Chest x-ray was done which was acceptable.    He then had a sinus CT which revealed impacted tooth and sinus inflammation particularly on the left.  I called him and sent in another round of Augmentin and encouraged him to see his dentist and ENT.    He was seen in ENT on October 8th by the [facility] and was given doxycycline.    He was seen again in ENT October 22nd and note indicates no active issues, that symptoms are on the right side and findings are on the left.  However, he did not fill the nasal spray that he was given in ENT due to some cost constraints or insurance constraints and needs me to prescribe it again.  He has started having cough and congestion again.    Reflux discussed, he states he is not having any symptoms.    Patient Active Problem List:     Vitamin D deficiency disease     Erectile dysfunction     Bleeding hemorrhoids     Anxiety     Obstructive sleep apnea syndrome: needs CPAP- see sleep assessment 7/17     Nuclear sclerosis - Both Eyes     DJD (degenerative joint disease) of cervical spine     Non morbid obesity due to excess calories     IFG (impaired fasting glucose)     Renal cyst, acquired, right: see u/s 11/18     Renal stones: left side non obstructing see u/s 11/18     Mixed hyperlipidemia     Non compliance w medication regimen      Review of Systems   Constitutional: Negative for chills, fatigue and fever.   HENT: Positive for congestion, ear pain and postnasal drip.    Eyes: Negative.    Respiratory: Positive for cough. Negative for chest tightness, shortness of breath and  wheezing.    Cardiovascular: Negative.    Gastrointestinal: Negative.         Denies reflux       Objective:      Physical Exam   Constitutional: He is oriented to person, place, and time. He appears well-developed and well-nourished.   HENT:   Head: Normocephalic and atraumatic.   Right Ear: External ear normal.   Left Ear: External ear normal.   Mouth/Throat: Oropharynx is clear and moist.   No frontal sinus tenderness   Eyes: EOM are normal.   Neck: Normal range of motion. Neck supple. No thyromegaly present.   Cardiovascular: Normal rate and regular rhythm.   Pulmonary/Chest: No respiratory distress. He has no wheezes. He has no rales.   Abdominal: Soft. Bowel sounds are normal. He exhibits no distension. There is no tenderness.   Musculoskeletal: He exhibits no edema.   Neurological: He is alert and oriented to person, place, and time.   Skin: Skin is warm and dry. No rash noted. No erythema.   Psychiatric: He has a normal mood and affect.       Assessment:       1. Chronic maxillary sinusitis    2. Right ear pain    3. Rhinitis, unspecified type        Plan:         Dhruv was seen today for follow-up.    Diagnoses and all orders for this visit:    Chronic maxillary sinusitis  -     beclomethasone dipropionate (QNASL) 80 mcg/actuation HFAA; 160 mcg by Nasal route once daily.  -     doxycycline (VIBRA-TABS) 100 MG tablet; Take 1 tablet (100 mg total) by mouth 2 (two) times daily.    Right ear pain    Rhinitis, unspecified type    Rest, fluids, acetaminophen, mucinex and follow up poor results.  ENT follow-up  Continue current regimen  He declines flu shot and pneumonia vaccines and shingles vaccines    Patient counseled for over 50% of the 25 minute appt, all questions answered,  chart reviewed and care coordinated.

## 2019-10-25 NOTE — PATIENT INSTRUCTIONS
Tips to Control Acid Reflux    To control acid reflux, youll need to make some basic diet and lifestyle changes. The simple steps outlined below may be all youll need to ease discomfort.  Watch what you eat  · Avoid fatty foods and spicy foods.  · Eat fewer acidic foods, such as citrus and tomato-based foods. These can increase symptoms.  · Limit drinking alcohol, caffeine, and fizzy beverages. All increase acid reflux.  · Try limiting chocolate, peppermint, and spearmint. These can worsen acid reflux in some people.  Watch when you eat  · Avoid lying down for 3 hours after eating.  · Do not snack before going to bed.  Raise your head  Raising your head and upper body by 4 to 6 inches helps limit reflux when youre lying down. Put blocks under the head of your bed frame to raise it.  Other changes  · Lose weight, if you need to  · Dont exercise near bedtime  · Avoid tight-fitting clothes  · Limit aspirin and ibuprofen  · Stop smoking   Date Last Reviewed: 7/1/2016 © 2000-2017 [employer]. [address] [place] [address], [place] [address]. All rights reserved. This information is not intended as a substitute for professional medical care. Always follow your healthcare professional's instructions.        How Acid Reflux Affects Your Throat    Do you have to clear your throat or cough often? Are you hoarse? Do you have trouble swallowing? If you have these or other throat symptoms, you may have acid reflux. This occurs when stomach acid flows back up and irritates your throat.  Why you have throat symptoms  There are muscles (esophageal sphincters) at both ends of the tube that carries food to your stomach (the esophagus). These muscles relax to let food pass. Then they tighten to keep stomach acid down. When the lower esophageal sphincter (LES) doesnt tighten enough, acid can flow back (reflux) from your stomach into your esophagus. This may cause heartburn. In some cases the upper esophageal  sphincter (UES) also doesnt work well. Then acid can travel higher and enter your throat (pharynx). In many cases, this causes throat symptoms.  Common throat symptoms  · Need to clear your throat often  · Feeling like youre choking  · Long-term (chronic) cough  · Hoarseness  · Trouble swallowing  · Feel like you have a lump in your throat  · Sour or acid taste  · Sore throat that keeps coming back   Date Last Reviewed: 7/1/2016  © 3787-7109 [employer]. [address] [place] [address], [place] [address]. All rights reserved. This information is not intended as a substitute for professional medical care. Always follow your healthcare professional's instructions.        Medicines for Acid Reflux  Your healthcare provider has told you that you have acid reflux. This condition causes stomach acid to wash up into your throat. For most people, acid reflux is troubling but not dangerous. But left untreated, acid reflux sometimes damages the esophagus. Medicines can help control acid reflux and limit your risk of future problems.  Medicines for acid reflux  Your healthcare provider may prescribe medicine to help treat your acid reflux. Medicine will be based on your symptoms and any test results. Your provider will explain how to take your medicine. You will also be told about possible side effects.  Reducing stomach acid  Your provider may suggest antacids that you can buy over the counter. Antacids can give fast relief. Or you may be told to take a type of medicine called H2 blockers. These are available over the counter and by prescription (for higher doses).  Blocking stomach acid  In more severe cases, your healthcare provider may suggest stronger medicines such as proton pump inhibitors (PPIs). These keep the stomach from making acid. They are often prescribed for long-term use.  Other medicines  In some cases medicines to reduce or block stomach acid may not work. Then you may be switched to another type of  medicine that helps your stomach empty better.     Date Last Reviewed: 10/1/2016  © 9491-2127 [employer]. [address] [place] [address], [place] [address]. All rights reserved. This information is not intended as a substitute for professional medical care. Always follow your healthcare professional's instructions.

## 2019-10-30 ENCOUNTER — TELEPHONE (OUTPATIENT)
Dept: OTOLARYNGOLOGY | Facility: CLINIC | Age: 64
End: 2019-10-30

## 2019-10-31 ENCOUNTER — TELEPHONE (OUTPATIENT)
Dept: OTOLARYNGOLOGY | Facility: CLINIC | Age: 64
End: 2019-10-31

## 2019-11-06 ENCOUNTER — TELEPHONE (OUTPATIENT)
Dept: ADMINISTRATIVE | Facility: OTHER | Age: 64
End: 2019-11-06

## 2019-11-06 NOTE — TELEPHONE ENCOUNTER
Left voice message for patient to return call to schedule appointment from referral to Sleep Medicine department.  Kaylynn ALVARADO [phone]

## 2019-11-18 ENCOUNTER — TELEPHONE (OUTPATIENT)
Dept: OTOLARYNGOLOGY | Facility: CLINIC | Age: 64
End: 2019-11-18

## 2019-11-27 ENCOUNTER — TELEPHONE (OUTPATIENT)
Dept: OTOLARYNGOLOGY | Facility: CLINIC | Age: 64
End: 2019-11-27

## 2019-12-03 ENCOUNTER — TELEPHONE (OUTPATIENT)
Dept: OTOLARYNGOLOGY | Facility: CLINIC | Age: 64
End: 2019-12-03

## 2019-12-03 ENCOUNTER — PATIENT MESSAGE (OUTPATIENT)
Dept: OTOLARYNGOLOGY | Facility: CLINIC | Age: 64
End: 2019-12-03

## 2019-12-20 ENCOUNTER — TELEPHONE (OUTPATIENT)
Dept: OTOLARYNGOLOGY | Facility: CLINIC | Age: 64
End: 2019-12-20

## 2019-12-27 ENCOUNTER — TELEPHONE (OUTPATIENT)
Dept: OTOLARYNGOLOGY | Facility: CLINIC | Age: 64
End: 2019-12-27

## 2019-12-27 ENCOUNTER — TELEPHONE (OUTPATIENT)
Dept: INTERNAL MEDICINE | Facility: CLINIC | Age: 64
End: 2019-12-27

## 2019-12-27 NOTE — TELEPHONE ENCOUNTER
----- Message from Madyson Gemajoshua sent at 12/27/2019  2:14 PM CST -----  Contact: Patient [phone]  Patient is calling for an RX refill or new RX.  Is this a refill or new RX:  refill  RX name and strength: sachin  Directions (copy/paste from chart):    Is this a 30 day or 90 day RX:    Local pharmacy or mail order pharmacy:  local  Pharmacy name and phone # (copy/paste from chart):   [id number] [place] [address] [address] [facility] [address] [phone] (Phone)  [phone] (Fax)      Comments:

## 2019-12-30 RX ORDER — VALACYCLOVIR HYDROCHLORIDE 1 G/1
1000 TABLET, FILM COATED ORAL EVERY 12 HOURS PRN
Qty: 14 TABLET | Refills: 0 | Status: SHIPPED | OUTPATIENT
Start: 2019-12-30 | End: 2021-02-03

## 2019-12-30 RX ORDER — BUDESONIDE 0.5 MG/2ML
INHALANT ORAL
COMMUNITY
Start: 2019-12-04 | End: 2022-12-05

## 2020-02-04 ENCOUNTER — OFFICE VISIT (OUTPATIENT)
Dept: PSYCHIATRY | Facility: CLINIC | Age: 65
End: 2020-02-04
Payer: COMMERCIAL

## 2020-02-04 DIAGNOSIS — F41.9 ANXIETY: Primary | ICD-10-CM

## 2020-02-04 PROCEDURE — 90791 PSYCH DIAGNOSTIC EVALUATION: CPT | Mod: S$GLB,,, | Performed by: SOCIAL WORKER

## 2020-02-04 PROCEDURE — 90791 PR PSYCHIATRIC DIAGNOSTIC EVALUATION: ICD-10-PCS | Mod: S$GLB,,, | Performed by: SOCIAL WORKER

## 2020-02-05 ENCOUNTER — DOCUMENTATION ONLY (OUTPATIENT)
Dept: PSYCHIATRY | Facility: HOSPITAL | Age: 65
End: 2020-02-05

## 2020-02-05 NOTE — PROGRESS NOTES
Sw met with pt after being referred to [facility] by Ronen Mukherjee LCSW. Sw explained program requirements to pt. Pt voiced verbal understanding. Sw placed a referral tot he  to verify ins benefits. Sw quoted pt's benefits and pt voiced verbal understanding. Pt scheduled a tentative start date for 2/12/20.

## 2020-02-06 NOTE — PROGRESS NOTES
"Psychiatry Initial Visit (PhD/LCSW)  Diagnostic Interview - CPT 53638    Date: 2020    Site: [facility]    Referral source: self-referral       Clinical status of patient: Outpatient    Dhruv Fine, a 64 y.o. male, for initial evaluation visit.  Met with patient.    Chief complaint/reason for encounter: anger and anxiety    History of present illness: 64 year old male patient presents to the clinic today for initial visit with chief complaint of anger and anxiety.  Reports that he was injured at work on Thursday after he fell.  He works at [employer].  He doesn't feel that the accident he had at work was reported properly.  This has been anxiety provoking for patient, and he reports feelings of anger towards his manager.  He has been injured at work in the past.  He is anxious about returning to work.  Per the patient: "I don't know if I can go back right now, I'm so upset".  He was treated in [facility] in the early , due to work related stress.  Reports that he is set to retire from [employer] in April.  He has been prescribed Valium.            Pain: yes, oxycodone-as needed     Symptoms:   · Mood: denied  · Anxiety: excessive anxiety/worry, restlessness/keyed up and irritability  · Substance abuse: denied  · Cognitive functioning: denied  · Health behaviors: noncontributory    Psychiatric history: psychotropic management by PCP and has participated in counseling/psychotherapy on an outpatient basis in the past    Medical history: noncontributory    Family history of psychiatric illness: none    Social history (marriage, employment, etc.):  x2.  Two daughters (29, 13).  Patient's parents are .  Four siblings are , has a brother who is living.  College graduate.  Employed at [employer] for 30 years.  Jain.        Substance use:   Alcohol: occasional   Drugs: none   Tobacco: none   Caffeine: none    Current medications and drug reactions (include OTC, herbal): see medication " list     Strengths and liabilities: Strength: Patient accepts guidance/feedback, Strength: Patient is expressive/articulate., Strength: Patient is motivated for change., Strength: Patient has reasonable judgment., Liability: Patient lacks coping skills.    Current Evaluation:     Mental Status Exam:  General Appearance:  unremarkable, age appropriate   Speech: normal tone, normal rate, normal pitch, normal volume      Level of Cooperation: cooperative      Thought Processes: normal and logical   Mood: a little anxious       Thought Content: normal, no suicidality, no homicidality, delusions, or paranoia   Affect: appropriate   Orientation: Oriented x3   Memory: recent >  intact, remote >  intact   Attention Span & Concentration: intact   Fund of General Knowledge: intact and appropriate to age and level of education   Abstract Reasoning: not assessed    Judgment & Insight: intact     Language  intact     Diagnostic Impression - Plan:       ICD-10-CM ICD-9-CM   1. Anxiety F41.9 300.00       Plan:BMU    Return to Clinic: as scheduled    Length of Service (minutes): 45

## 2020-02-12 ENCOUNTER — HOSPITAL ENCOUNTER (OUTPATIENT)
Dept: PSYCHIATRY | Facility: HOSPITAL | Age: 65
Discharge: HOME | End: 2020-02-12
Attending: PSYCHIATRY & NEUROLOGY
Payer: COMMERCIAL

## 2020-02-12 VITALS
SYSTOLIC BLOOD PRESSURE: 146 MMHG | TEMPERATURE: 98 F | BODY MASS INDEX: 31.87 KG/M2 | HEIGHT: 71 IN | DIASTOLIC BLOOD PRESSURE: 94 MMHG | RESPIRATION RATE: 18 BRPM | WEIGHT: 227.63 LBS

## 2020-02-12 DIAGNOSIS — F41.9 ANXIETY: Primary | ICD-10-CM

## 2020-02-12 LAB
AMPHET+METHAMPHET UR QL: NEGATIVE
BARBITURATES UR QL SCN>200 NG/ML: NEGATIVE
BENZODIAZ UR QL SCN>200 NG/ML: NEGATIVE
BZE UR QL SCN: NEGATIVE
CANNABINOIDS UR QL SCN: NEGATIVE
CREAT UR-MCNC: 102 MG/DL (ref 23–375)
ETHANOL UR-MCNC: <10 MG/DL
METHADONE UR QL SCN>300 NG/ML: NEGATIVE
OPIATES UR QL SCN: NEGATIVE
PCP UR QL SCN>25 NG/ML: NEGATIVE
TOXICOLOGY INFORMATION: NORMAL

## 2020-02-12 PROCEDURE — 90853 GROUP PSYCHOTHERAPY: CPT | Mod: ,,, | Performed by: PSYCHOLOGIST

## 2020-02-12 PROCEDURE — 80307 DRUG TEST PRSMV CHEM ANLYZR: CPT

## 2020-02-12 PROCEDURE — 99223 PR INITIAL HOSPITAL CARE,LEVL III: ICD-10-PCS | Mod: ,,, | Performed by: PSYCHIATRY & NEUROLOGY

## 2020-02-12 PROCEDURE — 90853 PR GROUP PSYCHOTHERAPY: ICD-10-PCS | Mod: ,,, | Performed by: PSYCHOLOGIST

## 2020-02-12 PROCEDURE — 99223 1ST HOSP IP/OBS HIGH 75: CPT | Mod: ,,, | Performed by: PSYCHIATRY & NEUROLOGY

## 2020-02-12 PROCEDURE — 90853 GROUP PSYCHOTHERAPY: CPT

## 2020-02-12 NOTE — TREATMENT PLAN
"[facility]  BEHAVIORAL MEDICINE UNIT  INTERDISCIPLINARY TREATMENT PLAN  INTENSIVE OUTPATIENT PROGRAM    INTERDISCIPLINARY  TREATMENT TEAM:    Amena Stacy M.D., Psychiatrist      Tere Hendrickson, Ph.D., Clinical Psychologist    Lakesha Beaver [facility],     Jhon Estrada [facility],       Resident:    (Signatures scanned into record separately).      ESTIMATED LOS:  2 weeks      The patient has reviewed the treatment plan with staff and has signed the "Patient Responsibilities" form.    (Patient signature scanned into record separately).      TREATMENT PLAN    DIAGNOSIS:      Patient Education Needs/Barriers to Learning (i.e., Language, Reading, Comprehension): None        Support/Advocacy Services/Needs (i.e., Financial, Transportation, Medications): none         Community Resources (i.e., Alcoholics Anonymous, Al Anon): None    Patients Identified Goals:  1. Coping skills  2. How to recognize oncoming issues  3. How to dodge/deflect sress  4.    Coping Skills:  1. Diversion  2. Talking to friend  3. Fishing, hunting, music, motorcycle ride  4. Swimming        Strengths:  1. Communication skills  2. Music- technical skills  3. Good at fishing, hunting, working with hands  4.      Limitations:  1. Patience with some people/situations  2. Can not accept bad change  3. Suppress stress from people/situations  4. People that are perceived as not correct- different agenda      Goals and Objectives:  1. Goal: Attend and participate in all groups   Objective measure: Progress notes indicating active listening,    self-disclosure, feedback   Time frame to reach goal: Each day    2. Goal: Medication management   Objective measure: Physician progress note indicating improved medication   status   Time frame to reach goal: By discharge    3. Goal: Reduce depression   Objective measure: Physician progress note indicating depression is improved   Time frame to reach goal: By discharge    4. "  Goal: Reduce anxiety   Objective measure: Physician progress note indicating anxiety is improved   Time frame to reach goal: By discharge    5. Goal: Develop stress coping skills   Objective measure: Patient self-report of improved coping   Time frame to reach goal: By discharge    6.  Goal: Address health problems   Objective measure: Physician progress note regarding management of health   problems   Time frame to reach goal: Within first week of treatment    7. Goal: Assess level of pain   Objective measure: Physician progress note regarding patient's self-reported pain   Time frame to reach goal: Within first week of treatment    8. Goal:    Objective measure:    Time frame to reach goal:    9. Goal:    Objective measure:    Time frame to reach goal:     10. Goal:    Objective measure:    Time frame to reach goal:       Group Interventions:    Psychodynamic Group Psychotherapy - 1 hour, 5 times per week  Goals: 1. Utilize group empathy and support for problem solving; 2. Apply stress management, communication, and assertiveness skills to personal issues; 3. Discuss mental health symptoms and explore strategies for coping; 4. Discuss ways to change lifestyle to maintain better emotional health.    Communication Skills - 1 hour, 2 times per week  Goals: 1. Learn rules of effective communication; 2. Improve listening skills; 3. Practice clear communication.    Nutrition and Health - 1 hour, 1 time per week  Goals: 1. Explore impact that nutrition has on health; 2. Identify positive nutritional choices; 3. Explore how nutrition relates to stress tolerance.    Personal Growth - 1 hour, 2 times per week  Goals: 1. Discuss the development of self; 2. Create personal awareness and insight; 3. Explore a variety of psycho-educational techniques.    Promoting Healthy Lifestyles - 1 hour, 1 time per week  Goals: 1. Understand the Biopsychosocial Model of Health; 2. Develop insight into how mental health can impact other  dimensions of health; 3. Develop appropriate health promotion strategies.    Acceptance and Commitment Therapy (ACT)- 1 hour, 1 time per week  Goals: 1. Learn an action-oriented psychotherapy called ACT; 2. Focus on identifying, challenging, and clarifying values systems and beliefs; 3. Explore how values oriented actions can lead to emotional well-being.    Relationship Dynamics - 1 hour, 1 time per week  Goals: 1. Learn about factors that shape relationships; 2. Understand the central role of relationships in personal well-being; 3. Learn how to improve all relationships.    Relaxation Training - 1 hour, 3 times per week  Goals: 1. Learn about and implement various techniques for releasing physical tension from the body.    Spirituality - 1 hour, 1 time per week  Goals: 1. Discuss and reflect on the process of seeking peace and comfort; 2. Identify healthy ways of exploring spirituality.    Stress Management - 1 hour, 4 times per week  Goals: 1. Identify types and levels of stress; 2. Identify and change maladaptive beliefs and behaviors; 3. Identify and practice techniques of stress management.    DBT- 1 hour, 4 times per week  Goals: 1. Discuss emotion regulation and Interpersonal Effectiveness Skills and practice mindefulness; 2. Identify and change maladaptive beliefs and behaviors; Identify and practice techniques of DBT and mindefulness.
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
## 2020-02-12 NOTE — PATIENT CARE CONFERENCE
Problem List:     MDD  R/o KRISTA     Staff discussed referral to BMU program by out pt therapist Lonny. Staff discussed pt recent job stress and injury at work. Pt reporting stressors revolving around pain, caregiver role, parent role, and custody issues. Pt reporting anhedonia, irritability, decreased mood/energy/concerntration x 3 weeks ago. Pt reporting denial of h/o depression until 3 weeks ago. Pt denying SI/HI and self injurious behaviors. Pt currently not on psych meds and reports not wanting trial. Pt identifying difficulty with initiating and maintaining sleep.      Follow Up:   Medication Management -TBD  Psychotherapy - TBD     Staff Present:  MD Tere Dunn, Phd  Phu Estrada, LCSW  Lakesha Beaver, LCSW

## 2020-02-12 NOTE — PROGRESS NOTES
Group Psychotherapy (PhD/LCSW)    Site: [facility]    Clinical status of patient: Intensive Outpatient Program (IOP)    Date: 2/12/2020    Group Focus: Stress Management     Length of service: [id number] - 45-50 minutes    Number of patients in attendance: 11    Referred by: Behavioral Medicine Unit Treatment Team    Target symptoms: Anxiety    Patient's response to treatment: Active Listening     Progress toward goals: Progressing adequately    Interval History: Discussed the application of 12-step principles to stress management with a particular emphasis on the first three steps of recovery. Provided vignettes to illustrate the value of these principles for enhancing coping    Diagnosis: Anxiety    Plan: Continue treatment on BMU
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
## 2020-02-12 NOTE — PROGRESS NOTES
Group Psychotherapy (PhD/LCSW)    Site: [facility]    Clinical status of patient: Intensive Outpatient Program (IOP)    Date: 2/12/2020    Group Focus: Psychodynamic Group Process    Length of service: 24022 - 45-50 minutes    Number of patients in attendance: 5    Referred by: Behavioral Medicine Unit Treatment Team    Target symptoms: Anxiety    Patient's response to treatment: Active Listening and Self-disclosure    Progress toward goals: Progressing adequately    Interval History: Patient introduced himself appropriately to the group and provided support and feedback to other group members.    Diagnosis: Anxiety    Plan: Continue treatment on BMU

## 2020-02-12 NOTE — PROGRESS NOTES
Group Psychotherapy (PhD/LCSW)    Site: [facility]    Clinical status of patient: Intensive Outpatient Program (IOP)    Date: 2/12/2020    Group Focus: DBT-Based Group Psychotherapy    Length of service: 54107 - 45-50 minutes    Number of patients in attendance: 10    Referred by: Behavioral Medicine Unit Treatment Team    Target symptoms: Anxiety    Patient's response to treatment: Active Listening and Self-disclosure    Progress toward goals: Progressing adequately    Interval History: Session focus was Interpersonal Effectiveness:  DEAR MAN.  Patients were encouraged to exercise skillfulness during interactions by using this framework.    Diagnosis: Anxiety    Plan: Continue treatment on BMU

## 2020-02-13 ENCOUNTER — HOSPITAL ENCOUNTER (OUTPATIENT)
Dept: PSYCHIATRY | Facility: HOSPITAL | Age: 65
Discharge: HOME | End: 2020-02-13
Attending: PSYCHIATRY & NEUROLOGY
Payer: COMMERCIAL

## 2020-02-13 VITALS — TEMPERATURE: 98 F | DIASTOLIC BLOOD PRESSURE: 93 MMHG | SYSTOLIC BLOOD PRESSURE: 146 MMHG | HEART RATE: 74 BPM

## 2020-02-13 DIAGNOSIS — F41.9 ANXIETY: ICD-10-CM

## 2020-02-13 PROCEDURE — 90853 GROUP PSYCHOTHERAPY: CPT

## 2020-02-13 PROCEDURE — 90853 GROUP PSYCHOTHERAPY: CPT | Mod: ,,, | Performed by: PSYCHOLOGIST

## 2020-02-13 PROCEDURE — 90853 PR GROUP PSYCHOTHERAPY: ICD-10-PCS | Mod: ,,, | Performed by: PSYCHOLOGIST

## 2020-02-14 ENCOUNTER — HOSPITAL ENCOUNTER (OUTPATIENT)
Dept: PSYCHIATRY | Facility: HOSPITAL | Age: 65
Discharge: HOME | End: 2020-02-14
Attending: PSYCHIATRY & NEUROLOGY
Payer: COMMERCIAL

## 2020-02-14 VITALS — SYSTOLIC BLOOD PRESSURE: 160 MMHG | DIASTOLIC BLOOD PRESSURE: 89 MMHG | HEART RATE: 80 BPM

## 2020-02-14 DIAGNOSIS — F41.9 ANXIETY: Primary | ICD-10-CM

## 2020-02-14 PROCEDURE — 90853 GROUP PSYCHOTHERAPY: CPT

## 2020-02-14 PROCEDURE — 90853 PR GROUP PSYCHOTHERAPY: ICD-10-PCS | Mod: ,,, | Performed by: PSYCHOLOGIST

## 2020-02-14 PROCEDURE — 90853 GROUP PSYCHOTHERAPY: CPT | Mod: ,,, | Performed by: PSYCHOLOGIST

## 2020-02-14 NOTE — PROGRESS NOTES
Group Psychotherapy (PhD/LCSW)    Site: [facility]    Clinical status of patient: Intensive Outpatient Program (IOP)    Date: 2/13/2020    Group Focus: Healthy lifestyles    Length of service: 54082 - 45-50 minutes    Number of patients in attendance: 12    Referred by: Behavioral Medicine Unit Treatment Team    Target symptoms: Anxiety    Patient's response to treatment: Active Listening and Self-disclosure    Progress toward goals: Progressing adequately    Interval History: Session focused on sleep hygiene and CBT sleep techniques.    Diagnosis: Anxiety    Plan: Continue treatment on BMU

## 2020-02-14 NOTE — PROGRESS NOTES
Group Psychotherapy (PhD/LCSW)    Site: [facility]    Clinical status of patient: Intensive Outpatient Program (IOP)    Date: 2/14/2020    Group Focus: Psychodynamic Group Process    Length of service: 93510 - 45-50 minutes    Number of patients in attendance: 6    Referred by: Behavioral Medicine Unit Treatment Team    Target symptoms: Anxiety    Patient's response to treatment: Active Listening and Self-disclosure    Progress toward goals: Progressing adequately    Interval History: Patient reported having a nice night with his girlfriend last night and has plans with his daughter for Bailey's Day tonight. He shared his frustrations with the group in regards to dealing with his ex-wife and their shared custody, and group members offered support and feedback.    Diagnosis: Anxiety    Plan: Continue treatment on BMU

## 2020-02-14 NOTE — PROGRESS NOTES
Group Psychotherapy (PhD/LCSW)    Site: [facility]    Clinical status of patient: Intensive Outpatient Program (IOP)    Date: 2/14/2020    Group Focus: Stress Management    Length of service: 34850 - 45-50 minutes    Number of patients in attendance: 11    Referred by: Behavioral Medicine Unit Treatment Team    Target symptoms: Anxiety    Patient's response to treatment: Active Listening    Progress toward goals: Progressing adequately    Interval History: Group learned and practiced mindfulness exercises (progressive muscle relaxation) to improve present-moment awareness, impulsive behavior tendencies, and tolerance of various emotional states.    Diagnosis: Anxiety    Plan: Continue treatment on BMU

## 2020-02-14 NOTE — PROGRESS NOTES
Group Psychotherapy (PhD/LCSW)    Site: [facility]    Clinical status of patient: Intensive Outpatient Program (IOP)    Date: 2/13/2020    Group Focus: Psychodynamic Group Process    Length of service: 74820 - 45-50 minutes    Number of patients in attendance: 5    Referred by: Behavioral Medicine Unit Treatment Team    Target symptoms: Anxiety    Patient's response to treatment: Active Listening and Self-disclosure    Progress toward goals: Progressing adequately    Interval History: Patient reported the desire to spend quality time with his girlfriend tonight, but doubting this would happen due to her elderly and sickly father needing to be cared for. He was encouraged to not give up on his plans, but be more amenable to change in the plans if necessary.    Diagnosis: Anxiety    Plan: Continue treatment on BMU

## 2020-02-17 ENCOUNTER — HOSPITAL ENCOUNTER (OUTPATIENT)
Dept: PSYCHIATRY | Facility: HOSPITAL | Age: 65
Discharge: HOME | End: 2020-02-17
Attending: PSYCHIATRY & NEUROLOGY
Payer: COMMERCIAL

## 2020-02-17 DIAGNOSIS — F41.9 ANXIETY: Primary | ICD-10-CM

## 2020-02-17 PROCEDURE — 99232 SBSQ HOSP IP/OBS MODERATE 35: CPT | Mod: ,,, | Performed by: PSYCHIATRY & NEUROLOGY

## 2020-02-17 PROCEDURE — 90853 GROUP PSYCHOTHERAPY: CPT

## 2020-02-17 PROCEDURE — 99232 PR SUBSEQUENT HOSPITAL CARE,LEVL II: ICD-10-PCS | Mod: ,,, | Performed by: PSYCHIATRY & NEUROLOGY

## 2020-02-17 PROCEDURE — 90853 PR GROUP PSYCHOTHERAPY: ICD-10-PCS | Mod: ,,, | Performed by: PSYCHOLOGIST

## 2020-02-17 PROCEDURE — 90853 GROUP PSYCHOTHERAPY: CPT | Mod: ,,, | Performed by: PSYCHOLOGIST
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
## 2020-02-17 NOTE — PROGRESS NOTES
Group Psychotherapy (PhD/LCSW)    Site: [facility]    Clinical status of patient: Intensive Outpatient Program (IOP)    Date: 2/17/2020    Group Focus: DBT-Based Group Psychotherapy    Length of service: 46243 - 45-50 minutes    Number of patients in attendance: 11    Referred by: Behavioral Medicine Unit Treatment Team    Target symptoms: Anxiety    Patient's response to treatment: Active Listening and Self-disclosure    Progress toward goals: Progressing adequately    Interval History: Session focus was Mindfulness.  Patients were reminded of what and how skills.  We focused on the what skills of observing and describing, and applied this to listening to music with the goal of reducing evaluations and judgments.    Diagnosis: Anxiety    Plan: Continue treatment on BMU

## 2020-02-17 NOTE — PROGRESS NOTES
"BEHAVIORAL MEDICAL UNIT PROGRESS NOTE      ENCOUNTER DATE:  2020 11:09 AM   [facility]  ADMIT DATE:   Pt Name: Dhruv Fine   : 1955   MRN: [id number]      HISTORY OF PRESENTING ILLNESS:  Dhruv Fine is a 64 y.o. male with history of depression who is admitted to BMU for stabilization.    Feeling "okay," had a rough weekend, daughter's dog  and his girlfriend's father was doing poorly. Better day on , "minor victory." He did not feel like he "had anything for them," wanted to do more for them but didn't feel he could offer more, "I didn't help... I'm just trying to keep my own shit together." Patient had to go to a  for a good friend from work.    Reports positive experiences recently include dinner with girlfriend for Neura's day and giving [employer] gifts to loved ones.      Risk Parameters:  Patient reports no suicidal ideation  Patient reports no homicidal ideation  Patient reports no self-injurious behavior  Patient reports no violent behavior    Current psych meds  None    PAST PSYCHIATRIC, MEDICAL, AND SOCIAL HISTORY REVIEWED  The patient's past medical, family and social history have been reviewed and updated as appropriate within the electronic medical record     MEDICAL REVIEW OF SYSTEMS   General : NO chills or fever   Respiratory: NO cough, shortness of breath   Cardiovascular: NO chest pain, palpitations or racing heart   Gastrointestinal: NO nausea, vomiting, constipation or diarrhea   Neurological: NO confusion, dizziness, headaches or tremors   Psychiatric: please see HPI     ALL MEDICATIONS:    Current Outpatient Medications:     budesonide (PULMICORT) 0.5 mg/2 mL nebulizer solution, MIX CONTENTS OF 1 RESPULE WITH STERILE DILUENT PROVIDED. POUR INTO NASONEB CUP & PERFORM NASAL TREATMENT TWICE DAILY., Disp: , Rfl:     cyclobenzaprine (FLEXERIL) 10 MG tablet, Take 1 tablet (10 mg total) by mouth nightly. Prn severe muscle spasms, " Disp: 30 tablet, Rfl: 0    diazePAM (VALIUM) 10 MG Tab, Take 10 mg by mouth as needed., Disp: , Rfl:     doxycycline (VIBRA-TABS) 100 MG tablet, Take 1 tablet (100 mg total) by mouth 2 (two) times daily., Disp: 14 tablet, Rfl: 0    fluticasone propionate (FLONASE) 50 mcg/actuation nasal spray, SHAKE LIQUID AND USE 1 SPRAY(50 MCG) IN EACH NOSTRIL EVERY DAY, Disp: 3 Bottle, Rfl: 0    lidocaine (LIDODERM) 5 %, Place 1-2 patches onto the skin once daily. Wear patch for 12 hours and  must have a 12 hour period without a patch, Disp: 60 patch, Rfl: 2    multivitamin (ONE DAILY MULTIVITAMIN) per tablet, Take 1 tablet by mouth once daily., Disp: , Rfl:     valACYclovir (VALTREX) 1000 MG tablet, Take 1 tablet (1,000 mg total) by mouth every 12 (twelve) hours as needed., Disp: 14 tablet, Rfl: 0    ALLERGIES:  Review of patient's allergies indicates:   Allergen Reactions    No known allergies        RELEVANT LABS/STUDIES:    Lab Results   Component Value Date    WBC 4.81 10/14/2019    HGB 14.6 10/14/2019    HCT 45.5 10/14/2019    MCV 91 10/14/2019     10/14/2019     BMP  Lab Results   Component Value Date     10/14/2019    K 4.6 10/14/2019     10/14/2019    CO2 29 10/14/2019    BUN 12 10/14/2019    CREATININE 1.0 10/14/2019    CALCIUM 10.0 10/14/2019    ANIONGAP 8 10/14/2019    ESTGFRAFRICA >60.0 10/14/2019    EGFRNONAA >60.0 10/14/2019     Lab Results   Component Value Date    ALT 33 10/14/2019    AST 29 10/14/2019    ALKPHOS 78 10/14/2019    BILITOT 0.3 10/14/2019     Lab Results   Component Value Date    TSH 3.033 10/14/2019     Lab Results   Component Value Date    HGBA1C 5.9 (H) 10/14/2019       VITALS  defer to nursing note    PHYSICAL EXAM  General: well developed, well nourished  Neurologic:   Gait: Normal   Psychomotor signs:  No involuntary movements or tremor  AIMS: none    PSYCHIATRIC EXAM:     Mental Status Exam:  Appearance: unremarkable, age appropriate  Behavior/Cooperation: normal,  "cooperative  Speech: normal tone, normal rate, normal pitch, normal volume  Language: uses words appropriately; NO aphasia or dysarthria  Mood: "okay"  Affect Full, reactive  Thought Process: normal and logical  Thought Content: normal, no suicidality, no homicidality, delusions, or paranoia  Level of Consciousness: Alert and Oriented x3  Memory:  Intact  Attention/concentration: appropriate for age/education.   Fund of Knowledge: appears adequate  Insight:  fair  Judgment: fair    IMPRESSION:    Dhruv Fine is a 64 y.o. male with history of anxiety who is admitted to the BMU for irritability.    Status/Progress:  Based on the examination today, the patient's problem(s) is/are inadequately controlled.  New problems have not been presented today.     DIAGNOSES:  MDD  R/o KRISTA     PLAN:  Continue U treatment- group and individual therapies     Psych Med:  · Patient not interested in psychiatric medications at this time    · Can utilize OTC melatonin per his request for sleep disturbance     · Discussed with patient informed consent, risks vs. benefits, alternative treatments, side effect profile and the inherent unpredictability of individual responses to these treatments. Answered any questions patient may have had. The patient expresses understanding of the above and displays the capacity to agree with this current plan      Other:  jacqui Camacho MD  [facility]  2/17/2020 11:09 AM      "

## 2020-02-17 NOTE — PROGRESS NOTES
"Group Psychotherapy (PhD/LCSW)    Site: [facility]    Clinical status of patient: Intensive Outpatient Program (IOP)    Date: 2/17/2020    Group Focus: Stress Management     Length of service: 96038 - 45-50 minutes    Number of patients in attendance: 6    Referred by: Behavioral Medicine Unit Treatment Team    Target symptoms: Anxiety    Patient's response to treatment: Active Listening and Self-disclosure    Progress toward goals: Progressing adequately    Interval History: Discussed the value of "daily rituals of awareness" to enhance coping skills by building new habits for responding to stressors. Illustrated the value of daily rituals of awareness to anger management and for coping with anxiety.    Diagnosis: Anxiety    Plan: Continue treatment on BMU        "

## 2020-02-18 ENCOUNTER — HOSPITAL ENCOUNTER (OUTPATIENT)
Dept: PSYCHIATRY | Facility: HOSPITAL | Age: 65
Discharge: HOME | End: 2020-02-18
Attending: PSYCHIATRY & NEUROLOGY
Payer: COMMERCIAL

## 2020-02-18 DIAGNOSIS — F41.9 ANXIETY: Primary | ICD-10-CM

## 2020-02-18 PROCEDURE — 90853 PR GROUP PSYCHOTHERAPY: ICD-10-PCS | Mod: ,,, | Performed by: PSYCHOLOGIST

## 2020-02-18 PROCEDURE — 90853 GROUP PSYCHOTHERAPY: CPT | Mod: ,,, | Performed by: PSYCHOLOGIST

## 2020-02-18 PROCEDURE — 90853 GROUP PSYCHOTHERAPY: CPT

## 2020-02-18 NOTE — PROGRESS NOTES
Group Psychotherapy (PhD/LCSW)    Site: [facility]    Clinical status of patient: Intensive Outpatient Program (IOP)    Date: 2/18/2020    Group Focus: Stress Management     Length of service: 94356 - 45-50 minutes    Number of patients in attendance: 9    Referred by: Behavioral Medicine Unit Treatment Team    Target symptoms: Anxiety    Patient's response to treatment: Active Listening and Self-disclosure    Progress toward goals: Progressing adequately    Interval History: Discussed the interpersonal dynamics of co-dependent behavior. Discussed the way recovery from co-dependency involves shifting from controlling, rescuing behavior to assertive, supportive behavior. Gave examples of how to apply this strategy to family dynamics where one person has a substance problem. Noted the importance of self-care in altering co-dependent behavior    Diagnosis: Anxiety    Plan: Continue treatment on BMU

## 2020-02-18 NOTE — PROGRESS NOTES
Group Psychotherapy (PhD/LCSW)    Site: [facility]    Clinical status of patient: Intensive Outpatient Program (IOP)    Date: 2/18/2020    Group Focus: Psychodynamic Group Process    Length of service: 54199 - 45-50 minutes    Number of patients in attendance: 5    Referred by: Behavioral Medicine Unit Treatment Team    Target symptoms: Anxiety    Patient's response to treatment: Active Listening, Self-disclosure, Feedback to other group members    Progress toward goals: Progressing adequately    Interval History: More time was spent in today's group session discussing the patient's work issues and returning to work upon completion of the program. Several group members offered him advice and suggestions of ways to approach the situation and the stress in order to survive two months back at work before retiring. However, the patient was thankful for the suggestions but was dismissive of them, which was pointed out to him by this provider.    Diagnosis: Anxiety    Plan: Continue treatment on BMU

## 2020-02-18 NOTE — PROGRESS NOTES
Group Psychotherapy (PhD/LCSW)    Site: [facility]    Clinical status of patient: Intensive Outpatient Program (IOP)    Date: 2/18/2020    Group Focus: CBT Group Psychotherapy    Length of service: 44533 - 45-50 minutes    Number of patients in attendance: 7    Referred by: Behavioral Medicine Unit Treatment Team    Target symptoms: Anxiety    Patient's response to treatment: Active Listening and Self-disclosure    Progress toward goals: Progressing adequately    Interval History: Session focus was Cognitive Restructuring:  Identifying unhelpful and helpful thoughts.  Patients were provided with a worksheet on unhelpful thinking styles and how to identify helpful thoughts.      Diagnosis: Anxiety    Plan: Continue treatment on BMU

## 2020-02-19 ENCOUNTER — HOSPITAL ENCOUNTER (OUTPATIENT)
Dept: PSYCHIATRY | Facility: HOSPITAL | Age: 65
Discharge: HOME | End: 2020-02-19
Attending: PSYCHIATRY & NEUROLOGY
Payer: COMMERCIAL

## 2020-02-19 DIAGNOSIS — F41.9 ANXIETY: Primary | ICD-10-CM

## 2020-02-19 PROCEDURE — 90853 GROUP PSYCHOTHERAPY: CPT | Mod: ,,, | Performed by: PSYCHOLOGIST

## 2020-02-19 PROCEDURE — 90853 PR GROUP PSYCHOTHERAPY: ICD-10-PCS | Mod: ,,, | Performed by: PSYCHOLOGIST

## 2020-02-19 PROCEDURE — 90853 GROUP PSYCHOTHERAPY: CPT

## 2020-02-19 NOTE — PROGRESS NOTES
Group Psychotherapy (PhD/LCSW)    Site: [facility]    Clinical status of patient: Intensive Outpatient Program (IOP)    Date: 2/19/2020    Group Focus: The Dynamics of Relationship       Length of service: 90690 - 45-50 minutes    Number of patients in attendance: 11    Referred by: Behavioral Medicine Unit Treatment Team    Target symptoms: Anxiety    Patient's response to treatment: Active Listening, Self-disclosure    Progress toward goals: Progressing adequately    Interval History: Discussed strategies for overcoming trust issues. Noted the importance of empathy. Gave examples of how to use reflective listening and I-messages to enhance communication around trust issues    Diagnosis: Anxiety    Plan: Continue treatment on BMU

## 2020-02-19 NOTE — PROGRESS NOTES
Group Psychotherapy (PhD/LCSW)    Site: [facility]    Clinical status of patient: Intensive Outpatient Program (IOP)    Date: 2/19/2020    Group Focus: Psychodynamic Group Process    Length of service: 90420 - 45-50 minutes    Number of patients in attendance: 4    Referred by: Behavioral Medicine Unit Treatment Team    Target symptoms: Anxiety    Patient's response to treatment: Active Listening, Self-disclosure, Feedback to other group members    Progress toward goals: Progressing adequately    Interval History: Pt reported having a good night; however, he experienced negative interactions this morning with his ex-wife and coworker. Other group members provided him encouragement to practice mindfulness and acceptance of the things he cannot control to lead to more peace.    Diagnosis: Anxiety    Plan: Continue treatment on BMU

## 2020-02-19 NOTE — PROGRESS NOTES
Group Psychotherapy (PhD/LCSW)    Site: [facility]    Clinical status of patient: Intensive Outpatient Program (IOP)    Date: 2/19/2020    Group Focus: CBT Group Psychotherapy    Length of service: 04702 - 45-50 minutes    Number of patients in attendance: 10    Referred by: Behavioral Medicine Unit Treatment Team    Target symptoms: Anxiety    Patient's response to treatment: Active Listening and Self-disclosure    Progress toward goals: Progressing adequately    Interval History: Session focus was Cognitive Restructuring (Part 2):  Identifying unhelpful and helpful thoughts.  Patients were provided with a worksheet on unhelpful thinking styles and how to identify helpful thoughts.  Patients helped others with personal examples of unhelpful and helpful thoughts.      Diagnosis: Anxiety    Plan: Continue treatment on BMU
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
## 2020-02-20 ENCOUNTER — HOSPITAL ENCOUNTER (OUTPATIENT)
Dept: PSYCHIATRY | Facility: HOSPITAL | Age: 65
Discharge: HOME | End: 2020-02-20
Attending: PSYCHIATRY & NEUROLOGY
Payer: COMMERCIAL

## 2020-02-20 DIAGNOSIS — F41.9 ANXIETY: Primary | ICD-10-CM

## 2020-02-20 PROCEDURE — 90853 GROUP PSYCHOTHERAPY: CPT

## 2020-02-20 PROCEDURE — 90834 PSYTX W PT 45 MINUTES: CPT | Mod: 59,,, | Performed by: PSYCHOLOGIST

## 2020-02-20 PROCEDURE — 90853 GROUP PSYCHOTHERAPY: CPT | Mod: ,,, | Performed by: PSYCHOLOGIST

## 2020-02-20 PROCEDURE — 90853 PR GROUP PSYCHOTHERAPY: ICD-10-PCS | Mod: 59,,, | Performed by: PSYCHOLOGIST

## 2020-02-20 PROCEDURE — 90853 PR GROUP PSYCHOTHERAPY: ICD-10-PCS | Mod: ,,, | Performed by: PSYCHOLOGIST

## 2020-02-20 PROCEDURE — 90834 PR PSYCHOTHERAPY W/PATIENT, 45 MIN: ICD-10-PCS | Mod: 59,,, | Performed by: PSYCHOLOGIST

## 2020-02-20 PROCEDURE — 90853 GROUP PSYCHOTHERAPY: CPT | Mod: 59,,, | Performed by: PSYCHOLOGIST

## 2020-02-21 ENCOUNTER — DOCUMENTATION ONLY (OUTPATIENT)
Dept: PSYCHIATRY | Facility: HOSPITAL | Age: 65
End: 2020-02-21

## 2020-02-21 ENCOUNTER — HOSPITAL ENCOUNTER (OUTPATIENT)
Dept: PSYCHIATRY | Facility: HOSPITAL | Age: 65
Discharge: HOME | End: 2020-02-21
Attending: PSYCHIATRY & NEUROLOGY
Payer: COMMERCIAL

## 2020-02-21 DIAGNOSIS — F41.9 ANXIETY: Primary | ICD-10-CM

## 2020-02-21 PROCEDURE — 90853 GROUP PSYCHOTHERAPY: CPT | Mod: ,,, | Performed by: PSYCHOLOGIST

## 2020-02-21 PROCEDURE — 90853 PR GROUP PSYCHOTHERAPY: ICD-10-PCS | Mod: ,,, | Performed by: PSYCHOLOGIST

## 2020-02-21 PROCEDURE — 90853 GROUP PSYCHOTHERAPY: CPT | Performed by: SOCIAL WORKER

## 2020-02-21 NOTE — PROGRESS NOTES
Sw placed a call to pt to inquire about attending program. No answer. Kim left a vm for immediate call back.

## 2020-02-21 NOTE — PLAN OF CARE
02/21/20 1000   Activity/Group Therapy Checklist   Group Goals/Reflection  (perception of self )   Attendance Attended   Follows Direction Followed directions   Group Interactions/Observations Interacted appropriately   Affect/Mood Range Normal range   Affect/Mood Display Appropriate   Goal Progression Progressing

## 2020-02-21 NOTE — PROGRESS NOTES
Group Psychotherapy (PhD/LCSW)    Site: [facility]    Clinical status of patient: Intensive Outpatient Program (IOP)    Date: 2020    Group Focus: Psychodynamic Group Process    Length of service: 11695 - 45-50 minutes    Number of patients in attendance: 3    Referred by: Behavioral Medicine Unit Treatment Team    Target symptoms: Anxiety    Patient's response to treatment: Active Listening, Self-disclosure, Feedback to other group members    Progress toward goals: Progressing adequately    Interval History: Pt reported being concerned about his teenage daughter being close to the situation on the parade route last night where a women . Group members provided suggestions of ways to approach the topic with his daughter if appropriate.    Diagnosis: Anxiety    Plan: Continue treatment on BMU

## 2020-02-21 NOTE — PROGRESS NOTES
Group Psychotherapy (PhD/LCSW)    Site: [facility]    Clinical status of patient: Intensive Outpatient Program (IOP)    Date: 2/20/2020    Group Focus: Conflict Resolution    Length of service: 64120 - 45-50 minutes    Number of patients in attendance: 9    Referred by: Behavioral Medicine Unit Treatment Team    Target symptoms: Anxiety    Patient's response to treatment: Active Listening, Self-disclosure, Feedback to other group members    Progress toward goals: Progressing adequately    Interval History: Session focused on conflict resolution.    Diagnosis: Anxiety    Plan: Continue treatment on BMU

## 2020-02-21 NOTE — PROGRESS NOTES
Group Psychotherapy (PhD/LCSW)    Site: [facility]    Clinical status of patient: Intensive Outpatient Program (IOP)    Date: 2/20/2020    Group Focus:  Stress Management     Length of service: 04126 - 45-50 minutes    Number of patients in attendance: 10    Referred by: Behavioral Medicine Unit Treatment Team    Target symptoms: Anxiety    Patient's response to treatment: Active Listening     Progress toward goals: Progressing adequately    Interval History: Discussed strategies for identifying maladaptive beliefs that intensify stress and changing one's self-talk to mitigate stress.     Diagnosis: Anxiety    Plan: Continue treatment on BMU

## 2020-02-21 NOTE — PROGRESS NOTES
Group Psychotherapy (PhD/LCSW)    Site: [facility]    Clinical status of patient: Intensive Outpatient Program (IOP)    Date: 2/21/2020    Group Focus: Stress Management    Length of service: 06156 - 45-50 minutes    Number of patients in attendance: 10    Referred by: Behavioral Medicine Unit Treatment Team    Target symptoms: Anxiety    Patient's response to treatment: Active Listening    Progress toward goals: Progressing adequately    Interval History: Group learned and practiced mindfulness exercises (sensory meditation) to improve present-moment awareness, impulsive behavior tendencies, and tolerance of various emotional states.     Diagnosis: Anxiety    Plan: Continue treatment on BMU

## 2020-02-21 NOTE — PLAN OF CARE
02/21/20 1300   Activity/Group Therapy Checklist   Group Cognitive Therapy  (Discussed anger and frustration related to psychosiocial stressors with work, ex wife, and girlfriends family. Discussed acceptance and goals to manage anger)   Attendance Attended   Follows Direction Followed directions   Group Interactions/Observations Interacted appropriately   Affect/Mood Range Normal range   Affect/Mood Display Appropriate   Goal Progression Progressing

## 2020-02-24 NOTE — PATIENT CARE CONFERENCE
Problem List:     MDD  R/o KRISTA     Staff discussed pt perseverating on work stress. Staffing discussed pt's minimal progress and insight into issues. Staffing discussed pt's concerns for sleep issues and asking for medication. Staffing discussed pt appearing to malinger and avoid work.      Follow Up:   Medication Management -refer to PCP for insomnia  Psychotherapy - Dr. Rauno     Staff Present:  MD Dr. Doug Dunn MD Resident  Tere Hendrickson, Phd  Lakesha Beaver, [facility]

## 2020-02-26 ENCOUNTER — HOSPITAL ENCOUNTER (OUTPATIENT)
Dept: PSYCHIATRY | Facility: HOSPITAL | Age: 65
Discharge: HOME | End: 2020-02-26
Attending: PSYCHIATRY & NEUROLOGY
Payer: COMMERCIAL

## 2020-02-26 DIAGNOSIS — F41.9 ANXIETY: Primary | ICD-10-CM

## 2020-02-26 PROCEDURE — 90853 GROUP PSYCHOTHERAPY: CPT | Mod: ,,, | Performed by: PSYCHOLOGIST

## 2020-02-26 PROCEDURE — 99233 SBSQ HOSP IP/OBS HIGH 50: CPT | Mod: ,,, | Performed by: PSYCHIATRY & NEUROLOGY

## 2020-02-26 PROCEDURE — 90834 PR PSYCHOTHERAPY W/PATIENT, 45 MIN: ICD-10-PCS | Mod: 59,,, | Performed by: PSYCHOLOGIST

## 2020-02-26 PROCEDURE — 99233 PR SUBSEQUENT HOSPITAL CARE,LEVL III: ICD-10-PCS | Mod: ,,, | Performed by: PSYCHIATRY & NEUROLOGY

## 2020-02-26 PROCEDURE — 90853 PR GROUP PSYCHOTHERAPY: ICD-10-PCS | Mod: ,,, | Performed by: PSYCHOLOGIST

## 2020-02-26 PROCEDURE — 90853 GROUP PSYCHOTHERAPY: CPT

## 2020-02-26 PROCEDURE — 90834 PSYTX W PT 45 MINUTES: CPT | Mod: 59,,, | Performed by: PSYCHOLOGIST

## 2020-02-26 RX ORDER — HYDROXYZINE PAMOATE 25 MG/1
25 CAPSULE ORAL NIGHTLY PRN
Qty: 30 CAPSULE | Refills: 2 | Status: SHIPPED | OUTPATIENT
Start: 2020-02-26 | End: 2022-12-05

## 2020-02-26 NOTE — PROGRESS NOTES
Group Psychotherapy (PhD/LCSW)    Site: [facility]    Clinical status of patient: Intensive Outpatient Program (IOP)    Date: 2/26/2020    Group Focus: Personal Growth     Length of service: 04768 - 45-50 minutes    Number of patients in attendance: 7    Referred by: Behavioral Medicine Unit Treatment Team    Target symptoms: Anxiety    Patient's response to treatment: Active Listening     Progress toward goals: Progressing adequately    Interval History: Discussed the dynamics of personal growth with a particular focus on the importance of self-acceptance.  Identified obstacles to self-acceptance and ways to overcome those obstacles. Provided models and vignettes to illustrate strategies for cultivating self-acceptance.       Diagnosis: Anxiety    Plan: Continue treatment on BMU

## 2020-02-26 NOTE — PROGRESS NOTES
"[facility]  Psychology  Progress Note  Individual Psychotherapy (PhD/LCSW)    Patient Name: Dhruv Fine  MRN: [id number]    Patient Class: OP- Behavioral Recurring   Admission Date: 2/26/2020  Hospital Length of Stay: 0 days  Attending Physician: Amena Stacy MD  Primary Care Provider: Cassandra Cruz MD    Therapeutic Intervention: Met with patient.  Outpatient - Insight oriented psychotherapy 45 min - CPT code 53659    Chief Complaint/Reason for Encounter: anxiety     Interval History and Content of Current Session: Pt the 3 major stressors in his life that have combined to undermine his emotional well-being: work, problems with his ex-wife about custody arrangements, and his current girlfriend. In each case he is confronted by things that are not fair about the way the other person is interacting with him and his own "powerlessness" to change the situation. Regarding work, he recognizes that he needs to prioritize the security of his pension and not do anything to jeopardize that before he has officially left the job. He agreed that he would explore ramifications of actions he is contemplating (eg making a complaint about his supervisor) before taking action since he less than 2 months to go before he leaves the company after 30 years. Regarding his ex and the custody situation he recognized that the custody issues will be remedied in significant ways when he is no longer working at his full-time job. He also recognizes that his 13 y.o needs will be best served if he maintains a civil relationship with her mother.  Finally, in regard to the situation with his girlfriend he could see that for his own self-care he may need to distance himself from her. Regarding all three of these situations we discussed his fx of origin background and the way he group up having to cope with unfair situations throughout his life and how that relates to how it is so hard to accept those situations in his " present life.      Risk Parameters:  Patient reports no suicidal ideation  Patient reports no homicidal ideation  Patient reports no self-injurious behavior  Patient reports no violent behavior    Verbal Deficits: None    Patient's response to intervention:  The patient's response to intervention is accepting.    Progress toward goals and other mental status changes:  The patient's progress toward goals is fair .    Diagnostic Impression - Plan:     Diagnosis: Anxiety     Treatment Plan:  · Target symptoms: anxiety   · Why chosen therapy is appropriate versus another modality: relevant to diagnosis, patient responds to this modality  · Outcome monitoring methods: self-report, observation, chart notes   · Therapeutic intervention type: insight oriented psychotherapy    Plan:  BMU    Return to Clinic: as scheduled    Length of Service (minutes): 45    Luciano Ruano, PhD  Psychology  [facility]

## 2020-02-26 NOTE — PROGRESS NOTES
Group Psychotherapy (PhD/LCSW)    Site: [facility]    Clinical status of patient: Intensive Outpatient Program (IOP)    Date: 2/26/2020    Group Focus: CBT Group Psychotherapy    Length of service: 44423 - 45-50 minutes    Number of patients in attendance: 8    Referred by: Behavioral Medicine Unit Treatment Team    Target symptoms: Anxiety    Patient's response to treatment: Active Listening and Self-disclosure    Progress toward goals: Progressing adequately    Interval History: Session focus was Cognitive Restructuring (Part 3):  Identifying unhelpful and helpful thoughts.  Patients were provided with a worksheet on unhelpful thinking styles and how to identify helpful thoughts.  Patients helped others with personal examples of unhelpful and helpful thoughts.      Diagnosis: Anxiety    Plan: Continue treatment on BMU

## 2020-02-26 NOTE — PROGRESS NOTES
Group Psychotherapy (PhD/LCSW)    Site: [facility]    Clinical status of patient: Intensive Outpatient Program (IOP)    Date: 2/26/2020    Group Focus: Psychodynamic Group Process    Length of service: 55720 - 45-50 minutes    Number of patients in attendance: 3    Referred by: Behavioral Medicine Unit Treatment Team    Target symptoms: Anxiety    Patient's response to treatment: Active Listening, Self-disclosure, Feedback to other group members    Progress toward goals: Progressing adequately    Interval History: Pt reported having a good [place] with friends, but staying away from deep conversations to avoid discussing his current personal issues. He added he has come to the realization that he needs to end his relationship with his girlfriend because her stress is adding to his.    Diagnosis: Anxiety    Plan: Continue treatment on BMU

## 2020-02-26 NOTE — PROGRESS NOTES
"BEHAVIORAL MEDICAL UNIT PROGRESS NOTE      ENCOUNTER DATE:  2020 11:09 AM   SITE:  U unit, [facility]  ADMIT DATE:   Pt Name: Dhruv Fine   : 1955   MRN: [id number]    HISTORY OF PRESENTING ILLNESS:  Dhruv Fine is a 64 y.o. male with history of depression who is admitted to BMU for stabilization.    Patient reports he is feeling "okay.. Fair." He states he watched a parade yesterday which he enjoyed somewhat, "I wanted to be around people, I haven't felt that way in a while." He states he is not sure if he is ready to face his dilemma at work. Complains of poor sleep overnight. He still feels upset by "wrong and unfair," situations, "I'm not wired to accept that but being away from that toxic situation has given me time to think."     Reports he broke up with his girlfriend to help "alleviate stress."       Risk Parameters:  Patient reports no suicidal ideation  Patient reports no homicidal ideation  Patient reports no self-injurious behavior  Patient reports no violent behavior    Current psych meds  None    PAST PSYCHIATRIC, MEDICAL, AND SOCIAL HISTORY REVIEWED  The patient's past medical, family and social history have been reviewed and updated as appropriate within the electronic medical record     MEDICAL REVIEW OF SYSTEMS  Review of Systems   Constitutional: Negative for chills and fever.   Respiratory: Negative for shortness of breath.    Cardiovascular: Negative for chest pain and palpitations.   Gastrointestinal: Negative for constipation, diarrhea, nausea and vomiting.   Musculoskeletal: Positive for joint pain.   Skin: Negative for rash.         ALL MEDICATIONS:    Current Outpatient Medications:     budesonide (PULMICORT) 0.5 mg/2 mL nebulizer solution, MIX CONTENTS OF 1 RESPULE WITH STERILE DILUENT PROVIDED. POUR INTO NASONEB CUP & PERFORM NASAL TREATMENT TWICE DAILY., Disp: , Rfl:     cyclobenzaprine (FLEXERIL) 10 MG tablet, Take 1 tablet (10 mg total) by mouth nightly. Prn " severe muscle spasms, Disp: 30 tablet, Rfl: 0    diazePAM (VALIUM) 10 MG Tab, Take 10 mg by mouth as needed., Disp: , Rfl:     doxycycline (VIBRA-TABS) 100 MG tablet, Take 1 tablet (100 mg total) by mouth 2 (two) times daily., Disp: 14 tablet, Rfl: 0    fluticasone propionate (FLONASE) 50 mcg/actuation nasal spray, SHAKE LIQUID AND USE 1 SPRAY(50 MCG) IN EACH NOSTRIL EVERY DAY, Disp: 3 Bottle, Rfl: 0    lidocaine (LIDODERM) 5 %, Place 1-2 patches onto the skin once daily. Wear patch for 12 hours and  must have a 12 hour period without a patch, Disp: 60 patch, Rfl: 2    multivitamin (ONE DAILY MULTIVITAMIN) per tablet, Take 1 tablet by mouth once daily., Disp: , Rfl:     valACYclovir (VALTREX) 1000 MG tablet, Take 1 tablet (1,000 mg total) by mouth every 12 (twelve) hours as needed., Disp: 14 tablet, Rfl: 0    ALLERGIES:  Review of patient's allergies indicates:   Allergen Reactions    No known allergies        RELEVANT LABS/STUDIES:    Lab Results   Component Value Date    WBC 4.81 10/14/2019    HGB 14.6 10/14/2019    HCT 45.5 10/14/2019    MCV 91 10/14/2019     10/14/2019     BMP  Lab Results   Component Value Date     10/14/2019    K 4.6 10/14/2019     10/14/2019    CO2 29 10/14/2019    BUN 12 10/14/2019    CREATININE 1.0 10/14/2019    CALCIUM 10.0 10/14/2019    ANIONGAP 8 10/14/2019    ESTGFRAFRICA >60.0 10/14/2019    EGFRNONAA >60.0 10/14/2019     Lab Results   Component Value Date    ALT 33 10/14/2019    AST 29 10/14/2019    ALKPHOS 78 10/14/2019    BILITOT 0.3 10/14/2019     Lab Results   Component Value Date    TSH 3.033 10/14/2019     Lab Results   Component Value Date    HGBA1C 5.9 (H) 10/14/2019       VITALS  defer to nursing note    PHYSICAL EXAM  General: well developed, well nourished  Neurologic:   Gait: Normal   Psychomotor signs:  No involuntary movements or tremor  AIMS: none    PSYCHIATRIC EXAM:     Mental Status Exam:  Appearance: unremarkable, age  "appropriate  Behavior/Cooperation: normal, cooperative  Speech: normal tone, normal rate, normal pitch, normal volume  Language: uses words appropriately; NO aphasia or dysarthria  Mood: "okay"  Affect Full, reactive  Thought Process: normal and logical  Thought Content: normal, no suicidality, no homicidality, delusions, or paranoia  Level of Consciousness:  Alert and Oriented x3  Memory:  Intact  Attention/concentration:  appropriate for age/education.   Fund of Knowledge:  appears adequate  Insight:  fair  Judgment: fair    IMPRESSION:    Dhruv Fine is a 64 y.o. male with history of anxiety who is admitted to the BMU for irritability.    Status/Progress:  Based on the examination today, the patient's problem(s) is/are inadequately controlled.  New problems have not been presented today.     DIAGNOSES:  MDD  R/o KRISTA     PLAN:  Continue BMU treatment- group and individual therapies     Psych Med:    · Start Vistaril 25 mg PO qhs PRN for insomnia     · Discussed with patient informed consent, risks vs. benefits, alternative treatments, side effect profile and the inherent unpredictability of individual responses to these treatments. Answered any questions patient may have had. The patient expresses understanding of the above and displays the capacity to agree with this current plan      Other:  jacqui Camacho MD  [facility]  2/26/2020 11:09 AM      "

## 2020-02-27 ENCOUNTER — DOCUMENTATION ONLY (OUTPATIENT)
Dept: PSYCHIATRY | Facility: HOSPITAL | Age: 65
End: 2020-02-27

## 2020-02-27 NOTE — PROGRESS NOTES
Sw received a call from pt at approx 8:30am reporting that contractors showed up at his house and he is unable to make it in today. Pt reports that he will complete his last day chanelle 2/28/2020.

## 2020-02-28 ENCOUNTER — HOSPITAL ENCOUNTER (OUTPATIENT)
Dept: PSYCHIATRY | Facility: HOSPITAL | Age: 65
Discharge: HOME | End: 2020-02-28
Attending: PSYCHIATRY & NEUROLOGY
Payer: COMMERCIAL

## 2020-02-28 DIAGNOSIS — E66.09 NON MORBID OBESITY DUE TO EXCESS CALORIES: ICD-10-CM

## 2020-02-28 DIAGNOSIS — F41.9 ANXIETY: Primary | ICD-10-CM

## 2020-02-28 PROCEDURE — 90853 PR GROUP PSYCHOTHERAPY: ICD-10-PCS | Mod: ,,, | Performed by: PSYCHOLOGIST

## 2020-02-28 PROCEDURE — 90853 GROUP PSYCHOTHERAPY: CPT | Mod: ,,, | Performed by: PSYCHOLOGIST

## 2020-02-28 PROCEDURE — 90853 GROUP PSYCHOTHERAPY: CPT | Performed by: SOCIAL WORKER

## 2020-02-28 PROCEDURE — 90853 GROUP PSYCHOTHERAPY: CPT

## 2020-02-28 PROCEDURE — 99239 PR HOSPITAL DISCHARGE DAY,>30 MIN: ICD-10-PCS | Mod: ,,, | Performed by: PSYCHIATRY & NEUROLOGY

## 2020-02-28 PROCEDURE — 99239 HOSP IP/OBS DSCHRG MGMT >30: CPT | Mod: ,,, | Performed by: PSYCHIATRY & NEUROLOGY

## 2020-02-28 NOTE — PROGRESS NOTES
02/28/20 1000   Activity/Group Therapy Checklist   Group Educational  (Self-care assessment)   Attendance Attended   Follows Direction Followed directions   Group Interactions/Observations Interacted appropriately;Sharing  (Self-care goal to listen to music)   Affect/Mood Range Normal range   Affect/Mood Display Appropriate   Goal Progression Progressing

## 2020-02-28 NOTE — DISCHARGE SUMMARY
"Behavioral Medicine Unit Partial Hospitalization Discharge Summary     Discharge Date: 2020 10:07 AM   Pt Name: Dhruv Fine   : 1955   MRN: [id number]     Admit Date:     SUBJECTIVE:  Patient is a 64 y.o. old, male, with past psychiatric history of MDD and KRISTA, admitted with depression and anxiety.    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 Still expressing frustration from treatment at work. He states he is not able to identify any possible coping skills, although he reports he took a walk which was helpful. Poor sleep overnight however did not take vistaril, "I forgot."      Risk Parameters:  Patient reports no suicidal ideation  Patient reports no homicidal ideation  Patient reports no self-injurious behavior  Patient reports no violent behavior    Allergies:   Review of patient's allergies indicates:   Allergen Reactions    No known allergies      Current Medications:     Current Outpatient Medications:     budesonide (PULMICORT) 0.5 mg/2 mL nebulizer solution, MIX CONTENTS OF 1 RESPULE WITH STERILE DILUENT PROVIDED. POUR INTO NASONEB CUP & PERFORM NASAL TREATMENT TWICE DAILY., Disp: , Rfl:     cyclobenzaprine (FLEXERIL) 10 MG tablet, Take 1 tablet (10 mg total) by mouth nightly. Prn severe muscle spasms, Disp: 30 tablet, Rfl: 0    diazePAM (VALIUM) 10 MG Tab, Take 10 mg by mouth as needed., Disp: , Rfl:     doxycycline (VIBRA-TABS) 100 MG tablet, Take 1 tablet (100 mg total) by mouth 2 (two) times daily., Disp: 14 tablet, Rfl: 0    fluticasone propionate (FLONASE) 50 mcg/actuation nasal spray, SHAKE LIQUID AND USE 1 SPRAY(50 MCG) IN EACH NOSTRIL EVERY DAY, Disp: 3 Bottle, Rfl: 0    hydrOXYzine pamoate (VISTARIL) 25 MG Cap, Take 1 capsule (25 mg total) by mouth nightly as needed (insomnia)., Disp: 30 capsule, Rfl: 2    lidocaine (LIDODERM) 5 %, Place 1-2 patches onto " the skin once daily. Wear patch for 12 hours and  must have a 12 hour period without a patch, Disp: 60 patch, Rfl: 2    multivitamin (ONE DAILY MULTIVITAMIN) per tablet, Take 1 tablet by mouth once daily., Disp: , Rfl:     valACYclovir (VALTREX) 1000 MG tablet, Take 1 tablet (1,000 mg total) by mouth every 12 (twelve) hours as needed., Disp: 14 tablet, Rfl: 0    Medical ROS:  Review of Systems   Constitutional: Negative for chills and fever.   Respiratory: Negative for shortness of breath.    Cardiovascular: Negative for chest pain and palpitations.   Gastrointestinal: Negative for constipation, diarrhea, nausea and vomiting.   Musculoskeletal: Positive for joint pain.   Skin: Negative for rash.       OBJECTIVE:   Vitals (Most Recent)  There were no vitals filed for this visit.; defer to nursing note    Labs/Imaging/Studies:   No results found for this or any previous visit (from the past 48 hour(s)).   No results found for: PHENYTOIN, PHENOBARB, VALPROATE, CBMZ    Mental Status Exam:  Appearance: unremarkable, age appropriate  Behavior/Cooperation: normal, cooperative  Speech: normal tone, normal rate, normal pitch, normal volume  Mood: frustrated  Affect: normal  Thought Process: normal and logical  Thought Content: normal, no suicidality, no homicidality, delusions, or paranoia  Orientation: grossly intact  Memory: Grossly intact  Attention Span/Concentration: Normal  Fund of Knowledge: Aware of current events  Estimate of Intelligence: grossly intact  Cognition: grossly intact  Insight: fair  Judgment: fair    ASSESSMENT/PLAN:   General Assessment:  Patient is a 64 y.o., male, admitted to the BMU partial hospitalization program  for stabilization.      Diagnosis:  MDD  R/o KRISTA       Plan:  1. Completed BMU treatment with moderate improvement in presenting symptoms and was encouraged to attend after care groups for better transition to out pt care.     2. Psych meds   Continue current med regimen, tolerating  well with out any side effects.    Discussed with patient informed consent, risks vs. benefits, alternative treatments, side effect profile and the inherent unpredictability of individual responses to these treatments. Answered any questions patient may have had. The patient expresses understanding of the above and displays the capacity to  agree with this.     3. Patient Discharge Instructions and informed to:-  · Follow up regularly with Outpatient [facility] appointments for further psychiatric care and management.   · Refrain from using excessive alcohol, avoid any illicit substances and or abuse of prescription medications, as this may worsen mood and may be detrimental to health.  · Call the crisis line at: [phone] for help in a crisis and emergent situations and present to ED for any worsening of psychiatric symptoms or any SI/HI/AVH    Ajay Camacho MD  Department of Psychiatry   [facility]  2/28/2020 10:07 AM

## 2020-02-28 NOTE — PROGRESS NOTES
Group Psychotherapy (PhD/LCSW)    Site: [facility]    Clinical status of patient: Intensive Outpatient Program (IOP)    Date: 2/28/2020    Group Focus: Psychodynamic Group Process    Length of service: 75337 - 45-50 minutes    Number of patients in attendance: 6    Referred by: Behavioral Medicine Unit Treatment Team    Target symptoms: Anxiety    Patient's response to treatment: Active Listening, Self-disclosure, Feedback to other group members    Progress toward goals: Progressing adequately    Interval History: Pt reported missing his girlfriend because they have not spoken in days, but he knows ending the relationship is best for him. He reported he does not plan to return to work Monday and will take some vacation days if needed. He is planning to figure out how he can avoid returning to work all together to avoid interacting with his supervisor.     Diagnosis: Anxiety    Plan: Discharge from U

## 2020-02-28 NOTE — PROGRESS NOTES
Group Psychotherapy (PhD/LCSW)    Site: [facility]    Clinical status of patient: Intensive Outpatient Program (IOP)    Date: 2/28/2020    Group Focus: Stress Management    Length of service: 91825 - 45-50 minutes    Number of patients in attendance: 10    Referred by: Behavioral Medicine Unit Treatment Team    Target symptoms: Anxiety    Patient's response to treatment: Active Listening    Progress toward goals: Progressing adequately    Interval History: Group learned and practiced mindfulness exercises (walking) to improve present-moment awareness, impulsive behavior tendencies, and tolerance of various emotional states.     Diagnosis: Anxiety    Plan: Continue treatment on BMU

## 2020-03-02 ENCOUNTER — PATIENT OUTREACH (OUTPATIENT)
Dept: ADMINISTRATIVE | Facility: HOSPITAL | Age: 65
End: 2020-03-02

## 2020-03-04 ENCOUNTER — OFFICE VISIT (OUTPATIENT)
Dept: PSYCHIATRY | Facility: CLINIC | Age: 65
End: 2020-03-04
Payer: COMMERCIAL

## 2020-03-04 DIAGNOSIS — F32.A DEPRESSION, UNSPECIFIED DEPRESSION TYPE: Primary | ICD-10-CM

## 2020-03-04 DIAGNOSIS — F41.9 ANXIETY: ICD-10-CM

## 2020-03-04 PROCEDURE — 90834 PSYTX W PT 45 MINUTES: CPT | Mod: S$GLB,,, | Performed by: PSYCHOLOGIST

## 2020-03-04 PROCEDURE — 90834 PR PSYCHOTHERAPY W/PATIENT, 45 MIN: ICD-10-PCS | Mod: S$GLB,,, | Performed by: PSYCHOLOGIST

## 2020-03-04 NOTE — PROGRESS NOTES
"Individual Psychotherapy (PhD/LCSW)    3/4/2020    Site:  [facility]         Therapeutic Intervention: Met with patient.  Outpatient - Insight oriented psychotherapy 45 min - CPT code 51855    Chief complaint/reason for encounter: depression, anger and anxiety     Interval history and content of current session: Pt recently discharged from the BMU program where he was treated for anxiety, depression and problems managing his anger. Pt describes himself as still in "crisis mode". He is anxious about returning to work in circumstances where his anger may get the best of him and result in problems on the job. He is also struggling with custody issues with his ex-wife, recent loss of several friends, and ambivalence about having broken up with his girlfriend. We addressed practical issues in coping with these stressors with an emphasis on things he can do to mitigate his anger and use skills for self-soothing learned on the BMU program.      Treatment plan:  · Target symptoms: depression, anxiety , anger management   · Why chosen therapy is appropriate versus another modality: relevant to diagnosis, patient responds to this modality  · Outcome monitoring methods: self-report, observation, chart notes   · Therapeutic intervention type: insight oriented psychotherapy    Risk parameters:  Patient reports no suicidal ideation  Patient reports no homicidal ideation  Patient reports no self-injurious behavior  Patient reports no violent behavior    Verbal deficits: None    Patient's response to intervention:  The patient's response to intervention is accepting.    Progress toward goals and other mental status changes:  The patient's progress toward goals is fair .    Diagnosis:  Depression, unspecified; Anxiety, uspecified    Plan:  individual psychotherapy; BMU aftercare group.     Return to clinic: as scheduled    Length of Service (minutes): 45      "

## 2020-03-05 ENCOUNTER — OFFICE VISIT (OUTPATIENT)
Dept: PSYCHIATRY | Facility: CLINIC | Age: 65
End: 2020-03-05
Payer: COMMERCIAL

## 2020-03-05 DIAGNOSIS — F32.A DEPRESSION, UNSPECIFIED DEPRESSION TYPE: Primary | ICD-10-CM

## 2020-03-05 DIAGNOSIS — F41.9 ANXIETY: ICD-10-CM

## 2020-03-05 PROCEDURE — 90853 GROUP PSYCHOTHERAPY: CPT | Mod: S$GLB,,, | Performed by: PSYCHOLOGIST

## 2020-03-05 PROCEDURE — 90853 PR GROUP PSYCHOTHERAPY: ICD-10-PCS | Mod: S$GLB,,, | Performed by: PSYCHOLOGIST

## 2020-03-06 NOTE — PROGRESS NOTES
Group Psychotherapy    Site: [facility]    Clinical status of patient: Outpatient    3/5/2020    Length of service:77665-54bqd    Referred by: Behavioral Medicine Unit     Number of patients in attendance: 7    Target symptoms: depression, anxiety , mood swings    Patient's response to intervention:  The patient's response to intervention is active listening, self-disclosure, feedback to other patients.    Progress toward goals and other mental status changes:  The patient's progress toward goals is fair .    Interval history: This was the patient's first BMU aftercare group session as she completed the BMU last week. He introduced himself appropriately to the group.    Diagnosis:   1. Depression, unspecified depression type    2. Anxiety      Plan: group psychotherapy    Return to clinic: 1 week

## 2020-03-09 ENCOUNTER — OFFICE VISIT (OUTPATIENT)
Dept: PSYCHIATRY | Facility: CLINIC | Age: 65
End: 2020-03-09
Payer: COMMERCIAL

## 2020-03-09 DIAGNOSIS — F32.A DEPRESSION, UNSPECIFIED DEPRESSION TYPE: Primary | ICD-10-CM

## 2020-03-09 DIAGNOSIS — F41.9 ANXIETY: ICD-10-CM

## 2020-03-09 PROCEDURE — 90834 PSYTX W PT 45 MINUTES: CPT | Mod: S$GLB,,, | Performed by: PSYCHOLOGIST

## 2020-03-09 PROCEDURE — 90834 PR PSYCHOTHERAPY W/PATIENT, 45 MIN: ICD-10-PCS | Mod: S$GLB,,, | Performed by: PSYCHOLOGIST

## 2020-03-09 NOTE — PROGRESS NOTES
Individual Psychotherapy (PhD/LCSW)    3/9/2020    Site:  [facility]         Therapeutic Intervention: Met with patient.  Outpatient - Insight oriented psychotherapy 45 min - CPT code 77318    Chief complaint/reason for encounter: depression, anger and anxiety     Interval history and content of current session: Pt noted that is feeling more relaxed and confident even though he has had to attend 4 funerals this past week. He cites instances where he was able to take time and collect himself before he engaged in stressful interactions. He recounted doing this with his girlfriend which allowed him to have a conversation with her that was productive, reassuring, and hopeful (vis-a-vis the future of the relationship). He was able to communicate to her his needs and relate them to the lack of love he received from his father. She was able to acknowledge her part in their relationship difficulties. He is feeling better about handling his anger with his supervisor when he returns to work for the two weeks he needs to be there before retiring.       Treatment plan:  · Target symptoms: depression, anxiety , anger management   · Why chosen therapy is appropriate versus another modality: relevant to diagnosis, patient responds to this modality  · Outcome monitoring methods: self-report, observation, chart notes   · Therapeutic intervention type: insight oriented psychotherapy    Risk parameters:  Patient reports no suicidal ideation  Patient reports no homicidal ideation  Patient reports no self-injurious behavior  Patient reports no violent behavior    Verbal deficits: None    Patient's response to intervention:  The patient's response to intervention is accepting.    Progress toward goals and other mental status changes:  The patient's progress toward goals is fair .    Diagnosis:  Depression, unspecified; Anxiety, uspecified    Plan:  individual psychotherapy; BMU aftercare group.     Return to clinic: as  scheduled    Length of Service (minutes): 45

## 2020-03-12 ENCOUNTER — OFFICE VISIT (OUTPATIENT)
Dept: PSYCHIATRY | Facility: CLINIC | Age: 65
End: 2020-03-12
Payer: COMMERCIAL

## 2020-03-12 DIAGNOSIS — F43.22 ADJUSTMENT DISORDER WITH ANXIETY: Primary | ICD-10-CM

## 2020-03-12 PROCEDURE — 90853 PR GROUP PSYCHOTHERAPY: ICD-10-PCS | Mod: S$GLB,,, | Performed by: PSYCHOLOGIST

## 2020-03-12 PROCEDURE — 90853 GROUP PSYCHOTHERAPY: CPT | Mod: S$GLB,,, | Performed by: PSYCHOLOGIST

## 2020-03-12 NOTE — PROGRESS NOTES
Group Psychotherapy    Site: [facility]    Clinical status of patient: Outpatient    3/12/2020    Length of service:[id number]    Referred by: Behavioral Medicine Unit     Number of patients in attendance: 3    Target symptoms: depression, anxiety , mood swings    Patient's response to intervention:  The patient's response to intervention is active listening, self-disclosure, feedback to other patients.    Progress toward goals and other mental status changes:  The patient's progress toward goals is fair .    Interval history: Patient reported feeling worried about returning to work next week because the sight of his supervisor on [employer] made him angry. This anger was processed in group and he was provided suggestions on how to manage his emotions when he returns to work.    Diagnosis: Adjustment Disorder with Anxiety    Plan: group psychotherapy    Return to clinic: 1 week

## 2020-03-17 ENCOUNTER — OFFICE VISIT (OUTPATIENT)
Dept: PSYCHIATRY | Facility: CLINIC | Age: 65
End: 2020-03-17
Payer: COMMERCIAL

## 2020-03-17 DIAGNOSIS — F32.A DEPRESSION, UNSPECIFIED DEPRESSION TYPE: Primary | ICD-10-CM

## 2020-03-17 DIAGNOSIS — F41.9 ANXIETY: ICD-10-CM

## 2020-03-17 PROCEDURE — 90834 PSYTX W PT 45 MINUTES: CPT | Mod: S$GLB,,, | Performed by: PSYCHOLOGIST

## 2020-03-17 PROCEDURE — 90834 PR PSYCHOTHERAPY W/PATIENT, 45 MIN: ICD-10-PCS | Mod: S$GLB,,, | Performed by: PSYCHOLOGIST

## 2020-03-17 NOTE — PROGRESS NOTES
Individual Psychotherapy (PhD/LCSW)    3/17/2020    Site:  [facility]         Therapeutic Intervention: Met with patient.  Outpatient - Insight oriented psychotherapy 45 min - CPT code 52252    Chief complaint/reason for encounter: depression, anger and anxiety     Interval history and content of current session: Pt preparing to return to work for two or three more weeks; then he will take vacation until he is officially retired at the end of April. Discussed coping skills to handle any contact with his supervisor that might make him angry. Noted that the current corona virus situation obviates the predicament about not including the supervisor in his [facility] party (there will be no party). Encouraged him to email HR to document that he has tried reaching them by phone to set up his return to work tomorrow. Discussed his sense of isolation due to the virus and how he can work around it. Noted that we should have telemedicine appointments available for follow up appts.        Treatment plan:  · Target symptoms: depression, anxiety , anger management   · Why chosen therapy is appropriate versus another modality: relevant to diagnosis, patient responds to this modality  · Outcome monitoring methods: self-report, observation, chart notes   · Therapeutic intervention type: insight oriented psychotherapy    Risk parameters:  Patient reports no suicidal ideation  Patient reports no homicidal ideation  Patient reports no self-injurious behavior  Patient reports no violent behavior    Verbal deficits: None    Patient's response to intervention:  The patient's response to intervention is accepting.    Progress toward goals and other mental status changes:  The patient's progress toward goals is fair .    Diagnosis:  Depression, unspecified; Anxiety, uspecified    Plan:  individual psychotherapy; [facility] aftercare group.     Return to clinic: as scheduled    Length of Service (minutes): 45

## 2020-03-18 ENCOUNTER — PATIENT MESSAGE (OUTPATIENT)
Dept: PSYCHIATRY | Facility: CLINIC | Age: 65
End: 2020-03-18

## 2020-03-19 NOTE — TELEPHONE ENCOUNTER
----- Message from Pooja Noble sent at 1/9/2017  2:05 PM CST -----  Contact: Self- Dhruv- [phone]  RX request - refill or new RX.  Is this a refill or new RX:  Refill  RX name and strength:  PROMETHAZINE HCL/CODEINE 6.25-10 mg/5 mL   Directions:   Is this a 30 day or 90 day RX:  30 day  Pharmacy name and phone #: [facility] [id number] [place], [place] - [address]  Comments:        
no

## 2020-03-24 ENCOUNTER — OFFICE VISIT (OUTPATIENT)
Dept: PSYCHIATRY | Facility: CLINIC | Age: 65
End: 2020-03-24
Payer: COMMERCIAL

## 2020-03-24 DIAGNOSIS — F41.9 ANXIETY: ICD-10-CM

## 2020-03-24 DIAGNOSIS — F32.A DEPRESSION, UNSPECIFIED DEPRESSION TYPE: Primary | ICD-10-CM

## 2020-03-24 PROCEDURE — 90834 PR PSYCHOTHERAPY W/PATIENT, 45 MIN: ICD-10-PCS | Mod: 95,,, | Performed by: PSYCHOLOGIST

## 2020-03-24 PROCEDURE — 90834 PSYTX W PT 45 MINUTES: CPT | Mod: 95,,, | Performed by: PSYCHOLOGIST
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
## 2020-03-27 ENCOUNTER — PATIENT MESSAGE (OUTPATIENT)
Dept: PSYCHIATRY | Facility: CLINIC | Age: 65
End: 2020-03-27

## 2020-04-01 ENCOUNTER — OFFICE VISIT (OUTPATIENT)
Dept: PSYCHIATRY | Facility: CLINIC | Age: 65
End: 2020-04-01
Payer: COMMERCIAL

## 2020-04-01 DIAGNOSIS — F41.9 ANXIETY: ICD-10-CM

## 2020-04-01 DIAGNOSIS — F32.A DEPRESSION, UNSPECIFIED DEPRESSION TYPE: Primary | ICD-10-CM

## 2020-04-01 PROCEDURE — 90834 PR PSYCHOTHERAPY W/PATIENT, 45 MIN: ICD-10-PCS | Mod: 95,,, | Performed by: PSYCHOLOGIST

## 2020-04-01 PROCEDURE — 90834 PSYTX W PT 45 MINUTES: CPT | Mod: 95,,, | Performed by: PSYCHOLOGIST
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
## 2020-04-07 ENCOUNTER — OFFICE VISIT (OUTPATIENT)
Dept: PSYCHIATRY | Facility: CLINIC | Age: 65
End: 2020-04-07
Payer: COMMERCIAL

## 2020-04-07 DIAGNOSIS — F41.9 ANXIETY: ICD-10-CM

## 2020-04-07 DIAGNOSIS — F32.A DEPRESSION, UNSPECIFIED DEPRESSION TYPE: Primary | ICD-10-CM

## 2020-04-07 PROCEDURE — 90834 PR PSYCHOTHERAPY W/PATIENT, 45 MIN: ICD-10-PCS | Mod: 95,,, | Performed by: PSYCHOLOGIST

## 2020-04-07 PROCEDURE — 90834 PSYTX W PT 45 MINUTES: CPT | Mod: 95,,, | Performed by: PSYCHOLOGIST

## 2020-04-07 NOTE — PROGRESS NOTES
"The patient location is: home  The chief complaint leading to consultation is: depression, anxiety   Visit type: Virtual visit with synchronous audio and video  Total time spent with patient: 45 minutes   Each patient to whom he or she provides medical services by telemedicine is:  (1) informed of the relationship between the physician and patient and the respective role of any other health care provider with respect to management of the patient; and (2) notified that he or she may decline to receive medical services by telemedicine and may withdraw from such care at any time.    Note: Due to technical problems that arose during the telemedicine session we had to finish the session by phone.      Individual Psychotherapy (PhD/LCSW)    2020    Site:  [facility]         Therapeutic Intervention: Met with patient.  Outpatient - Insight oriented psychotherapy 45 min - CPT code 66996    Chief complaint/reason for encounter: depression, anger and anxiety     Interval history and content of current session:  Pt reports that he has only two more virtual days of work before his vacation takes over and will take him to his care home date. He is worrying less about his conflict with his supervisor and looking forward to his life after work. He has figured out that his finances will be adequate and he has the possibility of part-time work if he wants it. He is doing research on Medicare options; He enjoyed a family get-together via zoom and is looking forward to more. He discussed people he has known that have  from the virus. He is doing his best to stay safe and stay busy in the context of the pandemic. We discussed what he had learned from his recent conflict with his supervisor. He recognizes that he is someone on tends to go towards conflict ("lets get it over with") and he needs to be more cognizant of his feelings and not let them push him into an escalation of conflict when its not necessary and will " only make things worse for him. .    Treatment plan:  · Target symptoms: depression, anxiety , anger management   · Why chosen therapy is appropriate versus another modality: relevant to diagnosis, patient responds to this modality  · Outcome monitoring methods: self-report, observation, chart notes   · Therapeutic intervention type: insight oriented psychotherapy    Risk parameters:  Patient reports no suicidal ideation  Patient reports no homicidal ideation  Patient reports no self-injurious behavior  Patient reports no violent behavior    Verbal deficits: None    Patient's response to intervention:  The patient's response to intervention is accepting.    Progress toward goals and other mental status changes:  The patient's progress toward goals is fair .    Diagnosis:  Depression, unspecified; Anxiety, uspecified    Plan:  individual psychotherapy; BMU aftercare group.     Return to clinic: as scheduled    Length of Service (minutes): 45

## 2020-04-15 ENCOUNTER — OFFICE VISIT (OUTPATIENT)
Dept: PSYCHIATRY | Facility: CLINIC | Age: 65
End: 2020-04-15
Payer: COMMERCIAL

## 2020-04-15 DIAGNOSIS — F41.9 ANXIETY: ICD-10-CM

## 2020-04-15 DIAGNOSIS — F32.A DEPRESSION, UNSPECIFIED DEPRESSION TYPE: Primary | ICD-10-CM

## 2020-04-15 PROCEDURE — 90834 PSYTX W PT 45 MINUTES: CPT | Mod: 95,,, | Performed by: PSYCHOLOGIST

## 2020-04-15 PROCEDURE — 90834 PR PSYCHOTHERAPY W/PATIENT, 45 MIN: ICD-10-PCS | Mod: 95,,, | Performed by: PSYCHOLOGIST

## 2020-04-15 NOTE — PROGRESS NOTES
The patient location is: home  The chief complaint leading to consultation is: depression, anxiety   Visit type: Virtual visit with synchronous audio and video  Total time spent with patient: 45 minutes   Each patient to whom he or she provides medical services by telemedicine is:  (1) informed of the relationship between the physician and patient and the respective role of any other health care provider with respect to management of the patient; and (2) notified that he or she may decline to receive medical services by telemedicine and may withdraw from such care at any time.    Note: Due to technical problems that arose during the telemedicine session we had to finish the session by phone.      Individual Psychotherapy (PhD/LCSW)    4/15/2020    Site:  [facility]         Therapeutic Intervention: Met with patient.  Outpatient - Insight oriented psychotherapy 45 min - CPT code 69063    Chief complaint/reason for encounter: depression, anger and anxiety     Interval history and content of current session:  Pt relieved that everything appears to be working smoothly now with his last couple of weeks at work. He has been able to communicate with his supervisor effectively through email and he has agreed to work from home rather than take vacation time so that he can be paid for the time. He went to his office and started to take home his things. He described mixed feelings of sadness at ending a 30+ year career and relief and anticipation of all the things he will be able to do in group home. He is getting along better with his ex and there are no current problems with visitation/custody. He is staying in contact with his girlfriend despite the stressors in her life that are preventing them from getting together. He is now trying to decide about a medicare plan. .    Treatment plan:  · Target symptoms: depression, anxiety , anger management   · Why chosen therapy is appropriate versus another modality: relevant  to diagnosis, patient responds to this modality  · Outcome monitoring methods: self-report, observation, chart notes   · Therapeutic intervention type: insight oriented psychotherapy    Risk parameters:  Patient reports no suicidal ideation  Patient reports no homicidal ideation  Patient reports no self-injurious behavior  Patient reports no violent behavior    Verbal deficits: None    Patient's response to intervention:  The patient's response to intervention is accepting.    Progress toward goals and other mental status changes:  The patient's progress toward goals is fair .    Diagnosis:  Depression, unspecified; Anxiety, uspecified    Plan:  individual psychotherapy; BMU aftercare group.     Return to clinic: as scheduled    Length of Service (minutes): 45

## 2020-04-21 ENCOUNTER — TELEPHONE (OUTPATIENT)
Dept: SPORTS MEDICINE | Facility: CLINIC | Age: 65
End: 2020-04-21

## 2020-04-21 ENCOUNTER — OFFICE VISIT (OUTPATIENT)
Dept: PSYCHIATRY | Facility: CLINIC | Age: 65
End: 2020-04-21
Payer: COMMERCIAL

## 2020-04-21 DIAGNOSIS — F32.A DEPRESSION, UNSPECIFIED DEPRESSION TYPE: Primary | ICD-10-CM

## 2020-04-21 DIAGNOSIS — F41.9 ANXIETY: ICD-10-CM

## 2020-04-21 PROCEDURE — 90834 PR PSYCHOTHERAPY W/PATIENT, 45 MIN: ICD-10-PCS | Mod: 95,,, | Performed by: PSYCHOLOGIST

## 2020-04-21 PROCEDURE — 90834 PSYTX W PT 45 MINUTES: CPT | Mod: 95,,, | Performed by: PSYCHOLOGIST

## 2020-04-21 NOTE — TELEPHONE ENCOUNTER
Spoke to the patient and scheduled a virtual visit with Dr. Fine on 4/22 at 12:15p    ----- Message from Esperanza Hollins sent at 4/21/2020  4:18 PM CDT -----  Contact: SELF  Pt states he need a new left knee brace Pt ask for a call    Contact info  [phone]

## 2020-04-21 NOTE — PROGRESS NOTES
The patient location is: home  The chief complaint leading to consultation is: depression, anxiety   Visit type: Virtual visit with synchronous audio and video  Total time spent with patient: 45 minutes   Each patient to whom he or she provides medical services by telemedicine is:  (1) informed of the relationship between the physician and patient and the respective role of any other health care provider with respect to management of the patient; and (2) notified that he or she may decline to receive medical services by telemedicine and may withdraw from such care at any time.    Note: Due to technical problems that arose during the telemedicine session we had to finish the session by phone.      Individual Psychotherapy (PhD/LCSW)    4/21/2020    Site:  [facility]         Therapeutic Intervention: Met with patient.  Outpatient - Insight oriented psychotherapy 45 min - CPT code 83441    Chief complaint/reason for encounter: depression, anger and anxiety     Interval history and content of current session:  Pt reports that things are going smoothly now in regard to his half-way. Issues with his supervisor appear to be receding as the pt completes his paperwork for his last day at the end of the month. Other half-way related issues have arisen (child support and medical coverage for his 13 year-old daughter; choosing a medicare plan, etc.). But pt is not overly stressed as he was in dealing with his supervisor. Discussed with pt the way that his fx of origin issues became activated in connection with his problems with his supervisor. Pt now dealing with his supervisor more effectively and without the tension he experienced before.     Treatment plan:  · Target symptoms: depression, anxiety , anger management   · Why chosen therapy is appropriate versus another modality: relevant to diagnosis, patient responds to this modality  · Outcome monitoring methods: self-report, observation, chart notes    · Therapeutic intervention type: insight oriented psychotherapy    Risk parameters:  Patient reports no suicidal ideation  Patient reports no homicidal ideation  Patient reports no self-injurious behavior  Patient reports no violent behavior    Verbal deficits: None    Patient's response to intervention:  The patient's response to intervention is accepting.    Progress toward goals and other mental status changes:  The patient's progress toward goals is fair .    Diagnosis:  Depression, unspecified; Anxiety, uspecified    Plan:  individual psychotherapy; BMU aftercare group.     Return to clinic: as scheduled    Length of Service (minutes): 45

## 2020-04-22 ENCOUNTER — OFFICE VISIT (OUTPATIENT)
Dept: SPORTS MEDICINE | Facility: CLINIC | Age: 65
End: 2020-04-22
Payer: COMMERCIAL

## 2020-04-22 DIAGNOSIS — M17.12 ARTHRITIS OF LEFT KNEE: ICD-10-CM

## 2020-04-22 DIAGNOSIS — M21.162 GENU VARUM OF LEFT LOWER EXTREMITY: ICD-10-CM

## 2020-04-22 DIAGNOSIS — M47.812 OSTEOARTHRITIS OF CERVICAL SPINE, UNSPECIFIED SPINAL OSTEOARTHRITIS COMPLICATION STATUS: Primary | ICD-10-CM

## 2020-04-22 DIAGNOSIS — M23.92 ACUTE INTERNAL DERANGEMENT OF LEFT KNEE: ICD-10-CM

## 2020-04-22 PROCEDURE — 1101F PR PT FALLS ASSESS DOC 0-1 FALLS W/OUT INJ PAST YR: ICD-10-PCS | Mod: CPTII,,, | Performed by: ORTHOPAEDIC SURGERY

## 2020-04-22 PROCEDURE — 1159F PR MEDICATION LIST DOCUMENTED IN MEDICAL RECORD: ICD-10-PCS | Mod: ,,, | Performed by: ORTHOPAEDIC SURGERY

## 2020-04-22 PROCEDURE — 1159F MED LIST DOCD IN RCRD: CPT | Mod: ,,, | Performed by: ORTHOPAEDIC SURGERY

## 2020-04-22 PROCEDURE — 99214 PR OFFICE/OUTPT VISIT, EST, LEVL IV, 30-39 MIN: ICD-10-PCS | Mod: 95,,, | Performed by: ORTHOPAEDIC SURGERY

## 2020-04-22 PROCEDURE — 1101F PT FALLS ASSESS-DOCD LE1/YR: CPT | Mod: CPTII,,, | Performed by: ORTHOPAEDIC SURGERY

## 2020-04-22 PROCEDURE — 99214 OFFICE O/P EST MOD 30 MIN: CPT | Mod: 95,,, | Performed by: ORTHOPAEDIC SURGERY

## 2020-04-22 NOTE — PATIENT INSTRUCTIONS
1. IKDC, SF-12 and KOOS was not filled out today in clinic.     RTC in 6 months with Mid-level provider Patient will fill out IKDC, SF-12 and KOOS and bilateral knee series on return.    2. [id number] - Ramírez, performed a custom orthotic / brace adjustment, fitting and training with the patient. The patient demonstrated understanding and proper care. This was performed for 15 minutes. OSSUR Medial  Brace    3. Low impact program

## 2020-04-22 NOTE — Clinical Note
1. IKDC, SF-12 and KOOS was not filled out today in clinic. RTC in 6 months with Mid-level provider Patient will fill out IKDC, SF-12 and KOOS and bilateral knee series on return.2. [id number] - Ramírez, performed a custom orthotic / brace adjustment, fitting and training with the patient. The patient demonstrated understanding and proper care. This was performed for 15 minutes. OSSUR Medial  Brace3. Low impact program

## 2020-04-22 NOTE — PROGRESS NOTES
Subjective:          Chief Complaint: Dhruv Fine is a 64 y.o. male who had no chief complaint listed for this encounter.    Telemedicine/Virtual Visit Documentation:    The patient location is: home    The chief complaint leading to consultation is: see HPI    VISIT TYPE X  Virtual visit with synchronous audio and video   Telephone E/M service     Total time spent with patient: see X wilian on chart below.   More than half of the time was spent counseling or coordinating care including prognosis, differential diagnosis, risks and benefits of treatment, instructions, compliance risk reductions     EST MINUTES X  [id number] 5   99542 10   87752 15   84720 25   09153 40   NEW    30396 10   56330 20   33043 30   21899 45   99207 60   PHONE     5-10   83845 11-20   29435 21-30     H&P  Orthopaedics      SUBJECTIVE:    History of Present Illness:  Patient is a 64 y.o. male with continuing left knee medial pain. He was seen in 2017 and we recommended a left knee medial  brace at that time. The previous brace was not past 5 years at that time and as a result he has waited until now to receive the brace.  He reports continued medial knee pain intermittently.    Review of patient's allergies indicates:   -- No known allergies     Past Medical History:  10/11/2013: Anemia  No date: Anxiety  No date: Bleeding hemorrhoids  10/19/2013: Borderline hypertension  No date: Degenerative disc disease      Comment:  cervical and lumbar  1/23/2015: Former smoker 1-2 packs daily for < 10 years  1/23/2015: Former smoker 1-2 packs daily for < 10 years  No date: Hyperlipidemia  5/21/2015: Iron deficiency anemia  6/17/2016: Non morbid obesity due to excess calories  2/25/2013: Nuclear sclerosis - Both Eyes  No date: Psychiatric exam requested by authority  No date: Psychiatric problem  7/26/2012: Sleep apnea  No date: Sleep difficulties      Comment:  trouble staying asleep   10/10/2018: Statin myopathy  No date: Therapy  Past  Surgical History:  2/10/15: KNEE SURGERY; Left      Comment:  Dr. Fine  Review of patient's family history indicates:  Problem: Kidney disease      Relation: Mother          Age of Onset: (Not Specified)  Problem: Glaucoma      Relation: Paternal Aunt          Age of Onset: (Not Specified)  Problem: Cataracts      Relation: Paternal Aunt          Age of Onset: (Not Specified)  Problem: Cancer      Relation: Sister          Age of Onset: (Not Specified)  Problem: Heart disease      Relation: Brother          Age of Onset: (Not Specified)          Comment: rheumatic fever  Problem: Hepatitis      Relation: Brother          Age of Onset: (Not Specified)  Problem: Peripheral vascular disease      Relation: Brother          Age of Onset: (Not Specified)  Problem: Blindness      Relation: Neg Hx          Age of Onset: (Not Specified)  Problem: Amblyopia      Relation: Neg Hx          Age of Onset: (Not Specified)  Problem: Macular degeneration      Relation: Neg Hx          Age of Onset: (Not Specified)  Problem: Retinal detachment      Relation: Neg Hx          Age of Onset: (Not Specified)  Problem: Strabismus      Relation: Neg Hx          Age of Onset: (Not Specified)  Problem: Diabetes      Relation: Neg Hx          Age of Onset: (Not Specified)  Problem: Hypertension      Relation: Neg Hx          Age of Onset: (Not Specified)  Problem: Stroke      Relation: Neg Hx          Age of Onset: (Not Specified)  Problem: Thyroid disease      Relation: Neg Hx          Age of Onset: (Not Specified)    Social History    Tobacco Use      Smoking status: Current Every Day Smoker        Types: Cigars      Smokeless tobacco: Never Used      Tobacco comment: once every 3 months     Alcohol use: Yes      Comment: occasionally, every so many weekends, [employer] game     Drug use: No       Review of Systems:  Patient denies constitutional symptoms, cardiac symptoms, respiratory symptoms, GI symptoms.  The remainder of the  musculoskeletal ROS is included in the HPI.      OBJECTIVE:    Physical Exam:  Gen:  No acute distress  CV:  Peripherally well-perfused.  Pulses 2+ bilaterally.  Lungs:  Normal respiratory effort.  Abdomen:  Soft, non-tender, non-distended  Head/Neck:  Normocephalic.  Atraumatic. No TTP, AROM and PROM intact without pain  Neuro:  CN intact without deficit, SILT throughout B/L Upper & Lower Extremities    MSK:  See attached    No imaging was obtained for this visit as patient was unable to complete an in clinic visit.    ASSESSMENT/PLAN:    A/P: Dhruv Fine is a 64 y.o. Doing relatively well with continued left knee medial pain    Plan:  1. IKDC, SF-12 and KOOS was not filled out today in clinic.     RTC in 6 months with Mid-level provider Patient will fill out IKDC, SF-12 and KOOS and bilateral knee series on return.    2. [id number] - Ramírez, performed a custom orthotic / brace adjustment, fitting and training with the patient. The patient demonstrated understanding and proper care. This was performed for 15 minutes. OSSUR Medial  Brace    3. Low impact program                Review of Systems   Constitution: Negative for fever and night sweats.   HENT: Negative for hearing loss.    Eyes: Negative for blurred vision and visual disturbance.   Cardiovascular: Negative for chest pain and leg swelling.   Respiratory: Negative for shortness of breath.    Endocrine: Negative for polyuria.   Hematologic/Lymphatic: Negative for bleeding problem.   Skin: Negative for rash.   Musculoskeletal: Negative for back pain, joint pain, joint swelling, muscle cramps and muscle weakness.   Gastrointestinal: Negative for melena.   Genitourinary: Negative for hematuria.   Neurological: Negative for loss of balance, numbness and paresthesias.   Psychiatric/Behavioral: Negative for altered mental status.                   Objective:        General: Dhruv is well-developed, well-nourished, appears stated age, in no acute distress,  alert and oriented to time, place and person.     Ortho/SPM Exam            Assessment:       Encounter Diagnoses   Name Primary?    Osteoarthritis of cervical spine, unspecified spinal osteoarthritis complication status Yes    Arthritis of left knee     Acute internal derangement of left knee     Genu varum of left lower extremity           Plan:       1. IKDC, SF-12 and KOOS was not filled out today in clinic.     RTC in 6 months with Mid-level provider Patient will fill out IKDC, SF-12 and KOOS and bilateral knee series on return.    2. 22994 - Ramírez, performed a custom orthotic / brace adjustment, fitting and training with the patient. The patient demonstrated understanding and proper care. This was performed for 15 minutes. OSSUR Medial  Brace    3. Low impact program    4. 15 minute discussion with the patient                    [facility] patient questionnaires have been collected today.

## 2020-04-29 ENCOUNTER — OFFICE VISIT (OUTPATIENT)
Dept: PSYCHIATRY | Facility: CLINIC | Age: 65
End: 2020-04-29
Payer: COMMERCIAL

## 2020-04-29 DIAGNOSIS — F32.A DEPRESSION, UNSPECIFIED DEPRESSION TYPE: Primary | ICD-10-CM

## 2020-04-29 DIAGNOSIS — F41.9 ANXIETY: ICD-10-CM

## 2020-04-29 PROCEDURE — 90834 PSYTX W PT 45 MINUTES: CPT | Mod: 95,,, | Performed by: PSYCHOLOGIST

## 2020-04-29 PROCEDURE — 90834 PR PSYCHOTHERAPY W/PATIENT, 45 MIN: ICD-10-PCS | Mod: 95,,, | Performed by: PSYCHOLOGIST

## 2020-04-29 NOTE — PROGRESS NOTES
"The patient location is: home  The chief complaint leading to consultation is: depression, anxiety   Visit type: Virtual visit with synchronous audio and video  Total time spent with patient: 45 minutes   Each patient to whom he or she provides medical services by telemedicine is:  (1) informed of the relationship between the physician and patient and the respective role of any other health care provider with respect to management of the patient; and (2) notified that he or she may decline to receive medical services by telemedicine and may withdraw from such care at any time.    Note: Due to technical problems that arose during the telemedicine session we had to finish the session by phone.      Individual Psychotherapy (PhD/LCSW)    4/29/2020    Site:  [facility]         Therapeutic Intervention: Met with patient.  Outpatient - Insight oriented psychotherapy 45 min - CPT code 14018    Chief complaint/reason for encounter: depression, anger and anxiety     Interval history and content of current session:  Pt reports that he is maintaining improved mood and outlook as he prepares for hs final day at work tomorrow. He is hopeful and optimistic about his future in [employer]. We discussed his accomplishment over the last 30 years on his job. Also addressed how he feels that therapy has helped him manage his feelings in a way that allowed him to keep his job. He summed it up by saying he learned the following steps when his anger gets triggered: 1) remove self from the situation; 2) calm down; 3) think rationally; 4) if he goes through the first 3 steps he can "do anything."   No future appointments scheduled but pt know he can come back in if the need arises.     Treatment plan:  · Target symptoms: depression, anxiety , anger management   · Why chosen therapy is appropriate versus another modality: relevant to diagnosis, patient responds to this modality  · Outcome monitoring methods: self-report, observation, " chart notes   · Therapeutic intervention type: insight oriented psychotherapy    Risk parameters:  Patient reports no suicidal ideation  Patient reports no homicidal ideation  Patient reports no self-injurious behavior  Patient reports no violent behavior    Verbal deficits: None    Patient's response to intervention:  The patient's response to intervention is accepting.    Progress toward goals and other mental status changes:  The patient's progress toward goals is fair .    Diagnosis:  Depression, unspecified; Anxiety, uspecified    Plan:  individual psychotherapy prn     Return to clinic: PRN    Length of Service (minutes): 45

## 2020-12-08 ENCOUNTER — OFFICE VISIT (OUTPATIENT)
Dept: PSYCHIATRY | Facility: CLINIC | Age: 65
End: 2020-12-08
Payer: COMMERCIAL

## 2020-12-08 DIAGNOSIS — F43.22 ADJUSTMENT DISORDER WITH ANXIETY: Primary | ICD-10-CM

## 2020-12-08 DIAGNOSIS — Z62.820 PARENT-CHILD PROBLEM: ICD-10-CM

## 2020-12-08 PROCEDURE — 90834 PR PSYCHOTHERAPY W/PATIENT, 45 MIN: ICD-10-PCS | Mod: 95,,, | Performed by: PSYCHOLOGIST

## 2020-12-08 PROCEDURE — 90834 PSYTX W PT 45 MINUTES: CPT | Mod: 95,,, | Performed by: PSYCHOLOGIST

## 2020-12-08 NOTE — PROGRESS NOTES
The patient location is: home in [place], [place]  The chief complaint leading to consultation is: depression, anxiety   Visit type: Virtual visit with synchronous audio and video  Total time spent with patient: 45 minutes   Each patient to whom he or she provides medical services by telemedicine is:  (1) informed of the relationship between the physician and patient and the respective role of any other health care provider with respect to management of the patient; and (2) notified that he or she may decline to receive medical services by telemedicine and may withdraw from such care at any time.    Note: Due to technical problems that arose during the telemedicine session we changed from MyChart to Doximity during the session.       Individual Psychotherapy (PhD/LCSW)    12/8/2020    Site:  [facility]         Therapeutic Intervention: Met with patient.  Outpatient - Insight oriented psychotherapy 45 min - CPT code 27965    Chief complaint/reason for encounter: depression, anger and anxiety     Interval history and content of current session:  Pt discussed his increasing anxiety about his 14 y.o daughter's behavior and differences  with his ex-wife about how to respond to it. Daughter claims she needs her phone at all times including bringing it to bed with her. He is concerned about the amount of time she is spending on it and the way she may be misusing it to connect with inappropriate people. He is also concerned about her safety in general when she is at her mother's house (50%) of the time. He agreed that he will need to come to an agreement with her mother about limits regarding phone use and he is hopeful that family therapy with his wife (and possibly with their daughter) could facilitate this outcome. He agreed that I would not be the best choice for a fx therapistas I have a long hx of being an individual therapist for him. I will look for possible therapists to treat the family     Treatment  plan:  · Target symptoms: depression, anxiety , anger management   · Why chosen therapy is appropriate versus another modality: relevant to diagnosis, patient responds to this modality  · Outcome monitoring methods: self-report, observation, chart notes   · Therapeutic intervention type: insight oriented psychotherapy    Risk parameters:  Patient reports no suicidal ideation  Patient reports no homicidal ideation  Patient reports no self-injurious behavior  Patient reports no violent behavior    Verbal deficits: None    Patient's response to intervention:  The patient's response to intervention is accepting.    Progress toward goals and other mental status changes:  The patient's progress toward goals is fair .    Diagnosis:  Adjustment d/o with anxiety; parent child relationship problem     Plan:  individual psychotherapy prn     Return to clinic: PRN    Length of Service (minutes): 45

## 2020-12-17 ENCOUNTER — PATIENT OUTREACH (OUTPATIENT)
Dept: ADMINISTRATIVE | Facility: OTHER | Age: 65
End: 2020-12-17

## 2020-12-20 ENCOUNTER — TELEPHONE (OUTPATIENT)
Dept: SPORTS MEDICINE | Facility: CLINIC | Age: 65
End: 2020-12-20

## 2020-12-20 NOTE — TELEPHONE ENCOUNTER
LVM for patient requesting a call back to reschedule. Dr. Fine will no longer be in clinic due to an emergency procedure.

## 2020-12-21 ENCOUNTER — TELEPHONE (OUTPATIENT)
Dept: SPORTS MEDICINE | Facility: CLINIC | Age: 65
End: 2020-12-21

## 2020-12-21 ENCOUNTER — PATIENT MESSAGE (OUTPATIENT)
Dept: SPORTS MEDICINE | Facility: CLINIC | Age: 65
End: 2020-12-21

## 2020-12-21 NOTE — TELEPHONE ENCOUNTER
Spoke to the patient and rescheduled his appt to 12/29 at 11:15am due to Dr. Fine having to perform an emergency surgery on 12/21 in the afternoon.

## 2020-12-21 NOTE — TELEPHONE ENCOUNTER
LVM to reschedule today's appointment.    Luis Barahona MS, OTC   Sports Medicine Assistant   [facility]

## 2020-12-22 ENCOUNTER — OFFICE VISIT (OUTPATIENT)
Dept: PSYCHIATRY | Facility: CLINIC | Age: 65
End: 2020-12-22
Payer: COMMERCIAL

## 2020-12-22 DIAGNOSIS — Z62.820 PARENT-CHILD PROBLEM: ICD-10-CM

## 2020-12-22 DIAGNOSIS — F43.22 ADJUSTMENT DISORDER WITH ANXIETY: Primary | ICD-10-CM

## 2020-12-22 PROCEDURE — 90834 PR PSYCHOTHERAPY W/PATIENT, 45 MIN: ICD-10-PCS | Mod: 95,,, | Performed by: PSYCHOLOGIST

## 2020-12-22 PROCEDURE — 90834 PSYTX W PT 45 MINUTES: CPT | Mod: 95,,, | Performed by: PSYCHOLOGIST

## 2020-12-22 NOTE — PROGRESS NOTES
"The patient location is: home in [place], [place]  The chief complaint leading to consultation is: depression, anxiety   Visit type: Virtual visit with synchronous audio and video  Total time spent with patient: 45 minutes   Each patient to whom he or she provides medical services by telemedicine is:  (1) informed of the relationship between the physician and patient and the respective role of any other health care provider with respect to management of the patient; and (2) notified that he or she may decline to receive medical services by telemedicine and may withdraw from such care at any time.    Note: Due to technical problems that arose during the telemedicine session we changed from MyChart to Doximity during the session.       Individual Psychotherapy (PhD/LCSW)    12/22/2020    Site:  [facility]         Therapeutic Intervention: Met with patient.  Outpatient - Insight oriented psychotherapy 45 min - CPT code 80699    Chief complaint/reason for encounter: depression, anger and anxiety     Interval history and content of current session:  Pt remains anxious about his 14 y,o ddhariniter's behavior and well-being. He notes that she has started having her period and he believes that accounts for some of his difficulty talking with her. He has established some agreement about boundaries with his wife regarding safety and phone use but he needs to f/u and see if they can agree on a specific plan. Encouraged him to explore the possibility of setting limits and boundaries together with his ex so that daughter can see they are on the same page. Noted that his daughter appears to have established a theraputic alliance with the daughter's therapist and that this was a positive sign. Encouraged pt to talk with the daughter's therapist one-to-one to explore his concerns about his daughter. Also recommended the [employer] book: "How to talk so teens will listen and how to listen so teens will talk." Pt will call " for a f/u psychotherapy session      Treatment plan:  · Target symptoms: depression, anxiety , anger management   · Why chosen therapy is appropriate versus another modality: relevant to diagnosis, patient responds to this modality  · Outcome monitoring methods: self-report, observation, chart notes   · Therapeutic intervention type: insight oriented psychotherapy    Risk parameters:  Patient reports no suicidal ideation  Patient reports no homicidal ideation  Patient reports no self-injurious behavior  Patient reports no violent behavior    Verbal deficits: None    Patient's response to intervention:  The patient's response to intervention is accepting.    Progress toward goals and other mental status changes:  The patient's progress toward goals is fair .    Diagnosis:  Adjustment d/o with anxiety; parent child relationship problem     Plan:  individual psychotherapy prn     Return to clinic: PRN    Length of Service (minutes): 45

## 2020-12-29 ENCOUNTER — PATIENT MESSAGE (OUTPATIENT)
Dept: PSYCHIATRY | Facility: CLINIC | Age: 65
End: 2020-12-29

## 2021-01-05 ENCOUNTER — PATIENT MESSAGE (OUTPATIENT)
Dept: PSYCHIATRY | Facility: CLINIC | Age: 66
End: 2021-01-05

## 2021-01-21 ENCOUNTER — TELEPHONE (OUTPATIENT)
Dept: SPORTS MEDICINE | Facility: CLINIC | Age: 66
End: 2021-01-21

## 2021-01-23 ENCOUNTER — PATIENT MESSAGE (OUTPATIENT)
Dept: PSYCHIATRY | Facility: CLINIC | Age: 66
End: 2021-01-23

## 2021-01-26 ENCOUNTER — OFFICE VISIT (OUTPATIENT)
Dept: PSYCHIATRY | Facility: CLINIC | Age: 66
End: 2021-01-26
Payer: COMMERCIAL

## 2021-01-26 DIAGNOSIS — F43.22 ADJUSTMENT DISORDER WITH ANXIETY: Primary | ICD-10-CM

## 2021-01-26 DIAGNOSIS — Z62.820 PARENT-CHILD PROBLEM: ICD-10-CM

## 2021-01-26 PROCEDURE — 90834 PR PSYCHOTHERAPY W/PATIENT, 45 MIN: ICD-10-PCS | Mod: 95,,, | Performed by: PSYCHOLOGIST

## 2021-01-26 PROCEDURE — 90834 PSYTX W PT 45 MINUTES: CPT | Mod: 95,,, | Performed by: PSYCHOLOGIST

## 2021-02-02 ENCOUNTER — PATIENT OUTREACH (OUTPATIENT)
Dept: ADMINISTRATIVE | Facility: OTHER | Age: 66
End: 2021-02-02

## 2021-02-03 ENCOUNTER — OFFICE VISIT (OUTPATIENT)
Dept: INTERNAL MEDICINE | Facility: CLINIC | Age: 66
End: 2021-02-03
Payer: COMMERCIAL

## 2021-02-03 ENCOUNTER — HOSPITAL ENCOUNTER (OUTPATIENT)
Dept: RADIOLOGY | Facility: HOSPITAL | Age: 66
Discharge: HOME | End: 2021-02-03
Attending: ORTHOPAEDIC SURGERY
Payer: COMMERCIAL

## 2021-02-03 ENCOUNTER — OFFICE VISIT (OUTPATIENT)
Dept: SPORTS MEDICINE | Facility: CLINIC | Age: 66
End: 2021-02-03
Payer: COMMERCIAL

## 2021-02-03 VITALS
SYSTOLIC BLOOD PRESSURE: 139 MMHG | DIASTOLIC BLOOD PRESSURE: 93 MMHG | HEART RATE: 77 BPM | BODY MASS INDEX: 29.43 KG/M2 | WEIGHT: 211 LBS

## 2021-02-03 VITALS
DIASTOLIC BLOOD PRESSURE: 80 MMHG | BODY MASS INDEX: 29.54 KG/M2 | SYSTOLIC BLOOD PRESSURE: 120 MMHG | HEIGHT: 71 IN | WEIGHT: 211 LBS

## 2021-02-03 DIAGNOSIS — Z87.891 FORMER SMOKER: ICD-10-CM

## 2021-02-03 DIAGNOSIS — M17.12 PRIMARY OSTEOARTHRITIS OF ONE KNEE, LEFT: ICD-10-CM

## 2021-02-03 DIAGNOSIS — M21.162 GENU VARUM OF LEFT LOWER EXTREMITY: ICD-10-CM

## 2021-02-03 DIAGNOSIS — N28.1 RENAL CYST, ACQUIRED, RIGHT: ICD-10-CM

## 2021-02-03 DIAGNOSIS — M25.562 LEFT KNEE PAIN, UNSPECIFIED CHRONICITY: Primary | ICD-10-CM

## 2021-02-03 DIAGNOSIS — M25.562 LEFT KNEE PAIN, UNSPECIFIED CHRONICITY: ICD-10-CM

## 2021-02-03 DIAGNOSIS — E78.2 MIXED HYPERLIPIDEMIA: ICD-10-CM

## 2021-02-03 DIAGNOSIS — R53.83 FATIGUE, UNSPECIFIED TYPE: ICD-10-CM

## 2021-02-03 DIAGNOSIS — Z12.5 ENCOUNTER FOR SCREENING FOR MALIGNANT NEOPLASM OF PROSTATE: ICD-10-CM

## 2021-02-03 DIAGNOSIS — R09.81 NASAL CONGESTION: ICD-10-CM

## 2021-02-03 DIAGNOSIS — E55.9 VITAMIN D DEFICIENCY DISEASE: ICD-10-CM

## 2021-02-03 DIAGNOSIS — Z00.00 ANNUAL PHYSICAL EXAM: Primary | ICD-10-CM

## 2021-02-03 DIAGNOSIS — R73.01 IFG (IMPAIRED FASTING GLUCOSE): ICD-10-CM

## 2021-02-03 PROCEDURE — 99214 OFFICE O/P EST MOD 30 MIN: CPT | Mod: S$GLB,,, | Performed by: ORTHOPAEDIC SURGERY

## 2021-02-03 PROCEDURE — 99397 PER PM REEVAL EST PAT 65+ YR: CPT | Mod: S$GLB,,, | Performed by: INTERNAL MEDICINE

## 2021-02-03 PROCEDURE — 3288F PR FALLS RISK ASSESSMENT DOCUMENTED: ICD-10-PCS | Mod: CPTII,S$GLB,, | Performed by: INTERNAL MEDICINE

## 2021-02-03 PROCEDURE — 1159F MED LIST DOCD IN RCRD: CPT | Mod: S$GLB,,, | Performed by: ORTHOPAEDIC SURGERY

## 2021-02-03 PROCEDURE — 1125F AMNT PAIN NOTED PAIN PRSNT: CPT | Mod: S$GLB,,, | Performed by: ORTHOPAEDIC SURGERY

## 2021-02-03 PROCEDURE — 99999 PR PBB SHADOW E&M-EST. PATIENT-LVL III: ICD-10-PCS | Mod: PBBFAC,,, | Performed by: INTERNAL MEDICINE

## 2021-02-03 PROCEDURE — 3008F BODY MASS INDEX DOCD: CPT | Mod: CPTII,S$GLB,, | Performed by: INTERNAL MEDICINE

## 2021-02-03 PROCEDURE — 3288F FALL RISK ASSESSMENT DOCD: CPT | Mod: CPTII,S$GLB,, | Performed by: INTERNAL MEDICINE

## 2021-02-03 PROCEDURE — 99214 PR OFFICE/OUTPT VISIT, EST, LEVL IV, 30-39 MIN: ICD-10-PCS | Mod: S$GLB,,, | Performed by: ORTHOPAEDIC SURGERY

## 2021-02-03 PROCEDURE — 99999 PR PBB SHADOW E&M-EST. PATIENT-LVL IV: ICD-10-PCS | Mod: PBBFAC,,, | Performed by: ORTHOPAEDIC SURGERY

## 2021-02-03 PROCEDURE — 1159F PR MEDICATION LIST DOCUMENTED IN MEDICAL RECORD: ICD-10-PCS | Mod: S$GLB,,, | Performed by: ORTHOPAEDIC SURGERY

## 2021-02-03 PROCEDURE — 1101F PR PT FALLS ASSESS DOC 0-1 FALLS W/OUT INJ PAST YR: ICD-10-PCS | Mod: CPTII,S$GLB,, | Performed by: INTERNAL MEDICINE

## 2021-02-03 PROCEDURE — 3008F BODY MASS INDEX DOCD: CPT | Mod: CPTII,S$GLB,, | Performed by: ORTHOPAEDIC SURGERY

## 2021-02-03 PROCEDURE — 3288F PR FALLS RISK ASSESSMENT DOCUMENTED: ICD-10-PCS | Mod: CPTII,S$GLB,, | Performed by: ORTHOPAEDIC SURGERY

## 2021-02-03 PROCEDURE — 3288F FALL RISK ASSESSMENT DOCD: CPT | Mod: CPTII,S$GLB,, | Performed by: ORTHOPAEDIC SURGERY

## 2021-02-03 PROCEDURE — 73564 X-RAY EXAM KNEE 4 OR MORE: CPT | Mod: 26,50,, | Performed by: RADIOLOGY

## 2021-02-03 PROCEDURE — 1101F PT FALLS ASSESS-DOCD LE1/YR: CPT | Mod: CPTII,S$GLB,, | Performed by: ORTHOPAEDIC SURGERY

## 2021-02-03 PROCEDURE — 3008F PR BODY MASS INDEX (BMI) DOCUMENTED: ICD-10-PCS | Mod: CPTII,S$GLB,, | Performed by: INTERNAL MEDICINE

## 2021-02-03 PROCEDURE — 1125F PR PAIN SEVERITY QUANTIFIED, PAIN PRESENT: ICD-10-PCS | Mod: S$GLB,,, | Performed by: ORTHOPAEDIC SURGERY

## 2021-02-03 PROCEDURE — 73564 XR KNEE ORTHO BILAT WITH FLEXION: ICD-10-PCS | Mod: 26,50,, | Performed by: RADIOLOGY

## 2021-02-03 PROCEDURE — 1101F PT FALLS ASSESS-DOCD LE1/YR: CPT | Mod: CPTII,S$GLB,, | Performed by: INTERNAL MEDICINE

## 2021-02-03 PROCEDURE — 99999 PR PBB SHADOW E&M-EST. PATIENT-LVL III: CPT | Mod: PBBFAC,,, | Performed by: INTERNAL MEDICINE

## 2021-02-03 PROCEDURE — 1101F PR PT FALLS ASSESS DOC 0-1 FALLS W/OUT INJ PAST YR: ICD-10-PCS | Mod: CPTII,S$GLB,, | Performed by: ORTHOPAEDIC SURGERY

## 2021-02-03 PROCEDURE — 73564 X-RAY EXAM KNEE 4 OR MORE: CPT | Mod: TC,50

## 2021-02-03 PROCEDURE — 99999 PR PBB SHADOW E&M-EST. PATIENT-LVL IV: CPT | Mod: PBBFAC,,, | Performed by: ORTHOPAEDIC SURGERY

## 2021-02-03 PROCEDURE — 99397 PR PREVENTIVE VISIT,EST,65 & OVER: ICD-10-PCS | Mod: S$GLB,,, | Performed by: INTERNAL MEDICINE

## 2021-02-03 PROCEDURE — 1125F PR PAIN SEVERITY QUANTIFIED, PAIN PRESENT: ICD-10-PCS | Mod: S$GLB,,, | Performed by: INTERNAL MEDICINE

## 2021-02-03 PROCEDURE — 3008F PR BODY MASS INDEX (BMI) DOCUMENTED: ICD-10-PCS | Mod: CPTII,S$GLB,, | Performed by: ORTHOPAEDIC SURGERY

## 2021-02-03 PROCEDURE — 1125F AMNT PAIN NOTED PAIN PRSNT: CPT | Mod: S$GLB,,, | Performed by: INTERNAL MEDICINE

## 2021-02-03 RX ORDER — FLUTICASONE PROPIONATE 50 MCG
SPRAY, SUSPENSION (ML) NASAL
Qty: 48 G | Refills: 1
Start: 2021-02-03 | End: 2022-11-22 | Stop reason: SDUPTHER

## 2021-02-10 ENCOUNTER — LAB VISIT (OUTPATIENT)
Dept: LAB | Facility: HOSPITAL | Age: 66
End: 2021-02-10
Attending: INTERNAL MEDICINE
Payer: COMMERCIAL

## 2021-02-10 DIAGNOSIS — E55.9 VITAMIN D DEFICIENCY DISEASE: ICD-10-CM

## 2021-02-10 DIAGNOSIS — R53.83 FATIGUE, UNSPECIFIED TYPE: ICD-10-CM

## 2021-02-10 DIAGNOSIS — Z12.5 ENCOUNTER FOR SCREENING FOR MALIGNANT NEOPLASM OF PROSTATE: ICD-10-CM

## 2021-02-10 DIAGNOSIS — E78.2 MIXED HYPERLIPIDEMIA: ICD-10-CM

## 2021-02-10 DIAGNOSIS — R73.01 IFG (IMPAIRED FASTING GLUCOSE): ICD-10-CM

## 2021-02-10 LAB
25(OH)D3+25(OH)D2 SERPL-MCNC: 40 NG/ML (ref 30–96)
ALBUMIN SERPL BCP-MCNC: 4 G/DL (ref 3.5–5.2)
ALP SERPL-CCNC: 95 U/L (ref 55–135)
ALT SERPL W/O P-5'-P-CCNC: 24 U/L (ref 10–44)
ANION GAP SERPL CALC-SCNC: 8 MMOL/L (ref 8–16)
AST SERPL-CCNC: 28 U/L (ref 10–40)
BASOPHILS # BLD AUTO: 0.04 K/UL (ref 0–0.2)
BASOPHILS NFR BLD: 0.9 % (ref 0–1.9)
BILIRUB SERPL-MCNC: 0.5 MG/DL (ref 0.1–1)
BUN SERPL-MCNC: 9 MG/DL (ref 8–23)
CALCIUM SERPL-MCNC: 9.2 MG/DL (ref 8.7–10.5)
CHLORIDE SERPL-SCNC: 105 MMOL/L (ref 95–110)
CHOLEST SERPL-MCNC: 289 MG/DL (ref 120–199)
CHOLEST/HDLC SERPL: 6.1 {RATIO} (ref 2–5)
CO2 SERPL-SCNC: 26 MMOL/L (ref 23–29)
COMPLEXED PSA SERPL-MCNC: 1.1 NG/ML (ref 0–4)
CREAT SERPL-MCNC: 1 MG/DL (ref 0.5–1.4)
DIFFERENTIAL METHOD: ABNORMAL
EOSINOPHIL # BLD AUTO: 0.2 K/UL (ref 0–0.5)
EOSINOPHIL NFR BLD: 3.3 % (ref 0–8)
ERYTHROCYTE [DISTWIDTH] IN BLOOD BY AUTOMATED COUNT: 15.1 % (ref 11.5–14.5)
EST. GFR  (AFRICAN AMERICAN): >60 ML/MIN/1.73 M^2
EST. GFR  (NON AFRICAN AMERICAN): >60 ML/MIN/1.73 M^2
ESTIMATED AVG GLUCOSE: 114 MG/DL (ref 68–131)
GLUCOSE SERPL-MCNC: 106 MG/DL (ref 70–110)
HBA1C MFR BLD: 5.6 % (ref 4–5.6)
HCT VFR BLD AUTO: 43.6 % (ref 40–54)
HDLC SERPL-MCNC: 47 MG/DL (ref 40–75)
HDLC SERPL: 16.3 % (ref 20–50)
HGB BLD-MCNC: 14 G/DL (ref 14–18)
IMM GRANULOCYTES # BLD AUTO: 0.01 K/UL (ref 0–0.04)
IMM GRANULOCYTES NFR BLD AUTO: 0.2 % (ref 0–0.5)
LDLC SERPL CALC-MCNC: 203 MG/DL (ref 63–159)
LYMPHOCYTES # BLD AUTO: 2.1 K/UL (ref 1–4.8)
LYMPHOCYTES NFR BLD: 45.6 % (ref 18–48)
MCH RBC QN AUTO: 29.5 PG (ref 27–31)
MCHC RBC AUTO-ENTMCNC: 32.1 G/DL (ref 32–36)
MCV RBC AUTO: 92 FL (ref 82–98)
MONOCYTES # BLD AUTO: 0.4 K/UL (ref 0.3–1)
MONOCYTES NFR BLD: 8.5 % (ref 4–15)
NEUTROPHILS # BLD AUTO: 1.9 K/UL (ref 1.8–7.7)
NEUTROPHILS NFR BLD: 41.5 % (ref 38–73)
NONHDLC SERPL-MCNC: 242 MG/DL
NRBC BLD-RTO: 0 /100 WBC
PLATELET # BLD AUTO: 244 K/UL (ref 150–350)
PMV BLD AUTO: 9.7 FL (ref 9.2–12.9)
POTASSIUM SERPL-SCNC: 4.6 MMOL/L (ref 3.5–5.1)
PROT SERPL-MCNC: 8 G/DL (ref 6–8.4)
RBC # BLD AUTO: 4.75 M/UL (ref 4.6–6.2)
SODIUM SERPL-SCNC: 139 MMOL/L (ref 136–145)
TRIGL SERPL-MCNC: 195 MG/DL (ref 30–150)
TSH SERPL DL<=0.005 MIU/L-ACNC: 3.11 UIU/ML (ref 0.4–4)
WBC # BLD AUTO: 4.61 K/UL (ref 3.9–12.7)

## 2021-02-10 PROCEDURE — 84153 ASSAY OF PSA TOTAL: CPT

## 2021-02-10 PROCEDURE — 85025 COMPLETE CBC W/AUTO DIFF WBC: CPT

## 2021-02-10 PROCEDURE — 80061 LIPID PANEL: CPT

## 2021-02-10 PROCEDURE — 36415 COLL VENOUS BLD VENIPUNCTURE: CPT

## 2021-02-10 PROCEDURE — 82306 VITAMIN D 25 HYDROXY: CPT

## 2021-02-10 PROCEDURE — 83036 HEMOGLOBIN GLYCOSYLATED A1C: CPT

## 2021-02-10 PROCEDURE — 80053 COMPREHEN METABOLIC PANEL: CPT

## 2021-02-10 PROCEDURE — 84443 ASSAY THYROID STIM HORMONE: CPT

## 2021-02-11 ENCOUNTER — PATIENT MESSAGE (OUTPATIENT)
Dept: INTERNAL MEDICINE | Facility: CLINIC | Age: 66
End: 2021-02-11

## 2021-02-12 ENCOUNTER — TELEPHONE (OUTPATIENT)
Dept: SPORTS MEDICINE | Facility: CLINIC | Age: 66
End: 2021-02-12

## 2021-02-12 DIAGNOSIS — M17.12 PRIMARY OSTEOARTHRITIS OF LEFT KNEE: Primary | ICD-10-CM

## 2021-02-15 ENCOUNTER — TELEPHONE (OUTPATIENT)
Dept: SPORTS MEDICINE | Facility: CLINIC | Age: 66
End: 2021-02-15

## 2021-02-15 DIAGNOSIS — M17.12 PRIMARY OSTEOARTHRITIS OF LEFT KNEE: Primary | ICD-10-CM

## 2021-02-17 ENCOUNTER — OFFICE VISIT (OUTPATIENT)
Dept: SPORTS MEDICINE | Facility: CLINIC | Age: 66
End: 2021-02-17
Payer: COMMERCIAL

## 2021-02-17 VITALS
DIASTOLIC BLOOD PRESSURE: 82 MMHG | HEIGHT: 71 IN | SYSTOLIC BLOOD PRESSURE: 121 MMHG | WEIGHT: 218 LBS | HEART RATE: 79 BPM | BODY MASS INDEX: 30.52 KG/M2

## 2021-02-17 DIAGNOSIS — M17.12 PRIMARY OSTEOARTHRITIS OF LEFT KNEE: Primary | ICD-10-CM

## 2021-02-17 PROCEDURE — 99499 UNLISTED E&M SERVICE: CPT | Mod: S$GLB,,, | Performed by: ORTHOPAEDIC SURGERY

## 2021-02-17 PROCEDURE — 99999 PR PBB SHADOW E&M-EST. PATIENT-LVL III: CPT | Mod: PBBFAC,,, | Performed by: ORTHOPAEDIC SURGERY

## 2021-02-17 PROCEDURE — 3008F BODY MASS INDEX DOCD: CPT | Mod: CPTII,S$GLB,, | Performed by: ORTHOPAEDIC SURGERY

## 2021-02-17 PROCEDURE — 1101F PT FALLS ASSESS-DOCD LE1/YR: CPT | Mod: CPTII,S$GLB,, | Performed by: ORTHOPAEDIC SURGERY

## 2021-02-17 PROCEDURE — 3288F FALL RISK ASSESSMENT DOCD: CPT | Mod: CPTII,S$GLB,, | Performed by: ORTHOPAEDIC SURGERY

## 2021-02-17 PROCEDURE — 3008F PR BODY MASS INDEX (BMI) DOCUMENTED: ICD-10-PCS | Mod: CPTII,S$GLB,, | Performed by: ORTHOPAEDIC SURGERY

## 2021-02-17 PROCEDURE — 1126F AMNT PAIN NOTED NONE PRSNT: CPT | Mod: S$GLB,,, | Performed by: ORTHOPAEDIC SURGERY

## 2021-02-17 PROCEDURE — 99499 NO LOS: ICD-10-PCS | Mod: S$GLB,,, | Performed by: ORTHOPAEDIC SURGERY

## 2021-02-17 PROCEDURE — 20611 PR DRAIN/ASP/INJECT MAJOR JOINT/BURSA W/US GUIDANCE: ICD-10-PCS | Mod: LT,S$GLB,, | Performed by: ORTHOPAEDIC SURGERY

## 2021-02-17 PROCEDURE — 99999 PR PBB SHADOW E&M-EST. PATIENT-LVL III: ICD-10-PCS | Mod: PBBFAC,,, | Performed by: ORTHOPAEDIC SURGERY

## 2021-02-17 PROCEDURE — 20611 DRAIN/INJ JOINT/BURSA W/US: CPT | Mod: LT,S$GLB,, | Performed by: ORTHOPAEDIC SURGERY

## 2021-02-17 PROCEDURE — 1126F PR PAIN SEVERITY QUANTIFIED, NO PAIN PRESENT: ICD-10-PCS | Mod: S$GLB,,, | Performed by: ORTHOPAEDIC SURGERY

## 2021-02-17 PROCEDURE — 3288F PR FALLS RISK ASSESSMENT DOCUMENTED: ICD-10-PCS | Mod: CPTII,S$GLB,, | Performed by: ORTHOPAEDIC SURGERY

## 2021-02-17 PROCEDURE — 1101F PR PT FALLS ASSESS DOC 0-1 FALLS W/OUT INJ PAST YR: ICD-10-PCS | Mod: CPTII,S$GLB,, | Performed by: ORTHOPAEDIC SURGERY

## 2021-03-01 ENCOUNTER — OFFICE VISIT (OUTPATIENT)
Dept: PSYCHIATRY | Facility: CLINIC | Age: 66
End: 2021-03-01
Payer: COMMERCIAL

## 2021-03-01 DIAGNOSIS — Z62.820 PARENT-CHILD PROBLEM: ICD-10-CM

## 2021-03-01 DIAGNOSIS — F43.22 ADJUSTMENT DISORDER WITH ANXIETY: Primary | ICD-10-CM

## 2021-03-01 PROCEDURE — 90834 PR PSYCHOTHERAPY W/PATIENT, 45 MIN: ICD-10-PCS | Mod: S$GLB,,, | Performed by: PSYCHOLOGIST

## 2021-03-01 PROCEDURE — 90834 PSYTX W PT 45 MINUTES: CPT | Mod: S$GLB,,, | Performed by: PSYCHOLOGIST

## 2021-03-04 ENCOUNTER — PATIENT MESSAGE (OUTPATIENT)
Dept: INTERNAL MEDICINE | Facility: CLINIC | Age: 66
End: 2021-03-04

## 2021-03-05 ENCOUNTER — OFFICE VISIT (OUTPATIENT)
Dept: INTERNAL MEDICINE | Facility: CLINIC | Age: 66
End: 2021-03-05
Payer: COMMERCIAL

## 2021-03-05 DIAGNOSIS — F41.9 ANXIETY: ICD-10-CM

## 2021-03-05 DIAGNOSIS — F43.9 SITUATIONAL STRESS: Primary | ICD-10-CM

## 2021-03-05 PROCEDURE — 1159F MED LIST DOCD IN RCRD: CPT | Mod: ,,, | Performed by: INTERNAL MEDICINE

## 2021-03-05 PROCEDURE — 99214 OFFICE O/P EST MOD 30 MIN: CPT | Mod: 95,,, | Performed by: INTERNAL MEDICINE

## 2021-03-05 PROCEDURE — 1159F PR MEDICATION LIST DOCUMENTED IN MEDICAL RECORD: ICD-10-PCS | Mod: ,,, | Performed by: INTERNAL MEDICINE

## 2021-03-05 PROCEDURE — 99214 PR OFFICE/OUTPT VISIT, EST, LEVL IV, 30-39 MIN: ICD-10-PCS | Mod: 95,,, | Performed by: INTERNAL MEDICINE

## 2021-03-05 RX ORDER — LORAZEPAM 1 MG/1
1 TABLET ORAL DAILY PRN
Qty: 30 TABLET | Refills: 0 | Status: SHIPPED | OUTPATIENT
Start: 2021-03-05 | End: 2023-12-06

## 2021-03-05 RX ORDER — ESCITALOPRAM OXALATE 20 MG/1
20 TABLET ORAL DAILY
Qty: 30 TABLET | Refills: 11 | Status: SHIPPED | OUTPATIENT
Start: 2021-03-05 | End: 2021-09-23 | Stop reason: SDUPTHER

## 2021-03-15 ENCOUNTER — OFFICE VISIT (OUTPATIENT)
Dept: PSYCHIATRY | Facility: CLINIC | Age: 66
End: 2021-03-15
Payer: COMMERCIAL

## 2021-03-15 ENCOUNTER — PATIENT MESSAGE (OUTPATIENT)
Dept: INTERNAL MEDICINE | Facility: CLINIC | Age: 66
End: 2021-03-15

## 2021-03-15 DIAGNOSIS — Z62.820 PARENT-CHILD PROBLEM: ICD-10-CM

## 2021-03-15 DIAGNOSIS — M54.2 NECK PAIN: ICD-10-CM

## 2021-03-15 DIAGNOSIS — F43.22 ADJUSTMENT DISORDER WITH ANXIETY: Primary | ICD-10-CM

## 2021-03-15 PROCEDURE — 90834 PR PSYCHOTHERAPY W/PATIENT, 45 MIN: ICD-10-PCS | Mod: S$GLB,,, | Performed by: PSYCHOLOGIST

## 2021-03-15 PROCEDURE — 90834 PSYTX W PT 45 MINUTES: CPT | Mod: S$GLB,,, | Performed by: PSYCHOLOGIST

## 2021-03-15 RX ORDER — LIDOCAINE 50 MG/G
1-2 PATCH TOPICAL DAILY
Qty: 60 PATCH | Refills: 2 | Status: SHIPPED | OUTPATIENT
Start: 2021-03-15 | End: 2021-11-17 | Stop reason: SDUPTHER

## 2021-03-29 ENCOUNTER — OFFICE VISIT (OUTPATIENT)
Dept: PSYCHIATRY | Facility: CLINIC | Age: 66
End: 2021-03-29
Payer: COMMERCIAL

## 2021-03-29 DIAGNOSIS — F43.22 ADJUSTMENT DISORDER WITH ANXIETY: Primary | ICD-10-CM

## 2021-03-29 DIAGNOSIS — Z62.820 PARENT-CHILD PROBLEM: ICD-10-CM

## 2021-03-29 PROCEDURE — 90834 PSYTX W PT 45 MINUTES: CPT | Mod: S$GLB,,, | Performed by: PSYCHOLOGIST

## 2021-03-29 PROCEDURE — 90834 PR PSYCHOTHERAPY W/PATIENT, 45 MIN: ICD-10-PCS | Mod: S$GLB,,, | Performed by: PSYCHOLOGIST

## 2021-04-05 ENCOUNTER — PATIENT MESSAGE (OUTPATIENT)
Dept: PSYCHIATRY | Facility: CLINIC | Age: 66
End: 2021-04-05

## 2021-04-12 ENCOUNTER — OFFICE VISIT (OUTPATIENT)
Dept: PSYCHIATRY | Facility: CLINIC | Age: 66
End: 2021-04-12
Payer: COMMERCIAL

## 2021-04-12 DIAGNOSIS — F43.22 ADJUSTMENT DISORDER WITH ANXIETY: Primary | ICD-10-CM

## 2021-04-12 PROCEDURE — 90834 PR PSYCHOTHERAPY W/PATIENT, 45 MIN: ICD-10-PCS | Mod: S$GLB,,, | Performed by: PSYCHOLOGIST

## 2021-04-12 PROCEDURE — 90834 PSYTX W PT 45 MINUTES: CPT | Mod: S$GLB,,, | Performed by: PSYCHOLOGIST

## 2021-04-16 ENCOUNTER — PATIENT MESSAGE (OUTPATIENT)
Dept: RESEARCH | Facility: HOSPITAL | Age: 66
End: 2021-04-16

## 2021-04-19 ENCOUNTER — OFFICE VISIT (OUTPATIENT)
Dept: PSYCHIATRY | Facility: CLINIC | Age: 66
End: 2021-04-19
Payer: MEDICARE

## 2021-04-19 DIAGNOSIS — F43.22 ADJUSTMENT DISORDER WITH ANXIETY: Primary | ICD-10-CM

## 2021-04-19 PROCEDURE — 90834 PR PSYCHOTHERAPY W/PATIENT, 45 MIN: ICD-10-PCS | Mod: S$GLB,,, | Performed by: PSYCHOLOGIST

## 2021-04-19 PROCEDURE — 90834 PSYTX W PT 45 MINUTES: CPT | Mod: S$GLB,,, | Performed by: PSYCHOLOGIST

## 2021-04-26 ENCOUNTER — OFFICE VISIT (OUTPATIENT)
Dept: PSYCHIATRY | Facility: CLINIC | Age: 66
End: 2021-04-26
Payer: MEDICARE

## 2021-04-26 DIAGNOSIS — Z62.820 PARENT-CHILD PROBLEM: ICD-10-CM

## 2021-04-26 DIAGNOSIS — F43.22 ADJUSTMENT DISORDER WITH ANXIETY: Primary | ICD-10-CM

## 2021-04-26 PROCEDURE — 90834 PR PSYCHOTHERAPY W/PATIENT, 45 MIN: ICD-10-PCS | Mod: S$GLB,,, | Performed by: PSYCHOLOGIST

## 2021-04-26 PROCEDURE — 90834 PSYTX W PT 45 MINUTES: CPT | Mod: S$GLB,,, | Performed by: PSYCHOLOGIST

## 2021-05-03 ENCOUNTER — OFFICE VISIT (OUTPATIENT)
Dept: PSYCHIATRY | Facility: CLINIC | Age: 66
End: 2021-05-03
Payer: MEDICARE

## 2021-05-03 DIAGNOSIS — F43.22 ADJUSTMENT DISORDER WITH ANXIETY: Primary | ICD-10-CM

## 2021-05-03 DIAGNOSIS — Z62.820 PARENT-CHILD PROBLEM: ICD-10-CM

## 2021-05-03 PROCEDURE — 90834 PSYTX W PT 45 MINUTES: CPT | Mod: S$GLB,,, | Performed by: PSYCHOLOGIST

## 2021-05-03 PROCEDURE — 90834 PR PSYCHOTHERAPY W/PATIENT, 45 MIN: ICD-10-PCS | Mod: S$GLB,,, | Performed by: PSYCHOLOGIST

## 2021-05-10 ENCOUNTER — OFFICE VISIT (OUTPATIENT)
Dept: PSYCHIATRY | Facility: CLINIC | Age: 66
End: 2021-05-10
Payer: MEDICARE

## 2021-05-10 DIAGNOSIS — F43.22 ADJUSTMENT DISORDER WITH ANXIETY: Primary | ICD-10-CM

## 2021-05-10 DIAGNOSIS — Z62.820 PARENT-CHILD PROBLEM: ICD-10-CM

## 2021-05-10 PROCEDURE — 90834 PR PSYCHOTHERAPY W/PATIENT, 45 MIN: ICD-10-PCS | Mod: S$GLB,,, | Performed by: PSYCHOLOGIST

## 2021-05-10 PROCEDURE — 90834 PSYTX W PT 45 MINUTES: CPT | Mod: S$GLB,,, | Performed by: PSYCHOLOGIST

## 2021-05-17 ENCOUNTER — OFFICE VISIT (OUTPATIENT)
Dept: SPORTS MEDICINE | Facility: CLINIC | Age: 66
End: 2021-05-17
Payer: MEDICARE

## 2021-05-17 ENCOUNTER — OFFICE VISIT (OUTPATIENT)
Dept: PSYCHIATRY | Facility: CLINIC | Age: 66
End: 2021-05-17
Payer: MEDICARE

## 2021-05-17 DIAGNOSIS — F43.22 ADJUSTMENT DISORDER WITH ANXIETY: Primary | ICD-10-CM

## 2021-05-17 DIAGNOSIS — M17.12 PRIMARY OSTEOARTHRITIS OF LEFT KNEE: Primary | ICD-10-CM

## 2021-05-17 DIAGNOSIS — Z62.820 PARENT-CHILD PROBLEM: ICD-10-CM

## 2021-05-17 PROCEDURE — 99213 PR OFFICE/OUTPT VISIT, EST, LEVL III, 20-29 MIN: ICD-10-PCS | Mod: 95,,, | Performed by: PHYSICIAN ASSISTANT

## 2021-05-17 PROCEDURE — 1159F MED LIST DOCD IN RCRD: CPT | Mod: 95,,, | Performed by: PHYSICIAN ASSISTANT

## 2021-05-17 PROCEDURE — 90834 PR PSYCHOTHERAPY W/PATIENT, 45 MIN: ICD-10-PCS | Mod: S$GLB,,, | Performed by: PSYCHOLOGIST

## 2021-05-17 PROCEDURE — 99213 OFFICE O/P EST LOW 20 MIN: CPT | Mod: 95,,, | Performed by: PHYSICIAN ASSISTANT

## 2021-05-17 PROCEDURE — 90834 PSYTX W PT 45 MINUTES: CPT | Mod: S$GLB,,, | Performed by: PSYCHOLOGIST

## 2021-05-17 PROCEDURE — 1159F PR MEDICATION LIST DOCUMENTED IN MEDICAL RECORD: ICD-10-PCS | Mod: 95,,, | Performed by: PHYSICIAN ASSISTANT

## 2021-05-24 ENCOUNTER — OFFICE VISIT (OUTPATIENT)
Dept: PSYCHIATRY | Facility: CLINIC | Age: 66
End: 2021-05-24
Payer: MEDICARE

## 2021-05-24 DIAGNOSIS — F43.22 ADJUSTMENT DISORDER WITH ANXIETY: Primary | ICD-10-CM

## 2021-05-24 DIAGNOSIS — Z62.820 PARENT-CHILD PROBLEM: ICD-10-CM

## 2021-05-24 PROCEDURE — 90834 PSYTX W PT 45 MINUTES: CPT | Mod: S$GLB,,, | Performed by: PSYCHOLOGIST

## 2021-05-24 PROCEDURE — 90834 PR PSYCHOTHERAPY W/PATIENT, 45 MIN: ICD-10-PCS | Mod: S$GLB,,, | Performed by: PSYCHOLOGIST

## 2021-05-31 ENCOUNTER — OFFICE VISIT (OUTPATIENT)
Dept: PSYCHIATRY | Facility: CLINIC | Age: 66
End: 2021-05-31
Payer: MEDICARE

## 2021-05-31 DIAGNOSIS — Z62.820 PARENT-CHILD PROBLEM: ICD-10-CM

## 2021-05-31 DIAGNOSIS — F43.22 ADJUSTMENT DISORDER WITH ANXIETY: Primary | ICD-10-CM

## 2021-05-31 PROCEDURE — 90834 PSYTX W PT 45 MINUTES: CPT | Mod: S$GLB,,, | Performed by: PSYCHOLOGIST

## 2021-05-31 PROCEDURE — 90834 PR PSYCHOTHERAPY W/PATIENT, 45 MIN: ICD-10-PCS | Mod: S$GLB,,, | Performed by: PSYCHOLOGIST

## 2021-06-06 NOTE — PLAN OF CARE
02/14/20 1000   Activity/Group Therapy Checklist   Group Educational  (Gratitude)   Attendance Attended   Follows Direction Followed directions   Group Interactions/Observations Interacted appropriately;Alert;Sharing   Affect/Mood Range Normal range   Affect/Mood Display Appropriate   Goal Progression Progressing      patient

## 2021-06-07 ENCOUNTER — PATIENT MESSAGE (OUTPATIENT)
Dept: PSYCHIATRY | Facility: CLINIC | Age: 66
End: 2021-06-07

## 2021-06-14 ENCOUNTER — OFFICE VISIT (OUTPATIENT)
Dept: PSYCHIATRY | Facility: CLINIC | Age: 66
End: 2021-06-14
Payer: MEDICARE

## 2021-06-14 DIAGNOSIS — F43.22 ADJUSTMENT DISORDER WITH ANXIETY: Primary | ICD-10-CM

## 2021-06-14 DIAGNOSIS — Z62.820 PARENT-CHILD PROBLEM: ICD-10-CM

## 2021-06-14 PROCEDURE — 90834 PR PSYCHOTHERAPY W/PATIENT, 45 MIN: ICD-10-PCS | Mod: S$GLB,,, | Performed by: PSYCHOLOGIST

## 2021-06-14 PROCEDURE — 90834 PSYTX W PT 45 MINUTES: CPT | Mod: S$GLB,,, | Performed by: PSYCHOLOGIST

## 2021-06-28 ENCOUNTER — PATIENT MESSAGE (OUTPATIENT)
Dept: PHARMACY | Facility: CLINIC | Age: 66
End: 2021-06-28

## 2021-07-26 ENCOUNTER — OFFICE VISIT (OUTPATIENT)
Dept: PSYCHIATRY | Facility: CLINIC | Age: 66
End: 2021-07-26
Payer: MEDICARE

## 2021-07-26 DIAGNOSIS — Z62.820 PARENT-CHILD PROBLEM: ICD-10-CM

## 2021-07-26 DIAGNOSIS — F43.22 ADJUSTMENT DISORDER WITH ANXIETY: Primary | ICD-10-CM

## 2021-07-26 PROCEDURE — 90834 PSYTX W PT 45 MINUTES: CPT | Mod: 95,,, | Performed by: PSYCHOLOGIST

## 2021-07-26 PROCEDURE — 90834 PR PSYCHOTHERAPY W/PATIENT, 45 MIN: ICD-10-PCS | Mod: 95,,, | Performed by: PSYCHOLOGIST

## 2021-08-02 ENCOUNTER — OFFICE VISIT (OUTPATIENT)
Dept: PSYCHIATRY | Facility: CLINIC | Age: 66
End: 2021-08-02
Payer: MEDICARE

## 2021-08-02 DIAGNOSIS — F43.22 ADJUSTMENT DISORDER WITH ANXIETY: Primary | ICD-10-CM

## 2021-08-02 PROCEDURE — 90834 PR PSYCHOTHERAPY W/PATIENT, 45 MIN: ICD-10-PCS | Mod: 95,,, | Performed by: PSYCHOLOGIST

## 2021-08-02 PROCEDURE — 3044F PR MOST RECENT HEMOGLOBIN A1C LEVEL <7.0%: ICD-10-PCS | Mod: CPTII,95,, | Performed by: PSYCHOLOGIST

## 2021-08-02 PROCEDURE — 3044F HG A1C LEVEL LT 7.0%: CPT | Mod: CPTII,95,, | Performed by: PSYCHOLOGIST

## 2021-08-02 PROCEDURE — 90834 PSYTX W PT 45 MINUTES: CPT | Mod: 95,,, | Performed by: PSYCHOLOGIST

## 2021-08-06 ENCOUNTER — TELEPHONE (OUTPATIENT)
Dept: SPORTS MEDICINE | Facility: CLINIC | Age: 66
End: 2021-08-06

## 2021-08-09 ENCOUNTER — TELEPHONE (OUTPATIENT)
Dept: INTERNAL MEDICINE | Facility: CLINIC | Age: 66
End: 2021-08-09

## 2021-08-09 ENCOUNTER — OFFICE VISIT (OUTPATIENT)
Dept: PSYCHIATRY | Facility: CLINIC | Age: 66
End: 2021-08-09
Payer: MEDICARE

## 2021-08-09 ENCOUNTER — LAB VISIT (OUTPATIENT)
Dept: INTERNAL MEDICINE | Facility: CLINIC | Age: 66
End: 2021-08-09
Payer: MEDICARE

## 2021-08-09 DIAGNOSIS — Z20.822 ENCOUNTER FOR LABORATORY TESTING FOR COVID-19 VIRUS: ICD-10-CM

## 2021-08-09 DIAGNOSIS — F43.22 ADJUSTMENT DISORDER WITH ANXIETY: Primary | ICD-10-CM

## 2021-08-09 PROCEDURE — U0003 INFECTIOUS AGENT DETECTION BY NUCLEIC ACID (DNA OR RNA); SEVERE ACUTE RESPIRATORY SYNDROME CORONAVIRUS 2 (SARS-COV-2) (CORONAVIRUS DISEASE [COVID-19]), AMPLIFIED PROBE TECHNIQUE, MAKING USE OF HIGH THROUGHPUT TECHNOLOGIES AS DESCRIBED BY CMS-2020-01-R: HCPCS | Performed by: INTERNAL MEDICINE

## 2021-08-09 PROCEDURE — U0005 INFEC AGEN DETEC AMPLI PROBE: HCPCS | Performed by: INTERNAL MEDICINE

## 2021-08-09 PROCEDURE — 3044F PR MOST RECENT HEMOGLOBIN A1C LEVEL <7.0%: ICD-10-PCS | Mod: CPTII,95,, | Performed by: PSYCHOLOGIST

## 2021-08-09 PROCEDURE — 3044F HG A1C LEVEL LT 7.0%: CPT | Mod: CPTII,95,, | Performed by: PSYCHOLOGIST

## 2021-08-09 PROCEDURE — 90834 PSYTX W PT 45 MINUTES: CPT | Mod: 95,,, | Performed by: PSYCHOLOGIST

## 2021-08-09 PROCEDURE — 90834 PR PSYCHOTHERAPY W/PATIENT, 45 MIN: ICD-10-PCS | Mod: 95,,, | Performed by: PSYCHOLOGIST

## 2021-08-10 LAB
SARS-COV-2 RNA RESP QL NAA+PROBE: NOT DETECTED
SARS-COV-2- CYCLE NUMBER: -1

## 2021-08-17 ENCOUNTER — PATIENT OUTREACH (OUTPATIENT)
Dept: ADMINISTRATIVE | Facility: OTHER | Age: 66
End: 2021-08-17

## 2021-08-17 DIAGNOSIS — M17.12 PRIMARY OSTEOARTHRITIS OF LEFT KNEE: Primary | ICD-10-CM

## 2021-08-23 ENCOUNTER — OFFICE VISIT (OUTPATIENT)
Dept: PSYCHIATRY | Facility: CLINIC | Age: 66
End: 2021-08-23
Payer: MEDICARE

## 2021-08-23 ENCOUNTER — PATIENT MESSAGE (OUTPATIENT)
Dept: SPORTS MEDICINE | Facility: CLINIC | Age: 66
End: 2021-08-23

## 2021-08-23 DIAGNOSIS — F43.22 ADJUSTMENT DISORDER WITH ANXIETY: Primary | ICD-10-CM

## 2021-08-23 PROCEDURE — 90834 PSYTX W PT 45 MINUTES: CPT | Mod: 95,,, | Performed by: PSYCHOLOGIST

## 2021-08-23 PROCEDURE — 3044F PR MOST RECENT HEMOGLOBIN A1C LEVEL <7.0%: ICD-10-PCS | Mod: CPTII,95,, | Performed by: PSYCHOLOGIST

## 2021-08-23 PROCEDURE — 3044F HG A1C LEVEL LT 7.0%: CPT | Mod: CPTII,95,, | Performed by: PSYCHOLOGIST

## 2021-08-23 PROCEDURE — 90834 PR PSYCHOTHERAPY W/PATIENT, 45 MIN: ICD-10-PCS | Mod: 95,,, | Performed by: PSYCHOLOGIST

## 2021-08-24 DIAGNOSIS — M17.12 PRIMARY OSTEOARTHRITIS OF LEFT KNEE: Primary | ICD-10-CM

## 2021-09-13 ENCOUNTER — OFFICE VISIT (OUTPATIENT)
Dept: PSYCHIATRY | Facility: CLINIC | Age: 66
End: 2021-09-13
Payer: MEDICARE

## 2021-09-13 DIAGNOSIS — Z62.820 PARENT-CHILD PROBLEM: ICD-10-CM

## 2021-09-13 DIAGNOSIS — F43.22 ADJUSTMENT DISORDER WITH ANXIETY: Primary | ICD-10-CM

## 2021-09-13 PROCEDURE — 90834 PR PSYCHOTHERAPY W/PATIENT, 45 MIN: ICD-10-PCS | Mod: 95,,, | Performed by: PSYCHOLOGIST

## 2021-09-13 PROCEDURE — 3044F PR MOST RECENT HEMOGLOBIN A1C LEVEL <7.0%: ICD-10-PCS | Mod: CPTII,95,, | Performed by: PSYCHOLOGIST

## 2021-09-13 PROCEDURE — 90834 PSYTX W PT 45 MINUTES: CPT | Mod: 95,,, | Performed by: PSYCHOLOGIST

## 2021-09-13 PROCEDURE — 3044F HG A1C LEVEL LT 7.0%: CPT | Mod: CPTII,95,, | Performed by: PSYCHOLOGIST

## 2021-09-23 ENCOUNTER — PATIENT MESSAGE (OUTPATIENT)
Dept: INTERNAL MEDICINE | Facility: CLINIC | Age: 66
End: 2021-09-23

## 2021-09-23 RX ORDER — ESCITALOPRAM OXALATE 20 MG/1
20 TABLET ORAL DAILY
Qty: 30 TABLET | Refills: 11 | Status: SHIPPED | OUTPATIENT
Start: 2021-09-23 | End: 2022-03-25

## 2021-10-08 ENCOUNTER — PATIENT MESSAGE (OUTPATIENT)
Dept: SPORTS MEDICINE | Facility: CLINIC | Age: 66
End: 2021-10-08

## 2021-10-17 ENCOUNTER — PATIENT OUTREACH (OUTPATIENT)
Dept: ADMINISTRATIVE | Facility: OTHER | Age: 66
End: 2021-10-17

## 2021-10-18 ENCOUNTER — OFFICE VISIT (OUTPATIENT)
Dept: SPORTS MEDICINE | Facility: CLINIC | Age: 66
End: 2021-10-18
Payer: MEDICARE

## 2021-10-18 VITALS
WEIGHT: 218 LBS | HEIGHT: 71 IN | HEART RATE: 77 BPM | BODY MASS INDEX: 30.52 KG/M2 | DIASTOLIC BLOOD PRESSURE: 87 MMHG | SYSTOLIC BLOOD PRESSURE: 132 MMHG

## 2021-10-18 DIAGNOSIS — M17.12 PRIMARY OSTEOARTHRITIS OF ONE KNEE, LEFT: Primary | ICD-10-CM

## 2021-10-18 PROCEDURE — 3079F DIAST BP 80-89 MM HG: CPT | Mod: CPTII,S$GLB,, | Performed by: ORTHOPAEDIC SURGERY

## 2021-10-18 PROCEDURE — 3008F BODY MASS INDEX DOCD: CPT | Mod: CPTII,S$GLB,, | Performed by: ORTHOPAEDIC SURGERY

## 2021-10-18 PROCEDURE — 3288F PR FALLS RISK ASSESSMENT DOCUMENTED: ICD-10-PCS | Mod: CPTII,S$GLB,, | Performed by: ORTHOPAEDIC SURGERY

## 2021-10-18 PROCEDURE — 99214 PR OFFICE/OUTPT VISIT, EST, LEVL IV, 30-39 MIN: ICD-10-PCS | Mod: 25,S$GLB,, | Performed by: ORTHOPAEDIC SURGERY

## 2021-10-18 PROCEDURE — 3075F PR MOST RECENT SYSTOLIC BLOOD PRESS GE 130-139MM HG: ICD-10-PCS | Mod: CPTII,S$GLB,, | Performed by: ORTHOPAEDIC SURGERY

## 2021-10-18 PROCEDURE — 1101F PR PT FALLS ASSESS DOC 0-1 FALLS W/OUT INJ PAST YR: ICD-10-PCS | Mod: CPTII,S$GLB,, | Performed by: ORTHOPAEDIC SURGERY

## 2021-10-18 PROCEDURE — 3044F PR MOST RECENT HEMOGLOBIN A1C LEVEL <7.0%: ICD-10-PCS | Mod: CPTII,S$GLB,, | Performed by: ORTHOPAEDIC SURGERY

## 2021-10-18 PROCEDURE — 3044F HG A1C LEVEL LT 7.0%: CPT | Mod: CPTII,S$GLB,, | Performed by: ORTHOPAEDIC SURGERY

## 2021-10-18 PROCEDURE — 1159F MED LIST DOCD IN RCRD: CPT | Mod: CPTII,S$GLB,, | Performed by: ORTHOPAEDIC SURGERY

## 2021-10-18 PROCEDURE — 99999 PR PBB SHADOW E&M-EST. PATIENT-LVL III: CPT | Mod: PBBFAC,,, | Performed by: ORTHOPAEDIC SURGERY

## 2021-10-18 PROCEDURE — 3288F FALL RISK ASSESSMENT DOCD: CPT | Mod: CPTII,S$GLB,, | Performed by: ORTHOPAEDIC SURGERY

## 2021-10-18 PROCEDURE — 1160F PR REVIEW ALL MEDS BY PRESCRIBER/CLIN PHARMACIST DOCUMENTED: ICD-10-PCS | Mod: CPTII,S$GLB,, | Performed by: ORTHOPAEDIC SURGERY

## 2021-10-18 PROCEDURE — 99214 OFFICE O/P EST MOD 30 MIN: CPT | Mod: 25,S$GLB,, | Performed by: ORTHOPAEDIC SURGERY

## 2021-10-18 PROCEDURE — 20611 LARGE JOINT ASPIRATION/INJECTION: L KNEE: ICD-10-PCS | Mod: LT,S$GLB,, | Performed by: ORTHOPAEDIC SURGERY

## 2021-10-18 PROCEDURE — 1159F PR MEDICATION LIST DOCUMENTED IN MEDICAL RECORD: ICD-10-PCS | Mod: CPTII,S$GLB,, | Performed by: ORTHOPAEDIC SURGERY

## 2021-10-18 PROCEDURE — 3079F PR MOST RECENT DIASTOLIC BLOOD PRESSURE 80-89 MM HG: ICD-10-PCS | Mod: CPTII,S$GLB,, | Performed by: ORTHOPAEDIC SURGERY

## 2021-10-18 PROCEDURE — 3008F PR BODY MASS INDEX (BMI) DOCUMENTED: ICD-10-PCS | Mod: CPTII,S$GLB,, | Performed by: ORTHOPAEDIC SURGERY

## 2021-10-18 PROCEDURE — 3075F SYST BP GE 130 - 139MM HG: CPT | Mod: CPTII,S$GLB,, | Performed by: ORTHOPAEDIC SURGERY

## 2021-10-18 PROCEDURE — 1101F PT FALLS ASSESS-DOCD LE1/YR: CPT | Mod: CPTII,S$GLB,, | Performed by: ORTHOPAEDIC SURGERY

## 2021-10-18 PROCEDURE — 1125F AMNT PAIN NOTED PAIN PRSNT: CPT | Mod: CPTII,S$GLB,, | Performed by: ORTHOPAEDIC SURGERY

## 2021-10-18 PROCEDURE — 20611 DRAIN/INJ JOINT/BURSA W/US: CPT | Mod: LT,S$GLB,, | Performed by: ORTHOPAEDIC SURGERY

## 2021-10-18 PROCEDURE — 1125F PR PAIN SEVERITY QUANTIFIED, PAIN PRESENT: ICD-10-PCS | Mod: CPTII,S$GLB,, | Performed by: ORTHOPAEDIC SURGERY

## 2021-10-18 PROCEDURE — 99999 PR PBB SHADOW E&M-EST. PATIENT-LVL III: ICD-10-PCS | Mod: PBBFAC,,, | Performed by: ORTHOPAEDIC SURGERY

## 2021-10-18 PROCEDURE — 1160F RVW MEDS BY RX/DR IN RCRD: CPT | Mod: CPTII,S$GLB,, | Performed by: ORTHOPAEDIC SURGERY

## 2021-11-08 ENCOUNTER — OFFICE VISIT (OUTPATIENT)
Dept: PSYCHIATRY | Facility: CLINIC | Age: 66
End: 2021-11-08
Payer: MEDICARE

## 2021-11-08 DIAGNOSIS — F43.22 ADJUSTMENT DISORDER WITH ANXIETY: Primary | ICD-10-CM

## 2021-11-08 DIAGNOSIS — Z62.820 PARENT-CHILD PROBLEM: ICD-10-CM

## 2021-11-08 PROCEDURE — 3044F HG A1C LEVEL LT 7.0%: CPT | Mod: CPTII,S$GLB,, | Performed by: PSYCHOLOGIST

## 2021-11-08 PROCEDURE — 90834 PR PSYCHOTHERAPY W/PATIENT, 45 MIN: ICD-10-PCS | Mod: S$GLB,,, | Performed by: PSYCHOLOGIST

## 2021-11-08 PROCEDURE — 90834 PSYTX W PT 45 MINUTES: CPT | Mod: S$GLB,,, | Performed by: PSYCHOLOGIST

## 2021-11-08 PROCEDURE — 3044F PR MOST RECENT HEMOGLOBIN A1C LEVEL <7.0%: ICD-10-PCS | Mod: CPTII,S$GLB,, | Performed by: PSYCHOLOGIST

## 2021-11-17 ENCOUNTER — OFFICE VISIT (OUTPATIENT)
Dept: INTERNAL MEDICINE | Facility: CLINIC | Age: 66
End: 2021-11-17
Payer: MEDICARE

## 2021-11-17 VITALS
OXYGEN SATURATION: 98 % | TEMPERATURE: 98 F | HEART RATE: 77 BPM | HEIGHT: 70 IN | BODY MASS INDEX: 32.86 KG/M2 | SYSTOLIC BLOOD PRESSURE: 132 MMHG | WEIGHT: 229.5 LBS | DIASTOLIC BLOOD PRESSURE: 84 MMHG

## 2021-11-17 DIAGNOSIS — R05.9 COUGH: ICD-10-CM

## 2021-11-17 DIAGNOSIS — R09.82 POST-NASAL DRIP: ICD-10-CM

## 2021-11-17 DIAGNOSIS — J32.4 PANSINUSITIS, UNSPECIFIED CHRONICITY: Primary | ICD-10-CM

## 2021-11-17 DIAGNOSIS — H66.91 RIGHT OTITIS MEDIA, UNSPECIFIED OTITIS MEDIA TYPE: ICD-10-CM

## 2021-11-17 DIAGNOSIS — M54.2 NECK PAIN: ICD-10-CM

## 2021-11-17 PROCEDURE — 99214 PR OFFICE/OUTPT VISIT, EST, LEVL IV, 30-39 MIN: ICD-10-PCS | Mod: S$GLB,,, | Performed by: INTERNAL MEDICINE

## 2021-11-17 PROCEDURE — 99214 OFFICE O/P EST MOD 30 MIN: CPT | Mod: S$GLB,,, | Performed by: INTERNAL MEDICINE

## 2021-11-17 PROCEDURE — 99999 PR PBB SHADOW E&M-EST. PATIENT-LVL IV: ICD-10-PCS | Mod: PBBFAC,,, | Performed by: INTERNAL MEDICINE

## 2021-11-17 PROCEDURE — 99999 PR PBB SHADOW E&M-EST. PATIENT-LVL IV: CPT | Mod: PBBFAC,,, | Performed by: INTERNAL MEDICINE

## 2021-11-17 RX ORDER — AMOXICILLIN 250 MG/1
250 CAPSULE ORAL 3 TIMES DAILY
Qty: 30 CAPSULE | Refills: 0 | Status: SHIPPED | OUTPATIENT
Start: 2021-11-17 | End: 2022-11-23

## 2021-11-17 RX ORDER — LIDOCAINE 50 MG/G
1-2 PATCH TOPICAL DAILY
Qty: 60 PATCH | Refills: 2 | Status: SHIPPED | OUTPATIENT
Start: 2021-11-17 | End: 2022-12-05 | Stop reason: SDUPTHER

## 2021-11-17 RX ORDER — CODEINE PHOSPHATE AND GUAIFENESIN 10; 100 MG/5ML; MG/5ML
SOLUTION ORAL
Qty: 118 ML | Refills: 1 | Status: SHIPPED | OUTPATIENT
Start: 2021-11-17 | End: 2022-11-22 | Stop reason: SDUPTHER

## 2021-11-18 ENCOUNTER — OFFICE VISIT (OUTPATIENT)
Dept: PSYCHIATRY | Facility: CLINIC | Age: 66
End: 2021-11-18
Payer: MEDICARE

## 2021-11-18 DIAGNOSIS — F43.22 ADJUSTMENT DISORDER WITH ANXIETY: Primary | ICD-10-CM

## 2021-11-18 DIAGNOSIS — Z62.820 PARENT-CHILD PROBLEM: ICD-10-CM

## 2021-11-18 PROCEDURE — 3044F PR MOST RECENT HEMOGLOBIN A1C LEVEL <7.0%: ICD-10-PCS | Mod: CPTII,S$GLB,, | Performed by: PSYCHOLOGIST

## 2021-11-18 PROCEDURE — 90834 PSYTX W PT 45 MINUTES: CPT | Mod: S$GLB,,, | Performed by: PSYCHOLOGIST

## 2021-11-18 PROCEDURE — 90834 PR PSYCHOTHERAPY W/PATIENT, 45 MIN: ICD-10-PCS | Mod: S$GLB,,, | Performed by: PSYCHOLOGIST

## 2021-11-18 PROCEDURE — 3044F HG A1C LEVEL LT 7.0%: CPT | Mod: CPTII,S$GLB,, | Performed by: PSYCHOLOGIST

## 2021-11-19 ENCOUNTER — TELEPHONE (OUTPATIENT)
Dept: INTERNAL MEDICINE | Facility: CLINIC | Age: 66
End: 2021-11-19
Payer: MEDICARE

## 2021-11-22 ENCOUNTER — IMMUNIZATION (OUTPATIENT)
Dept: PHARMACY | Facility: CLINIC | Age: 66
End: 2021-11-22
Payer: MEDICARE

## 2021-11-22 ENCOUNTER — OFFICE VISIT (OUTPATIENT)
Dept: PSYCHIATRY | Facility: CLINIC | Age: 66
End: 2021-11-22
Payer: MEDICARE

## 2021-11-22 DIAGNOSIS — F43.22 ADJUSTMENT DISORDER WITH ANXIETY: Primary | ICD-10-CM

## 2021-11-22 DIAGNOSIS — Z23 NEED FOR VACCINATION: Primary | ICD-10-CM

## 2021-11-22 PROCEDURE — 90834 PSYTX W PT 45 MINUTES: CPT | Mod: S$GLB,,, | Performed by: PSYCHOLOGIST

## 2021-11-22 PROCEDURE — 90834 PR PSYCHOTHERAPY W/PATIENT, 45 MIN: ICD-10-PCS | Mod: S$GLB,,, | Performed by: PSYCHOLOGIST

## 2021-12-02 ENCOUNTER — OFFICE VISIT (OUTPATIENT)
Dept: PSYCHIATRY | Facility: CLINIC | Age: 66
End: 2021-12-02
Payer: MEDICARE

## 2021-12-02 DIAGNOSIS — Z62.820 PARENT-CHILD PROBLEM: ICD-10-CM

## 2021-12-02 DIAGNOSIS — F43.22 ADJUSTMENT DISORDER WITH ANXIETY: Primary | ICD-10-CM

## 2021-12-02 PROCEDURE — 90834 PR PSYCHOTHERAPY W/PATIENT, 45 MIN: ICD-10-PCS | Mod: S$GLB,,, | Performed by: PSYCHOLOGIST

## 2021-12-02 PROCEDURE — 90834 PSYTX W PT 45 MINUTES: CPT | Mod: S$GLB,,, | Performed by: PSYCHOLOGIST

## 2021-12-05 ENCOUNTER — PATIENT MESSAGE (OUTPATIENT)
Dept: PSYCHIATRY | Facility: CLINIC | Age: 66
End: 2021-12-05
Payer: MEDICARE

## 2021-12-10 ENCOUNTER — TELEPHONE (OUTPATIENT)
Dept: INTERNAL MEDICINE | Facility: CLINIC | Age: 66
End: 2021-12-10
Payer: MEDICARE

## 2021-12-13 ENCOUNTER — PATIENT MESSAGE (OUTPATIENT)
Dept: PSYCHIATRY | Facility: CLINIC | Age: 66
End: 2021-12-13
Payer: MEDICARE

## 2021-12-13 ENCOUNTER — PATIENT MESSAGE (OUTPATIENT)
Dept: INTERNAL MEDICINE | Facility: CLINIC | Age: 66
End: 2021-12-13
Payer: MEDICARE

## 2021-12-13 ENCOUNTER — TELEPHONE (OUTPATIENT)
Dept: INTERNAL MEDICINE | Facility: CLINIC | Age: 66
End: 2021-12-13
Payer: MEDICARE

## 2021-12-15 ENCOUNTER — TELEPHONE (OUTPATIENT)
Dept: INTERNAL MEDICINE | Facility: CLINIC | Age: 66
End: 2021-12-15
Payer: MEDICARE

## 2021-12-17 ENCOUNTER — TELEPHONE (OUTPATIENT)
Dept: INTERNAL MEDICINE | Facility: CLINIC | Age: 66
End: 2021-12-17
Payer: MEDICARE

## 2021-12-20 ENCOUNTER — OFFICE VISIT (OUTPATIENT)
Dept: PSYCHIATRY | Facility: CLINIC | Age: 66
End: 2021-12-20
Payer: MEDICARE

## 2021-12-20 DIAGNOSIS — F43.22 ADJUSTMENT DISORDER WITH ANXIETY: Primary | ICD-10-CM

## 2021-12-20 DIAGNOSIS — Z62.820 PARENT-CHILD PROBLEM: ICD-10-CM

## 2021-12-20 PROCEDURE — 90834 PSYTX W PT 45 MINUTES: CPT | Mod: S$GLB,,, | Performed by: PSYCHOLOGIST

## 2021-12-20 PROCEDURE — 90834 PR PSYCHOTHERAPY W/PATIENT, 45 MIN: ICD-10-PCS | Mod: S$GLB,,, | Performed by: PSYCHOLOGIST

## 2022-01-05 ENCOUNTER — PATIENT MESSAGE (OUTPATIENT)
Dept: PSYCHIATRY | Facility: CLINIC | Age: 67
End: 2022-01-05
Payer: MEDICARE

## 2022-01-06 ENCOUNTER — OFFICE VISIT (OUTPATIENT)
Dept: PSYCHIATRY | Facility: CLINIC | Age: 67
End: 2022-01-06
Payer: MEDICARE

## 2022-01-06 DIAGNOSIS — F43.22 ADJUSTMENT DISORDER WITH ANXIETY: Primary | ICD-10-CM

## 2022-01-06 DIAGNOSIS — Z62.820 PARENT-CHILD PROBLEM: ICD-10-CM

## 2022-01-06 PROCEDURE — 90834 PSYTX W PT 45 MINUTES: CPT | Mod: 95,,, | Performed by: PSYCHOLOGIST

## 2022-01-06 PROCEDURE — 90834 PR PSYCHOTHERAPY W/PATIENT, 45 MIN: ICD-10-PCS | Mod: 95,,, | Performed by: PSYCHOLOGIST

## 2022-01-07 NOTE — PROGRESS NOTES
"The patient location is: pt's home in [place], [place]   The chief complaint leading to consultation is: anxiety; interpersonal     Visit type: audiovisual (part of session done by audio only due to internet problems)    Face to Face time with patient: 45 minutes   55  minutes of total time spent on the encounter, which includes face to face time and non-face to face time preparing to see the patient (eg, review of tests), Obtaining and/or reviewing separately obtained history, Documenting clinical information in the electronic or other health record, Independently interpreting results (not separately reported) and communicating results to the patient/family/caregiver, or Care coordination (not separately reported).         Each patient to whom he or she provides medical services by telemedicine is:  (1) informed of the relationship between the physician and patient and the respective role of any other health care provider with respect to management of the patient; and (2) notified that he or she may decline to receive medical services by telemedicine and may withdraw from such care at any time.    Notes:       Individual Psychotherapy (PhD/LCSW)    1/6/2022    Site:  [facility]         Therapeutic Intervention: Met with patient.  Outpatient - Insight oriented psychotherapy 45 min - CPT code 46226    Chief complaint/reason for encounter: depression, anger and anxiety     Interval history and content of current session:  Pt acknowledges that things are better with his daughter but he feels like he and his ex have a long way to go in terms of reaching an accord regarding parenting. He fears that his ex is going to withdraw from fx therapy because she doesn't like it and believes that everything is "ok". Pt takes issue with this and hopes that they will continue. Discussed the limits of his influence on the process and how far they have come with their daughter since she was having to be hospitalized for suicidal " acting out. Discussed strategies that might bring he and the ex a little more in agreement. But also recognized that his ability to effect change may be quite limited whether or not she (the ex) stays engaged in fx therapy.    Treatment   · Target symptoms: depression, anxiety , anger management   · Why chosen therapy is appropriate versus another modality  · Outcome monitoring methods: self-report, observation, chart notes   · Therapeutic intervention type: insight oriented psychotherapy    Risk parameters:  Patient reports no suicidal ideation  Patient reports no homicidal ideation  Patient reports no self-injurious behavior  Patient reports no violent behavior    Verbal deficits: None    Patient's response to intervention:  The patient's response to intervention is accepting.    Progress toward goals and other mental status changes:  The patient's progress toward goals is fair .    Diagnosis:  Adjustment d/o with anxiety; parent child relationship problem     Plan:  individual psychotherapy prn     Return to clinic: PRN    Length of Service (minutes): 45      .00

## 2022-01-11 ENCOUNTER — PATIENT MESSAGE (OUTPATIENT)
Dept: PSYCHIATRY | Facility: CLINIC | Age: 67
End: 2022-01-11
Payer: MEDICARE

## 2022-01-27 ENCOUNTER — OFFICE VISIT (OUTPATIENT)
Dept: PSYCHIATRY | Facility: CLINIC | Age: 67
End: 2022-01-27
Payer: MEDICARE

## 2022-01-27 DIAGNOSIS — Z62.820 PARENT-CHILD PROBLEM: ICD-10-CM

## 2022-01-27 DIAGNOSIS — F43.22 ADJUSTMENT DISORDER WITH ANXIETY: Primary | ICD-10-CM

## 2022-01-27 PROCEDURE — 90834 PSYTX W PT 45 MINUTES: CPT | Mod: 95,,, | Performed by: PSYCHOLOGIST

## 2022-01-27 PROCEDURE — 90834 PR PSYCHOTHERAPY W/PATIENT, 45 MIN: ICD-10-PCS | Mod: 95,,, | Performed by: PSYCHOLOGIST

## 2022-01-27 NOTE — PROGRESS NOTES
The patient location is: pt's home in [place], [place]   The chief complaint leading to consultation is: anxiety; interpersonal     Visit type: audiovisual      Face to Face time with patient: 45 minutes   55  minutes of total time spent on the encounter, which includes face to face time and non-face to face time preparing to see the patient (eg, review of tests), Obtaining and/or reviewing separately obtained history, Documenting clinical information in the electronic or other health record, Independently interpreting results (not separately reported) and communicating results to the patient/family/caregiver, or Care coordination (not separately reported).         Each patient to whom he or she provides medical services by telemedicine is:  (1) informed of the relationship between the physician and patient and the respective role of any other health care provider with respect to management of the patient; and (2) notified that he or she may decline to receive medical services by telemedicine and may withdraw from such care at any time.    Notes:       Individual Psychotherapy (PhD/LCSW)    1/27/2022    Site:  [facility]         Therapeutic Intervention: Met with patient.  Outpatient - Insight oriented psychotherapy 45 min - CPT code 80869    Chief complaint/reason for encounter: depression, anger and anxiety     Interval history and content of current session:  Pt reports continued progress in relationship with his 15 y.o daughter and satisfaction that some of his concerns about her are being addressed by her psychiatrist as well as the family therapist. Pt noted that for the first time she apologized to him and said he was right about something in an argument they had. He also feels good about her desire to go to a more academically challenging school. He recognizes that the progress of the moment may change and things may backslide. He notes that he is continuing to work on his temper and he can see that it  "is allowing for progress with his daughter as he learns "not to blow up." He can also see that his emotional response to her has been based on his need to protect her; and that the way he expressed himself in trying to protect her has made it difficult for for her to be open with him.     Treatment   · Target symptoms: depression, anxiety , anger management   · Why chosen therapy is appropriate versus another modality  · Outcome monitoring methods: self-report, observation, chart notes   · Therapeutic intervention type: insight oriented psychotherapy    Risk parameters:  Patient reports no suicidal ideation  Patient reports no homicidal ideation  Patient reports no self-injurious behavior  Patient reports no violent behavior    Verbal deficits: None    Patient's response to intervention:  The patient's response to intervention is accepting.    Progress toward goals and other mental status changes:  The patient's progress toward goals is fair .    Diagnosis:  Adjustment d/o with anxiety; parent child relationship problem     Plan:  individual psychotherapy prn     Return to clinic: PRN    Length of Service (minutes): 45      .00  "

## 2022-02-10 ENCOUNTER — OFFICE VISIT (OUTPATIENT)
Dept: PSYCHIATRY | Facility: CLINIC | Age: 67
End: 2022-02-10
Payer: MEDICARE

## 2022-02-10 DIAGNOSIS — Z62.820 PARENT-CHILD PROBLEM: ICD-10-CM

## 2022-02-10 DIAGNOSIS — F43.22 ADJUSTMENT DISORDER WITH ANXIETY: Primary | ICD-10-CM

## 2022-02-10 PROCEDURE — 90834 PSYTX W PT 45 MINUTES: CPT | Mod: S$GLB,,, | Performed by: PSYCHOLOGIST

## 2022-02-10 PROCEDURE — 90834 PR PSYCHOTHERAPY W/PATIENT, 45 MIN: ICD-10-PCS | Mod: S$GLB,,, | Performed by: PSYCHOLOGIST
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
## 2022-02-24 ENCOUNTER — OFFICE VISIT (OUTPATIENT)
Dept: PSYCHIATRY | Facility: CLINIC | Age: 67
End: 2022-02-24
Payer: MEDICARE

## 2022-02-24 DIAGNOSIS — F43.22 ADJUSTMENT DISORDER WITH ANXIETY: Primary | ICD-10-CM

## 2022-02-24 DIAGNOSIS — Z62.820 PARENT-CHILD PROBLEM: ICD-10-CM

## 2022-02-24 PROCEDURE — 90834 PSYTX W PT 45 MINUTES: CPT | Mod: S$GLB,,, | Performed by: PSYCHOLOGIST

## 2022-02-24 PROCEDURE — 90834 PR PSYCHOTHERAPY W/PATIENT, 45 MIN: ICD-10-PCS | Mod: S$GLB,,, | Performed by: PSYCHOLOGIST
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
## 2022-03-03 ENCOUNTER — PATIENT MESSAGE (OUTPATIENT)
Dept: PSYCHIATRY | Facility: CLINIC | Age: 67
End: 2022-03-03
Payer: MEDICARE

## 2022-03-10 ENCOUNTER — OFFICE VISIT (OUTPATIENT)
Dept: PSYCHIATRY | Facility: CLINIC | Age: 67
End: 2022-03-10
Payer: MEDICARE

## 2022-03-10 DIAGNOSIS — F43.22 ADJUSTMENT DISORDER WITH ANXIETY: Primary | ICD-10-CM

## 2022-03-10 DIAGNOSIS — Z62.820 PARENT-CHILD PROBLEM: ICD-10-CM

## 2022-03-10 PROCEDURE — 90834 PSYTX W PT 45 MINUTES: CPT | Mod: S$GLB,,, | Performed by: PSYCHOLOGIST

## 2022-03-10 PROCEDURE — 90834 PR PSYCHOTHERAPY W/PATIENT, 45 MIN: ICD-10-PCS | Mod: S$GLB,,, | Performed by: PSYCHOLOGIST

## 2022-03-10 NOTE — PROGRESS NOTES
"     Individual Psychotherapy (PhD/LCSW)    3/10/2022    Site:  [facility]         Therapeutic Intervention: Met with patient.  Outpatient - Insight oriented psychotherapy 45 min - CPT code 45014    Chief complaint/reason for encounter: depression, anger and anxiety     Interval history and content of current session:  Pt reported intense frustration with himself for losing his temper at a fx session with his ex, his older daughter, and the therapist (Wilfredo Garcia LCSW) earlier today. They were discussing his 15 y.o daughter, Zenaida's, college plans. Pt took issue with what ex was saying and she responded with a "you message" that triggered his anger. He is frustrated because he was trying his best to be calm & reasonable and he felt he was making progress in addressing his concerns that the ex was treating Zenaida more as a friend than as a parent. Because he lost his temper the points he was trying to make were lost. We discussed alternative ways he could have communicated using non-judgemental I-messages rather than you messages. Also modeled how he could offer an apology in the next session and bring the subject of importance (parenting) back to the focus of the discussion.     Treatment   · Target symptoms: depression, anxiety , anger management   · Why chosen therapy is appropriate versus another modality  · Outcome monitoring methods: self-report, observation, chart notes   · Therapeutic intervention type: insight oriented psychotherapy    Risk parameters:  Patient reports no suicidal ideation  Patient reports no homicidal ideation  Patient reports no self-injurious behavior  Patient reports no violent behavior    Verbal deficits: None    Patient's response to intervention:  The patient's response to intervention is accepting.    Progress toward goals and other mental status changes:  The patient's progress toward goals is fair .    Diagnosis:  Adjustment d/o with anxiety; parent child relationship problem "     Plan:  individual psychotherapy prn     Return to clinic as scheduled     Length of Service (minutes): 45      .00

## 2022-03-24 ENCOUNTER — OFFICE VISIT (OUTPATIENT)
Dept: PSYCHIATRY | Facility: CLINIC | Age: 67
End: 2022-03-24
Payer: MEDICARE

## 2022-03-24 DIAGNOSIS — F43.22 ADJUSTMENT DISORDER WITH ANXIETY: Primary | ICD-10-CM

## 2022-03-24 DIAGNOSIS — Z62.820 PARENT-CHILD PROBLEM: ICD-10-CM

## 2022-03-24 PROCEDURE — 90834 PR PSYCHOTHERAPY W/PATIENT, 45 MIN: ICD-10-PCS | Mod: S$GLB,,, | Performed by: PSYCHOLOGIST

## 2022-03-24 PROCEDURE — 90834 PSYTX W PT 45 MINUTES: CPT | Mod: S$GLB,,, | Performed by: PSYCHOLOGIST

## 2022-03-24 NOTE — PROGRESS NOTES
"     Individual Psychotherapy (PhD/LCSW)    3/24/2022    Site:  [facility]         Therapeutic Intervention: Met with patient.  Outpatient - Insight oriented psychotherapy 45 min - CPT code 61055    Chief complaint/reason for encounter: depression, anger and anxiety     Interval history and content of current session:  Pt remains very frustrated with the divergence of parenting styles between himself and his ex-wife vis-a-vis his 14 y.o daughter Zenaida. He sees his ex catering to and manipulated by his daughter's emotional demands to allow the daughter to "rule" the household when the daughter is with his ex. Although the immediate dangers associated with the daughter's behavior (suicidal acting out; vulnerability to internet predators,) are no longer salient, pt is frustrated by the lax approach being taken to the daughter's education. Suggested ways to engage the daughter in endeavors that could be meaningful and helpful in her future. Also reminded him how far they have come in their relationship. Noted that his own life is becoming more and more fulfilling in-and-of-itself.      Treatment   · Target symptoms: depression, anxiety , anger management   · Why chosen therapy is appropriate versus another modality  · Outcome monitoring methods: self-report, observation, chart notes   · Therapeutic intervention type: insight oriented psychotherapy    Risk parameters:  Patient reports no suicidal ideation  Patient reports no homicidal ideation  Patient reports no self-injurious behavior  Patient reports no violent behavior    Verbal deficits: None    Patient's response to intervention:  The patient's response to intervention is accepting.    Progress toward goals and other mental status changes:  The patient's progress toward goals is fair .    Diagnosis:  Adjustment d/o with anxiety; parent child relationship problem     Plan:  individual psychotherapy prn     Return to clinic as scheduled     Length of Service " (minutes): 45      .00

## 2022-04-06 ENCOUNTER — OFFICE VISIT (OUTPATIENT)
Dept: PSYCHIATRY | Facility: CLINIC | Age: 67
End: 2022-04-06
Payer: MEDICARE

## 2022-04-06 DIAGNOSIS — Z62.820 PARENT-CHILD PROBLEM: ICD-10-CM

## 2022-04-06 DIAGNOSIS — F43.22 ADJUSTMENT DISORDER WITH ANXIETY: Primary | ICD-10-CM

## 2022-04-06 PROCEDURE — 90834 PR PSYCHOTHERAPY W/PATIENT, 45 MIN: ICD-10-PCS | Mod: 95,,, | Performed by: PSYCHOLOGIST

## 2022-04-06 PROCEDURE — 90834 PSYTX W PT 45 MINUTES: CPT | Mod: 95,,, | Performed by: PSYCHOLOGIST

## 2022-04-06 NOTE — PROGRESS NOTES
The patient location is: pt's home in [place], [place]   The chief complaint leading to consultation is: anxiety; interpersonal     Visit type: audiovisual      Face to Face time with patient: 45 minutes   55  minutes of total time spent on the encounter, which includes face to face time and non-face to face time preparing to see the patient (eg, review of tests), Obtaining and/or reviewing separately obtained history, Documenting clinical information in the electronic or other health record, Independently interpreting results (not separately reported) and communicating results to the patient/family/caregiver, or Care coordination (not separately reported).         Each patient to whom he or she provides medical services by telemedicine is:  (1) informed of the relationship between the physician and patient and the respective role of any other health care provider with respect to management of the patient; and (2) notified that he or she may decline to receive medical services by telemedicine and may withdraw from such care at any time.    Notes:       Individual Psychotherapy (PhD/LCSW)    4/6/2022    Site:  [facility]         Therapeutic Intervention: Met with patient.  Outpatient - Insight oriented psychotherapy 45 min - CPT code 37192    Chief complaint/reason for encounter: depression, anger and anxiety     Interval history and content of current session:  Pt focused on conflict with his older (adult) daughter Annika. She c/o to pt about the way his tx of his nephew and his younger daughter was better than the way he was treating her. Specifically, pt would not allow Annika to move into an adjacent, attached apartment with her pets. Pt explained that pets were unacceptable in his household. That's why the nephew was able to live in the apartment; he had no pets. Currently Annika has pulled back from interacting with the pt and he has been very distressed by this. We discussed how to address the issue  between them using I-messages rather than argument about who was right or wrong. Discussed the issue of boundaries, limits, and how to interact assertively in ways that will facilitate resolving their differences with respect for their differing points of view. .   ,  Treatment   · Target symptoms: depression, anxiety , anger management   · Why chosen therapy is appropriate versus another modality  · Outcome monitoring methods: self-report, observation, chart notes   · Therapeutic intervention type: insight oriented psychotherapy    Risk parameters:  Patient reports no suicidal ideation  Patient reports no homicidal ideation  Patient reports no self-injurious behavior  Patient reports no violent behavior    Verbal deficits: None    Patient's response to intervention:  The patient's response to intervention is accepting.    Progress toward goals and other mental status changes:  The patient's progress toward goals is fair .    Diagnosis:  Adjustment d/o with anxiety; parent child relationship problem     Plan:  individual psychotherapy prn     Return to clinic: PRN    Length of Service (minutes): 45

## 2022-04-07 ENCOUNTER — IMMUNIZATION (OUTPATIENT)
Dept: PHARMACY | Facility: CLINIC | Age: 67
End: 2022-04-07
Payer: MEDICARE

## 2022-04-07 DIAGNOSIS — Z23 NEED FOR VACCINATION: Primary | ICD-10-CM

## 2022-04-14 ENCOUNTER — OFFICE VISIT (OUTPATIENT)
Dept: PSYCHIATRY | Facility: CLINIC | Age: 67
End: 2022-04-14
Payer: MEDICARE

## 2022-04-14 DIAGNOSIS — Z62.820 PARENT-CHILD PROBLEM: ICD-10-CM

## 2022-04-14 DIAGNOSIS — F43.22 ADJUSTMENT DISORDER WITH ANXIETY: Primary | ICD-10-CM

## 2022-04-14 PROCEDURE — 90834 PR PSYCHOTHERAPY W/PATIENT, 45 MIN: ICD-10-PCS | Mod: S$GLB,,, | Performed by: PSYCHOLOGIST

## 2022-04-14 PROCEDURE — 90834 PSYTX W PT 45 MINUTES: CPT | Mod: S$GLB,,, | Performed by: PSYCHOLOGIST
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
## 2022-04-20 ENCOUNTER — PATIENT MESSAGE (OUTPATIENT)
Dept: SPORTS MEDICINE | Facility: CLINIC | Age: 67
End: 2022-04-20
Payer: MEDICARE

## 2022-04-21 DIAGNOSIS — M17.12 PRIMARY OSTEOARTHRITIS OF ONE KNEE, LEFT: Primary | ICD-10-CM

## 2022-04-29 ENCOUNTER — TELEPHONE (OUTPATIENT)
Dept: SPORTS MEDICINE | Facility: CLINIC | Age: 67
End: 2022-04-29
Payer: MEDICARE

## 2022-04-29 NOTE — TELEPHONE ENCOUNTER
LVM Pt notified his appt with Dr. Fine on 5/25/22 will need to be rescheduled. Callback number provided.

## 2022-05-12 ENCOUNTER — PATIENT MESSAGE (OUTPATIENT)
Dept: PSYCHIATRY | Facility: CLINIC | Age: 67
End: 2022-05-12
Payer: MEDICARE

## 2022-05-12 ENCOUNTER — OFFICE VISIT (OUTPATIENT)
Dept: PSYCHIATRY | Facility: CLINIC | Age: 67
End: 2022-05-12
Payer: MEDICARE

## 2022-05-12 DIAGNOSIS — Z62.820 PARENT-CHILD PROBLEM: ICD-10-CM

## 2022-05-12 DIAGNOSIS — F43.22 ADJUSTMENT DISORDER WITH ANXIETY: Primary | ICD-10-CM

## 2022-05-12 PROCEDURE — 90834 PR PSYCHOTHERAPY W/PATIENT, 45 MIN: ICD-10-PCS | Mod: S$GLB,,, | Performed by: PSYCHOLOGIST

## 2022-05-12 PROCEDURE — 90834 PSYTX W PT 45 MINUTES: CPT | Mod: S$GLB,,, | Performed by: PSYCHOLOGIST

## 2022-05-12 NOTE — PROGRESS NOTES
":       Individual Psychotherapy (PhD/LCSW)    4/14/2022     Site:  [facility]         Therapeutic Intervention: Met with patient.  Outpatient - Insight oriented psychotherapy 45 min - CPT code 78550    Chief complaint/reason for encounter: depression, anger and anxiety     Interval history and content of current session:  Pt focused on his frustration with his ex-wife's approach to parenting ("overly permissive") and his concerns for the impact on his 15 y.o daughter Zenaida's character development. Pt remains committed to the fx therapy process with Wilfredo Garcia in the hopes that it will encourage his ex-wife to be less gratifying, less enabling, and give their daughter the limits, expectations, and accountability necessary to mature into a responsible adult. We discussed the unlikelihood that his ex will change in a significant degree and noted that the ex sees my pt as "the problem" that needs to change. We discussed other aspects of parenting that can enhance his influence on his daughter's development that focus more on communication with his daughter about his concerns and on modeling the kind of person he hopes she will become.         Treatment   · Target symptoms: depression, anxiety , anger management   · Why chosen therapy is appropriate versus another modality  · Outcome monitoring methods: self-report, observation, chart notes   · Therapeutic intervention type: insight oriented psychotherapy    Risk parameters:  Patient reports no suicidal ideation  Patient reports no homicidal ideation  Patient reports no self-injurious behavior  Patient reports no violent behavior    Verbal deficits: None    Patient's response to intervention:  The patient's response to intervention is accepting.    Progress toward goals and other mental status changes:  The patient's progress toward goals is fair .    Diagnosis:  Adjustment d/o with anxiety; parent child relationship problem     Plan:  individual psychotherapy prn "     Return to clinic: PRN    Length of Service (minutes): 45

## 2022-05-24 ENCOUNTER — TELEPHONE (OUTPATIENT)
Dept: SPORTS MEDICINE | Facility: CLINIC | Age: 67
End: 2022-05-24
Payer: MEDICARE

## 2022-05-24 NOTE — TELEPHONE ENCOUNTER
Pt notified his appt will need to be rescheduled since Dr. Fine is out of town on 5/24/22 and that his injections has been authorized.     He is scheduled for 6/20/22 and will call back if he needs to reschedule. Callback number provided.

## 2022-05-24 NOTE — TELEPHONE ENCOUNTER
"----- Message from Kandy Pearce sent at 5/24/2022 11:39 AM CDT -----  Regarding: FW: Appointment  Please make sure all appointments for tomorrow are canceled and rescheduled accordingly.     Kandy Pearce   Clinical Assistant to Dr. Sam Fine   ----- Message -----  From: Guadalupe Escobar  Sent: 5/24/2022  11:24 AM CDT  To: Babatunde THURSTON Staff  Subject: Appointment                                      "Type:  Patient Call Back    Who Called:ILIANA CARPENTER [[id number]]    What is the reqeust in detail:Pt requesting call back in regards to appointment on 5/25. Please advise    Can the clinic reply by [facility]?no    Best Call Back Number:[phone]      Additional Information:Pt would like to know if appointment is cancelled. If so pt would like to schedule something sooner than 6/27.              "

## 2022-05-26 ENCOUNTER — OFFICE VISIT (OUTPATIENT)
Dept: PSYCHIATRY | Facility: CLINIC | Age: 67
End: 2022-05-26
Payer: MEDICARE

## 2022-05-26 DIAGNOSIS — F43.22 ADJUSTMENT DISORDER WITH ANXIETY: Primary | ICD-10-CM

## 2022-05-26 DIAGNOSIS — Z62.820 PARENT-CHILD PROBLEM: ICD-10-CM

## 2022-05-26 PROCEDURE — 90834 PSYTX W PT 45 MINUTES: CPT | Mod: S$GLB,,, | Performed by: PSYCHOLOGIST

## 2022-05-26 PROCEDURE — 90834 PR PSYCHOTHERAPY W/PATIENT, 45 MIN: ICD-10-PCS | Mod: S$GLB,,, | Performed by: PSYCHOLOGIST

## 2022-05-26 NOTE — PROGRESS NOTES
"     Individual Psychotherapy (PhD/LCSW)    5/26/2022    Site:  [facility]         Therapeutic Intervention: Met with patient.  Outpatient - Insight oriented psychotherapy 45 min - CPT code 59866    Chief complaint/reason for encounter: depression, anger and anxiety     Interval history and content of current session:  Pt describes himself as happy about slow progress in his relationship with Zenaida (his 15 y.o daughter) but continued frustration about his wife's permissive parenting style. He notes that he and his daughter generally get along without conflict now and she accepts his authority when she is with him. He is also pleased that they converse easily. He is hopeful that his style of parenting (limits and accountability without being punitive; nurturing) will payoff in the long-run even though it is so different from her mother's more laissez-faire style which allows the daughter go "get away" with behavior that will not help her mature. Pt feels the fx therapy that he, his ex, and their daughter participate in is helpful. We discussed the positive value of Zenaida learning how to function in two households that are so different. Also noted that the more he manages his own temper and models respectful, responsible behavior himself, the more his daughter will be influenced in the way he hopes to she will be influenced.      Treatment   · Target symptoms: depression, anxiety , anger management   · Why chosen therapy is appropriate versus another modality  · Outcome monitoring methods: self-report, observation, chart notes   · Therapeutic intervention type: insight oriented psychotherapy    Risk parameters:  Patient reports no suicidal ideation  Patient reports no homicidal ideation  Patient reports no self-injurious behavior  Patient reports no violent behavior    Verbal deficits: None    Patient's response to intervention:  The patient's response to intervention is accepting.    Progress toward goals and " other mental status changes:  The patient's progress toward goals is fair .    Diagnosis:  Adjustment d/o with anxiety; parent child relationship problem     Plan:  individual psychotherapy prn     Return to clinic as scheduled     Length of Service (minutes): 45      .00

## 2022-06-15 NOTE — PROGRESS NOTES
Subjective:          Chief Complaint: Dhruv Fine is a 66 y.o. male who had concerns including Pain of the Left Knee.    Mr. Dhruv Fine comes for left knee follow up of synvisc-one. He responded well to his last round of injection for 6 months and is requesting repeat. He has demonstrated improved function.    Mr. Dhruv Fine comes in today for LEFT knee synvisc-one. He is overall doing well.     Dhruv Fine is present today on a virtual visit for a follow up from a Monovisc injection that he received by Dr. Fine on 2/17/21. He reports no relief of pain with this injection. He has had synvisc one injections twice in the past with the most recent one being in 2017. He typically receives years of relief with these injections. He takes glucosamine ASU daily. He is a very active person and snowboards, water skis, and rides motorcycles. He has not felt 100% with pivoting and planting movements of his left knee since the most recent injection. He would not like to receive Monovisc again and would like to receive Synvisc one injection again if approved. Pain increases with climbing stairs.       Interval Hx:  Dhruv Fine is a 65  y.o. male with continuing left knee medial pain. He was seen in 2017 and we recommended a left knee medial  brace at that time. The previous brace was not past 5 years at that time and as a result he has waited until now to receive the brace.  He reports continued medial knee pain intermittently.    Review of Systems   Constitutional: Negative. Negative for fever and night sweats.   HENT: Negative.  Negative for hearing loss.    Eyes: Negative.  Negative for blurred vision and visual disturbance.   Cardiovascular: Negative.  Negative for chest pain and leg swelling.   Respiratory: Negative.  Negative for shortness of breath.    Endocrine: Negative.  Negative for polyuria.   Hematologic/Lymphatic: Negative.  Negative for bleeding problem.   Skin: Negative.  Negative  for rash.   Musculoskeletal: Positive for joint pain. Negative for back pain, joint swelling, muscle cramps and muscle weakness.   Gastrointestinal: Negative.  Negative for melena.   Genitourinary: Negative.  Negative for hematuria.   Neurological: Negative.  Negative for loss of balance, numbness and paresthesias.   Psychiatric/Behavioral: Negative.  Negative for altered mental status.   Allergic/Immunologic: Negative.        Pain Related Questions  Over the past 3 days, what was your average pain during activity? (I.e. running, jogging, walking, climbing stairs, getting dressed, ect.): 3  Over the past 3 days, what was your highest pain level?: 2  Over the past 3 days, what was your lowest pain level? : 2    Other  How many nights a week are you awakened by your affected body part?: 2  Was the patient's HEIGHT measured or patient reported?: Patient Reported  Was the patient's WEIGHT measured or patient reported?: Measured      Objective:        General: Dhruv is well-developed, well-nourished, appears stated age, in no acute distress, alert and oriented to time, place and person.     General    Vitals reviewed.  Constitutional: He is oriented to person, place, and time. He appears well-developed and well-nourished. No distress.   HENT:   Mouth/Throat: No oropharyngeal exudate.   Eyes: Right eye exhibits no discharge. Left eye exhibits no discharge.   Pulmonary/Chest: Effort normal and breath sounds normal. No respiratory distress.   Neurological: He is alert and oriented to person, place, and time. He has normal reflexes. No cranial nerve deficit. Coordination normal.   Psychiatric: He has a normal mood and affect. His behavior is normal. Judgment and thought content normal.     General Musculoskeletal Exam   Gait: normal       Right Knee Exam     Inspection   Erythema: absent  Scars: absent  Swelling: absent  Effusion: absent  Deformity: absent  Bruising: absent    Tenderness   The patient is experiencing no  tenderness.     Range of Motion   Extension: 0   Flexion: 130     Tests   Meniscus   Rudy:  Medial - negative Lateral - negative  Ligament Examination Lachman: normal (-1 to 2mm) PCL-Posterior Drawer: normal (0 to 2mm)     MCL - Valgus: normal (0 to 2mm)  LCL - Varus: normalPivot Shift: normal (Equal)Reverse Pivot Shift: normal (Equal)Dial Test at 30 degrees: normal (< 5 degrees)Dial Test at 90 degrees: normal (< 5 degrees)  Posterior Sag Test: negative  Posterolateral Corner: unstable (>15 degrees difference)  Patella   Patellar apprehension: negative  Passive Patellar Tilt: neutral  Patellar Tracking: normal  Patellar Glide (quadrants): Lateral - 1   Medial - 2  Q-Angle at 90 degrees: normal  Patellar Grind: negative  J-Sign: none    Other   Meniscal Cyst: absent  Popliteal (Baker's) Cyst: absent  Sensation: normal    Left Knee Exam     Inspection   Erythema: absent  Scars: absent  Swelling: absent  Effusion: absent  Deformity: absent  Bruising: absent    Tenderness   The patient is experiencing no tenderness.     Range of Motion   Extension: 0   Flexion: 140     Tests   Meniscus   Rudy:  Medial - negative Lateral - negative  Stability Lachman: normal (-1 to 2mm) PCL-Posterior Drawer: normal (0 to 2mm)  MCL - Valgus: normal (0 to 2mm)  LCL - Varus: normal (0 to 2mm)Pivot Shift: normal (Equal)Reverse Pivot Shift: normal (Equal)Dial Test at 30 degrees: normal (< 5 degrees)Dial Test at 90 degrees: normal (< 5 degrees)  Posterior Sag Test: negative  Posterolateral Corner: unstable (>15 degrees difference)  Patella   Patellar apprehension: negative  Passive Patellar Tilt: neutral  Patellar Tracking: normal  Patellar Glide (Quadrants): Lateral - 1 Medial - 2  Q-Angle at 90 degrees: normal  Patellar Grind: negative  J-Sign: J sign absent    Other   Meniscal Cyst: absent  Popliteal (Baker's) Cyst: absent  Sensation: normal    Right Hip Exam     Tests   Patricia: negative  Left Hip Exam     Tests   Patricia:  negative          Reflexes     Left Side  Achilles:  2+  Quadriceps:  2+    Right Side   Achilles:  2+  Quadriceps:  2+    Vascular Exam     Right Pulses  Dorsalis Pedis:      2+  Posterior Tibial:      2+        Left Pulses  Dorsalis Pedis:      2+  Posterior Tibial:      2+          X-ray Knee Ortho Bilateral with Flexion  Narrative: EXAMINATION:  XR KNEE ORTHO BILAT WITH FLEXION    CLINICAL HISTORY:  Pain in left knee    TECHNIQUE:  AP standing of both knees, PA flexion standing views of both knees, and Merchant views of both knees were performed.  Lateral views of both knees were also performed.    COMPARISON:  05/31/2017    FINDINGS:  No acute fracture or dislocation seen.  No significant soft tissue edema or suprapatellar joint effusion.    Tricompartmental osteophyte formation with narrowing of the medial tibiofemoral compartment bilaterally.  Impression: Osteoarthritis as above.    Electronically signed by: Dipika Bobo  Date:    02/03/2021  Time:    16:01          Assessment:       Encounter Diagnosis   Name Primary?    Localized primary osteoarthritis of left lower leg Yes          Plan:       1. IKDC, SF-12 and KOOS was filled out today in clinic.     RTC in 6 months with Dr. Sam Fine Patient will fill out IKDC, SF-12 and KOOS on return.    2. Medications: Refills of the following Rx were sent to patients preferred Pharmacy:  No Refills Needed Today    3. Physical Therapy:      4. HEP: N/A    5. Procedures/Procedural Planning:   We reviewed with Dhruv today, the pathology and natural history of his diagnosis. We had an extensive discussion as to the conservative treatment and management of their condition. We also discussed the variety of treatment options to include medication, physical therapy, diagnostic testing as well as other treatments.The decision was made to go forward with:     knee - left    1. Synvisc-One performed today, see procedure note.        6. DME: N/A    7. Work/Sport Status: no  change in work status    8. Visit Summary: Mr. Bartlett is having great relief in symptoms for 6 months with Synvisc-One. He does not receive any relief in symptoms with Monovisc. Patient will come back in 6 months for repeat exam, xrays and for new authorization to be placed for Synvisc-One.                           [facility] patient questionnaires have been collected today.

## 2022-06-20 ENCOUNTER — OFFICE VISIT (OUTPATIENT)
Dept: SPORTS MEDICINE | Facility: CLINIC | Age: 67
End: 2022-06-20
Payer: MEDICARE

## 2022-06-20 VITALS
BODY MASS INDEX: 32.78 KG/M2 | HEIGHT: 70 IN | SYSTOLIC BLOOD PRESSURE: 146 MMHG | WEIGHT: 229 LBS | HEART RATE: 78 BPM | DIASTOLIC BLOOD PRESSURE: 89 MMHG

## 2022-06-20 DIAGNOSIS — M17.12 LOCALIZED PRIMARY OSTEOARTHRITIS OF LEFT LOWER LEG: Primary | ICD-10-CM

## 2022-06-20 PROCEDURE — 20611 DRAIN/INJ JOINT/BURSA W/US: CPT | Mod: LT,S$GLB,, | Performed by: ORTHOPAEDIC SURGERY

## 2022-06-20 PROCEDURE — 3079F DIAST BP 80-89 MM HG: CPT | Mod: CPTII,S$GLB,, | Performed by: ORTHOPAEDIC SURGERY

## 2022-06-20 PROCEDURE — 3008F BODY MASS INDEX DOCD: CPT | Mod: CPTII,S$GLB,, | Performed by: ORTHOPAEDIC SURGERY

## 2022-06-20 PROCEDURE — 99499 UNLISTED E&M SERVICE: CPT | Mod: S$GLB,,, | Performed by: ORTHOPAEDIC SURGERY

## 2022-06-20 PROCEDURE — 99999 PR PBB SHADOW E&M-EST. PATIENT-LVL III: ICD-10-PCS | Mod: PBBFAC,,, | Performed by: ORTHOPAEDIC SURGERY

## 2022-06-20 PROCEDURE — 99499 NO LOS: ICD-10-PCS | Mod: S$GLB,,, | Performed by: ORTHOPAEDIC SURGERY

## 2022-06-20 PROCEDURE — 1101F PT FALLS ASSESS-DOCD LE1/YR: CPT | Mod: CPTII,S$GLB,, | Performed by: ORTHOPAEDIC SURGERY

## 2022-06-20 PROCEDURE — 1126F AMNT PAIN NOTED NONE PRSNT: CPT | Mod: CPTII,S$GLB,, | Performed by: ORTHOPAEDIC SURGERY

## 2022-06-20 PROCEDURE — 3288F FALL RISK ASSESSMENT DOCD: CPT | Mod: CPTII,S$GLB,, | Performed by: ORTHOPAEDIC SURGERY

## 2022-06-20 PROCEDURE — 1159F MED LIST DOCD IN RCRD: CPT | Mod: CPTII,S$GLB,, | Performed by: ORTHOPAEDIC SURGERY

## 2022-06-20 PROCEDURE — 1160F PR REVIEW ALL MEDS BY PRESCRIBER/CLIN PHARMACIST DOCUMENTED: ICD-10-PCS | Mod: CPTII,S$GLB,, | Performed by: ORTHOPAEDIC SURGERY

## 2022-06-20 PROCEDURE — 1159F PR MEDICATION LIST DOCUMENTED IN MEDICAL RECORD: ICD-10-PCS | Mod: CPTII,S$GLB,, | Performed by: ORTHOPAEDIC SURGERY

## 2022-06-20 PROCEDURE — 3008F PR BODY MASS INDEX (BMI) DOCUMENTED: ICD-10-PCS | Mod: CPTII,S$GLB,, | Performed by: ORTHOPAEDIC SURGERY

## 2022-06-20 PROCEDURE — 99999 PR PBB SHADOW E&M-EST. PATIENT-LVL III: CPT | Mod: PBBFAC,,, | Performed by: ORTHOPAEDIC SURGERY

## 2022-06-20 PROCEDURE — 20611 LARGE JOINT ASPIRATION/INJECTION: L KNEE: ICD-10-PCS | Mod: LT,S$GLB,, | Performed by: ORTHOPAEDIC SURGERY

## 2022-06-20 PROCEDURE — 1126F PR PAIN SEVERITY QUANTIFIED, NO PAIN PRESENT: ICD-10-PCS | Mod: CPTII,S$GLB,, | Performed by: ORTHOPAEDIC SURGERY

## 2022-06-20 PROCEDURE — 3288F PR FALLS RISK ASSESSMENT DOCUMENTED: ICD-10-PCS | Mod: CPTII,S$GLB,, | Performed by: ORTHOPAEDIC SURGERY

## 2022-06-20 PROCEDURE — 1160F RVW MEDS BY RX/DR IN RCRD: CPT | Mod: CPTII,S$GLB,, | Performed by: ORTHOPAEDIC SURGERY

## 2022-06-20 PROCEDURE — 3077F PR MOST RECENT SYSTOLIC BLOOD PRESSURE >= 140 MM HG: ICD-10-PCS | Mod: CPTII,S$GLB,, | Performed by: ORTHOPAEDIC SURGERY

## 2022-06-20 PROCEDURE — 1101F PR PT FALLS ASSESS DOC 0-1 FALLS W/OUT INJ PAST YR: ICD-10-PCS | Mod: CPTII,S$GLB,, | Performed by: ORTHOPAEDIC SURGERY

## 2022-06-20 PROCEDURE — 3077F SYST BP >= 140 MM HG: CPT | Mod: CPTII,S$GLB,, | Performed by: ORTHOPAEDIC SURGERY

## 2022-06-20 PROCEDURE — 3079F PR MOST RECENT DIASTOLIC BLOOD PRESSURE 80-89 MM HG: ICD-10-PCS | Mod: CPTII,S$GLB,, | Performed by: ORTHOPAEDIC SURGERY

## 2022-06-23 ENCOUNTER — OFFICE VISIT (OUTPATIENT)
Dept: PSYCHIATRY | Facility: CLINIC | Age: 67
End: 2022-06-23
Payer: MEDICARE

## 2022-06-23 DIAGNOSIS — Z62.820 PARENT-CHILD PROBLEM: ICD-10-CM

## 2022-06-23 DIAGNOSIS — F43.22 ADJUSTMENT DISORDER WITH ANXIETY: Primary | ICD-10-CM

## 2022-06-23 PROCEDURE — 90834 PSYTX W PT 45 MINUTES: CPT | Mod: 95,,, | Performed by: PSYCHOLOGIST

## 2022-06-23 PROCEDURE — 90834 PR PSYCHOTHERAPY W/PATIENT, 45 MIN: ICD-10-PCS | Mod: 95,,, | Performed by: PSYCHOLOGIST

## 2022-06-23 NOTE — PROGRESS NOTES
" The patient location is: home in [place], [place]   The chief complaint leading to consultation is: anxiety    Visit type: audiovisual  (part of session done audio only due to technological problems)    Face to Face time with patient: 45 minutes   55 minutes of total time spent on the encounter, which includes face to face time and non-face to face time preparing to see the patient (eg, review of tests), Obtaining and/or reviewing separately obtained history, Documenting clinical information in the electronic or other health record, Independently interpreting results (not separately reported) and communicating results to the patient/family/caregiver, or Care coordination (not separately reported).         Each patient to whom he or she provides medical services by telemedicine is:  (1) informed of the relationship between the physician and patient and the respective role of any other health care provider with respect to management of the patient; and (2) notified that he or she may decline to receive medical services by telemedicine and may withdraw from such care at any time.    Notes:     Individual Psychotherapy (PhD/LCSW)    6/23/2022    Site:  [facility]         Therapeutic Intervention: Met with patient.  Outpatient - Insight oriented psychotherapy 45 min - CPT code 92409    Chief complaint/reason for encounter: depression, anger and anxiety     Interval history and content of current session:  Pt discussed his dismay and frustration with his ex-wife's attitude about their daughter Zenaida's behavior and plans for the summer. He is pushing for her to engage in some sort of cultural or intellectual pursuit (eg piano playing) as well as working. Pt has been told by musician friends that Zenaida is "gifted" and he sees her as wasting that gift. He finds it very depressing that he lacks the ability to influence the situation in ways that he knows will profit her in the long run. We discussed his position as one " which many parents have to endure and accept, especially since it is a post-divorce, split custody arrangement. Also discussed the importance of him engaging in his own interests and pastimes so that he can model for her adulthood satisfactions and fulfillment that is not dependent on her behavior.      Treatment   · Target symptoms: depression, anxiety , anger management   · Why chosen therapy is appropriate versus another modality  · Outcome monitoring methods: self-report, observation, chart notes   · Therapeutic intervention type: insight oriented psychotherapy    Risk parameters:  Patient reports no suicidal ideation  Patient reports no homicidal ideation  Patient reports no self-injurious behavior  Patient reports no violent behavior    Verbal deficits: None    Patient's response to intervention:  The patient's response to intervention is accepting.    Progress toward goals and other mental status changes:  The patient's progress toward goals is fair .    Diagnosis:  Adjustment d/o with anxiety; parent child relationship problem     Plan:  individual psychotherapy prn     Return to clinic as scheduled     Length of Service (minutes): 45      .00

## 2022-08-23 ENCOUNTER — OFFICE VISIT (OUTPATIENT)
Dept: PSYCHIATRY | Facility: CLINIC | Age: 67
End: 2022-08-23
Payer: MEDICARE

## 2022-08-23 DIAGNOSIS — F43.22 ADJUSTMENT DISORDER WITH ANXIETY: Primary | ICD-10-CM

## 2022-08-23 DIAGNOSIS — Z62.820 PARENT-CHILD PROBLEM: ICD-10-CM

## 2022-08-23 PROCEDURE — 90834 PR PSYCHOTHERAPY W/PATIENT, 45 MIN: ICD-10-PCS | Mod: 95,,, | Performed by: PSYCHOLOGIST

## 2022-08-23 PROCEDURE — 90834 PSYTX W PT 45 MINUTES: CPT | Mod: 95,,, | Performed by: PSYCHOLOGIST

## 2022-08-23 NOTE — PROGRESS NOTES
The patient location is: home in [place], [place]   The chief complaint leading to consultation is: anxiety    Visit type: audiovisual       Face to Face time with patient: 45 minutes   55 minutes of total time spent on the encounter, which includes face to face time and non-face to face time preparing to see the patient (eg, review of tests), Obtaining and/or reviewing separately obtained history, Documenting clinical information in the electronic or other health record, Independently interpreting results (not separately reported) and communicating results to the patient/family/caregiver, or Care coordination (not separately reported).         Each patient to whom he or she provides medical services by telemedicine is:  (1) informed of the relationship between the physician and patient and the respective role of any other health care provider with respect to management of the patient; and (2) notified that he or she may decline to receive medical services by telemedicine and may withdraw from such care at any time.    Notes:     Individual Psychotherapy (PhD/LCSW)    8/23/2022    Site:  [facility]         Therapeutic Intervention: Met with patient.  Outpatient - Insight oriented psychotherapy 45 min - CPT code 13816    Chief complaint/reason for encounter: depression, anger and anxiety     Interval history and content of current session:  Pt discussed his problems managing his frustration with his youngest daughter's behavior and the permissiveness of her mother. Pt recognizes that he can't control his ex'wife's behavior and that he has to find ways of promoting his values. He can also see that the anger of his responses when he is frustrated with his daughter mars his message to his daughter about what he believes are important values. We discussed how he could be more effective if he engages his daughter calmly about the differences between his world view and that of her mother. Pt agreed that anger  management is an ongoing challenge for him to find better strategies. Noted his tendency to personalize other peoples' behavior. .      Treatment   · Target symptoms: depression, anxiety , anger management   · Why chosen therapy is appropriate versus another modality  · Outcome monitoring methods: self-report, observation, chart notes   · Therapeutic intervention type: insight oriented psychotherapy    Risk parameters:  Patient reports no suicidal ideation  Patient reports no homicidal ideation  Patient reports no self-injurious behavior  Patient reports no violent behavior    Verbal deficits: None    Patient's response to intervention:  The patient's response to intervention is accepting.    Progress toward goals and other mental status changes:  The patient's progress toward goals is fair .    Diagnosis:  Adjustment d/o with anxiety; parent child relationship problem     Plan:  individual psychotherapy prn     Return to clinic as scheduled     Length of Service (minutes): 45      .00

## 2022-09-05 ENCOUNTER — PATIENT MESSAGE (OUTPATIENT)
Dept: PSYCHIATRY | Facility: CLINIC | Age: 67
End: 2022-09-05
Payer: MEDICARE

## 2022-09-07 ENCOUNTER — OFFICE VISIT (OUTPATIENT)
Dept: PSYCHIATRY | Facility: CLINIC | Age: 67
End: 2022-09-07
Payer: MEDICARE

## 2022-09-07 DIAGNOSIS — Z62.820 PARENT-CHILD PROBLEM: ICD-10-CM

## 2022-09-07 DIAGNOSIS — F43.22 ADJUSTMENT DISORDER WITH ANXIETY: Primary | ICD-10-CM

## 2022-09-07 PROCEDURE — 90834 PSYTX W PT 45 MINUTES: CPT | Mod: 95,,, | Performed by: PSYCHOLOGIST

## 2022-09-07 PROCEDURE — 90834 PR PSYCHOTHERAPY W/PATIENT, 45 MIN: ICD-10-PCS | Mod: 95,,, | Performed by: PSYCHOLOGIST

## 2022-09-07 NOTE — PROGRESS NOTES
The patient location is: home in [place], [place]   The chief complaint leading to consultation is: anxiety    Visit type: audiovisual  (part of session done audio only due to technological problems)    Face to Face time with patient: 45 minutes   55 minutes of total time spent on the encounter, which includes face to face time and non-face to face time preparing to see the patient (eg, review of tests), Obtaining and/or reviewing separately obtained history, Documenting clinical information in the electronic or other health record, Independently interpreting results (not separately reported) and communicating results to the patient/family/caregiver, or Care coordination (not separately reported).         Each patient to whom he or she provides medical services by telemedicine is:  (1) informed of the relationship between the physician and patient and the respective role of any other health care provider with respect to management of the patient; and (2) notified that he or she may decline to receive medical services by telemedicine and may withdraw from such care at any time.    Notes:     Individual Psychotherapy (PhD/LCSW)    9/7/2022    Site:  [facility]         Therapeutic Intervention: Met with patient.  Outpatient - Insight oriented psychotherapy 45 min - CPT code 79633    Chief complaint/reason for encounter: depression, anger and anxiety     Interval history and content of current session:  Pt remains very frustrated and anxious in regard to managing his 14 y.o daughter's behavior and co-parenting with his ex-wife. Pt recognizes that his efforts to change his ex's parenting style are very unlikely to succeed. We discussed changing his own communication style from you-messages to I-messages and modeled how to do this that could make her more amenable to his concerns without suppressing them or being verbally aggressive with them. Also addressed how to do the same with his daughter and respond to her  behavior from the position of natural consequences (vs punishment).      Treatment   Target symptoms: depression, anxiety , anger management   Why chosen therapy is appropriate versus another modality  Outcome monitoring methods: self-report, observation, chart notes   Therapeutic intervention type: insight oriented psychotherapy    Risk parameters:  Patient reports no suicidal ideation  Patient reports no homicidal ideation  Patient reports no self-injurious behavior  Patient reports no violent behavior    Verbal deficits: None    Patient's response to intervention:  The patient's response to intervention is accepting.    Progress toward goals and other mental status changes:  The patient's progress toward goals is fair .    Diagnosis:  Adjustment d/o with anxiety; parent child relationship problem     Plan:  individual psychotherapy prn     Return to clinic as scheduled     Length of Service (minutes): 45      .00

## 2022-10-13 ENCOUNTER — OFFICE VISIT (OUTPATIENT)
Dept: INTERNAL MEDICINE | Facility: CLINIC | Age: 67
End: 2022-10-13
Payer: MEDICARE

## 2022-10-13 ENCOUNTER — LAB VISIT (OUTPATIENT)
Dept: LAB | Facility: HOSPITAL | Age: 67
End: 2022-10-13
Attending: INTERNAL MEDICINE
Payer: MEDICARE

## 2022-10-13 DIAGNOSIS — E78.5 HYPERLIPIDEMIA, UNSPECIFIED HYPERLIPIDEMIA TYPE: ICD-10-CM

## 2022-10-13 DIAGNOSIS — N40.0 BENIGN PROSTATIC HYPERPLASIA, UNSPECIFIED WHETHER LOWER URINARY TRACT SYMPTOMS PRESENT: Primary | ICD-10-CM

## 2022-10-13 DIAGNOSIS — N40.0 BENIGN PROSTATIC HYPERPLASIA, UNSPECIFIED WHETHER LOWER URINARY TRACT SYMPTOMS PRESENT: ICD-10-CM

## 2022-10-13 LAB
ANION GAP SERPL CALC-SCNC: 8 MMOL/L (ref 8–16)
BILIRUB UR QL STRIP: NEGATIVE
BUN SERPL-MCNC: 10 MG/DL (ref 8–23)
CALCIUM SERPL-MCNC: 9.4 MG/DL (ref 8.7–10.5)
CHLORIDE SERPL-SCNC: 106 MMOL/L (ref 95–110)
CLARITY UR REFRACT.AUTO: CLEAR
CO2 SERPL-SCNC: 27 MMOL/L (ref 23–29)
COLOR UR AUTO: YELLOW
CREAT SERPL-MCNC: 1.1 MG/DL (ref 0.5–1.4)
EST. GFR  (NO RACE VARIABLE): >60 ML/MIN/1.73 M^2
GLUCOSE SERPL-MCNC: 120 MG/DL (ref 70–110)
GLUCOSE UR QL STRIP: NEGATIVE
HGB UR QL STRIP: NEGATIVE
KETONES UR QL STRIP: NEGATIVE
LEUKOCYTE ESTERASE UR QL STRIP: NEGATIVE
NITRITE UR QL STRIP: NEGATIVE
PH UR STRIP: 6 [PH] (ref 5–8)
POTASSIUM SERPL-SCNC: 4.7 MMOL/L (ref 3.5–5.1)
PROT UR QL STRIP: ABNORMAL
SODIUM SERPL-SCNC: 141 MMOL/L (ref 136–145)
SP GR UR STRIP: 1.03 (ref 1–1.03)
URN SPEC COLLECT METH UR: ABNORMAL

## 2022-10-13 PROCEDURE — 80048 BASIC METABOLIC PNL TOTAL CA: CPT | Performed by: INTERNAL MEDICINE

## 2022-10-13 PROCEDURE — 1101F PR PT FALLS ASSESS DOC 0-1 FALLS W/OUT INJ PAST YR: ICD-10-PCS | Mod: CPTII,S$GLB,, | Performed by: INTERNAL MEDICINE

## 2022-10-13 PROCEDURE — 99999 PR PBB SHADOW E&M-EST. PATIENT-LVL IV: CPT | Mod: PBBFAC,,, | Performed by: INTERNAL MEDICINE

## 2022-10-13 PROCEDURE — 99214 OFFICE O/P EST MOD 30 MIN: CPT | Mod: S$GLB,,, | Performed by: INTERNAL MEDICINE

## 2022-10-13 PROCEDURE — 1101F PT FALLS ASSESS-DOCD LE1/YR: CPT | Mod: CPTII,S$GLB,, | Performed by: INTERNAL MEDICINE

## 2022-10-13 PROCEDURE — 3074F PR MOST RECENT SYSTOLIC BLOOD PRESSURE < 130 MM HG: ICD-10-PCS | Mod: CPTII,S$GLB,, | Performed by: INTERNAL MEDICINE

## 2022-10-13 PROCEDURE — 3079F DIAST BP 80-89 MM HG: CPT | Mod: CPTII,S$GLB,, | Performed by: INTERNAL MEDICINE

## 2022-10-13 PROCEDURE — 3079F PR MOST RECENT DIASTOLIC BLOOD PRESSURE 80-89 MM HG: ICD-10-PCS | Mod: CPTII,S$GLB,, | Performed by: INTERNAL MEDICINE

## 2022-10-13 PROCEDURE — 3074F SYST BP LT 130 MM HG: CPT | Mod: CPTII,S$GLB,, | Performed by: INTERNAL MEDICINE

## 2022-10-13 PROCEDURE — 1126F AMNT PAIN NOTED NONE PRSNT: CPT | Mod: CPTII,S$GLB,, | Performed by: INTERNAL MEDICINE

## 2022-10-13 PROCEDURE — 81003 URINALYSIS AUTO W/O SCOPE: CPT | Performed by: INTERNAL MEDICINE

## 2022-10-13 PROCEDURE — 3288F PR FALLS RISK ASSESSMENT DOCUMENTED: ICD-10-PCS | Mod: CPTII,S$GLB,, | Performed by: INTERNAL MEDICINE

## 2022-10-13 PROCEDURE — 3288F FALL RISK ASSESSMENT DOCD: CPT | Mod: CPTII,S$GLB,, | Performed by: INTERNAL MEDICINE

## 2022-10-13 PROCEDURE — 1126F PR PAIN SEVERITY QUANTIFIED, NO PAIN PRESENT: ICD-10-PCS | Mod: CPTII,S$GLB,, | Performed by: INTERNAL MEDICINE

## 2022-10-13 PROCEDURE — 87086 URINE CULTURE/COLONY COUNT: CPT | Performed by: INTERNAL MEDICINE

## 2022-10-13 PROCEDURE — 99999 PR PBB SHADOW E&M-EST. PATIENT-LVL IV: ICD-10-PCS | Mod: PBBFAC,,, | Performed by: INTERNAL MEDICINE

## 2022-10-13 PROCEDURE — 99214 PR OFFICE/OUTPT VISIT, EST, LEVL IV, 30-39 MIN: ICD-10-PCS | Mod: S$GLB,,, | Performed by: INTERNAL MEDICINE

## 2022-10-13 PROCEDURE — 36415 COLL VENOUS BLD VENIPUNCTURE: CPT | Performed by: INTERNAL MEDICINE

## 2022-10-14 ENCOUNTER — OFFICE VISIT (OUTPATIENT)
Dept: UROLOGY | Facility: CLINIC | Age: 67
End: 2022-10-14
Payer: MEDICARE

## 2022-10-14 VITALS — WEIGHT: 219.81 LBS | HEIGHT: 71 IN | BODY MASS INDEX: 30.77 KG/M2

## 2022-10-14 DIAGNOSIS — N52.9 ERECTILE DYSFUNCTION, UNSPECIFIED ERECTILE DYSFUNCTION TYPE: ICD-10-CM

## 2022-10-14 DIAGNOSIS — N40.0 BENIGN PROSTATIC HYPERPLASIA, UNSPECIFIED WHETHER LOWER URINARY TRACT SYMPTOMS PRESENT: Primary | ICD-10-CM

## 2022-10-14 PROCEDURE — 1126F AMNT PAIN NOTED NONE PRSNT: CPT | Mod: CPTII,S$GLB,,

## 2022-10-14 PROCEDURE — 1160F PR REVIEW ALL MEDS BY PRESCRIBER/CLIN PHARMACIST DOCUMENTED: ICD-10-PCS | Mod: CPTII,S$GLB,,

## 2022-10-14 PROCEDURE — 99999 PR PBB SHADOW E&M-EST. PATIENT-LVL IV: CPT | Mod: PBBFAC,,,

## 2022-10-14 PROCEDURE — 99204 OFFICE O/P NEW MOD 45 MIN: CPT | Mod: S$GLB,,,

## 2022-10-14 PROCEDURE — 99999 PR PBB SHADOW E&M-EST. PATIENT-LVL IV: ICD-10-PCS | Mod: PBBFAC,,,

## 2022-10-14 PROCEDURE — 1160F RVW MEDS BY RX/DR IN RCRD: CPT | Mod: CPTII,S$GLB,,

## 2022-10-14 PROCEDURE — 99204 PR OFFICE/OUTPT VISIT, NEW, LEVL IV, 45-59 MIN: ICD-10-PCS | Mod: S$GLB,,,

## 2022-10-14 PROCEDURE — 1159F MED LIST DOCD IN RCRD: CPT | Mod: CPTII,S$GLB,,

## 2022-10-14 PROCEDURE — 1159F PR MEDICATION LIST DOCUMENTED IN MEDICAL RECORD: ICD-10-PCS | Mod: CPTII,S$GLB,,

## 2022-10-14 PROCEDURE — 1126F PR PAIN SEVERITY QUANTIFIED, NO PAIN PRESENT: ICD-10-PCS | Mod: CPTII,S$GLB,,

## 2022-10-14 RX ORDER — TAMSULOSIN HYDROCHLORIDE 0.4 MG/1
0.4 CAPSULE ORAL NIGHTLY
Qty: 30 CAPSULE | Refills: 11 | Status: SHIPPED | OUTPATIENT
Start: 2022-10-14 | End: 2023-10-12

## 2022-10-14 RX ORDER — SILDENAFIL 100 MG/1
100 TABLET, FILM COATED ORAL DAILY PRN
Qty: 10 TABLET | Refills: 2 | Status: SHIPPED | OUTPATIENT
Start: 2022-10-14 | End: 2023-05-16 | Stop reason: SDUPTHER

## 2022-10-14 RX ORDER — SILDENAFIL 100 MG/1
100 TABLET, FILM COATED ORAL DAILY PRN
Qty: 10 TABLET | Refills: 2 | Status: SHIPPED | OUTPATIENT
Start: 2022-10-14 | End: 2022-10-14

## 2022-10-14 NOTE — PROGRESS NOTES
CHIEF COMPLAINT:  Weak urinary stream    HISTORY OF PRESENTING ILLINESS:  Dhruv Fine is a 66 y.o. male new to urology. Referral for BPH evaluation from Dr. Hicks. Reports hesitancy that began 2 weeks ago, weakened FOS, straining with urination, and urinary frequency. He drinks alkalinized water - states his symptoms are worsened by this water but not bothersome enough to stop use. PMHx includes anermia, anxiety, hemorrhoids, DDD cervical and lumbar, obesity, HLD, sleep apnea, nuclear sclerosis. He reports some ED that is bothersome, he has not tried an oral medication for this in the past. Most recent PSA from 2/2021 1.1 ng/dl.         REVIEW OF SYSTEMS:  Review of Systems   Constitutional:  Negative for chills and fever.   HENT:  Negative for congestion and sore throat.    Respiratory:  Negative for cough and shortness of breath.    Cardiovascular:  Negative for chest pain and palpitations.   Gastrointestinal:  Negative for nausea and vomiting.   Genitourinary:  Positive for frequency. Negative for dysuria, flank pain, hematuria and urgency.        + straining, +weak FOS   Neurological:  Negative for dizziness and headaches.       PATIENT HISTORY:    Past Medical History:   Diagnosis Date    Anemia 10/11/2013    Anxiety     Bleeding hemorrhoids     Borderline hypertension 10/19/2013    Degenerative disc disease     cervical and lumbar    Former smoker 1-2 packs daily for < 10 years 1/23/2015    Former smoker 1-2 packs daily for < 10 years 1/23/2015    Hyperlipidemia     Iron deficiency anemia 5/21/2015    Non morbid obesity due to excess calories 6/17/2016    Nuclear sclerosis - Both Eyes 2/25/2013    Psychiatric exam requested by authority     Psychiatric problem     Sleep apnea 7/26/2012    Sleep difficulties     trouble staying asleep     Statin myopathy 10/10/2018    Therapy        Past Surgical History:   Procedure Laterality Date    KNEE SURGERY Left 2/10/15    Dr. Fine       Family History    Problem Relation Age of Onset    Kidney disease Mother     Glaucoma Paternal Aunt     Cataracts Paternal Aunt     Cancer Sister     Heart disease Brother         rheumatic fever    Hepatitis Brother     Peripheral vascular disease Brother     Blindness Neg Hx     Amblyopia Neg Hx     Macular degeneration Neg Hx     Retinal detachment Neg Hx     Strabismus Neg Hx     Diabetes Neg Hx     Hypertension Neg Hx     Stroke Neg Hx     Thyroid disease Neg Hx        Social History     Socioeconomic History    Marital status:    Tobacco Use    Smoking status: Former     Types: Cigars     Quit date:      Years since quittin.8    Smokeless tobacco: Never    Tobacco comments:     once every 3 months    Substance and Sexual Activity    Alcohol use: Yes     Comment: occasionally, every so many weekends, [employer] game     Drug use: No    Sexual activity: Yes     Partners: Female       Allergies:  No known allergies    Medications:    Current Outpatient Medications:     amoxicillin (AMOXIL) 250 MG capsule, Take 1 capsule (250 mg total) by mouth 3 (three) times daily. For sinus and ear infection., Disp: 30 capsule, Rfl: 0    budesonide (PULMICORT) 0.5 mg/2 mL nebulizer solution, MIX CONTENTS OF 1 RESPULE WITH STERILE DILUENT PROVIDED. POUR INTO NASONEB CUP & PERFORM NASAL TREATMENT TWICE DAILY., Disp: , Rfl:     cyclobenzaprine (FLEXERIL) 10 MG tablet, Take 1 tablet (10 mg total) by mouth nightly. Prn severe muscle spasms, Disp: 30 tablet, Rfl: 0    fluticasone propionate (FLONASE) 50 mcg/actuation nasal spray, SHAKE LIQUID AND USE 1 SPRAY(50 MCG) IN EACH NOSTRIL EVERY DAY, Disp: 48 g, Rfl: 1    guaiFENesin-codeine 100-10 mg/5 ml (TUSSI-ORGANIDIN NR)  mg/5 mL syrup, 1-2 tsps po Q 4hours prn cough, Disp: 118 mL, Rfl: 1    hydrOXYzine pamoate (VISTARIL) 25 MG Cap, Take 1 capsule (25 mg total) by mouth nightly as needed (insomnia)., Disp: 30 capsule, Rfl: 2    LIDOcaine (LIDODERM) 5 %, Place 1-2 patches onto the  skin once daily. Wear patch for 12 hours and  must have a 12 hour period without a patch, Disp: 60 patch, Rfl: 2    multivitamin (THERAGRAN) per tablet, Take 1 tablet by mouth once daily., Disp: , Rfl:     EScitalopram oxalate (LEXAPRO) 20 MG tablet, TAKE 1 TABLET(20 MG) BY MOUTH EVERY DAY (Patient not taking: Reported on 10/14/2022), Disp: 90 tablet, Rfl: 0    LORazepam (ATIVAN) 1 MG tablet, Take 1 tablet (1 mg total) by mouth daily as needed for Anxiety (daily prn anxiety)., Disp: 30 tablet, Rfl: 0    sildenafiL (VIAGRA) 100 MG tablet, Take 1 tablet (100 mg total) by mouth daily as needed for Erectile Dysfunction (1 hour prior to intercourse on an empty stomach)., Disp: 10 tablet, Rfl: 2    tamsulosin (FLOMAX) 0.4 mg Cap, Take 1 capsule (0.4 mg total) by mouth every evening., Disp: 30 capsule, Rfl: 11    PHYSICAL EXAMINATION:  Physical Exam  Constitutional:       Appearance: Normal appearance.   HENT:      Head: Normocephalic and atraumatic.      Right Ear: External ear normal.      Left Ear: External ear normal.   Pulmonary:      Effort: Pulmonary effort is normal. No respiratory distress.   Genitourinary:     Prostate: Enlarged. Not tender and no nodules present.      Rectum: External hemorrhoid present.   Skin:     General: Skin is warm and dry.   Neurological:      General: No focal deficit present.      Mental Status: He is alert and oriented to person, place, and time.   Psychiatric:         Mood and Affect: Mood normal.         Behavior: Behavior normal.         LABS:  UA not collected - unable to provide sample  PVR 60 ml      Lab Results   Component Value Date    PSA 1.1 02/10/2021    PSA 0.76 10/14/2019    PSA 0.60 02/21/2019             IMPRESSION:    Encounter Diagnoses   Name Primary?    Benign prostatic hyperplasia, unspecified whether lower urinary tract symptoms present Yes    Erectile dysfunction, unspecified erectile dysfunction type          Assessment:       1. Benign prostatic hyperplasia,  unspecified whether lower urinary tract symptoms present    2. Erectile dysfunction, unspecified erectile dysfunction type          Plan:   - Rx for Flomax provided. Reviewed timing and possible side effects such as dizziness and retrograde ejaculation. Teaching attached to AVS. Explained it may take at least 2 weeks to notice and effect.  - Rx for Viagra sent to [facility]. Discussed how to take the medication and possible side effects. Teaching attached.   - PSA today will call with results    Follow up in 3 month to discuss Flomax and Viagra efficacy    I spent 45 minutes with the patient of which more than half was spent in direct consultation with the patient in regards to our treatment and plan.  We addressed the office findings and recent labs.   Education and recommendations of today's plan of care including home remedies and needed follow up with PCP.   We discussed the chief complaint/LUTS and the possible contributory factors.   Recommended lifestyle modifications with proper, healthy diet, good hydration if no fluid restrictions; reducing bladder irritants.   Benefits of regular exercise approved by PCP.

## 2022-10-15 LAB — BACTERIA UR CULT: NO GROWTH

## 2022-10-16 VITALS
DIASTOLIC BLOOD PRESSURE: 84 MMHG | HEART RATE: 78 BPM | HEIGHT: 71 IN | OXYGEN SATURATION: 97 % | SYSTOLIC BLOOD PRESSURE: 128 MMHG | WEIGHT: 219.81 LBS | TEMPERATURE: 99 F | BODY MASS INDEX: 30.77 KG/M2

## 2022-10-16 NOTE — PROGRESS NOTES
Subjective:       Patient ID: Dhruv Fine is a 66 y.o. male.    Chief Complaint: Hyperlipidemia    HPI  He returns for management of hyperlipidemia.  He has had hyperlipidemia for over a year.  Current treatment has included a low-fat diet.  Denies pain located in his chest.  He complains of nocturia and decreased urine stream.  No fever, chills, night sweats.  No penile discharge     Medications:  See med list     Social history:  Does not smoke, does not drink alcohol       Review of Systems   Constitutional:  Negative for chills, fatigue, fever and unexpected weight change.   Respiratory:  Negative for chest tightness and shortness of breath.    Cardiovascular:  Negative for chest pain and palpitations.   Gastrointestinal:  Negative for abdominal pain and blood in stool.   Neurological:  Negative for dizziness, syncope, numbness and headaches.     Objective:      Physical Exam  HENT:      Right Ear: External ear normal.      Left Ear: External ear normal.      Nose: Nose normal.      Mouth/Throat:      Mouth: Mucous membranes are moist.      Pharynx: Oropharynx is clear.   Eyes:      Pupils: Pupils are equal, round, and reactive to light.   Cardiovascular:      Rate and Rhythm: Normal rate and regular rhythm.      Heart sounds: No murmur heard.  Pulmonary:      Breath sounds: Normal breath sounds.   Abdominal:      General: There is no distension.      Palpations: There is no hepatomegaly.      Tenderness: There is no abdominal tenderness.   Musculoskeletal:      Cervical back: Normal range of motion.   Lymphadenopathy:      Cervical: No cervical adenopathy.      Upper Body:      Right upper body: No axillary adenopathy.      Left upper body: No axillary adenopathy.   Neurological:      Cranial Nerves: No cranial nerve deficit.      Sensory: No sensory deficit.      Motor: Motor function is intact.      Deep Tendon Reflexes: Reflexes are normal and symmetric.       Assessment/Plan       Assessment and plan:   1.  BPH: Schedule urology appointment.  Urine sent for urinalysis and culture.  Check BMP  2.  Hyperlipidemia:  Continue low-fat diet  3.  He was here for urgent care only appointment.  He will follow-up with his primary care physician for a physical

## 2022-11-22 ENCOUNTER — TELEPHONE (OUTPATIENT)
Dept: INTERNAL MEDICINE | Facility: CLINIC | Age: 67
End: 2022-11-22
Payer: MEDICARE

## 2022-11-22 ENCOUNTER — OFFICE VISIT (OUTPATIENT)
Dept: INTERNAL MEDICINE | Facility: CLINIC | Age: 67
End: 2022-11-22
Payer: MEDICARE

## 2022-11-22 ENCOUNTER — LAB VISIT (OUTPATIENT)
Dept: LAB | Facility: HOSPITAL | Age: 67
End: 2022-11-22
Payer: MEDICARE

## 2022-11-22 VITALS
OXYGEN SATURATION: 96 % | HEART RATE: 63 BPM | WEIGHT: 226.19 LBS | DIASTOLIC BLOOD PRESSURE: 83 MMHG | BODY MASS INDEX: 31.67 KG/M2 | SYSTOLIC BLOOD PRESSURE: 138 MMHG | HEIGHT: 71 IN

## 2022-11-22 DIAGNOSIS — R09.81 NASAL CONGESTION: ICD-10-CM

## 2022-11-22 DIAGNOSIS — J32.4 PANSINUSITIS, UNSPECIFIED CHRONICITY: Primary | ICD-10-CM

## 2022-11-22 DIAGNOSIS — R05.9 COUGH, UNSPECIFIED TYPE: ICD-10-CM

## 2022-11-22 DIAGNOSIS — Z12.5 PROSTATE CANCER SCREENING: ICD-10-CM

## 2022-11-22 DIAGNOSIS — R07.89 BURNING CHEST PAIN: ICD-10-CM

## 2022-11-22 DIAGNOSIS — R09.82 POST-NASAL DRIP: ICD-10-CM

## 2022-11-22 PROCEDURE — 3079F DIAST BP 80-89 MM HG: CPT | Mod: CPTII,GC,S$GLB,

## 2022-11-22 PROCEDURE — 1159F MED LIST DOCD IN RCRD: CPT | Mod: CPTII,GC,S$GLB,

## 2022-11-22 PROCEDURE — 1160F RVW MEDS BY RX/DR IN RCRD: CPT | Mod: CPTII,GC,S$GLB,

## 2022-11-22 PROCEDURE — 36415 COLL VENOUS BLD VENIPUNCTURE: CPT

## 2022-11-22 PROCEDURE — 3075F PR MOST RECENT SYSTOLIC BLOOD PRESS GE 130-139MM HG: ICD-10-PCS | Mod: CPTII,GC,S$GLB,

## 2022-11-22 PROCEDURE — 84153 ASSAY OF PSA TOTAL: CPT

## 2022-11-22 PROCEDURE — 99213 PR OFFICE/OUTPT VISIT, EST, LEVL III, 20-29 MIN: ICD-10-PCS | Mod: GC,S$GLB,,

## 2022-11-22 PROCEDURE — 3079F PR MOST RECENT DIASTOLIC BLOOD PRESSURE 80-89 MM HG: ICD-10-PCS | Mod: CPTII,GC,S$GLB,

## 2022-11-22 PROCEDURE — 99999 PR PBB SHADOW E&M-EST. PATIENT-LVL IV: ICD-10-PCS | Mod: PBBFAC,GC,,

## 2022-11-22 PROCEDURE — 3008F PR BODY MASS INDEX (BMI) DOCUMENTED: ICD-10-PCS | Mod: CPTII,GC,S$GLB,

## 2022-11-22 PROCEDURE — 99999 PR PBB SHADOW E&M-EST. PATIENT-LVL IV: CPT | Mod: PBBFAC,GC,,

## 2022-11-22 PROCEDURE — 3008F BODY MASS INDEX DOCD: CPT | Mod: CPTII,GC,S$GLB,

## 2022-11-22 PROCEDURE — 3075F SYST BP GE 130 - 139MM HG: CPT | Mod: CPTII,GC,S$GLB,

## 2022-11-22 PROCEDURE — 1160F PR REVIEW ALL MEDS BY PRESCRIBER/CLIN PHARMACIST DOCUMENTED: ICD-10-PCS | Mod: CPTII,GC,S$GLB,

## 2022-11-22 PROCEDURE — 99213 OFFICE O/P EST LOW 20 MIN: CPT | Mod: GC,S$GLB,,

## 2022-11-22 PROCEDURE — 1159F PR MEDICATION LIST DOCUMENTED IN MEDICAL RECORD: ICD-10-PCS | Mod: CPTII,GC,S$GLB,

## 2022-11-22 RX ORDER — FLUTICASONE PROPIONATE 50 MCG
SPRAY, SUSPENSION (ML) NASAL
Qty: 48 G | Refills: 1
Start: 2022-11-22

## 2022-11-22 RX ORDER — PANTOPRAZOLE SODIUM 40 MG/1
40 TABLET, DELAYED RELEASE ORAL DAILY
Qty: 30 TABLET | Refills: 1 | Status: SHIPPED | OUTPATIENT
Start: 2022-11-22

## 2022-11-22 RX ORDER — CODEINE PHOSPHATE AND GUAIFENESIN 10; 100 MG/5ML; MG/5ML
SOLUTION ORAL
Qty: 118 ML | Refills: 1 | Status: SHIPPED | OUTPATIENT
Start: 2022-11-22 | End: 2023-12-06

## 2022-11-22 NOTE — PATIENT INSTRUCTIONS
Please continue taking flonase and cough medication as needed.  Refilled.  Please start taking Pantoprazole once a day.  Please attempt to avoid foods and beverages that could exacerbate symptoms including wine, chocolate, spicy foods.  Please sit up after a meal prior to going to bed for 30+ minutes.      Please follow up with Dr. Cruz or sooner if needed.

## 2022-11-22 NOTE — PROGRESS NOTES
Dhruv Devries  1955        Subjective     Chief Complaint: chronic cough    History of Present Illness:  Mr. Dhruv Devries is a 67 y.o. male who presents to clinic for urgent care visit.     Patient with history of sinusitis episodes that improve with prior prescription of augmentin, cough medication, and flonase.  Patient now reporting similar episode of non productive cough associated with post nasal drip and burning throat/chest sensation.  States burning pain worsens with lying down and after drinking wine.  Has never been diagnosed with GERD in the past.  Patient denies dyspnea, f/c, recent sick contacts, abdominal pain.         Review of Systems   Constitutional:  Negative for chills and fever.   HENT:  Positive for congestion. Negative for sinus pain and sore throat.    Respiratory:  Positive for cough. Negative for sputum production and shortness of breath.    Cardiovascular:  Negative for palpitations and leg swelling.   Gastrointestinal:  Positive for heartburn. Negative for abdominal pain, constipation, diarrhea and vomiting.   Genitourinary:  Negative for dysuria and hematuria.   Musculoskeletal:  Negative for myalgias.   Neurological: Negative.       PAST HISTORY:     Past Medical History:   Diagnosis Date    Anemia 10/11/2013    Anxiety     Bleeding hemorrhoids     Borderline hypertension 10/19/2013    Degenerative disc disease     cervical and lumbar    Former smoker 1-2 packs daily for < 10 years 1/23/2015    Former smoker 1-2 packs daily for < 10 years 1/23/2015    Hyperlipidemia     Iron deficiency anemia 5/21/2015    Non morbid obesity due to excess calories 6/17/2016    Nuclear sclerosis - Both Eyes 2/25/2013    Psychiatric exam requested by authority     Psychiatric problem     Sleep apnea 7/26/2012    Sleep difficulties     trouble staying asleep     Statin myopathy 10/10/2018    Therapy        Past Surgical History:   Procedure Laterality Date    KNEE SURGERY Left 2/10/15      [facility]       Family History   Problem Relation Age of Onset    Kidney disease Mother     Glaucoma Paternal Aunt     Cataracts Paternal Aunt     Cancer Sister     Heart disease Brother         rheumatic fever    Hepatitis Brother     Peripheral vascular disease Brother     Blindness Neg Hx     Amblyopia Neg Hx     Macular degeneration Neg Hx     Retinal detachment Neg Hx     Strabismus Neg Hx     Diabetes Neg Hx     Hypertension Neg Hx     Stroke Neg Hx     Thyroid disease Neg Hx        Social History     Socioeconomic History    Marital status:    Tobacco Use    Smoking status: Former     Types: Cigars     Quit date:      Years since quittin.9    Smokeless tobacco: Never    Tobacco comments:     once every 3 months    Substance and Sexual Activity    Alcohol use: Yes     Comment: occasionally, every so many weekends, [employer] game     Drug use: No    Sexual activity: Yes     Partners: Female       MEDICATIONS & ALLERGIES:     Current Outpatient Medications on File Prior to Visit   Medication Sig    amoxicillin (AMOXIL) 250 MG capsule Take 1 capsule (250 mg total) by mouth 3 (three) times daily. For sinus and ear infection.    budesonide (PULMICORT) 0.5 mg/2 mL nebulizer solution MIX CONTENTS OF 1 RESPULE WITH STERILE DILUENT PROVIDED. POUR INTO NASONEB CUP & PERFORM NASAL TREATMENT TWICE DAILY.    cyclobenzaprine (FLEXERIL) 10 MG tablet Take 1 tablet (10 mg total) by mouth nightly. Prn severe muscle spasms    EScitalopram oxalate (LEXAPRO) 20 MG tablet TAKE 1 TABLET(20 MG) BY MOUTH EVERY DAY (Patient not taking: Reported on 10/14/2022)    hydrOXYzine pamoate (VISTARIL) 25 MG Cap Take 1 capsule (25 mg total) by mouth nightly as needed (insomnia).    LIDOcaine (LIDODERM) 5 % Place 1-2 patches onto the skin once daily. Wear patch for 12 hours and  must have a 12 hour period without a patch    LORazepam (ATIVAN) 1 MG tablet Take 1 tablet (1 mg total) by mouth daily as needed for Anxiety (daily prn  "anxiety).    multivitamin (THERAGRAN) per tablet Take 1 tablet by mouth once daily.    sildenafiL (VIAGRA) 100 MG tablet Take 1 tablet (100 mg total) by mouth daily as needed for Erectile Dysfunction (1 hour prior to intercourse on an empty stomach).    tamsulosin (FLOMAX) 0.4 mg Cap Take 1 capsule (0.4 mg total) by mouth every evening.    [DISCONTINUED] fluticasone propionate (FLONASE) 50 mcg/actuation nasal spray SHAKE LIQUID AND USE 1 SPRAY(50 MCG) IN EACH NOSTRIL EVERY DAY    [DISCONTINUED] guaiFENesin-codeine 100-10 mg/5 ml (TUSSI-ORGANIDIN NR)  mg/5 mL syrup 1-2 tsps po Q 4hours prn cough     No current facility-administered medications on file prior to visit.       Review of patient's allergies indicates:   Allergen Reactions    No known allergies        OBJECTIVE:     Vital Signs:  Vitals:    11/22/22 1333   BP: 138/83   Pulse: 63   SpO2: 96%   Weight: 102.6 kg (226 lb 3.1 oz)   Height: 5' 11" (1.803 m)       Body mass index is 31.55 kg/m².     Physical Exam:  Physical Exam  Vitals and nursing note reviewed.   Constitutional:       General: He is not in acute distress.     Appearance: Normal appearance.   HENT:      Head: Normocephalic and atraumatic.      Mouth/Throat:      Mouth: Mucous membranes are moist.      Pharynx: Oropharynx is clear. No oropharyngeal exudate or posterior oropharyngeal erythema.   Eyes:      General: No scleral icterus.     Extraocular Movements: Extraocular movements intact.      Conjunctiva/sclera: Conjunctivae normal.   Cardiovascular:      Rate and Rhythm: Normal rate and regular rhythm.      Pulses: Normal pulses.      Heart sounds: Normal heart sounds. No murmur heard.  Pulmonary:      Effort: Pulmonary effort is normal. No respiratory distress.      Breath sounds: Normal breath sounds. No wheezing or rales.   Abdominal:      Palpations: Abdomen is soft.      Tenderness: There is no abdominal tenderness. There is no guarding or rebound.   Musculoskeletal:         " General: No tenderness. Normal range of motion.      Cervical back: Normal range of motion and neck supple. No tenderness.      Right lower leg: No edema.      Left lower leg: No edema.   Lymphadenopathy:      Cervical: No cervical adenopathy.   Skin:     General: Skin is warm and dry.   Neurological:      General: No focal deficit present.      Mental Status: He is alert and oriented to person, place, and time.          Laboratory  Lab Results   Component Value Date    WBC 4.61 02/10/2021    HGB 14.0 02/10/2021    HCT 43.6 02/10/2021    MCV 92 02/10/2021     02/10/2021     Lab Results   Component Value Date     (H) 10/13/2022     10/13/2022    K 4.7 10/13/2022     10/13/2022    CO2 27 10/13/2022    BUN 10 10/13/2022    CREATININE 1.1 10/13/2022    CALCIUM 9.4 10/13/2022     No results found for: INR, PROTIME  Lab Results   Component Value Date    HGBA1C 5.6 02/10/2021     No results for input(s): POCTGLUCOSE in the last 72 hours.      Health Maintenance         Date Due Completion Date    Shingles Vaccine (1 of 2) Never done ---    Pneumococcal Vaccines (Age 65+) (1 - PCV) Never done ---    PROSTATE-SPECIFIC ANTIGEN 02/10/2022 2/10/2021    COVID-19 Vaccine (5 - Booster for Moderna series) 06/02/2022 4/7/2022    Influenza Vaccine (1) 09/01/2022 10/18/2021    Colorectal Cancer Screening 07/17/2024 7/17/2014    Override on 7/26/2005: Done    TETANUS VACCINE 08/21/2024 8/21/2014    Lipid Panel 02/10/2026 2/10/2021            ASSESSMENT & PLAN:   Mr. Dhruv Devries is a 67 y.o. male who was seen today in clinic for annual/physical.    Diagnoses and all orders for this visit:    Pansinusitis, unspecified chronicity    Post-nasal drip    Burning chest pain    Nasal congestion  -     fluticasone propionate (FLONASE) 50 mcg/actuation nasal spray; SHAKE LIQUID AND USE 1 SPRAY(50 MCG) IN EACH NOSTRIL EVERY DAY    Cough, unspecified type  -     guaiFENesin-codeine 100-10 mg/5 ml (TUSSI-JESUS NR)   mg/5 mL syrup; 1-2 tsps po Q 4hours prn cough    Other orders  -     pantoprazole (PROTONIX) 40 MG tablet; Take 1 tablet (40 mg total) by mouth once daily.       1. Pansinusitis, unspecified chronicity    2. Post-nasal drip    3. Burning chest pain    4. Nasal congestion    5. Cough, unspecified type        Refilled flonase and cough medications.  Started on PPI for heartburn and consideration of GERD.  Discussed appropriate diet modifications and sitting up after a meal prior to going to bed.      Please follow up with Dr. Cruz or sooner if needed.      Jett García MD  Internal Medicine PGY-2  [facility]  [address] [place] [address]

## 2022-11-22 NOTE — TELEPHONE ENCOUNTER
----- Message from Karla Sinha sent at 11/22/2022 12:29 PM CST -----  Contact: Patient @ [phone]  Patient is returning a phone call.  Who left a message for the patient: Minna Jeffries MA  Does patient know what this is regarding:    Would you like a call back, or a response through your [facility] portal?:   CALL   Comments:

## 2022-11-23 ENCOUNTER — TELEPHONE (OUTPATIENT)
Dept: HEPATOLOGY | Facility: HOSPITAL | Age: 67
End: 2022-11-23
Payer: MEDICARE

## 2022-11-23 DIAGNOSIS — J01.40 ACUTE PANSINUSITIS, RECURRENCE NOT SPECIFIED: Primary | ICD-10-CM

## 2022-11-23 LAB — COMPLEXED PSA SERPL-MCNC: 1.4 NG/ML (ref 0–4)

## 2022-11-23 RX ORDER — AMOXICILLIN 875 MG/1
875 TABLET, FILM COATED ORAL EVERY 12 HOURS
Qty: 10 TABLET | Refills: 0 | Status: SHIPPED | OUTPATIENT
Start: 2022-11-23 | End: 2022-11-28

## 2022-11-23 NOTE — TELEPHONE ENCOUNTER
Called patient and discussed initially did not prescribe Abx 2/2 viral infection however patient now complaining of similar symptoms to prior sinus infections with sinus pressure and right sided ear pressure.  Will prescribe a short course of Amoxicillin and patient to follow up with PCP.    Jett García MD  [facility]

## 2022-11-28 ENCOUNTER — TELEPHONE (OUTPATIENT)
Dept: INTERNAL MEDICINE | Facility: CLINIC | Age: 67
End: 2022-11-28
Payer: MEDICARE

## 2022-11-28 NOTE — TELEPHONE ENCOUNTER
----- Message from Nasra Huerta sent at 11/28/2022 11:16 AM CST -----  Contact: [phone]  Is calling regarding amoxicillin (AMOXIL) 875 MG tablet 1 every 12 hrs, but he took it 3 times a day instead and needs to speak with someone regarding that. Please advise and call him back    Thank you

## 2022-11-28 NOTE — TELEPHONE ENCOUNTER
Called patient to discuss continuing ear/sinus pressure/aching.  Patient reports improvement in cough since last visit. Reports compliance with medications from prior visit.  Discussed with patient to start taking OTC medications to help with sinus pressure.  Patient has upcoming follow up with PCP in 1 week.  Will follow up with PCP.  Patient understanding and agreeable with plan.  Answered all questions.    Jett García MD  [facility]

## 2022-11-28 NOTE — TELEPHONE ENCOUNTER
Pt states that he took his prescription incorrectly. He states that he took one pill the night that he received his prescription, and then the next 2 days the pt took 3 pills each day, and on the 4th day he took the correct amount, which was 2 pills. I spoke with the pt and I notified him that I was able to speak w/ the provider in between pts. Per Dr. García I asked the pt if he has noticed a new rash or any changes with urinating. Pt denies both. He states that he felt a bit dizzy on Saturday but it resolved after he ate. Pt also states that he is still feeling dullness in his ear. I then notified the pt that I would send a message to the provider. Pt understood.

## 2022-11-28 NOTE — TELEPHONE ENCOUNTER
----- Message from Nasra Huerta sent at 11/28/2022 11:16 AM CST -----  Contact: [phone]  Is calling regarding amoxicillin (AMOXIL) 875 MG tablet 1 every 12 hrs, but he took it 3 times a day instead and needs to speak with someone regarding that. Please advise and call him back    Thank you

## 2022-12-05 ENCOUNTER — OFFICE VISIT (OUTPATIENT)
Dept: INTERNAL MEDICINE | Facility: CLINIC | Age: 67
End: 2022-12-05
Payer: MEDICARE

## 2022-12-05 VITALS
DIASTOLIC BLOOD PRESSURE: 80 MMHG | WEIGHT: 224 LBS | HEIGHT: 71 IN | SYSTOLIC BLOOD PRESSURE: 136 MMHG | BODY MASS INDEX: 31.36 KG/M2

## 2022-12-05 DIAGNOSIS — M47.816 SPONDYLOSIS OF LUMBAR REGION WITHOUT MYELOPATHY OR RADICULOPATHY: ICD-10-CM

## 2022-12-05 DIAGNOSIS — H66.90 OTITIS MEDIA, UNSPECIFIED LATERALITY, UNSPECIFIED OTITIS MEDIA TYPE: ICD-10-CM

## 2022-12-05 DIAGNOSIS — R73.01 IFG (IMPAIRED FASTING GLUCOSE): ICD-10-CM

## 2022-12-05 DIAGNOSIS — Z00.00 ANNUAL PHYSICAL EXAM: Primary | ICD-10-CM

## 2022-12-05 DIAGNOSIS — N28.1 RENAL CYST, ACQUIRED, RIGHT: ICD-10-CM

## 2022-12-05 DIAGNOSIS — E78.2 MIXED HYPERLIPIDEMIA: ICD-10-CM

## 2022-12-05 DIAGNOSIS — Z87.891 FORMER SMOKER: ICD-10-CM

## 2022-12-05 DIAGNOSIS — R53.83 FATIGUE, UNSPECIFIED TYPE: ICD-10-CM

## 2022-12-05 DIAGNOSIS — G47.33 OBSTRUCTIVE SLEEP APNEA SYNDROME: ICD-10-CM

## 2022-12-05 DIAGNOSIS — E55.9 VITAMIN D DEFICIENCY DISEASE: ICD-10-CM

## 2022-12-05 DIAGNOSIS — M54.2 NECK PAIN: ICD-10-CM

## 2022-12-05 PROBLEM — M25.562 LEFT KNEE PAIN: Status: RESOLVED | Noted: 2017-02-13 | Resolved: 2022-12-05

## 2022-12-05 LAB
BILIRUB UR QL STRIP: NEGATIVE
CLARITY UR REFRACT.AUTO: CLEAR
COLOR UR AUTO: YELLOW
GLUCOSE UR QL STRIP: NEGATIVE
HGB UR QL STRIP: NEGATIVE
KETONES UR QL STRIP: NEGATIVE
LEUKOCYTE ESTERASE UR QL STRIP: NEGATIVE
NITRITE UR QL STRIP: NEGATIVE
PH UR STRIP: 5 [PH] (ref 5–8)
PROT UR QL STRIP: NEGATIVE
SP GR UR STRIP: 1.03 (ref 1–1.03)
URN SPEC COLLECT METH UR: NORMAL

## 2022-12-05 PROCEDURE — 99999 PR PBB SHADOW E&M-EST. PATIENT-LVL IV: ICD-10-PCS | Mod: PBBFAC,,, | Performed by: INTERNAL MEDICINE

## 2022-12-05 PROCEDURE — 81003 URINALYSIS AUTO W/O SCOPE: CPT | Performed by: INTERNAL MEDICINE

## 2022-12-05 PROCEDURE — 1101F PT FALLS ASSESS-DOCD LE1/YR: CPT | Mod: CPTII,S$GLB,, | Performed by: INTERNAL MEDICINE

## 2022-12-05 PROCEDURE — 1101F PR PT FALLS ASSESS DOC 0-1 FALLS W/OUT INJ PAST YR: ICD-10-PCS | Mod: CPTII,S$GLB,, | Performed by: INTERNAL MEDICINE

## 2022-12-05 PROCEDURE — 3288F FALL RISK ASSESSMENT DOCD: CPT | Mod: CPTII,S$GLB,, | Performed by: INTERNAL MEDICINE

## 2022-12-05 PROCEDURE — 3075F SYST BP GE 130 - 139MM HG: CPT | Mod: CPTII,S$GLB,, | Performed by: INTERNAL MEDICINE

## 2022-12-05 PROCEDURE — 1125F AMNT PAIN NOTED PAIN PRSNT: CPT | Mod: CPTII,S$GLB,, | Performed by: INTERNAL MEDICINE

## 2022-12-05 PROCEDURE — 1159F MED LIST DOCD IN RCRD: CPT | Mod: CPTII,S$GLB,, | Performed by: INTERNAL MEDICINE

## 2022-12-05 PROCEDURE — 3075F PR MOST RECENT SYSTOLIC BLOOD PRESS GE 130-139MM HG: ICD-10-PCS | Mod: CPTII,S$GLB,, | Performed by: INTERNAL MEDICINE

## 2022-12-05 PROCEDURE — 1159F PR MEDICATION LIST DOCUMENTED IN MEDICAL RECORD: ICD-10-PCS | Mod: CPTII,S$GLB,, | Performed by: INTERNAL MEDICINE

## 2022-12-05 PROCEDURE — 3288F PR FALLS RISK ASSESSMENT DOCUMENTED: ICD-10-PCS | Mod: CPTII,S$GLB,, | Performed by: INTERNAL MEDICINE

## 2022-12-05 PROCEDURE — 99397 PR PREVENTIVE VISIT,EST,65 & OVER: ICD-10-PCS | Mod: S$GLB,,, | Performed by: INTERNAL MEDICINE

## 2022-12-05 PROCEDURE — 3008F BODY MASS INDEX DOCD: CPT | Mod: CPTII,S$GLB,, | Performed by: INTERNAL MEDICINE

## 2022-12-05 PROCEDURE — 1160F RVW MEDS BY RX/DR IN RCRD: CPT | Mod: CPTII,S$GLB,, | Performed by: INTERNAL MEDICINE

## 2022-12-05 PROCEDURE — 99397 PER PM REEVAL EST PAT 65+ YR: CPT | Mod: S$GLB,,, | Performed by: INTERNAL MEDICINE

## 2022-12-05 PROCEDURE — 99999 PR PBB SHADOW E&M-EST. PATIENT-LVL IV: CPT | Mod: PBBFAC,,, | Performed by: INTERNAL MEDICINE

## 2022-12-05 PROCEDURE — 1160F PR REVIEW ALL MEDS BY PRESCRIBER/CLIN PHARMACIST DOCUMENTED: ICD-10-PCS | Mod: CPTII,S$GLB,, | Performed by: INTERNAL MEDICINE

## 2022-12-05 PROCEDURE — 1125F PR PAIN SEVERITY QUANTIFIED, PAIN PRESENT: ICD-10-PCS | Mod: CPTII,S$GLB,, | Performed by: INTERNAL MEDICINE

## 2022-12-05 PROCEDURE — 3008F PR BODY MASS INDEX (BMI) DOCUMENTED: ICD-10-PCS | Mod: CPTII,S$GLB,, | Performed by: INTERNAL MEDICINE

## 2022-12-05 PROCEDURE — 3079F DIAST BP 80-89 MM HG: CPT | Mod: CPTII,S$GLB,, | Performed by: INTERNAL MEDICINE

## 2022-12-05 PROCEDURE — 3079F PR MOST RECENT DIASTOLIC BLOOD PRESSURE 80-89 MM HG: ICD-10-PCS | Mod: CPTII,S$GLB,, | Performed by: INTERNAL MEDICINE

## 2022-12-05 RX ORDER — LIDOCAINE 50 MG/G
1 PATCH TOPICAL DAILY
Qty: 30 PATCH | Refills: 2 | Status: SHIPPED | OUTPATIENT
Start: 2022-12-05 | End: 2023-05-16 | Stop reason: SDUPTHER

## 2022-12-05 RX ORDER — SULFAMETHOXAZOLE AND TRIMETHOPRIM 800; 160 MG/1; MG/1
1 TABLET ORAL 2 TIMES DAILY
Qty: 20 TABLET | Refills: 0 | Status: SHIPPED | OUTPATIENT
Start: 2022-12-05 | End: 2023-09-01

## 2022-12-05 NOTE — PROGRESS NOTES
Patient ID: Dhruv Devries is a 67 y.o. male.    Chief Complaint: Annual Exam      Assessment:       1. Annual physical exam    2. Mixed hyperlipidemia    3. Vitamin D deficiency disease    4. IFG (impaired fasting glucose)    5. Former smoker 1-2 packs daily for < 10 years    6. Fatigue, unspecified type    7. Spondylosis of lumbar region without myelopathy or radiculopathy: see MRI 2018    8. Renal cyst, acquired, right: see u/s 11/18    9. Neck pain    10. Obstructive sleep apnea syndrome: dx 2010; needs CPAP- see sleep assessment 7/17    11. Otitis media, unspecified laterality, unspecified otitis media type            Plan:         1. Annual physical exam    2. Mixed hyperlipidemia  -     CBC Auto Differential; Future; Expected date: 12/05/2022  -     Comprehensive Metabolic Panel; Future; Expected date: 12/05/2022  -     Lipid Panel; Future; Expected date: 12/05/2022    3. Vitamin D deficiency disease  -     Vitamin D; Future; Expected date: 12/05/2022    4. IFG (impaired fasting glucose)  -     Hemoglobin A1C; Future; Expected date: 12/05/2022    5. Former smoker 1-2 packs daily for < 10 years    6. Fatigue, unspecified type  -     TSH; Future; Expected date: 12/05/2022    7. Spondylosis of lumbar region without myelopathy or radiculopathy: see MRI 2018: reviewed.  Has done PT.  Consider Sp[facility] Clinic, he declines    8. Renal cyst, acquired, right: see u/s 11/18  -     US Retroperitoneal Complete; Future; Expected date: 12/05/2022  -     Urinalysis, Reflex to Urine Culture Urine, Clean Catch    9. Neck pain  -     LIDOcaine (LIDODERM) 5 %; Place 1 patch onto the skin once daily. Wear patch for 12 hours and  must have a 12 hour period without a patch  Dispense: 30 patch; Refill: 2    10. Obstructive sleep apnea syndrome: dx 2010; needs CPAP- see sleep assessment 7/17: sleep clinic follow up recommended    11. Otitis media, unspecified laterality, unspecified otitis media type  -      sulfamethoxazole-trimethoprim 800-160mg (BACTRIM DS) 800-160 mg Tab; Take 1 tablet by mouth 2 (two) times daily.  Dispense: 20 tablet; Refill: 0     Rest, fluids, acetaminophen, mucinex and follow up poor results.   I will review all studies and determine further tx depending on findings  Low salt diet, exercise, 15-20-# weight loss in next 6-12 months' time.  Call if BP > 130/80 on a regular basis.     Subjective:   Annual exam    Due for labs, fasting.    URI- sinus and ear- given amoxicillin and no real help.  Got dizzy with this as he took 3 x daily instead of 2 x daily.  Sx have improved but not fully abated.    BMI 31    BP doing well.    Lipid and risk stratification reviewed.    Immunizations reviewed, due for flu shot, COVID booster also recommended.  Shingles and Pneumovax also discussed.    Ongoing back pain- seen in [facility] 2014 and has had PT, then also seen in chiropractor 2018, MRI done, stable.  Would like to see about the Lidoderm patches as they have helped.  He continues to do stretches, and is hoping to start exercising soon.      Patient Active Problem List:     Vitamin D deficiency disease     Erectile dysfunction     Bleeding hemorrhoids     Anxiety     Obstructive sleep apnea syndrome: needs CPAP- see sleep assessment 7/17     Nuclear sclerosis - Both Eyes     DJD (degenerative joint disease) of cervical spine     Genu varum of left lower extremity     Former smoker 1-2 packs daily for < 10 years     IFG (impaired fasting glucose)     Renal cyst, acquired, right: see u/s 11/18     Renal stones: left side non obstructing see u/s 11/18     Mixed hyperlipidemia     Non compliance w medication regimen     Primary osteoarthritis of one knee, left         Review of Systems   Constitutional: Negative.    HENT:  Positive for ear pain and sinus pain. Negative for sinus pressure.    Eyes:  Negative for visual disturbance.   Respiratory:  Positive for apnea. Negative for shortness of breath and  stridor.    Cardiovascular:  Negative for chest pain.   Gastrointestinal:  Negative for abdominal pain, constipation and diarrhea.   Genitourinary:  Negative for difficulty urinating, flank pain, frequency, testicular pain and urgency.   Musculoskeletal:  Positive for back pain and neck pain. Negative for arthralgias.        More lumbar but also has cervical   Skin:  Negative for color change and rash.   Neurological: Negative.    Psychiatric/Behavioral: Negative.         Objective:      Physical Exam  Constitutional:       Appearance: He is well-developed.   HENT:      Head: Normocephalic and atraumatic.      Right Ear: External ear normal.      Left Ear: External ear normal.   Eyes:      Extraocular Movements: Extraocular movements intact.      Conjunctiva/sclera: Conjunctivae normal.   Neck:      Thyroid: No thyromegaly.   Cardiovascular:      Rate and Rhythm: Normal rate and regular rhythm.      Heart sounds: No murmur heard.  Pulmonary:      Effort: Pulmonary effort is normal. No respiratory distress.      Breath sounds: Normal breath sounds. No wheezing.   Abdominal:      General: There is no distension.      Palpations: Abdomen is soft.      Tenderness: There is no abdominal tenderness.   Musculoskeletal:         General: No tenderness.      Cervical back: Normal range of motion and neck supple.      Right lower leg: No edema.      Left lower leg: No edema.   Lymphadenopathy:      Cervical: No cervical adenopathy.   Skin:     General: Skin is warm and dry.   Neurological:      General: No focal deficit present.      Mental Status: He is alert and oriented to person, place, and time.      Cranial Nerves: No cranial nerve deficit.   Psychiatric:         Mood and Affect: Mood normal.         Behavior: Behavior normal.         Thought Content: Thought content normal.         Judgment: Judgment normal.           Health Maintenance Due   Topic Date Due    Hemoglobin A1c (Prediabetes)  02/10/2022

## 2022-12-09 ENCOUNTER — HOSPITAL ENCOUNTER (OUTPATIENT)
Dept: RADIOLOGY | Facility: HOSPITAL | Age: 67
Discharge: HOME | End: 2022-12-09
Attending: INTERNAL MEDICINE
Payer: MEDICARE

## 2022-12-09 DIAGNOSIS — N28.1 RENAL CYST, ACQUIRED, RIGHT: ICD-10-CM

## 2022-12-09 PROCEDURE — 76770 US EXAM ABDO BACK WALL COMP: CPT | Mod: TC

## 2022-12-09 PROCEDURE — 76770 US RETROPERITONEAL COMPLETE: ICD-10-PCS | Mod: 26,,, | Performed by: STUDENT IN AN ORGANIZED HEALTH CARE EDUCATION/TRAINING PROGRAM

## 2022-12-09 PROCEDURE — 76770 US EXAM ABDO BACK WALL COMP: CPT | Mod: 26,,, | Performed by: STUDENT IN AN ORGANIZED HEALTH CARE EDUCATION/TRAINING PROGRAM

## 2022-12-12 ENCOUNTER — TELEPHONE (OUTPATIENT)
Dept: INTERNAL MEDICINE | Facility: CLINIC | Age: 67
End: 2022-12-12
Payer: MEDICARE

## 2022-12-12 DIAGNOSIS — E78.2 MIXED HYPERLIPIDEMIA: Primary | ICD-10-CM

## 2022-12-12 NOTE — TELEPHONE ENCOUNTER
Stable labs other than very elevated cholesterol.  I know you he had side effects with Lipitor and Crestor    He really needs to be on medication and there are some injections that are available, I would like for him to get established with Cardiology to review these options.  Referral is in, please assist with appointment and let me know when scheduled, thank you

## 2022-12-12 NOTE — TELEPHONE ENCOUNTER
Spoke to patient and advised of labs and recommendation. Patient verbalized understanding.       Offered assistance with scheduling patient declined and will call back when he gets his calender.

## 2022-12-20 NOTE — PROGRESS NOTES
Subjective:          Chief Complaint: Dhruv Fine is a 67 y.o. male who had concerns including Pain of the Left Knee.    Mr. Dhruv Fine comes for left knee follow up of synvisc-one. He responded well to his last round of injection for 6 months and is requesting repeat. He has demonstrated improved function.    Mr. Dhruv Fine comes in today for LEFT knee synvisc-one. He is overall doing well.     Dhruv Fine is present today on a virtual visit for a follow up from a Monovisc injection that he received by Dr. Fine on 2/17/21. He reports no relief of pain with this injection. He has had synvisc one injections twice in the past with the most recent one being in 2017. He typically receives years of relief with these injections. He takes glucosamine ASU daily. He is a very active person and snowboards, water skis, and rides motorcycles. He has not felt 100% with pivoting and planting movements of his left knee since the most recent injection. He would not like to receive Monovisc again and would like to receive Synvisc one injection again if approved. Pain increases with climbing stairs.       Interval Hx:  Dhruv Fine is a 65  y.o. male with continuing left knee medial pain. He was seen in 2017 and we recommended a left knee medial  brace at that time. The previous brace was not past 5 years at that time and as a result he has waited until now to receive the brace.  He reports continued medial knee pain intermittently.    Review of Systems   Constitutional: Negative. Negative for fever and night sweats.   HENT: Negative.  Negative for hearing loss.    Eyes: Negative.  Negative for blurred vision and visual disturbance.   Cardiovascular: Negative.  Negative for chest pain and leg swelling.   Respiratory: Negative.  Negative for shortness of breath.    Endocrine: Negative.  Negative for polyuria.   Hematologic/Lymphatic: Negative.  Negative for bleeding problem.   Skin: Negative.  Negative  for rash.   Musculoskeletal:  Positive for joint pain. Negative for back pain, joint swelling, muscle cramps and muscle weakness.   Gastrointestinal: Negative.  Negative for melena.   Genitourinary: Negative.  Negative for hematuria.   Neurological: Negative.  Negative for loss of balance, numbness and paresthesias.   Psychiatric/Behavioral: Negative.  Negative for altered mental status.    Allergic/Immunologic: Negative.                  Objective:        General: Dhruv is well-developed, well-nourished, appears stated age, in no acute distress, alert and oriented to time, place and person.     General    Vitals reviewed.  Constitutional: He is oriented to person, place, and time. He appears well-developed and well-nourished. No distress.   HENT:   Mouth/Throat: No oropharyngeal exudate.   Eyes: Right eye exhibits no discharge. Left eye exhibits no discharge.   Pulmonary/Chest: Effort normal and breath sounds normal. No respiratory distress.   Neurological: He is alert and oriented to person, place, and time. He has normal reflexes. No cranial nerve deficit. Coordination normal.   Psychiatric: He has a normal mood and affect. His behavior is normal. Judgment and thought content normal.     General Musculoskeletal Exam   Gait: normal       Right Knee Exam     Inspection   Erythema: absent  Scars: absent  Swelling: absent  Effusion: absent  Deformity: absent  Bruising: absent    Tenderness   The patient is experiencing no tenderness.     Range of Motion   Extension:  0   Flexion:  130     Tests   Meniscus   Rudy:  Medial - negative Lateral - negative  Ligament Examination   Lachman: normal (-1 to 2mm)   PCL-Posterior Drawer: normal (0 to 2mm)     MCL - Valgus: normal (0 to 2mm)  LCL - Varus: normal  Pivot Shift: normal (Equal)  Reverse Pivot Shift: normal (Equal)  Dial Test at 30 degrees: normal (< 5 degrees)  Dial Test at 90 degrees: normal (< 5 degrees)  Posterior Sag Test: negative  Posterolateral Corner:  unstable (>15 degrees difference)  Patella   Patellar apprehension: negative  Passive Patellar Tilt: neutral  Patellar Tracking: normal  Patellar Glide (quadrants): Lateral - 1   Medial - 2  Q-Angle at 90 degrees: normal  Patellar Grind: negative  J-Sign: none    Other   Meniscal Cyst: absent  Popliteal (Baker's) Cyst: absent  Sensation: normal    Left Knee Exam     Inspection   Erythema: absent  Scars: absent  Swelling: absent  Effusion: absent  Deformity: absent  Bruising: absent    Tenderness   The patient tender to palpation of the medial joint line and lateral joint line.    Range of Motion   Extension:  0 (3)   Flexion:  140     Tests   Meniscus   Rudy:  Medial - negative Lateral - negative  Stability   Lachman: normal (-1 to 2mm)   PCL-Posterior Drawer: normal (0 to 2mm)  MCL - Valgus: normal (0 to 2mm)  LCL - Varus: normal (0 to 2mm)  Pivot Shift: normal (Equal)  Reverse Pivot Shift: normal (Equal)  Dial Test at 30 degrees: normal (< 5 degrees)  Dial Test at 90 degrees: normal (< 5 degrees)  Posterior Sag Test: negative  Posterolateral Corner: unstable (>15 degrees difference)  Patella   Patellar apprehension: negative  Passive Patellar Tilt: neutral  Patellar Tracking: normal  Patellar Glide (Quadrants): Lateral - 1 Medial - 2  Q-Angle at 90 degrees: normal  Patellar Grind: negative  J-Sign: J sign absent    Other   Meniscal Cyst: absent  Popliteal (Baker's) Cyst: absent  Sensation: normal    Right Hip Exam     Tests   Patricia: negative  Left Hip Exam     Tests   Patricia: negative          Reflexes     Left Side  Achilles:  2+  Quadriceps:  2+    Right Side   Achilles:  2+  Quadriceps:  2+    Vascular Exam     Right Pulses  Dorsalis Pedis:      2+  Posterior Tibial:      2+        Left Pulses  Dorsalis Pedis:      2+  Posterior Tibial:      2+             Assessment:       Encounter Diagnoses   Name Primary?    Primary osteoarthritis of left knee Yes    Primary osteoarthritis of one knee, left     Genu varum  of left lower extremity             Plan:       1. RTC in 3 weeks with Dr. Sam Fine. IKDC, SF-12 and KOOS was filled out today in clinic. Patient will fill out IKDC, SF-12 and KOOS on return.    2. Medications: Refills of the following Rx were sent to patients preferred Pharmacy:  No Refills Needed Today    3. Physical Therapy:     4. HEP: N/A    5. Procedures/Procedural Planning:   Prior authorization for left Knee Synvisc-One to be completed the next several weeks placed today.  We have discussed a variety of treatment options including medications, injections, physical therapy and other alternative treatments. Patient's pain is refractory to long-term use of PO/topical NSAIDs, physical therapy 6+ weeks, aerobic exercise, weight-loss, walking aids, visco-supplementation, and multiple corticosteroid injections, and the current treatment is the only effective treatment recommended at this time. Also recommending to continue HEP.  Patient agrees with treatment plan. Radiographs show osteoarthritis of bilateral knees with Kellgren Ej scale of 2    6. DME: N/A    7. Work/Sport Status: retired    8. Visit Summary: Patient needs new referral for left knee Synvisc-one to be completed in several weeks                                 [facility] patient questionnaires have been collected today.

## 2022-12-21 ENCOUNTER — OFFICE VISIT (OUTPATIENT)
Dept: SPORTS MEDICINE | Facility: CLINIC | Age: 67
End: 2022-12-21
Payer: MEDICARE

## 2022-12-21 ENCOUNTER — HOSPITAL ENCOUNTER (OUTPATIENT)
Dept: RADIOLOGY | Facility: HOSPITAL | Age: 67
Discharge: HOME | End: 2022-12-21
Attending: ORTHOPAEDIC SURGERY
Payer: MEDICARE

## 2022-12-21 VITALS
SYSTOLIC BLOOD PRESSURE: 152 MMHG | HEART RATE: 86 BPM | BODY MASS INDEX: 30.52 KG/M2 | WEIGHT: 218 LBS | HEIGHT: 71 IN | DIASTOLIC BLOOD PRESSURE: 96 MMHG

## 2022-12-21 DIAGNOSIS — M17.12 PRIMARY OSTEOARTHRITIS OF LEFT KNEE: ICD-10-CM

## 2022-12-21 DIAGNOSIS — M21.162 GENU VARUM OF LEFT LOWER EXTREMITY: ICD-10-CM

## 2022-12-21 DIAGNOSIS — M17.12 PRIMARY OSTEOARTHRITIS OF LEFT KNEE: Primary | ICD-10-CM

## 2022-12-21 DIAGNOSIS — M17.12 PRIMARY OSTEOARTHRITIS OF ONE KNEE, LEFT: ICD-10-CM

## 2022-12-21 PROCEDURE — 73564 X-RAY EXAM KNEE 4 OR MORE: CPT | Mod: TC,50

## 2022-12-21 PROCEDURE — 3080F PR MOST RECENT DIASTOLIC BLOOD PRESSURE >= 90 MM HG: ICD-10-PCS | Mod: CPTII,S$GLB,, | Performed by: ORTHOPAEDIC SURGERY

## 2022-12-21 PROCEDURE — 3080F DIAST BP >= 90 MM HG: CPT | Mod: CPTII,S$GLB,, | Performed by: ORTHOPAEDIC SURGERY

## 2022-12-21 PROCEDURE — 3288F PR FALLS RISK ASSESSMENT DOCUMENTED: ICD-10-PCS | Mod: CPTII,S$GLB,, | Performed by: ORTHOPAEDIC SURGERY

## 2022-12-21 PROCEDURE — 99214 PR OFFICE/OUTPT VISIT, EST, LEVL IV, 30-39 MIN: ICD-10-PCS | Mod: S$GLB,,, | Performed by: ORTHOPAEDIC SURGERY

## 2022-12-21 PROCEDURE — 3008F PR BODY MASS INDEX (BMI) DOCUMENTED: ICD-10-PCS | Mod: CPTII,S$GLB,, | Performed by: ORTHOPAEDIC SURGERY

## 2022-12-21 PROCEDURE — 1160F PR REVIEW ALL MEDS BY PRESCRIBER/CLIN PHARMACIST DOCUMENTED: ICD-10-PCS | Mod: CPTII,S$GLB,, | Performed by: ORTHOPAEDIC SURGERY

## 2022-12-21 PROCEDURE — 99999 PR PBB SHADOW E&M-EST. PATIENT-LVL III: ICD-10-PCS | Mod: PBBFAC,,, | Performed by: ORTHOPAEDIC SURGERY

## 2022-12-21 PROCEDURE — 1159F PR MEDICATION LIST DOCUMENTED IN MEDICAL RECORD: ICD-10-PCS | Mod: CPTII,S$GLB,, | Performed by: ORTHOPAEDIC SURGERY

## 2022-12-21 PROCEDURE — 99999 PR PBB SHADOW E&M-EST. PATIENT-LVL III: CPT | Mod: PBBFAC,,, | Performed by: ORTHOPAEDIC SURGERY

## 2022-12-21 PROCEDURE — 3077F SYST BP >= 140 MM HG: CPT | Mod: CPTII,S$GLB,, | Performed by: ORTHOPAEDIC SURGERY

## 2022-12-21 PROCEDURE — 3077F PR MOST RECENT SYSTOLIC BLOOD PRESSURE >= 140 MM HG: ICD-10-PCS | Mod: CPTII,S$GLB,, | Performed by: ORTHOPAEDIC SURGERY

## 2022-12-21 PROCEDURE — 1101F PT FALLS ASSESS-DOCD LE1/YR: CPT | Mod: CPTII,S$GLB,, | Performed by: ORTHOPAEDIC SURGERY

## 2022-12-21 PROCEDURE — 1101F PR PT FALLS ASSESS DOC 0-1 FALLS W/OUT INJ PAST YR: ICD-10-PCS | Mod: CPTII,S$GLB,, | Performed by: ORTHOPAEDIC SURGERY

## 2022-12-21 PROCEDURE — 3008F BODY MASS INDEX DOCD: CPT | Mod: CPTII,S$GLB,, | Performed by: ORTHOPAEDIC SURGERY

## 2022-12-21 PROCEDURE — 1160F RVW MEDS BY RX/DR IN RCRD: CPT | Mod: CPTII,S$GLB,, | Performed by: ORTHOPAEDIC SURGERY

## 2022-12-21 PROCEDURE — 1159F MED LIST DOCD IN RCRD: CPT | Mod: CPTII,S$GLB,, | Performed by: ORTHOPAEDIC SURGERY

## 2022-12-21 PROCEDURE — 1126F PR PAIN SEVERITY QUANTIFIED, NO PAIN PRESENT: ICD-10-PCS | Mod: CPTII,S$GLB,, | Performed by: ORTHOPAEDIC SURGERY

## 2022-12-21 PROCEDURE — 73564 X-RAY EXAM KNEE 4 OR MORE: CPT | Mod: 26,50,, | Performed by: RADIOLOGY

## 2022-12-21 PROCEDURE — 1126F AMNT PAIN NOTED NONE PRSNT: CPT | Mod: CPTII,S$GLB,, | Performed by: ORTHOPAEDIC SURGERY

## 2022-12-21 PROCEDURE — 73564 XR KNEE ORTHO BILAT WITH FLEXION: ICD-10-PCS | Mod: 26,50,, | Performed by: RADIOLOGY

## 2022-12-21 PROCEDURE — 3288F FALL RISK ASSESSMENT DOCD: CPT | Mod: CPTII,S$GLB,, | Performed by: ORTHOPAEDIC SURGERY

## 2022-12-21 PROCEDURE — 99214 OFFICE O/P EST MOD 30 MIN: CPT | Mod: S$GLB,,, | Performed by: ORTHOPAEDIC SURGERY

## 2023-01-23 ENCOUNTER — OFFICE VISIT (OUTPATIENT)
Dept: SPORTS MEDICINE | Facility: CLINIC | Age: 68
End: 2023-01-23
Payer: MEDICARE

## 2023-01-23 VITALS
HEART RATE: 81 BPM | SYSTOLIC BLOOD PRESSURE: 135 MMHG | DIASTOLIC BLOOD PRESSURE: 90 MMHG | BODY MASS INDEX: 30.8 KG/M2 | HEIGHT: 71 IN | WEIGHT: 220 LBS

## 2023-01-23 DIAGNOSIS — M17.12 PRIMARY OSTEOARTHRITIS OF LEFT KNEE: ICD-10-CM

## 2023-01-23 DIAGNOSIS — M25.562 LEFT KNEE PAIN, UNSPECIFIED CHRONICITY: Primary | ICD-10-CM

## 2023-01-23 PROCEDURE — 99999 PR PBB SHADOW E&M-EST. PATIENT-LVL III: ICD-10-PCS | Mod: PBBFAC,HCNC,, | Performed by: ORTHOPAEDIC SURGERY

## 2023-01-23 PROCEDURE — 1159F MED LIST DOCD IN RCRD: CPT | Mod: HCNC,CPTII,S$GLB, | Performed by: ORTHOPAEDIC SURGERY

## 2023-01-23 PROCEDURE — 3075F PR MOST RECENT SYSTOLIC BLOOD PRESS GE 130-139MM HG: ICD-10-PCS | Mod: HCNC,CPTII,S$GLB, | Performed by: ORTHOPAEDIC SURGERY

## 2023-01-23 PROCEDURE — 1126F PR PAIN SEVERITY QUANTIFIED, NO PAIN PRESENT: ICD-10-PCS | Mod: HCNC,CPTII,S$GLB, | Performed by: ORTHOPAEDIC SURGERY

## 2023-01-23 PROCEDURE — 1159F PR MEDICATION LIST DOCUMENTED IN MEDICAL RECORD: ICD-10-PCS | Mod: HCNC,CPTII,S$GLB, | Performed by: ORTHOPAEDIC SURGERY

## 2023-01-23 PROCEDURE — 99499 NO LOS: ICD-10-PCS | Mod: HCNC,S$GLB,, | Performed by: ORTHOPAEDIC SURGERY

## 2023-01-23 PROCEDURE — 3288F PR FALLS RISK ASSESSMENT DOCUMENTED: ICD-10-PCS | Mod: HCNC,CPTII,S$GLB, | Performed by: ORTHOPAEDIC SURGERY

## 2023-01-23 PROCEDURE — 3080F DIAST BP >= 90 MM HG: CPT | Mod: HCNC,CPTII,S$GLB, | Performed by: ORTHOPAEDIC SURGERY

## 2023-01-23 PROCEDURE — 3008F PR BODY MASS INDEX (BMI) DOCUMENTED: ICD-10-PCS | Mod: HCNC,CPTII,S$GLB, | Performed by: ORTHOPAEDIC SURGERY

## 2023-01-23 PROCEDURE — 20611 LARGE JOINT ASPIRATION/INJECTION: L KNEE: ICD-10-PCS | Mod: HCNC,LT,S$GLB, | Performed by: ORTHOPAEDIC SURGERY

## 2023-01-23 PROCEDURE — 3288F FALL RISK ASSESSMENT DOCD: CPT | Mod: HCNC,CPTII,S$GLB, | Performed by: ORTHOPAEDIC SURGERY

## 2023-01-23 PROCEDURE — 1101F PR PT FALLS ASSESS DOC 0-1 FALLS W/OUT INJ PAST YR: ICD-10-PCS | Mod: HCNC,CPTII,S$GLB, | Performed by: ORTHOPAEDIC SURGERY

## 2023-01-23 PROCEDURE — 20611 DRAIN/INJ JOINT/BURSA W/US: CPT | Mod: HCNC,LT,S$GLB, | Performed by: ORTHOPAEDIC SURGERY

## 2023-01-23 PROCEDURE — 3075F SYST BP GE 130 - 139MM HG: CPT | Mod: HCNC,CPTII,S$GLB, | Performed by: ORTHOPAEDIC SURGERY

## 2023-01-23 PROCEDURE — 1126F AMNT PAIN NOTED NONE PRSNT: CPT | Mod: HCNC,CPTII,S$GLB, | Performed by: ORTHOPAEDIC SURGERY

## 2023-01-23 PROCEDURE — 3080F PR MOST RECENT DIASTOLIC BLOOD PRESSURE >= 90 MM HG: ICD-10-PCS | Mod: HCNC,CPTII,S$GLB, | Performed by: ORTHOPAEDIC SURGERY

## 2023-01-23 PROCEDURE — 99499 UNLISTED E&M SERVICE: CPT | Mod: HCNC,S$GLB,, | Performed by: ORTHOPAEDIC SURGERY

## 2023-01-23 PROCEDURE — 1101F PT FALLS ASSESS-DOCD LE1/YR: CPT | Mod: HCNC,CPTII,S$GLB, | Performed by: ORTHOPAEDIC SURGERY

## 2023-01-23 PROCEDURE — 99999 PR PBB SHADOW E&M-EST. PATIENT-LVL III: CPT | Mod: PBBFAC,HCNC,, | Performed by: ORTHOPAEDIC SURGERY

## 2023-01-23 PROCEDURE — 3008F BODY MASS INDEX DOCD: CPT | Mod: HCNC,CPTII,S$GLB, | Performed by: ORTHOPAEDIC SURGERY

## 2023-01-23 NOTE — PROCEDURES
"Large Joint Aspiration/Injection: L knee    Date/Time: 1/23/2023 1:30 PM  Performed by: Sam Fine MD  Authorized by: Sam Fine MD     Consent Done?:  Yes (Verbal)  Indications:  Pain  Site marked: the procedure site was marked    Timeout: prior to procedure the correct patient, procedure, and site was verified    Prep: patient was prepped and draped in usual sterile fashion    Local anesthesia used?: No    Local anesthetic:  Topical anesthetic    Details:  Needle Size:  22 G  Ultrasonic Guidance for needle placement?: Yes    Images are saved and documented.  Approach: superiorlateral.  Location:  Knee  Site:  L knee  Medications:  48 mg hylan g-f 20 48 mg/6 mL  Patient tolerance:  Patient tolerated the procedure well with no immediate complications     Description of ultrasound utilization for needle guidance:   Ultrasound guidance used for needle localization. Images saved and stored for documentation. The knee joint was visualized. Dynamic visualization of the 22g x 1.5" needle was continuous throughout the procedure.   "

## 2023-01-23 NOTE — PROGRESS NOTES
Subjective:          Chief Complaint: Dhruv Fine is a 67 y.o. male who had concerns including Injections of the Left Knee.    Mr. Dhruv Fine comes for left knee follow up of synvisc-one. He responded well to his last round of injection for 6 months and is requesting repeat. He has demonstrated improved function.    Mr. Dhruv Fine comes in today for LEFT knee synvisc-one. He is overall doing well.     Dhruv Fine is present today on a virtual visit for a follow up from a Monovisc injection that he received by Dr. Fine on 2/17/21. He reports no relief of pain with this injection. He has had synvisc one injections twice in the past with the most recent one being in 2017. He typically receives years of relief with these injections. He takes glucosamine ASU daily. He is a very active person and snowboards, water skis, and rides motorcycles. He has not felt 100% with pivoting and planting movements of his left knee since the most recent injection. He would not like to receive Monovisc again and would like to receive Synvisc one injection again if approved. Pain increases with climbing stairs.       Interval Hx:  Dhruv Fine is a 65  y.o. male with continuing left knee medial pain. He was seen in 2017 and we recommended a left knee medial  brace at that time. The previous brace was not past 5 years at that time and as a result he has waited until now to receive the brace.  He reports continued medial knee pain intermittently.    Review of Systems   Constitutional: Negative. Negative for fever and night sweats.   HENT: Negative.  Negative for hearing loss.    Eyes: Negative.  Negative for blurred vision and visual disturbance.   Cardiovascular: Negative.  Negative for chest pain and leg swelling.   Respiratory: Negative.  Negative for shortness of breath.    Endocrine: Negative.  Negative for polyuria.   Hematologic/Lymphatic: Negative.  Negative for bleeding problem.   Skin: Negative.   Negative for rash.   Musculoskeletal:  Positive for joint pain. Negative for back pain, joint swelling, muscle cramps and muscle weakness.   Gastrointestinal: Negative.  Negative for melena.   Genitourinary: Negative.  Negative for hematuria.   Neurological: Negative.  Negative for loss of balance, numbness and paresthesias.   Psychiatric/Behavioral: Negative.  Negative for altered mental status.    Allergic/Immunologic: Negative.                  Objective:        General: Dhruv is well-developed, well-nourished, appears stated age, in no acute distress, alert and oriented to time, place and person.     General    Vitals reviewed.  Constitutional: He is oriented to person, place, and time. He appears well-developed and well-nourished. No distress.   HENT:   Mouth/Throat: No oropharyngeal exudate.   Eyes: Right eye exhibits no discharge. Left eye exhibits no discharge.   Pulmonary/Chest: Effort normal and breath sounds normal. No respiratory distress.   Neurological: He is alert and oriented to person, place, and time. He has normal reflexes. No cranial nerve deficit. Coordination normal.   Psychiatric: He has a normal mood and affect. His behavior is normal. Judgment and thought content normal.     General Musculoskeletal Exam   Gait: normal       Right Knee Exam     Inspection   Erythema: absent  Scars: absent  Swelling: absent  Effusion: absent  Deformity: absent  Bruising: absent    Tenderness   The patient is experiencing no tenderness.     Range of Motion   Extension:  0   Flexion:  130     Tests   Meniscus   Rudy:  Medial - negative Lateral - negative  Ligament Examination   Lachman: normal (-1 to 2mm)   PCL-Posterior Drawer: normal (0 to 2mm)     MCL - Valgus: normal (0 to 2mm)  LCL - Varus: normal  Pivot Shift: normal (Equal)  Reverse Pivot Shift: normal (Equal)  Dial Test at 30 degrees: normal (< 5 degrees)  Dial Test at 90 degrees: normal (< 5 degrees)  Posterior Sag Test: negative  Posterolateral  Corner: unstable (>15 degrees difference)  Patella   Patellar apprehension: negative  Passive Patellar Tilt: neutral  Patellar Tracking: normal  Patellar Glide (quadrants): Lateral - 1   Medial - 2  Q-Angle at 90 degrees: normal  Patellar Grind: negative  J-Sign: none    Other   Meniscal Cyst: absent  Popliteal (Baker's) Cyst: absent  Sensation: normal    Left Knee Exam     Inspection   Erythema: absent  Scars: absent  Swelling: absent  Effusion: absent  Deformity: absent  Bruising: absent    Tenderness   The patient tender to palpation of the medial joint line and lateral joint line.    Range of Motion   Extension:  0 (3)   Flexion:  140     Tests   Meniscus   Rudy:  Medial - negative Lateral - negative  Stability   Lachman: normal (-1 to 2mm)   PCL-Posterior Drawer: normal (0 to 2mm)  MCL - Valgus: normal (0 to 2mm)  LCL - Varus: normal (0 to 2mm)  Pivot Shift: normal (Equal)  Reverse Pivot Shift: normal (Equal)  Dial Test at 30 degrees: normal (< 5 degrees)  Dial Test at 90 degrees: normal (< 5 degrees)  Posterior Sag Test: negative  Posterolateral Corner: unstable (>15 degrees difference)  Patella   Patellar apprehension: negative  Passive Patellar Tilt: neutral  Patellar Tracking: normal  Patellar Glide (Quadrants): Lateral - 1 Medial - 2  Q-Angle at 90 degrees: normal  Patellar Grind: negative  J-Sign: J sign absent    Other   Meniscal Cyst: absent  Popliteal (Baker's) Cyst: absent  Sensation: normal    Right Hip Exam     Tests   Patricia: negative  Left Hip Exam     Tests   Patricia: negative          Reflexes     Left Side  Achilles:  2+  Quadriceps:  2+    Right Side   Achilles:  2+  Quadriceps:  2+    Vascular Exam     Right Pulses  Dorsalis Pedis:      2+  Posterior Tibial:      2+        Left Pulses  Dorsalis Pedis:      2+  Posterior Tibial:      2+      Radiographic Findings:    X-ray Knee Ortho Bilateral with Flexion  Narrative: EXAMINATION:  XR KNEE ORTHO BILAT WITH FLEXION    CLINICAL  HISTORY:  Unilateral primary osteoarthritis, left knee    TECHNIQUE:  AP standing of both knees, PA flexion standing views of both knees, and Merchant views of both knees were performed.  Lateral views of both knees were also performed.    COMPARISON:  N 02/03/2021 one    FINDINGS:  Mild DJD and slight narrowing of the medial tibiofemoral joint spaces.  No fracture or dislocation.  No bone destruction identified .  Impression: See above    Electronically signed by: Fuentes Siddiqui MD  Date:    12/21/2022  Time:    15:02         These findings were discussed and reviewed with the patient.          Assessment:       Encounter Diagnoses   Name Primary?    Left knee pain, unspecified chronicity Yes    Primary osteoarthritis of left knee             Plan:       1. RTC in 6 months with Dr. Sam Fine. IKDC, SF-12 and KOOS was filled out today in clinic. Patient will fill out IKDC, SF-12 and KOOS on return.    2. Medications: Refills of the following Rx were sent to patients preferred Pharmacy:  No Refills Needed Today    3. Physical Therapy:     4. HEP: N/A    5. Procedures/Procedural Planning:   Prior authorization for left Knee Synvisc-One to be completed the next several weeks placed today.  We have discussed a variety of treatment options including medications, injections, physical therapy and other alternative treatments. Patient's pain is refractory to long-term use of PO/topical NSAIDs, physical therapy 6+ weeks, aerobic exercise, weight-loss, walking aids, visco-supplementation, and multiple corticosteroid injections, and the current treatment is the only effective treatment recommended at this time. Also recommending to continue HEP.  Patient agrees with treatment plan. Radiographs show osteoarthritis of bilateral knees with Kellgren Ej scale of 2    6. DME: N/A    7. Work/Sport Status: retired    8. Visit Summary: Patient will come back in 6 months for repeat evaluation and new referral order for synvisc-one                                  [facility] patient questionnaires have been collected today.

## 2023-02-07 DIAGNOSIS — Z00.00 ENCOUNTER FOR MEDICARE ANNUAL WELLNESS EXAM: ICD-10-CM

## 2023-02-09 DIAGNOSIS — Z00.00 ENCOUNTER FOR MEDICARE ANNUAL WELLNESS EXAM: ICD-10-CM

## 2023-05-10 DIAGNOSIS — N52.9 ERECTILE DYSFUNCTION, UNSPECIFIED ERECTILE DYSFUNCTION TYPE: ICD-10-CM

## 2023-05-10 RX ORDER — SILDENAFIL 100 MG/1
100 TABLET, FILM COATED ORAL DAILY PRN
Qty: 10 TABLET | Refills: 2 | OUTPATIENT
Start: 2023-05-10 | End: 2024-05-09

## 2023-05-16 DIAGNOSIS — M54.2 NECK PAIN: ICD-10-CM

## 2023-05-16 DIAGNOSIS — N52.9 ERECTILE DYSFUNCTION, UNSPECIFIED ERECTILE DYSFUNCTION TYPE: ICD-10-CM

## 2023-05-16 RX ORDER — SILDENAFIL 100 MG/1
100 TABLET, FILM COATED ORAL DAILY PRN
Qty: 10 TABLET | Refills: 2 | Status: SHIPPED | OUTPATIENT
Start: 2023-05-16 | End: 2023-12-12 | Stop reason: SDUPTHER

## 2023-05-16 RX ORDER — LIDOCAINE 50 MG/G
1 PATCH TOPICAL DAILY
Qty: 30 PATCH | Refills: 2 | Status: SHIPPED | OUTPATIENT
Start: 2023-05-16

## 2023-07-11 ENCOUNTER — PES CALL (OUTPATIENT)
Dept: ADMINISTRATIVE | Facility: CLINIC | Age: 68
End: 2023-07-11
Payer: MEDICARE

## 2023-07-24 DIAGNOSIS — R06.00 DYSPNEA, UNSPECIFIED TYPE: Primary | ICD-10-CM

## 2023-08-17 NOTE — PROGRESS NOTES
Subjective:          Chief Complaint: Dhruv Fine is a 67 y.o. male who had no chief complaint listed for this encounter.    Mr. Dhruv Fine comes for left knee follow up of synvisc-one. He responded well to his last round of injection for 6 months and is requesting repeat. He has demonstrated improved function.    Mr. Dhruv Fine comes in today for LEFT knee synvisc-one. He is overall doing well.     Dhruv Fine is present today on a virtual visit for a follow up from a Monovisc injection that he received by Dr. Fine on 2/17/21. He reports no relief of pain with this injection. He has had synvisc one injections twice in the past with the most recent one being in 2017. He typically receives years of relief with these injections. He takes glucosamine ASU daily. He is a very active person and snowboards, water skis, and rides motorcycles. He has not felt 100% with pivoting and planting movements of his left knee since the most recent injection. He would not like to receive Monovisc again and would like to receive Synvisc one injection again if approved. Pain increases with climbing stairs.       Interval Hx:  Dhruv Fnie is a 65  y.o. male with continuing left knee medial pain. He was seen in 2017 and we recommended a left knee medial  brace at that time. The previous brace was not past 5 years at that time and as a result he has waited until now to receive the brace.  He reports continued medial knee pain intermittently.    Review of Systems   Constitutional: Negative. Negative for fever and night sweats.   HENT: Negative.  Negative for hearing loss.    Eyes: Negative.  Negative for blurred vision and visual disturbance.   Cardiovascular: Negative.  Negative for chest pain and leg swelling.   Respiratory: Negative.  Negative for shortness of breath.    Endocrine: Negative.  Negative for polyuria.   Hematologic/Lymphatic: Negative.  Negative for bleeding problem.   Skin: Negative.   Negative for rash.   Musculoskeletal:  Positive for joint pain. Negative for back pain, joint swelling, muscle cramps and muscle weakness.   Gastrointestinal: Negative.  Negative for melena.   Genitourinary: Negative.  Negative for hematuria.   Neurological: Negative.  Negative for loss of balance, numbness and paresthesias.   Psychiatric/Behavioral: Negative.  Negative for altered mental status.    Allergic/Immunologic: Negative.                    Objective:        General: Dhruv is well-developed, well-nourished, appears stated age, in no acute distress, alert and oriented to time, place and person.     General    Vitals reviewed.  Constitutional: He is oriented to person, place, and time. He appears well-developed and well-nourished. No distress.   HENT:   Mouth/Throat: No oropharyngeal exudate.   Eyes: Right eye exhibits no discharge. Left eye exhibits no discharge.   Pulmonary/Chest: Effort normal and breath sounds normal. No respiratory distress.   Neurological: He is alert and oriented to person, place, and time. He has normal reflexes. No cranial nerve deficit. Coordination normal.   Psychiatric: He has a normal mood and affect. His behavior is normal. Judgment and thought content normal.     General Musculoskeletal Exam   Gait: normal       Right Knee Exam     Inspection   Erythema: absent  Scars: absent  Swelling: absent  Effusion: absent  Deformity: absent  Bruising: absent    Tenderness   The patient is experiencing no tenderness.     Range of Motion   Extension:  0   Flexion:  130     Tests   Meniscus   Rudy:  Medial - negative Lateral - negative  Ligament Examination   Lachman: normal (-1 to 2mm)   PCL-Posterior Drawer: normal (0 to 2mm)     MCL - Valgus: normal (0 to 2mm)  LCL - Varus: normal  Pivot Shift: normal (Equal)  Reverse Pivot Shift: normal (Equal)  Dial Test at 30 degrees: normal (< 5 degrees)  Dial Test at 90 degrees: normal (< 5 degrees)  Posterior Sag Test: negative  Posterolateral  Corner: unstable (>15 degrees difference)  Patella   Patellar apprehension: negative  Passive Patellar Tilt: neutral  Patellar Tracking: normal  Patellar Glide (quadrants): Lateral - 1   Medial - 2  Q-Angle at 90 degrees: normal  Patellar Grind: negative  J-Sign: none    Other   Meniscal Cyst: absent  Popliteal (Baker's) Cyst: absent  Sensation: normal    Left Knee Exam     Inspection   Erythema: absent  Scars: absent  Swelling: absent  Effusion: absent  Deformity: absent  Bruising: absent    Tenderness   The patient tender to palpation of the medial joint line and lateral joint line.    Range of Motion   Extension:  0 (3)   Flexion:  140     Tests   Meniscus   Rudy:  Medial - negative Lateral - negative  Stability   Lachman: normal (-1 to 2mm)   PCL-Posterior Drawer: normal (0 to 2mm)  MCL - Valgus: normal (0 to 2mm)  LCL - Varus: normal (0 to 2mm)  Pivot Shift: normal (Equal)  Reverse Pivot Shift: normal (Equal)  Dial Test at 30 degrees: normal (< 5 degrees)  Dial Test at 90 degrees: normal (< 5 degrees)  Posterior Sag Test: negative  Posterolateral Corner: unstable (>15 degrees difference)  Patella   Patellar apprehension: negative  Passive Patellar Tilt: neutral  Patellar Tracking: normal  Patellar Glide (Quadrants): Lateral - 1 Medial - 2  Q-Angle at 90 degrees: normal  Patellar Grind: negative  J-Sign: J sign absent    Other   Meniscal Cyst: absent  Popliteal (Baker's) Cyst: absent  Sensation: normal    Right Hip Exam     Tests   Patricia: negative  Left Hip Exam     Tests   Patricia: negative          Muscle Strength   Right Lower Extremity   Hip Abduction: 5/5   Quadriceps:  5/5   Hamstrin/5   Left Lower Extremity   Hip Abduction: 5/5   Quadriceps:  5/5   Hamstrin/5     Reflexes     Left Side  Achilles:  2+  Quadriceps:  2+    Right Side   Achilles:  2+  Quadriceps:  2+    Vascular Exam     Right Pulses  Dorsalis Pedis:      2+  Posterior Tibial:      2+        Left Pulses  Dorsalis Pedis:       2+  Posterior Tibial:      2+      Radiographic Findings:    X-ray Knee Ortho Bilateral with Flexion  Narrative: EXAMINATION:  XR KNEE ORTHO BILAT WITH FLEXION    CLINICAL HISTORY:  Unilateral primary osteoarthritis, left knee    TECHNIQUE:  AP standing of both knees, PA flexion standing views of both knees, and Merchant views of both knees were performed.  Lateral views of both knees were also performed.    COMPARISON:  N 02/03/2021 one    FINDINGS:  Mild DJD and slight narrowing of the medial tibiofemoral joint spaces.  No fracture or dislocation.  No bone destruction identified .  Impression: See above    Electronically signed by: Fuentes Siddiqui MD  Date:    12/21/2022  Time:    15:02         These findings were discussed and reviewed with the patient.          Assessment:       Encounter Diagnoses   Name Primary?    Left knee pain, unspecified chronicity Yes    Primary osteoarthritis of left knee               Plan:       1. RTC in 3 months with Dr. Sam Fine. IKDC, SF-12 and KOOS was filled out today in clinic. Patient will fill out IKDC, SF-12 and KOOS on return.    2. Medications: Refills of the following Rx were sent to patients preferred Pharmacy:  No Refills Needed Today    3. Physical Therapy:     4. HEP: N/A    5. Procedures/Procedural Planning:   Prior authorization for left Knee Synvisc-One to be completed the next several weeks placed today.  We have discussed a variety of treatment options including medications, injections, physical therapy and other alternative treatments. Patient's pain is refractory to long-term use of PO/topical NSAIDs, physical therapy 6+ weeks, aerobic exercise, weight-loss, walking aids, visco-supplementation, and multiple corticosteroid injections, and the current treatment is the only effective treatment recommended at this time. Also recommending to continue HEP.  Patient agrees with treatment plan. Radiographs show osteoarthritis of bilateral knees with Kellgren Ej  scale of 2    6. DME: N/A    7. Work/Sport Status: full work duty     8. Visit Summary: Patient would like to proceed with Synvisc-one injection in 3-4 weeks for left knee. He documents strong functional improvement and improvement in symptoms with synvisc-one injection.                                       [facility] patient questionnaires have been collected today.

## 2023-08-21 ENCOUNTER — OFFICE VISIT (OUTPATIENT)
Dept: SPORTS MEDICINE | Facility: CLINIC | Age: 68
End: 2023-08-21
Payer: MEDICARE

## 2023-08-21 ENCOUNTER — HOSPITAL ENCOUNTER (OUTPATIENT)
Dept: RADIOLOGY | Facility: HOSPITAL | Age: 68
Discharge: HOME | End: 2023-08-21
Attending: ORTHOPAEDIC SURGERY
Payer: MEDICARE

## 2023-08-21 VITALS
BODY MASS INDEX: 31.44 KG/M2 | DIASTOLIC BLOOD PRESSURE: 88 MMHG | WEIGHT: 225.44 LBS | HEART RATE: 73 BPM | SYSTOLIC BLOOD PRESSURE: 135 MMHG

## 2023-08-21 DIAGNOSIS — M25.562 LEFT KNEE PAIN, UNSPECIFIED CHRONICITY: ICD-10-CM

## 2023-08-21 DIAGNOSIS — M17.12 PRIMARY OSTEOARTHRITIS OF LEFT KNEE: ICD-10-CM

## 2023-08-21 DIAGNOSIS — M25.562 LEFT KNEE PAIN, UNSPECIFIED CHRONICITY: Primary | ICD-10-CM

## 2023-08-21 PROCEDURE — 3008F PR BODY MASS INDEX (BMI) DOCUMENTED: ICD-10-PCS | Mod: HCNC,CPTII,S$GLB, | Performed by: ORTHOPAEDIC SURGERY

## 2023-08-21 PROCEDURE — 73564 XR KNEE ORTHO BILAT WITH FLEXION: ICD-10-PCS | Mod: 26,50,HCNC, | Performed by: RADIOLOGY

## 2023-08-21 PROCEDURE — 3079F DIAST BP 80-89 MM HG: CPT | Mod: HCNC,CPTII,S$GLB, | Performed by: ORTHOPAEDIC SURGERY

## 2023-08-21 PROCEDURE — 1126F PR PAIN SEVERITY QUANTIFIED, NO PAIN PRESENT: ICD-10-PCS | Mod: HCNC,CPTII,S$GLB, | Performed by: ORTHOPAEDIC SURGERY

## 2023-08-21 PROCEDURE — 1159F PR MEDICATION LIST DOCUMENTED IN MEDICAL RECORD: ICD-10-PCS | Mod: HCNC,CPTII,S$GLB, | Performed by: ORTHOPAEDIC SURGERY

## 2023-08-21 PROCEDURE — 1126F AMNT PAIN NOTED NONE PRSNT: CPT | Mod: HCNC,CPTII,S$GLB, | Performed by: ORTHOPAEDIC SURGERY

## 2023-08-21 PROCEDURE — 3075F PR MOST RECENT SYSTOLIC BLOOD PRESS GE 130-139MM HG: ICD-10-PCS | Mod: HCNC,CPTII,S$GLB, | Performed by: ORTHOPAEDIC SURGERY

## 2023-08-21 PROCEDURE — 3079F PR MOST RECENT DIASTOLIC BLOOD PRESSURE 80-89 MM HG: ICD-10-PCS | Mod: HCNC,CPTII,S$GLB, | Performed by: ORTHOPAEDIC SURGERY

## 2023-08-21 PROCEDURE — 73564 X-RAY EXAM KNEE 4 OR MORE: CPT | Mod: 26,50,HCNC, | Performed by: RADIOLOGY

## 2023-08-21 PROCEDURE — 99214 PR OFFICE/OUTPT VISIT, EST, LEVL IV, 30-39 MIN: ICD-10-PCS | Mod: HCNC,S$GLB,, | Performed by: ORTHOPAEDIC SURGERY

## 2023-08-21 PROCEDURE — 1159F MED LIST DOCD IN RCRD: CPT | Mod: HCNC,CPTII,S$GLB, | Performed by: ORTHOPAEDIC SURGERY

## 2023-08-21 PROCEDURE — 3008F BODY MASS INDEX DOCD: CPT | Mod: HCNC,CPTII,S$GLB, | Performed by: ORTHOPAEDIC SURGERY

## 2023-08-21 PROCEDURE — 3075F SYST BP GE 130 - 139MM HG: CPT | Mod: HCNC,CPTII,S$GLB, | Performed by: ORTHOPAEDIC SURGERY

## 2023-08-21 PROCEDURE — 99999 PR PBB SHADOW E&M-EST. PATIENT-LVL III: CPT | Mod: PBBFAC,HCNC,, | Performed by: ORTHOPAEDIC SURGERY

## 2023-08-21 PROCEDURE — 73564 X-RAY EXAM KNEE 4 OR MORE: CPT | Mod: TC,50,HCNC

## 2023-08-21 PROCEDURE — 99214 OFFICE O/P EST MOD 30 MIN: CPT | Mod: HCNC,S$GLB,, | Performed by: ORTHOPAEDIC SURGERY

## 2023-08-21 PROCEDURE — 99999 PR PBB SHADOW E&M-EST. PATIENT-LVL III: ICD-10-PCS | Mod: PBBFAC,HCNC,, | Performed by: ORTHOPAEDIC SURGERY

## 2023-08-21 NOTE — PATIENT INSTRUCTIONS
DESCRIPTION  Synvisc-One (hylan G-F 20) is an elastoviscous high molecular weight fluid containing hylan A and hylan B polymers produced from chicken harrison. Hylans are derivatives of hyaluronan (sodium hyaluronate). Hylan G-F 20 is unique in that the hyaluronan is chemically cross linked. Hyaluronan is a long-chain polymer containing repeating disaccharide units of Mw-qmituzhyzur-M-acetylglucosamine.     INDICATIONS FOR USE  Synvisc-One is indicated for the treatment of pain in osteoarthritis (OA) of the knee in patients who have failed to respond adequately to conservative nonpharmacologic therapy and simple analgesics, e.g., acetaminophen.     Who is a candidate for Synvisc-One  Patients with knee osteoarthritis, who have tried diet, exercise and over-the-counter pain medication but still have pain, should talk to their doctor to see if Synvisc-One is right for them.     How Synvisc-One is administered  Synvisc-One is a single injection. It's a simple, in-office procedure that only takes a few minutes.     What you can expect following a Synvisc-One knee injection Synvisc-One can provide up to six months of osteoarthritis knee pain relief. Everyone responds differently, but in a medical study* patients experienced relief starting one month after the injection.     After the injection, you can resume normal day-to-day activities, but you should avoid any strenuous activities for about 48 hours.     Contraindication  Do not administer to patients with known hypersensitivity (allergy) to hyaluronan (sodium hyaluronate) preparations.   Do not inject Synvisc-One in the knees of patients having knee joint infections or skin diseases or infections in the area of theinjection site.    What are possible side effects?  Synvisc may occur short-term pain, swelling at the injection site and / or the appearance of synovial exudate after injection. In some cases, exudation may be significant and cause more prolonged pain.      Important Safety Information  Before trying Synvisc-One or SYNVISC, tell your doctor if you are allergic to products from birds - such as feathers, eggs or poultry - or if your leg is swollen or infected. Synvisc-One and SYNVISC are only for injection into the knee, performed by a doctor or other qualified health care professional. Synvisc-One and SYNVISC have not been tested to show pain relief in joints other than the knee. Talk to your doctor before resuming strenuous weight-bearing activities after treatment. Synvisc-One and SYNVISC have not been tested in children, pregnant women or women who are nursing. You should tell your doctor if you think you are pregnant or if you are nursing a child. The side effects most commonly seen when Synvisc-One or SYNVISC is injected into the knee were pain, swelling and/or fluid buildup in or around the knee. Cases where the swelling is extensive or painful should be discussed with your doctor. Allergic reactions such as rash and hives have been reported rarely.

## 2023-09-01 ENCOUNTER — OFFICE VISIT (OUTPATIENT)
Dept: INTERNAL MEDICINE | Facility: CLINIC | Age: 68
End: 2023-09-01
Payer: MEDICARE

## 2023-09-01 VITALS
HEIGHT: 71 IN | SYSTOLIC BLOOD PRESSURE: 120 MMHG | BODY MASS INDEX: 31.36 KG/M2 | WEIGHT: 224 LBS | DIASTOLIC BLOOD PRESSURE: 80 MMHG

## 2023-09-01 DIAGNOSIS — J01.90 ACUTE BACTERIAL SINUSITIS: Primary | ICD-10-CM

## 2023-09-01 DIAGNOSIS — E78.2 MIXED HYPERLIPIDEMIA: ICD-10-CM

## 2023-09-01 DIAGNOSIS — B96.89 ACUTE BACTERIAL SINUSITIS: Primary | ICD-10-CM

## 2023-09-01 PROCEDURE — 3074F SYST BP LT 130 MM HG: CPT | Mod: HCNC,CPTII,S$GLB, | Performed by: INTERNAL MEDICINE

## 2023-09-01 PROCEDURE — 1159F PR MEDICATION LIST DOCUMENTED IN MEDICAL RECORD: ICD-10-PCS | Mod: HCNC,CPTII,S$GLB, | Performed by: INTERNAL MEDICINE

## 2023-09-01 PROCEDURE — 3079F DIAST BP 80-89 MM HG: CPT | Mod: HCNC,CPTII,S$GLB, | Performed by: INTERNAL MEDICINE

## 2023-09-01 PROCEDURE — 1159F MED LIST DOCD IN RCRD: CPT | Mod: HCNC,CPTII,S$GLB, | Performed by: INTERNAL MEDICINE

## 2023-09-01 PROCEDURE — 99214 OFFICE O/P EST MOD 30 MIN: CPT | Mod: HCNC,S$GLB,, | Performed by: INTERNAL MEDICINE

## 2023-09-01 PROCEDURE — 99214 PR OFFICE/OUTPT VISIT, EST, LEVL IV, 30-39 MIN: ICD-10-PCS | Mod: HCNC,S$GLB,, | Performed by: INTERNAL MEDICINE

## 2023-09-01 PROCEDURE — 3074F PR MOST RECENT SYSTOLIC BLOOD PRESSURE < 130 MM HG: ICD-10-PCS | Mod: HCNC,CPTII,S$GLB, | Performed by: INTERNAL MEDICINE

## 2023-09-01 PROCEDURE — 99999 PR PBB SHADOW E&M-EST. PATIENT-LVL III: ICD-10-PCS | Mod: PBBFAC,HCNC,, | Performed by: INTERNAL MEDICINE

## 2023-09-01 PROCEDURE — 3008F PR BODY MASS INDEX (BMI) DOCUMENTED: ICD-10-PCS | Mod: HCNC,CPTII,S$GLB, | Performed by: INTERNAL MEDICINE

## 2023-09-01 PROCEDURE — 3079F PR MOST RECENT DIASTOLIC BLOOD PRESSURE 80-89 MM HG: ICD-10-PCS | Mod: HCNC,CPTII,S$GLB, | Performed by: INTERNAL MEDICINE

## 2023-09-01 PROCEDURE — 99999 PR PBB SHADOW E&M-EST. PATIENT-LVL III: CPT | Mod: PBBFAC,HCNC,, | Performed by: INTERNAL MEDICINE

## 2023-09-01 PROCEDURE — 3008F BODY MASS INDEX DOCD: CPT | Mod: HCNC,CPTII,S$GLB, | Performed by: INTERNAL MEDICINE

## 2023-09-01 RX ORDER — AMOXICILLIN AND CLAVULANATE POTASSIUM 875; 125 MG/1; MG/1
1 TABLET, FILM COATED ORAL 2 TIMES DAILY
Qty: 20 TABLET | Refills: 0 | Status: SHIPPED | OUTPATIENT
Start: 2023-09-01 | End: 2023-12-06

## 2023-09-01 NOTE — PROGRESS NOTES
Patient ID: Dhruv Devries is a 67 y.o. male.    Chief Complaint: URI and Sinus Problem      Assessment:       1. Acute bacterial sinusitis    2. Mixed hyperlipidemia          Plan:         1. Acute bacterial sinusitis  -     amoxicillin-clavulanate 875-125mg (AUGMENTIN) 875-125 mg per tablet; Take 1 tablet by mouth 2 (two) times daily.  Dispense: 20 tablet; Refill: 0    2. Mixed hyperlipidemia:  Declines treatment and evaluation currently, will continue to monitor and discuss    Rest, fluids, acetaminophen, mucinex and follow up poor results.          Subjective:   [facility] appt    Started with URI sx Monday to Tuesday.  On Wednesday felt a little achy.  Tested for COVID and negative.    Feels nasal congestion and headache- sinus.  Also dull aching in ear.    Lipids again discussed, he has massive hyperlipidemia and I have recommended cholesterol treatment as well as either a stress test or a coronary CT on multiple occasions.  He continues to decline.  Will discuss again at his annual exam, he states.    Patient Active Problem List:     Vitamin D deficiency disease     Erectile dysfunction     Bleeding hemorrhoids     Anxiety     Obstructive sleep apnea syndrome: dx 2010; needs CPAP- see sleep assessment 7/17     Nuclear sclerosis - Both Eyes     Spondylosis of lumbar region without myelopathy or radiculopathy: see MRI 2018     DJD (degenerative joint disease) of cervical spine     Genu varum of left lower extremity     Former smoker 1-2 packs daily for < 10 years     Left knee pain     Primary osteoarthritis of left knee     IFG (impaired fasting glucose)     Renal cyst, acquired, right: see u/s 11/18; stable 12/22     Renal stones: left side non obstructing see u/s 11/18; stable 12/22     Mixed hyperlipidemia     Non compliance w medication regimen     Primary osteoarthritis of one knee, left         Review of Systems   Constitutional:  Positive for fatigue. Negative for chills and fever.   HENT:  Positive for  congestion, postnasal drip and sinus pressure. Negative for ear pain.    Eyes: Negative.    Respiratory:  Positive for cough. Negative for chest tightness, shortness of breath and wheezing.    Cardiovascular: Negative.    Gastrointestinal: Negative.          Objective:      Physical Exam  Constitutional:       Appearance: He is well-developed.   HENT:      Head: Normocephalic and atraumatic.      Right Ear: External ear normal.      Left Ear: External ear normal.   Eyes:      Extraocular Movements: Extraocular movements intact.   Neck:      Thyroid: No thyromegaly.   Cardiovascular:      Rate and Rhythm: Normal rate and regular rhythm.   Pulmonary:      Effort: No respiratory distress.      Breath sounds: No wheezing or rales.   Abdominal:      General: Bowel sounds are normal. There is no distension.      Palpations: Abdomen is soft.      Tenderness: There is no abdominal tenderness.   Musculoskeletal:      Cervical back: Normal range of motion and neck supple.   Skin:     General: Skin is warm and dry.      Findings: No erythema or rash.   Neurological:      General: No focal deficit present.      Mental Status: He is alert and oriented to person, place, and time. Mental status is at baseline.   Psychiatric:         Mood and Affect: Mood normal.         Behavior: Behavior normal.         Thought Content: Thought content normal.             Health Maintenance Due   Topic Date Due    Influenza Vaccine (1) 09/01/2023

## 2023-09-14 ENCOUNTER — PATIENT MESSAGE (OUTPATIENT)
Dept: INTERNAL MEDICINE | Facility: CLINIC | Age: 68
End: 2023-09-14
Payer: MEDICARE

## 2023-09-14 DIAGNOSIS — H92.09 OTALGIA, UNSPECIFIED LATERALITY: Primary | ICD-10-CM

## 2023-09-14 NOTE — TELEPHONE ENCOUNTER
Please let him know I would like for him to be seen in ENT for his continued ear issues, referral is in    If he has any additional questions, let me know, thank you

## 2023-09-15 ENCOUNTER — PATIENT MESSAGE (OUTPATIENT)
Dept: OTOLARYNGOLOGY | Facility: CLINIC | Age: 68
End: 2023-09-15
Payer: MEDICARE

## 2023-09-19 ENCOUNTER — PATIENT MESSAGE (OUTPATIENT)
Dept: INTERNAL MEDICINE | Facility: CLINIC | Age: 68
End: 2023-09-19
Payer: MEDICARE

## 2023-09-20 ENCOUNTER — PATIENT MESSAGE (OUTPATIENT)
Dept: OTOLARYNGOLOGY | Facility: CLINIC | Age: 68
End: 2023-09-20
Payer: MEDICARE

## 2023-09-26 ENCOUNTER — CLINICAL SUPPORT (OUTPATIENT)
Dept: AUDIOLOGY | Facility: CLINIC | Age: 68
End: 2023-09-26
Payer: MEDICARE

## 2023-09-26 ENCOUNTER — OFFICE VISIT (OUTPATIENT)
Dept: OTOLARYNGOLOGY | Facility: CLINIC | Age: 68
End: 2023-09-26
Payer: MEDICARE

## 2023-09-26 DIAGNOSIS — H92.09 OTALGIA, UNSPECIFIED LATERALITY: ICD-10-CM

## 2023-09-26 DIAGNOSIS — H61.21 IMPACTED CERUMEN OF RIGHT EAR: ICD-10-CM

## 2023-09-26 DIAGNOSIS — H92.01 OTALGIA OF RIGHT EAR: Primary | ICD-10-CM

## 2023-09-26 DIAGNOSIS — J30.9 ALLERGIC RHINITIS, UNSPECIFIED SEASONALITY, UNSPECIFIED TRIGGER: ICD-10-CM

## 2023-09-26 DIAGNOSIS — H69.90 EUSTACHIAN TUBE DYSFUNCTION, UNSPECIFIED LATERALITY: Primary | ICD-10-CM

## 2023-09-26 DIAGNOSIS — H93.8X1 SENSATION OF FULLNESS IN EAR, RIGHT: ICD-10-CM

## 2023-09-26 PROCEDURE — 92567 TYMPANOMETRY: CPT | Mod: HCNC,S$GLB,, | Performed by: AUDIOLOGIST

## 2023-09-26 PROCEDURE — 1159F PR MEDICATION LIST DOCUMENTED IN MEDICAL RECORD: ICD-10-PCS | Mod: HCNC,CPTII,S$GLB,

## 2023-09-26 PROCEDURE — 99213 PR OFFICE/OUTPT VISIT, EST, LEVL III, 20-29 MIN: ICD-10-PCS | Mod: 25,S$GLB,,

## 2023-09-26 PROCEDURE — 69210 PR REMOVAL IMPACTED CERUMEN REQUIRING INSTRUMENTATION, UNILATERAL: ICD-10-PCS | Mod: S$GLB,,,

## 2023-09-26 PROCEDURE — 69210 REMOVE IMPACTED EAR WAX UNI: CPT | Mod: S$GLB,,,

## 2023-09-26 PROCEDURE — 1159F MED LIST DOCD IN RCRD: CPT | Mod: HCNC,CPTII,S$GLB,

## 2023-09-26 PROCEDURE — 92567 PR TYMPA2METRY: ICD-10-PCS | Mod: HCNC,S$GLB,, | Performed by: AUDIOLOGIST

## 2023-09-26 PROCEDURE — 99999 PR PBB SHADOW E&M-EST. PATIENT-LVL III: ICD-10-PCS | Mod: PBBFAC,HCNC,,

## 2023-09-26 PROCEDURE — 1126F AMNT PAIN NOTED NONE PRSNT: CPT | Mod: HCNC,CPTII,S$GLB,

## 2023-09-26 PROCEDURE — 1126F PR PAIN SEVERITY QUANTIFIED, NO PAIN PRESENT: ICD-10-PCS | Mod: HCNC,CPTII,S$GLB,

## 2023-09-26 PROCEDURE — 99999 PR PBB SHADOW E&M-EST. PATIENT-LVL III: CPT | Mod: PBBFAC,HCNC,,

## 2023-09-26 PROCEDURE — 99213 OFFICE O/P EST LOW 20 MIN: CPT | Mod: 25,S$GLB,,

## 2023-09-26 RX ORDER — NEOMYCIN SULFATE, POLYMYXIN B SULFATE AND HYDROCORTISONE 10; 3.5; 1 MG/ML; MG/ML; [USP'U]/ML
3 SUSPENSION/ DROPS AURICULAR (OTIC) 4 TIMES DAILY
Qty: 10 ML | Refills: 0 | Status: SHIPPED | OUTPATIENT
Start: 2023-09-26 | End: 2023-10-06

## 2023-09-26 NOTE — PROGRESS NOTES
Subjective:       Patient ID: Dhruv Devries is a 67 y.o. male.    Chief Complaint: Otalgia    HPI  Mr. Devries is a 67 year old male here today with complaints of right ear pain/pressure which began approximately 2 weeks ago. Of note had URI with nasal congestion/ headache/sinus pressure/post nasal drip 09/01/2023 and was prescribed Augmentin. He reports his right ear symptoms improved but continues to have right ear pressure inside ear with a sensation of blockage radiating from right ear, cheek and right corner of eye. He reports he notices sinuses draining when he turns side to side. Denies any discolored mucopurulence, nasal obstruction or facial pain.  Of note has similar problem when he saw Dr. Curry in 2019 which suggested Qnasal for right ETD. He denies any heartburn. Reports cough/phelgm when he eats salty foods.   Past Medical History:   Diagnosis Date    Anemia 10/11/2013    Anxiety     Bleeding hemorrhoids     Borderline hypertension 10/19/2013    Degenerative disc disease     cervical and lumbar    Former smoker 1-2 packs daily for < 10 years 1/23/2015    Former smoker 1-2 packs daily for < 10 years 1/23/2015    Hyperlipidemia     Iron deficiency anemia 5/21/2015    Non morbid obesity due to excess calories 6/17/2016    Nuclear sclerosis - Both Eyes 2/25/2013    Psychiatric exam requested by authority     Psychiatric problem     Sleep apnea 7/26/2012    Sleep difficulties     trouble staying asleep     Statin myopathy 10/10/2018    Therapy      Past Surgical History:   Procedure Laterality Date    KNEE SURGERY Left 2/10/15    Dr. Fine     Family History   Problem Relation Age of Onset    Kidney disease Mother     Glaucoma Paternal Aunt     Cataracts Paternal Aunt     Cancer Sister     Heart disease Brother         rheumatic fever    Hepatitis Brother     Peripheral vascular disease Brother     Blindness Neg Hx     Amblyopia Neg Hx     Macular degeneration Neg Hx     Retinal detachment Neg Hx      Strabismus Neg Hx     Diabetes Neg Hx     Hypertension Neg Hx     Stroke Neg Hx     Thyroid disease Neg Hx      Social History  Social History     Tobacco Use    Smoking status: Former     Types: Cigars     Quit date:      Years since quittin.7    Smokeless tobacco: Never    Tobacco comments:     once every 3 months    Substance Use Topics    Alcohol use: Yes     Comment: occasionally, every so many weekends, [employer] game     Drug use: No       Review of Systems   HENT: Positive for ear pain and sinus pressure.  Negative for ear discharge, ear infection, hearing loss, postnasal drip, ringing in the ears, runny nose, sinus infection, sore throat, stuffy nose, trouble swallowing and voice change.      Respiratory:  Negative for shortness of breath.      Gastrointestinal:  Negative for acid reflux.     Neurological: Negative for dizziness, headaches and light-headedness.                Objective:      Physical Exam  Vitals reviewed.   Constitutional:       General: He is not in acute distress.     Appearance: Normal appearance. He is not ill-appearing.   HENT:      Head: Normocephalic and atraumatic.      Jaw: No trismus, tenderness, swelling, pain on movement or malocclusion.      Salivary Glands: Right salivary gland is not diffusely enlarged or tender. Left salivary gland is not diffusely enlarged or tender.      Right Ear: Hearing, ear canal and external ear normal. No decreased hearing noted. No laceration, drainage, swelling or tenderness. No middle ear effusion. There is impacted cerumen (dry/hard cerumen). No foreign body. No mastoid tenderness. No PE tube. No hemotympanum. Tympanic membrane is erythematous (mild erythema noted on posterior superior TM). Tympanic membrane is not injected, scarred, perforated, retracted or bulging. Tympanic membrane has normal mobility.      Left Ear: Hearing, tympanic membrane, ear canal and external ear normal. No decreased hearing noted. No laceration, drainage,  swelling or tenderness.  No middle ear effusion. There is no impacted cerumen. No foreign body. No mastoid tenderness. No PE tube. No hemotympanum. Tympanic membrane is not injected, scarred, perforated, erythematous, retracted or bulging. Tympanic membrane has normal mobility.      Ears:        Nose: Congestion and rhinorrhea (clear) present. No nasal deformity, signs of injury or mucosal edema.      Right Nostril: No epistaxis.      Left Nostril: No epistaxis.      Right Turbinates: Enlarged (2+ turbinates). Not swollen or pale.      Left Turbinates: Enlarged (2+ turbinates). Not swollen or pale.      Right Sinus: No maxillary sinus tenderness or frontal sinus tenderness.      Left Sinus: No maxillary sinus tenderness or frontal sinus tenderness.      Mouth/Throat:      Lips: Pink.      Mouth: Mucous membranes are moist.      Pharynx: Oropharynx is clear. Uvula midline. No pharyngeal swelling, oropharyngeal exudate, posterior oropharyngeal erythema or uvula swelling.   Eyes:      General: Lids are normal.   Neck:      Trachea: Trachea and phonation normal.   Pulmonary:      Effort: Pulmonary effort is normal. No respiratory distress.   Musculoskeletal:      Cervical back: Normal range of motion and neck supple.   Lymphadenopathy:      Cervical: No cervical adenopathy.   Neurological:      Mental Status: He is alert and oriented to person, place, and time.   Psychiatric:         Attention and Perception: Attention normal.         Mood and Affect: Mood normal.         Speech: Speech normal.         Behavior: Behavior normal. Behavior is cooperative.               Procedure Note:    Patient was brought to the minor procedure room and using the operating microscope the right ear canal  was cleaned of ceruminous debris. There was a significant cerumen impaction.  The forceps and suction were both used to perform this. Tympanic membrane intact. Pt tolerated well. There were no complications.  Assessment:       1.  Otalgia of right ear    2. Sensation of fullness in ear, right    3. Allergic rhinitis, unspecified seasonality, unspecified trigger    4. Impacted cerumen of right ear        Plan:       Dhruv was seen today for otalgia.    Diagnoses and all orders for this visit:    Otalgia of right ear  -     neomycin-polymyxin-hydrocortisone (CORTISPORIN) 3.5-10,000-1 mg/mL-unit/mL-% otic suspension; Place 3 drops into the right ear 4 (four) times daily. for 10 days  He will notify me if symptoms do not improve.   Sensation of fullness in ear, right  -     neomycin-polymyxin-hydrocortisone (CORTISPORIN) 3.5-10,000-1 mg/mL-unit/mL-% otic suspension; Place 3 drops into the right ear 4 (four) times daily. for 10 days  If symptoms do not improve may recommend an audiogram in next visit . Bilateral ear exams benign.   Possible ETD.   Allergic rhinitis, unspecified seasonality, unspecified trigger  Continue daily use of Flonase  Claritin daily   Nasal saline spray PRN throughout the day  Nasal saline rinses twice a day recommended.   Impacted cerumen of right ear  Right cerumen impaction removed under microscopy.  Patient tolerated procedure well and noted improvement upon impaction removal.  Otoscopic exam benign.  Education about normal ear hygiene and the avoidance of Q-tips was reviewed.        RTC as needed or if symptoms persist.  Questions answered.

## 2023-09-26 NOTE — PROGRESS NOTES
Audiologic Evaluation 9/26/2023:       Dhruv Devries, a 67 y.o. male, was seen today in the clinic for tympanometry as ordered by CAROLINA Dalton NP.  Mr. Devries reported aural fullness and pressure of the right ear.     Tympanometry revealed Type A tympanogram in the right ear and Type A tympanogram in the left ear.       Recommendations:  Otologic evaluation  Annual audiogram  Hearing protection when in noise

## 2023-10-12 DIAGNOSIS — N40.0 BENIGN PROSTATIC HYPERPLASIA, UNSPECIFIED WHETHER LOWER URINARY TRACT SYMPTOMS PRESENT: ICD-10-CM

## 2023-10-12 RX ORDER — TAMSULOSIN HYDROCHLORIDE 0.4 MG/1
0.4 CAPSULE ORAL
Qty: 30 CAPSULE | Refills: 0 | Status: SHIPPED | OUTPATIENT
Start: 2023-10-12 | End: 2023-12-07 | Stop reason: SDUPTHER

## 2023-10-31 ENCOUNTER — PATIENT MESSAGE (OUTPATIENT)
Dept: AUDIOLOGY | Facility: CLINIC | Age: 68
End: 2023-10-31
Payer: MEDICARE

## 2023-11-30 ENCOUNTER — PATIENT MESSAGE (OUTPATIENT)
Dept: DERMATOLOGY | Facility: CLINIC | Age: 68
End: 2023-11-30

## 2023-11-30 ENCOUNTER — OFFICE VISIT (OUTPATIENT)
Dept: DERMATOLOGY | Facility: CLINIC | Age: 68
End: 2023-11-30
Payer: MEDICARE

## 2023-11-30 DIAGNOSIS — L81.4 LENTIGINES: Primary | ICD-10-CM

## 2023-11-30 DIAGNOSIS — L30.9 DERMATITIS, UNSPECIFIED: ICD-10-CM

## 2023-11-30 PROCEDURE — 3288F PR FALLS RISK ASSESSMENT DOCUMENTED: ICD-10-PCS | Mod: HCNC,CPTII,S$GLB, | Performed by: STUDENT IN AN ORGANIZED HEALTH CARE EDUCATION/TRAINING PROGRAM

## 2023-11-30 PROCEDURE — 1101F PR PT FALLS ASSESS DOC 0-1 FALLS W/OUT INJ PAST YR: ICD-10-PCS | Mod: HCNC,CPTII,S$GLB, | Performed by: STUDENT IN AN ORGANIZED HEALTH CARE EDUCATION/TRAINING PROGRAM

## 2023-11-30 PROCEDURE — 3288F FALL RISK ASSESSMENT DOCD: CPT | Mod: HCNC,CPTII,S$GLB, | Performed by: STUDENT IN AN ORGANIZED HEALTH CARE EDUCATION/TRAINING PROGRAM

## 2023-11-30 PROCEDURE — 1159F PR MEDICATION LIST DOCUMENTED IN MEDICAL RECORD: ICD-10-PCS | Mod: HCNC,CPTII,S$GLB, | Performed by: STUDENT IN AN ORGANIZED HEALTH CARE EDUCATION/TRAINING PROGRAM

## 2023-11-30 PROCEDURE — 99203 OFFICE O/P NEW LOW 30 MIN: CPT | Mod: HCNC,S$GLB,, | Performed by: STUDENT IN AN ORGANIZED HEALTH CARE EDUCATION/TRAINING PROGRAM

## 2023-11-30 PROCEDURE — 1126F AMNT PAIN NOTED NONE PRSNT: CPT | Mod: HCNC,CPTII,S$GLB, | Performed by: STUDENT IN AN ORGANIZED HEALTH CARE EDUCATION/TRAINING PROGRAM

## 2023-11-30 PROCEDURE — 99203 PR OFFICE/OUTPT VISIT, NEW, LEVL III, 30-44 MIN: ICD-10-PCS | Mod: HCNC,S$GLB,, | Performed by: STUDENT IN AN ORGANIZED HEALTH CARE EDUCATION/TRAINING PROGRAM

## 2023-11-30 PROCEDURE — 99999 PR PBB SHADOW E&M-EST. PATIENT-LVL III: CPT | Mod: PBBFAC,HCNC,, | Performed by: STUDENT IN AN ORGANIZED HEALTH CARE EDUCATION/TRAINING PROGRAM

## 2023-11-30 PROCEDURE — 1160F RVW MEDS BY RX/DR IN RCRD: CPT | Mod: HCNC,CPTII,S$GLB, | Performed by: STUDENT IN AN ORGANIZED HEALTH CARE EDUCATION/TRAINING PROGRAM

## 2023-11-30 PROCEDURE — 1160F PR REVIEW ALL MEDS BY PRESCRIBER/CLIN PHARMACIST DOCUMENTED: ICD-10-PCS | Mod: HCNC,CPTII,S$GLB, | Performed by: STUDENT IN AN ORGANIZED HEALTH CARE EDUCATION/TRAINING PROGRAM

## 2023-11-30 PROCEDURE — 1126F PR PAIN SEVERITY QUANTIFIED, NO PAIN PRESENT: ICD-10-PCS | Mod: HCNC,CPTII,S$GLB, | Performed by: STUDENT IN AN ORGANIZED HEALTH CARE EDUCATION/TRAINING PROGRAM

## 2023-11-30 PROCEDURE — 1159F MED LIST DOCD IN RCRD: CPT | Mod: HCNC,CPTII,S$GLB, | Performed by: STUDENT IN AN ORGANIZED HEALTH CARE EDUCATION/TRAINING PROGRAM

## 2023-11-30 PROCEDURE — 99999 PR PBB SHADOW E&M-EST. PATIENT-LVL III: ICD-10-PCS | Mod: PBBFAC,HCNC,, | Performed by: STUDENT IN AN ORGANIZED HEALTH CARE EDUCATION/TRAINING PROGRAM

## 2023-11-30 PROCEDURE — 1101F PT FALLS ASSESS-DOCD LE1/YR: CPT | Mod: HCNC,CPTII,S$GLB, | Performed by: STUDENT IN AN ORGANIZED HEALTH CARE EDUCATION/TRAINING PROGRAM

## 2023-11-30 RX ORDER — TRIAMCINOLONE ACETONIDE 1 MG/G
CREAM TOPICAL
Qty: 30 G | Refills: 1 | Status: SHIPPED | OUTPATIENT
Start: 2023-11-30

## 2023-11-30 NOTE — PROGRESS NOTES
Subjective:      Patient ID:  Dhruv Devries is a 68 y.o. male who presents for   Chief Complaint   Patient presents with    Spot     Discolored area on R hand     Patient with new complaint of lesion(s)/discoloration  Location: R dorsal hand hand  Duration: 3-4 months  Symptoms: spreading  Relieving factors/Previous treatments: OTC moisturizer   Started as sun spot but discolored around it     No hx of nmsc or mm.            Review of Systems   Skin:  Negative for itching.       Objective:   Physical Exam   Skin:               Diagram Legend     Erythematous scaling macule/papule c/w actinic keratosis       Vascular papule c/w angioma      Pigmented verrucoid papule/plaque c/w seborrheic keratosis      Yellow umbilicated papule c/w sebaceous hyperplasia      Irregularly shaped tan macule c/w lentigo     1-2 mm smooth white papules consistent with Milia      Movable subcutaneous cyst with punctum c/w epidermal inclusion cyst      Subcutaneous movable cyst c/w pilar cyst      Firm pink to brown papule c/w dermatofibroma      Pedunculated fleshy papule(s) c/w skin tag(s)      Evenly pigmented macule c/w junctional nevus     Mildly variegated pigmented, slightly irregular-bordered macule c/w mildly atypical nevus      Flesh colored to evenly pigmented papule c/w intradermal nevus       Pink pearly papule/plaque c/w basal cell carcinoma      Erythematous hyperkeratotic cursted plaque c/w SCC      Surgical scar with no sign of skin cancer recurrence      Open and closed comedones      Inflammatory papules and pustules      Verrucoid papule consistent consistent with wart     Erythematous eczematous patches and plaques     Dystrophic onycholytic nail with subungual debris c/w onychomycosis     Umbilicated papule    Erythematous-base heme-crusted tan verrucoid plaque consistent with inflamed seborrheic keratosis     Erythematous Silvery Scaling Plaque c/w Psoriasis     See annotation      Assessment / Plan:     "    Lentigines  Dermatitis, unspecified  On the right dorsal medial hand there are 2 lentigines or "sun spots" provided reassurance  Background skin around these appears very slightly erythematous, wrinkled and this is what is bothering patient  Recommend tral of triamcinolone cream bid x 2 weeks apply thin layer  Reassurance this is not dangerous  Asymptomatic to patient    RTC prn if condition worsens or fails to improve    "

## 2023-12-06 ENCOUNTER — CLINICAL SUPPORT (OUTPATIENT)
Dept: INTERNAL MEDICINE | Facility: CLINIC | Age: 68
End: 2023-12-06
Payer: MEDICARE

## 2023-12-06 ENCOUNTER — HOSPITAL ENCOUNTER (OUTPATIENT)
Dept: CARDIOLOGY | Facility: CLINIC | Age: 68
Discharge: HOME | End: 2023-12-06
Payer: MEDICARE

## 2023-12-06 ENCOUNTER — OFFICE VISIT (OUTPATIENT)
Dept: INTERNAL MEDICINE | Facility: CLINIC | Age: 68
End: 2023-12-06
Payer: MEDICARE

## 2023-12-06 ENCOUNTER — TELEPHONE (OUTPATIENT)
Dept: INTERNAL MEDICINE | Facility: CLINIC | Age: 68
End: 2023-12-06
Payer: MEDICARE

## 2023-12-06 VITALS
BODY MASS INDEX: 31.78 KG/M2 | SYSTOLIC BLOOD PRESSURE: 136 MMHG | DIASTOLIC BLOOD PRESSURE: 76 MMHG | WEIGHT: 227 LBS | HEIGHT: 71 IN

## 2023-12-06 DIAGNOSIS — Z12.5 ENCOUNTER FOR SCREENING FOR MALIGNANT NEOPLASM OF PROSTATE: ICD-10-CM

## 2023-12-06 DIAGNOSIS — R53.81 OTHER MALAISE: ICD-10-CM

## 2023-12-06 DIAGNOSIS — R35.1 BENIGN PROSTATIC HYPERPLASIA WITH NOCTURIA: ICD-10-CM

## 2023-12-06 DIAGNOSIS — Z00.00 ANNUAL PHYSICAL EXAM: Primary | ICD-10-CM

## 2023-12-06 DIAGNOSIS — N40.1 BENIGN PROSTATIC HYPERPLASIA WITH NOCTURIA: ICD-10-CM

## 2023-12-06 DIAGNOSIS — G47.33 OBSTRUCTIVE SLEEP APNEA SYNDROME: ICD-10-CM

## 2023-12-06 DIAGNOSIS — Z91.148 NON COMPLIANCE W MEDICATION REGIMEN: ICD-10-CM

## 2023-12-06 DIAGNOSIS — E66.09 CLASS 1 OBESITY DUE TO EXCESS CALORIES WITHOUT SERIOUS COMORBIDITY WITH BODY MASS INDEX (BMI) OF 30.0 TO 30.9 IN ADULT: ICD-10-CM

## 2023-12-06 DIAGNOSIS — Z13.6 ENCOUNTER FOR SCREENING FOR CARDIOVASCULAR DISORDERS: ICD-10-CM

## 2023-12-06 DIAGNOSIS — N20.0 RENAL STONES: ICD-10-CM

## 2023-12-06 DIAGNOSIS — N28.1 RENAL CYST, ACQUIRED, RIGHT: ICD-10-CM

## 2023-12-06 DIAGNOSIS — K21.9 GASTROESOPHAGEAL REFLUX DISEASE WITHOUT ESOPHAGITIS: ICD-10-CM

## 2023-12-06 DIAGNOSIS — E78.2 MIXED HYPERLIPIDEMIA: ICD-10-CM

## 2023-12-06 DIAGNOSIS — R79.9 ABNORMAL FINDING OF BLOOD CHEMISTRY, UNSPECIFIED: ICD-10-CM

## 2023-12-06 DIAGNOSIS — R06.00 DYSPNEA, UNSPECIFIED TYPE: ICD-10-CM

## 2023-12-06 DIAGNOSIS — R73.01 IFG (IMPAIRED FASTING GLUCOSE): ICD-10-CM

## 2023-12-06 PROBLEM — M25.562 LEFT KNEE PAIN: Status: RESOLVED | Noted: 2017-02-13 | Resolved: 2023-12-06

## 2023-12-06 PROBLEM — M17.12 PRIMARY OSTEOARTHRITIS OF ONE KNEE, LEFT: Status: RESOLVED | Noted: 2020-04-22 | Resolved: 2023-12-06

## 2023-12-06 LAB
ALBUMIN SERPL BCP-MCNC: 3.9 G/DL (ref 3.5–5.2)
ALP SERPL-CCNC: 97 U/L (ref 55–135)
ALT SERPL W/O P-5'-P-CCNC: 27 U/L (ref 10–44)
ANION GAP SERPL CALC-SCNC: 8 MMOL/L (ref 8–16)
AST SERPL-CCNC: 28 U/L (ref 10–40)
BILIRUB SERPL-MCNC: 0.5 MG/DL (ref 0.1–1)
BUN SERPL-MCNC: 9 MG/DL (ref 8–23)
CALCIUM SERPL-MCNC: 9.3 MG/DL (ref 8.7–10.5)
CHLORIDE SERPL-SCNC: 106 MMOL/L (ref 95–110)
CHOLEST SERPL-MCNC: 294 MG/DL (ref 120–199)
CHOLEST/HDLC SERPL: 7 {RATIO} (ref 2–5)
CO2 SERPL-SCNC: 26 MMOL/L (ref 23–29)
COMPLEXED PSA SERPL-MCNC: 1.1 NG/ML (ref 0–4)
CREAT SERPL-MCNC: 0.9 MG/DL (ref 0.5–1.4)
ERYTHROCYTE [DISTWIDTH] IN BLOOD BY AUTOMATED COUNT: 14.7 % (ref 11.5–14.5)
EST. GFR  (NO RACE VARIABLE): >60 ML/MIN/1.73 M^2
ESTIMATED AVG GLUCOSE: 111 MG/DL (ref 68–131)
GLUCOSE SERPL-MCNC: 100 MG/DL (ref 70–110)
HBA1C MFR BLD: 5.5 % (ref 4–5.6)
HCT VFR BLD AUTO: 41.9 % (ref 40–54)
HDLC SERPL-MCNC: 42 MG/DL (ref 40–75)
HDLC SERPL: 14.3 % (ref 20–50)
HGB BLD-MCNC: 14.3 G/DL (ref 14–18)
LDLC SERPL CALC-MCNC: 213.8 MG/DL (ref 63–159)
MCH RBC QN AUTO: 29.4 PG (ref 27–31)
MCHC RBC AUTO-ENTMCNC: 34.1 G/DL (ref 32–36)
MCV RBC AUTO: 86 FL (ref 82–98)
NONHDLC SERPL-MCNC: 252 MG/DL
PLATELET # BLD AUTO: 230 K/UL (ref 150–450)
PMV BLD AUTO: 8.7 FL (ref 9.2–12.9)
POTASSIUM SERPL-SCNC: 4.5 MMOL/L (ref 3.5–5.1)
PROT SERPL-MCNC: 7.9 G/DL (ref 6–8.4)
RBC # BLD AUTO: 4.86 M/UL (ref 4.6–6.2)
SODIUM SERPL-SCNC: 140 MMOL/L (ref 136–145)
TRIGL SERPL-MCNC: 191 MG/DL (ref 30–150)
TSH SERPL DL<=0.005 MIU/L-ACNC: 3.69 UIU/ML (ref 0.4–4)
WBC # BLD AUTO: 5.08 K/UL (ref 3.9–12.7)

## 2023-12-06 PROCEDURE — 80061 LIPID PANEL: CPT | Mod: HCNC | Performed by: INTERNAL MEDICINE

## 2023-12-06 PROCEDURE — 84153 ASSAY OF PSA TOTAL: CPT | Mod: HCNC | Performed by: INTERNAL MEDICINE

## 2023-12-06 PROCEDURE — 3044F HG A1C LEVEL LT 7.0%: CPT | Mod: HCNC,CPTII,S$GLB, | Performed by: INTERNAL MEDICINE

## 2023-12-06 PROCEDURE — 3078F PR MOST RECENT DIASTOLIC BLOOD PRESSURE < 80 MM HG: ICD-10-PCS | Mod: HCNC,CPTII,S$GLB, | Performed by: INTERNAL MEDICINE

## 2023-12-06 PROCEDURE — 3008F BODY MASS INDEX DOCD: CPT | Mod: HCNC,CPTII,S$GLB, | Performed by: INTERNAL MEDICINE

## 2023-12-06 PROCEDURE — 99999 PR PBB SHADOW E&M-EST. PATIENT-LVL V: CPT | Mod: PBBFAC,HCNC,, | Performed by: INTERNAL MEDICINE

## 2023-12-06 PROCEDURE — 93005 ELECTROCARDIOGRAM TRACING: CPT | Mod: HCNC,S$GLB,, | Performed by: INTERNAL MEDICINE

## 2023-12-06 PROCEDURE — 3008F PR BODY MASS INDEX (BMI) DOCUMENTED: ICD-10-PCS | Mod: HCNC,CPTII,S$GLB, | Performed by: INTERNAL MEDICINE

## 2023-12-06 PROCEDURE — 93010 EKG 12-LEAD: ICD-10-PCS | Mod: HCNC,S$GLB,, | Performed by: INTERNAL MEDICINE

## 2023-12-06 PROCEDURE — 3075F PR MOST RECENT SYSTOLIC BLOOD PRESS GE 130-139MM HG: ICD-10-PCS | Mod: HCNC,CPTII,S$GLB, | Performed by: INTERNAL MEDICINE

## 2023-12-06 PROCEDURE — 83036 HEMOGLOBIN GLYCOSYLATED A1C: CPT | Mod: HCNC | Performed by: INTERNAL MEDICINE

## 2023-12-06 PROCEDURE — 85027 COMPLETE CBC AUTOMATED: CPT | Mod: HCNC | Performed by: INTERNAL MEDICINE

## 2023-12-06 PROCEDURE — 99397 PR PREVENTIVE VISIT,EST,65 & OVER: ICD-10-PCS | Mod: HCNC,S$GLB,, | Performed by: INTERNAL MEDICINE

## 2023-12-06 PROCEDURE — 1159F MED LIST DOCD IN RCRD: CPT | Mod: HCNC,CPTII,S$GLB, | Performed by: INTERNAL MEDICINE

## 2023-12-06 PROCEDURE — 3044F PR MOST RECENT HEMOGLOBIN A1C LEVEL <7.0%: ICD-10-PCS | Mod: HCNC,CPTII,S$GLB, | Performed by: INTERNAL MEDICINE

## 2023-12-06 PROCEDURE — 99397 PER PM REEVAL EST PAT 65+ YR: CPT | Mod: HCNC,S$GLB,, | Performed by: INTERNAL MEDICINE

## 2023-12-06 PROCEDURE — 93005 EKG 12-LEAD: ICD-10-PCS | Mod: HCNC,S$GLB,, | Performed by: INTERNAL MEDICINE

## 2023-12-06 PROCEDURE — 99999 PR PBB SHADOW E&M-EST. PATIENT-LVL V: ICD-10-PCS | Mod: PBBFAC,HCNC,, | Performed by: INTERNAL MEDICINE

## 2023-12-06 PROCEDURE — 3078F DIAST BP <80 MM HG: CPT | Mod: HCNC,CPTII,S$GLB, | Performed by: INTERNAL MEDICINE

## 2023-12-06 PROCEDURE — 84443 ASSAY THYROID STIM HORMONE: CPT | Mod: HCNC | Performed by: INTERNAL MEDICINE

## 2023-12-06 PROCEDURE — 1159F PR MEDICATION LIST DOCUMENTED IN MEDICAL RECORD: ICD-10-PCS | Mod: HCNC,CPTII,S$GLB, | Performed by: INTERNAL MEDICINE

## 2023-12-06 PROCEDURE — 93010 ELECTROCARDIOGRAM REPORT: CPT | Mod: HCNC,S$GLB,, | Performed by: INTERNAL MEDICINE

## 2023-12-06 PROCEDURE — 80053 COMPREHEN METABOLIC PANEL: CPT | Mod: HCNC | Performed by: INTERNAL MEDICINE

## 2023-12-06 PROCEDURE — 3075F SYST BP GE 130 - 139MM HG: CPT | Mod: HCNC,CPTII,S$GLB, | Performed by: INTERNAL MEDICINE

## 2023-12-06 RX ORDER — DIAZEPAM 5 MG/1
TABLET ORAL
Qty: 30 TABLET | Refills: 0 | Status: SHIPPED | OUTPATIENT
Start: 2023-12-06

## 2023-12-06 NOTE — LETTER
December 8, 2023    Dhruv Devries  [address] [place] [address]      Dear Dhruv Devries,    On 12/6/2023, it was a pleasure to see you and perform your Executive Health evaluation. Your Primary Care physician is Cassandra Cruz MD. At the time of this visit, you are 68 y.o..  You denied any specific complaints or concerns during todays visit.        YOUR PAST MEDICAL HISTORY:  has a past medical history of Anemia, Anxiety, Bleeding hemorrhoids, Borderline hypertension, Degenerative disc disease, Former smoker 1-2 packs daily for < 10 years, Former smoker 1-2 packs daily for < 10 years, Hyperlipidemia, Iron deficiency anemia, Non morbid obesity due to excess calories, Nuclear sclerosis - Both Eyes, Psychiatric exam requested by authority, Psychiatric problem, Sleep apnea, Sleep difficulties, Statin myopathy, and Therapy..    YOUR PAST SURGICAL HISTORY:  has a past surgical history that includes Knee surgery (Left, 2/10/15)..    YOUR CURRENT MEDICATIONS:   Current Outpatient Medications:     cyclobenzaprine (FLEXERIL) 10 MG tablet, Take 1 tablet (10 mg total) by mouth nightly. Prn severe muscle spasms, Disp: 30 tablet, Rfl: 0    fluticasone propionate (FLONASE) 50 mcg/actuation nasal spray, SHAKE LIQUID AND USE 1 SPRAY(50 MCG) IN EACH NOSTRIL EVERY DAY, Disp: 48 g, Rfl: 1    LIDOcaine (LIDODERM) 5 %, Place 1 patch onto the skin once daily. Wear patch for 12 hours and  must have a 12 hour period without a patch, Disp: 30 patch, Rfl: 2    multivitamin (THERAGRAN) per tablet, Take 1 tablet by mouth once daily., Disp: , Rfl:     pantoprazole (PROTONIX) 40 MG tablet, Take 1 tablet (40 mg total) by mouth once daily., Disp: 30 tablet, Rfl: 1    sildenafiL (VIAGRA) 100 MG tablet, Take 1 tablet (100 mg total) by mouth daily as needed for Erectile Dysfunction (1 hour prior to intercourse on an empty stomach)., Disp: 10 tablet, Rfl: 2    triamcinolone acetonide 0.1% (KENALOG) 0.1 % cream, Apply twice  "daily for 2 weeks to affected area, Disp: 30 g, Rfl: 1    diazePAM (VALIUM) 5 MG tablet, May take half an hour before procedure- for anxiety, Disp: 30 tablet, Rfl: 0    tamsulosin (FLOMAX) 0.4 mg Cap, Take 1 capsule (0.4 mg total) by mouth once daily., Disp: 30 capsule, Rfl: 0.    YOUR KNOWN DRUG ALLERGIES:  is allergic to no known allergies.    YOUR SOCIAL HISTORY:  reports that he quit smoking about 39 years ago. His smoking use included cigars. He has never used smokeless tobacco. He reports current alcohol use. He reports that he does not use drugs..    YOUR FAMILY HISTORY: family history includes Cancer in his sister; Cataracts in his paternal aunt; Glaucoma in his paternal aunt; Heart disease in his brother; Hepatitis in his brother; Kidney disease in his mother; Peripheral vascular disease in his brother..     YOUR ROUTINE HEALTH MAINTENANCE includes   Health Maintenance   Topic Date Due    Colorectal Cancer Screening  07/17/2024    TETANUS VACCINE  08/21/2024    PROSTATE-SPECIFIC ANTIGEN  12/06/2024    Lipid Panel  12/06/2028    Hepatitis C Screening  Completed    Abdominal Aortic Aneurysm Screening  Completed    Shingles Vaccine  Discontinued   .    PHYSICAL EXAMINATION:  Vitals:    12/06/23 1148 12/06/23 1216   BP: (!) 140/80 136/76   BP Location: Right arm    Patient Position: Sitting    BP Method: Medium (Manual)    Weight: 103 kg (227 lb)    Height: 5' 11" (1.803 m)        Other than your weight, these are normal vital signs. Your physical examination did not reveal any additional significant abnormal findings.    YOUR BLOOD WORK AND ADDITIONAL TESTS:  Labs acceptable other than cholesterol.    Given your symptoms, I am recommending a kidney ultrasound any urology follow-up.  I also strongly encourage you to get on cholesterol medication.  You indicated this point that you are willing to get a coronary CT scan and see cardiology again.  It is very important that you complete the sleep study and arrange " for your follow-up in the sleep clinic.    In summary, you are overall in good health.     It was a pleasure to see you and perform this Executive Health evaluation. If I can be of any further assistance, or if you have any additional questions or concerns, please do not hesitate to let me know.    I will review the remainder of your studies when completed.    Sincerely,      Cassandra Cruz MD

## 2023-12-06 NOTE — PROGRESS NOTES
Patient ID: Dhruv Devries is a 68 y.o. male.    Chief Complaint: Executive Health      Assessment:       1. Annual physical exam    2. Mixed hyperlipidemia    3. Renal stones: left side non obstructing see u/s 11/18; stable 12/22    4. Renal cyst, acquired, right: see u/s 11/18; stable 12/22    5. IFG (impaired fasting glucose)    6. Non compliance w medication regimen    7. Obstructive sleep apnea syndrome: dx 2010; needs CPAP- see sleep assessment 7/17    8. Class 1 obesity due to excess calories without serious comorbidity with body mass index (BMI) of 30.0 to 30.9 in adult          Plan:         1. Annual physical exam    2. Mixed hyperlipidemia  -     Ambulatory referral/consult to Cardiology; Future; Expected date: 12/13/2023    3. Renal stones: left side non obstructing see u/s 11/18; stable 12/22  -     US Retroperitoneal Complete; Future; Expected date: 12/06/2023    4. Renal cyst, acquired, right: see u/s 11/18; stable 12/22  -     US Retroperitoneal Complete; Future; Expected date: 12/06/2023    5. IFG (impaired fasting glucose)    6. Non compliance w medication regimen    7. Obstructive sleep apnea syndrome: dx 2010; needs CPAP- see sleep assessment 7/17  -     Ambulatory referral/consult to Sleep Disorders; Future; Expected date: 12/13/2023  -     Home Sleep Study; Future    8. Class 1 obesity due to excess calories without serious comorbidity with body mass index (BMI) of 30.0 to 30.9 in adult    9. Benign prostatic hyperplasia with nocturia  -     Ambulatory referral/consult to Urology; Future; Expected date: 12/13/2023    10. Encounter for screening for cardiovascular disorders  -     CT Cardiac Scoring; Future; Expected date: 12/06/2023    11. Gastroesophageal reflux disease without esophagitis    Other orders  -     diazePAM (VALIUM) 5 MG tablet; May take half an hour before procedure- for anxiety  Dispense: 30 tablet; Refill: 0       He is not interested in pursuing cholesterol treatment  currently, is willing to get a cardiology assessment and coronary CT  Schedule kidney ultrasound and urology follow-up  Valium prior to his knee procedure,  reviewed  Repeat sleep study and sleep clinic evaluation  Portion control, exercise as tolerated, slow and steady weight loss recommendations reviewed  Vaccinations recommended, he declines  I will review all studies and determine further tx depending on findings      Subjective:   EH PE    Annual exam     Due for labs, fasting.     BMI 31     BP slightly high today.     Lipid and risk stratification reviewed.  We have discussed this at each and every visit.  He saw Cardiology in 2018.  He was given literature on Praluent or Repatha but he has declined.  He does not want to take statin therapy.  His last stress test was done in 2018 and was acceptable.  I would recommend a CT score for risk stratification, he has not been willing to consider this.     Immunizations reviewed, due for flu shot, COVID booster also recommended.  Shingles and Pneumovax also discussed.  He declines all.     Ongoing back pain- seen in [facility] multiple times, has had ongoing physical therapy.  Also having issues with his knees.  Fearful of needles and needs Valium before the Synvisc.    RICKI reviewed- he is not sleeping well, he does snore and he is tired.  He is willing to have another sleep study, and will get back in with the sleep clinic.       Patient Active Problem List:     Vitamin D deficiency disease     Erectile dysfunction     Bleeding hemorrhoids     Anxiety     Obstructive sleep apnea syndrome: dx 2010; needs CPAP- see sleep assessment 7/17     Nuclear sclerosis - Both Eyes     Spondylosis of lumbar region without myelopathy or radiculopathy: see MRI 2018     DJD (degenerative joint disease) of cervical spine     Genu varum of left lower extremity     Former smoker 1-2 packs daily for < 10 years     Class 1 obesity due to excess calories without serious comorbidity  with body mass index (BMI) of 30.0 to 30.9 in adult     Primary osteoarthritis of left knee     IFG (impaired fasting glucose)     Renal cyst, acquired, right: see u/s 11/18; stable 12/22     Renal stones: left side non obstructing see u/s 11/18; stable 12/22     Mixed hyperlipidemia     Non compliance w medication regimen     Gastroesophageal reflux disease without esophagitis                Review of Systems   Constitutional: Negative.    HENT:  Negative for sinus pressure.    Eyes:  Negative for visual disturbance.   Respiratory:  Positive for apnea. Negative for shortness of breath.         Snoring, see HPI   Cardiovascular:  Negative for chest pain.   Gastrointestinal:  Negative for abdominal pain, constipation and diarrhea.   Genitourinary:  Negative for difficulty urinating, flank pain, frequency, testicular pain and urgency.        Nocturia- due for Urology follow up for BPH   Musculoskeletal:  Positive for arthralgias and back pain.        See HPI   Skin:  Negative for color change and rash.   Neurological: Negative.    Psychiatric/Behavioral: Negative.           Objective:      Physical Exam  Constitutional:       Appearance: He is well-developed.   HENT:      Head: Normocephalic and atraumatic.      Right Ear: External ear normal.      Left Ear: External ear normal.   Eyes:      Extraocular Movements: Extraocular movements intact.      Conjunctiva/sclera: Conjunctivae normal.   Neck:      Thyroid: No thyromegaly.   Cardiovascular:      Rate and Rhythm: Normal rate and regular rhythm.      Heart sounds: No murmur heard.  Pulmonary:      Effort: Pulmonary effort is normal. No respiratory distress.      Breath sounds: Normal breath sounds. No wheezing.   Abdominal:      General: There is no distension.      Palpations: Abdomen is soft.      Tenderness: There is no abdominal tenderness.   Musculoskeletal:         General: No tenderness.      Cervical back: Normal range of motion and neck supple.      Right  lower leg: No edema.      Left lower leg: No edema.   Lymphadenopathy:      Cervical: No cervical adenopathy.   Skin:     General: Skin is warm and dry.   Neurological:      General: No focal deficit present.      Mental Status: He is alert and oriented to person, place, and time.      Cranial Nerves: No cranial nerve deficit.   Psychiatric:         Mood and Affect: Mood normal.         Behavior: Behavior normal.         Thought Content: Thought content normal.         Judgment: Judgment normal.             Health Maintenance Due   Topic Date Due    Shingles Vaccine (1 of 2) Never done    RSV Vaccine (Age 60+ and Pregnant patients) (1 - 1-dose 60+ series) Never done    Pneumococcal Vaccines (Age 65+) (1 - PCV) Never done    Influenza Vaccine (1) 09/01/2023    COVID-19 Vaccine (5 - 2023-24 season) 09/01/2023

## 2023-12-06 NOTE — TELEPHONE ENCOUNTER
Coronary CT  Cardiology appt  Home sleep study  Sleep medicine  Kidney ultrasound    Please schedule all thanks

## 2023-12-07 ENCOUNTER — PATIENT MESSAGE (OUTPATIENT)
Dept: SLEEP MEDICINE | Facility: CLINIC | Age: 68
End: 2023-12-07
Payer: MEDICARE

## 2023-12-07 DIAGNOSIS — N40.0 BENIGN PROSTATIC HYPERPLASIA, UNSPECIFIED WHETHER LOWER URINARY TRACT SYMPTOMS PRESENT: ICD-10-CM

## 2023-12-07 RX ORDER — TAMSULOSIN HYDROCHLORIDE 0.4 MG/1
0.4 CAPSULE ORAL DAILY
Qty: 30 CAPSULE | Refills: 0 | Status: SHIPPED | OUTPATIENT
Start: 2023-12-07 | End: 2023-12-12 | Stop reason: SDUPTHER

## 2023-12-07 NOTE — TELEPHONE ENCOUNTER
Hi, would you be willing to refill this for the patient?  He told me he was going to schedule an appointment with you

## 2023-12-07 NOTE — TELEPHONE ENCOUNTER
----- Message from Gris Fine sent at 12/7/2023 12:16 PM CST -----  Contact: Self  [phone]  Requesting an RX refill or new RX.    Is this a refill or new RX: refill    RX name and strength (copy/paste from chart): tamsulosin (FLOMAX) 0.4 mg Cap     Is this a 30 day or 90 day RX: 30    Pharmacy name and phone # (copy/paste from chart):      [id number] [address] [facility] [address]  [address]  [facility] [address]  Phone: [phone] Fax: [phone]    Pt is requesting a a call back.

## 2023-12-07 NOTE — TELEPHONE ENCOUNTER
No care due was identified.  Health [facility] Embedded Care Due Messages. Reference number: [id number].   12/07/2023 1:00:13 PM CST

## 2023-12-08 ENCOUNTER — TELEPHONE (OUTPATIENT)
Dept: SLEEP MEDICINE | Facility: OTHER | Age: 68
End: 2023-12-08

## 2023-12-10 NOTE — TELEPHONE ENCOUNTER
----- Message from Neha Petersen sent at 8/30/2019  2:00 PM CDT -----  Contact: self  Pt called to speak with nurse to get same day appt.            Pt callback number [phone]  
Appt scheduled---see other message.  
10-Dec-2023

## 2023-12-12 ENCOUNTER — TELEPHONE (OUTPATIENT)
Dept: INTERNAL MEDICINE | Facility: CLINIC | Age: 68
End: 2023-12-12
Payer: MEDICARE

## 2023-12-12 ENCOUNTER — OFFICE VISIT (OUTPATIENT)
Dept: UROLOGY | Facility: CLINIC | Age: 68
End: 2023-12-12
Payer: MEDICARE

## 2023-12-12 VITALS
HEIGHT: 71 IN | DIASTOLIC BLOOD PRESSURE: 93 MMHG | HEART RATE: 80 BPM | WEIGHT: 231 LBS | BODY MASS INDEX: 32.34 KG/M2 | SYSTOLIC BLOOD PRESSURE: 139 MMHG

## 2023-12-12 DIAGNOSIS — N52.9 ERECTILE DYSFUNCTION, UNSPECIFIED ERECTILE DYSFUNCTION TYPE: ICD-10-CM

## 2023-12-12 DIAGNOSIS — N40.1 BENIGN PROSTATIC HYPERPLASIA WITH NOCTURIA: ICD-10-CM

## 2023-12-12 DIAGNOSIS — N40.0 BENIGN PROSTATIC HYPERPLASIA, UNSPECIFIED WHETHER LOWER URINARY TRACT SYMPTOMS PRESENT: ICD-10-CM

## 2023-12-12 DIAGNOSIS — R35.1 BENIGN PROSTATIC HYPERPLASIA WITH NOCTURIA: ICD-10-CM

## 2023-12-12 LAB
BILIRUB SERPL-MCNC: NORMAL MG/DL
BLOOD URINE, POC: NORMAL
CLARITY, POC UA: CLEAR
COLOR, POC UA: YELLOW
GLUCOSE UR QL STRIP: NORMAL
KETONES UR QL STRIP: NORMAL
LEUKOCYTE ESTERASE URINE, POC: NORMAL
NITRITE, POC UA: NORMAL
PH, POC UA: 5
POC RESIDUAL URINE VOLUME: 0 ML (ref 0–100)
PROTEIN, POC: NORMAL
SPECIFIC GRAVITY, POC UA: 1
UROBILINOGEN, POC UA: NORMAL

## 2023-12-12 PROCEDURE — 51798 US URINE CAPACITY MEASURE: CPT | Mod: HCNC,S$GLB,,

## 2023-12-12 PROCEDURE — 81002 URINALYSIS NONAUTO W/O SCOPE: CPT | Mod: HCNC,S$GLB,,

## 2023-12-12 PROCEDURE — 3288F PR FALLS RISK ASSESSMENT DOCUMENTED: ICD-10-PCS | Mod: HCNC,CPTII,S$GLB,

## 2023-12-12 PROCEDURE — 81002 POCT URINE DIPSTICK WITHOUT MICROSCOPE: ICD-10-PCS | Mod: HCNC,S$GLB,,

## 2023-12-12 PROCEDURE — 1159F PR MEDICATION LIST DOCUMENTED IN MEDICAL RECORD: ICD-10-PCS | Mod: HCNC,CPTII,S$GLB,

## 2023-12-12 PROCEDURE — 1160F RVW MEDS BY RX/DR IN RCRD: CPT | Mod: HCNC,CPTII,S$GLB,

## 2023-12-12 PROCEDURE — 3075F SYST BP GE 130 - 139MM HG: CPT | Mod: HCNC,CPTII,S$GLB,

## 2023-12-12 PROCEDURE — 3288F FALL RISK ASSESSMENT DOCD: CPT | Mod: HCNC,CPTII,S$GLB,

## 2023-12-12 PROCEDURE — 99999 PR PBB SHADOW E&M-EST. PATIENT-LVL III: ICD-10-PCS | Mod: PBBFAC,HCNC,,

## 2023-12-12 PROCEDURE — 1101F PR PT FALLS ASSESS DOC 0-1 FALLS W/OUT INJ PAST YR: ICD-10-PCS | Mod: HCNC,CPTII,S$GLB,

## 2023-12-12 PROCEDURE — 3044F PR MOST RECENT HEMOGLOBIN A1C LEVEL <7.0%: ICD-10-PCS | Mod: HCNC,CPTII,S$GLB,

## 2023-12-12 PROCEDURE — 99214 OFFICE O/P EST MOD 30 MIN: CPT | Mod: HCNC,S$GLB,,

## 2023-12-12 PROCEDURE — 99999 PR PBB SHADOW E&M-EST. PATIENT-LVL III: CPT | Mod: PBBFAC,HCNC,,

## 2023-12-12 PROCEDURE — 1101F PT FALLS ASSESS-DOCD LE1/YR: CPT | Mod: HCNC,CPTII,S$GLB,

## 2023-12-12 PROCEDURE — 1126F PR PAIN SEVERITY QUANTIFIED, NO PAIN PRESENT: ICD-10-PCS | Mod: HCNC,CPTII,S$GLB,

## 2023-12-12 PROCEDURE — 1160F PR REVIEW ALL MEDS BY PRESCRIBER/CLIN PHARMACIST DOCUMENTED: ICD-10-PCS | Mod: HCNC,CPTII,S$GLB,

## 2023-12-12 PROCEDURE — 51798 POCT BLADDER SCAN: ICD-10-PCS | Mod: HCNC,S$GLB,,

## 2023-12-12 PROCEDURE — 3080F DIAST BP >= 90 MM HG: CPT | Mod: HCNC,CPTII,S$GLB,

## 2023-12-12 PROCEDURE — 99214 PR OFFICE/OUTPT VISIT, EST, LEVL IV, 30-39 MIN: ICD-10-PCS | Mod: HCNC,S$GLB,,

## 2023-12-12 PROCEDURE — 3080F PR MOST RECENT DIASTOLIC BLOOD PRESSURE >= 90 MM HG: ICD-10-PCS | Mod: HCNC,CPTII,S$GLB,

## 2023-12-12 PROCEDURE — 3075F PR MOST RECENT SYSTOLIC BLOOD PRESS GE 130-139MM HG: ICD-10-PCS | Mod: HCNC,CPTII,S$GLB,

## 2023-12-12 PROCEDURE — 3008F PR BODY MASS INDEX (BMI) DOCUMENTED: ICD-10-PCS | Mod: HCNC,CPTII,S$GLB,

## 2023-12-12 PROCEDURE — 3008F BODY MASS INDEX DOCD: CPT | Mod: HCNC,CPTII,S$GLB,

## 2023-12-12 PROCEDURE — 3044F HG A1C LEVEL LT 7.0%: CPT | Mod: HCNC,CPTII,S$GLB,

## 2023-12-12 PROCEDURE — 1159F MED LIST DOCD IN RCRD: CPT | Mod: HCNC,CPTII,S$GLB,

## 2023-12-12 PROCEDURE — 1126F AMNT PAIN NOTED NONE PRSNT: CPT | Mod: HCNC,CPTII,S$GLB,

## 2023-12-12 RX ORDER — TAMSULOSIN HYDROCHLORIDE 0.4 MG/1
0.4 CAPSULE ORAL DAILY
Qty: 90 CAPSULE | Refills: 3 | Status: SHIPPED | OUTPATIENT
Start: 2023-12-12 | End: 2024-12-11

## 2023-12-12 RX ORDER — DIAZEPAM 5 MG/1
TABLET ORAL
Qty: 30 TABLET | Refills: 0 | Status: CANCELLED | OUTPATIENT
Start: 2023-12-12

## 2023-12-12 RX ORDER — SILDENAFIL 100 MG/1
100 TABLET, FILM COATED ORAL DAILY PRN
Qty: 10 TABLET | Refills: 11 | Status: SHIPPED | OUTPATIENT
Start: 2023-12-12 | End: 2024-12-11

## 2023-12-12 NOTE — TELEPHONE ENCOUNTER
----- Message from Quentin Biggs sent at 12/12/2023 10:09 AM CST -----  Contact: [phone]@patient  Requesting an RX refill or new RX.diazePAM (VALIUM) 5 MG tablet  Is this a refill or new RX: refill   RX name and strength (copy/paste from chart):    Is this a 30 day or 90 day RX:   Pharmacy name and phone #  [id number] - [place] [address] [address]  The doctors have asked that we provide their patients with the following 2 reminders -- prescription refills can take up to 72 hours, and a friendly reminder that in the future you can use your [facility] account to request refills:

## 2023-12-12 NOTE — TELEPHONE ENCOUNTER
No care due was identified.  Health [facility] Embedded Care Due Messages. Reference number: [id number].   12/12/2023 10:25:54 AM CST

## 2023-12-12 NOTE — PROGRESS NOTES
CHIEF COMPLAINT:  Annual       HISTORY OF PRESENTING ILLINESS:  Dhruv Fine is a 66 y.o. male new to urology. Referral for BPH evaluation from Dr. Hicks. Initially seen due to  hesitancy, weak FOS, straining with urination, and urinary frequency. He drinks alkalinized water - states his symptoms are worsened by this water but not bothersome enough to stop use. PMHx includes anermia, anxiety, hemorrhoids, DDD cervical and lumbar, obesity, HLD, sleep apnea, nuclear sclerosis. He reports some ED that is bothersome, he has not tried an oral medication for this in the past. Most recent PSA from 12/6/2023 1.1 ng/dl.     Sildenafil and Flomax working well for patient. No SE noted from either medication.         REVIEW OF SYSTEMS:  Review of Systems   Constitutional:  Negative for chills and fever.   HENT:  Negative for congestion and sore throat.    Respiratory:  Negative for cough and shortness of breath.    Cardiovascular:  Negative for chest pain and palpitations.   Gastrointestinal:  Negative for nausea and vomiting.   Genitourinary:  Negative for dysuria, flank pain, frequency, hematuria and urgency.   Neurological:  Negative for dizziness and headaches.         PATIENT HISTORY:    Past Medical History:   Diagnosis Date    Anemia 10/11/2013    Anxiety     Bleeding hemorrhoids     Borderline hypertension 10/19/2013    Degenerative disc disease     cervical and lumbar    Former smoker 1-2 packs daily for < 10 years 01/23/2015    Former smoker 1-2 packs daily for < 10 years 01/23/2015    Hyperlipidemia     Iron deficiency anemia 05/21/2015    Non morbid obesity due to excess calories 06/17/2016    Nuclear sclerosis - Both Eyes 02/25/2013    Psychiatric exam requested by authority     Psychiatric problem     Sleep apnea 07/26/2012    Sleep difficulties     trouble staying asleep     Statin myopathy 10/10/2018    Therapy        Past Surgical History:   Procedure Laterality Date    KNEE SURGERY Left 2/10/15      [place]       Family History   Problem Relation Age of Onset    Kidney disease Mother     Glaucoma Paternal Aunt     Cataracts Paternal Aunt     Cancer Sister     Heart disease Brother         rheumatic fever    Hepatitis Brother     Peripheral vascular disease Brother     Blindness Neg Hx     Amblyopia Neg Hx     Macular degeneration Neg Hx     Retinal detachment Neg Hx     Strabismus Neg Hx     Diabetes Neg Hx     Hypertension Neg Hx     Stroke Neg Hx     Thyroid disease Neg Hx        Social History     Socioeconomic History    Marital status:    Tobacco Use    Smoking status: Former     Types: Cigars     Quit date:      Years since quittin.9    Smokeless tobacco: Never    Tobacco comments:     once every 3 months    Substance and Sexual Activity    Alcohol use: Yes     Comment: occasionally, every so many weekends, [employer] game     Drug use: No    Sexual activity: Yes     Partners: Female     Social Determinants of Health     Financial Resource Strain: Low Risk  (2021)    Overall Financial Resource Strain (CARDIA)     Difficulty of Paying Living Expenses: Not hard at all   Food Insecurity: No Food Insecurity (2021)    Hunger Vital Sign     Worried About Running Out of Food in the Last Year: Never true     Ran Out of Food in the Last Year: Never true   Transportation Needs: No Transportation Needs (2021)    PRAPARE - Transportation     Lack of Transportation (Medical): No     Lack of Transportation (Non-Medical): No   Physical Activity: Unknown (2021)    Exercise Vital Sign     Days of Exercise per Week: 1 day   Stress: Stress Concern Present (2021)    [employer] [place] [employer] - Occupational Stress Questionnaire     Feeling of Stress : To some extent   Social Connections: Unknown (2021)    Social Connection and Isolation Panel [NHANES]     Frequency of Communication with Friends and Family: More than three times a week     Frequency of Social Gatherings with  Friends and Family: Once a week     Active Member of Clubs or Organizations: Yes     Attends Club or Organization Meetings: More than 4 times per year     Marital Status:        Allergies:  No known allergies    Medications:    Current Outpatient Medications:     cyclobenzaprine (FLEXERIL) 10 MG tablet, Take 1 tablet (10 mg total) by mouth nightly. Prn severe muscle spasms, Disp: 30 tablet, Rfl: 0    diazePAM (VALIUM) 5 MG tablet, May take half an hour before procedure- for anxiety, Disp: 30 tablet, Rfl: 0    fluticasone propionate (FLONASE) 50 mcg/actuation nasal spray, SHAKE LIQUID AND USE 1 SPRAY(50 MCG) IN EACH NOSTRIL EVERY DAY, Disp: 48 g, Rfl: 1    LIDOcaine (LIDODERM) 5 %, Place 1 patch onto the skin once daily. Wear patch for 12 hours and  must have a 12 hour period without a patch, Disp: 30 patch, Rfl: 2    multivitamin (THERAGRAN) per tablet, Take 1 tablet by mouth once daily., Disp: , Rfl:     pantoprazole (PROTONIX) 40 MG tablet, Take 1 tablet (40 mg total) by mouth once daily., Disp: 30 tablet, Rfl: 1    triamcinolone acetonide 0.1% (KENALOG) 0.1 % cream, Apply twice daily for 2 weeks to affected area, Disp: 30 g, Rfl: 1    sildenafiL (VIAGRA) 100 MG tablet, Take 1 tablet (100 mg total) by mouth daily as needed for Erectile Dysfunction (1 hour prior to intercourse on an empty stomach)., Disp: 10 tablet, Rfl: 11    tamsulosin (FLOMAX) 0.4 mg Cap, Take 1 capsule (0.4 mg total) by mouth once daily., Disp: 90 capsule, Rfl: 3    PHYSICAL EXAMINATION:  Physical Exam  Constitutional:       Appearance: Normal appearance.   HENT:      Head: Normocephalic and atraumatic.      Right Ear: External ear normal.      Left Ear: External ear normal.      Nose: Nose normal.      Mouth/Throat:      Mouth: Mucous membranes are moist.   Pulmonary:      Effort: Pulmonary effort is normal. No respiratory distress.   Skin:     General: Skin is warm and dry.   Neurological:      General: No focal deficit present.       Mental Status: He is alert and oriented to person, place, and time.   Psychiatric:         Mood and Affect: Mood normal.         Behavior: Behavior normal.           LABS:  UA WNL  PVR 0 ml      Lab Results   Component Value Date    PSA 1.1 12/06/2023    PSA 1.4 11/22/2022    PSA 1.1 02/10/2021       Lab Results   Component Value Date    CREATININE 0.9 12/06/2023    EGFRNORACEVR >60.0 12/06/2023           IMPRESSION:  Encounter Diagnoses   Name Primary?    Benign prostatic hyperplasia with nocturia     Erectile dysfunction, unspecified erectile dysfunction type     Benign prostatic hyperplasia, unspecified whether lower urinary tract symptoms present          Assessment:       1. Benign prostatic hyperplasia with nocturia    2. Erectile dysfunction, unspecified erectile dysfunction type    3. Benign prostatic hyperplasia, unspecified whether lower urinary tract symptoms present        Plan:   - Continue nightly Flomax.  - Continue sildenafil PRN.    RTC 1 year or sooner PRN    I spent 30 minutes with the patient of which more than half was spent in direct consultation with the patient in regards to our treatment and plan.  We addressed the office findings and recent labs.   Education and recommendations of today's plan of care including home remedies and needed follow up with PCP.

## 2023-12-28 ENCOUNTER — TELEPHONE (OUTPATIENT)
Dept: SLEEP MEDICINE | Facility: OTHER | Age: 68
End: 2023-12-28
Payer: MEDICARE

## 2024-01-09 ENCOUNTER — TELEPHONE (OUTPATIENT)
Dept: INTERNAL MEDICINE | Facility: CLINIC | Age: 69
End: 2024-01-09
Payer: MEDICARE

## 2024-01-09 ENCOUNTER — TELEPHONE (OUTPATIENT)
Dept: SLEEP MEDICINE | Facility: OTHER | Age: 69
End: 2024-01-09
Payer: MEDICARE

## 2024-01-09 NOTE — TELEPHONE ENCOUNTER
----- Message from Karuna Jolly sent at 1/9/2024  3:31 PM CST -----  Contact: [phone]  [email or web address]     Patient is calling for Medical Advice regarding:speak to the nurse     How long has patient had these symptoms:sinus drip headaches (1 week)    Pharmacy name and phone#:  [id number] - [place], [place] - [address] AT [address]  [facility] [address]  [facility] [address]  Phone: [phone] Fax: [phone]       Would like response via Ciklum:  no     Comments:  Pt is calling he states he is having issues with his sinus

## 2024-01-11 ENCOUNTER — OFFICE VISIT (OUTPATIENT)
Dept: CARDIOLOGY | Facility: CLINIC | Age: 69
End: 2024-01-11
Payer: MEDICARE

## 2024-01-11 VITALS
OXYGEN SATURATION: 96 % | HEART RATE: 78 BPM | WEIGHT: 228.19 LBS | SYSTOLIC BLOOD PRESSURE: 147 MMHG | BODY MASS INDEX: 31.95 KG/M2 | DIASTOLIC BLOOD PRESSURE: 89 MMHG | HEIGHT: 71 IN

## 2024-01-11 DIAGNOSIS — Z13.6 ENCOUNTER FOR SCREENING FOR CARDIOVASCULAR DISORDERS: ICD-10-CM

## 2024-01-11 DIAGNOSIS — E66.09 CLASS 1 OBESITY DUE TO EXCESS CALORIES WITHOUT SERIOUS COMORBIDITY WITH BODY MASS INDEX (BMI) OF 30.0 TO 30.9 IN ADULT: ICD-10-CM

## 2024-01-11 DIAGNOSIS — E78.2 MIXED HYPERLIPIDEMIA: Primary | ICD-10-CM

## 2024-01-11 PROCEDURE — 3079F DIAST BP 80-89 MM HG: CPT | Mod: HCNC,CPTII,S$GLB, | Performed by: STUDENT IN AN ORGANIZED HEALTH CARE EDUCATION/TRAINING PROGRAM

## 2024-01-11 PROCEDURE — 3077F SYST BP >= 140 MM HG: CPT | Mod: HCNC,CPTII,S$GLB, | Performed by: STUDENT IN AN ORGANIZED HEALTH CARE EDUCATION/TRAINING PROGRAM

## 2024-01-11 PROCEDURE — 1125F AMNT PAIN NOTED PAIN PRSNT: CPT | Mod: HCNC,CPTII,S$GLB, | Performed by: STUDENT IN AN ORGANIZED HEALTH CARE EDUCATION/TRAINING PROGRAM

## 2024-01-11 PROCEDURE — 99999 PR PBB SHADOW E&M-EST. PATIENT-LVL IV: CPT | Mod: PBBFAC,HCNC,, | Performed by: STUDENT IN AN ORGANIZED HEALTH CARE EDUCATION/TRAINING PROGRAM

## 2024-01-11 PROCEDURE — 99214 OFFICE O/P EST MOD 30 MIN: CPT | Mod: HCNC,S$GLB,, | Performed by: STUDENT IN AN ORGANIZED HEALTH CARE EDUCATION/TRAINING PROGRAM

## 2024-01-11 PROCEDURE — 1159F MED LIST DOCD IN RCRD: CPT | Mod: HCNC,CPTII,S$GLB, | Performed by: STUDENT IN AN ORGANIZED HEALTH CARE EDUCATION/TRAINING PROGRAM

## 2024-01-11 PROCEDURE — 3008F BODY MASS INDEX DOCD: CPT | Mod: HCNC,CPTII,S$GLB, | Performed by: STUDENT IN AN ORGANIZED HEALTH CARE EDUCATION/TRAINING PROGRAM

## 2024-01-11 NOTE — PROGRESS NOTES
[facility]    CC:   Chief Complaint   Patient presents with    pcp referral    Medication Refill     pantoprazole       Patient ID: Dhruv Devries is a 68 y.o. male with HLD who presents for an initial appointment.  Pertinent history events are as follows:    HPI  Today Mr. Dhruv Devries presents with uncontrolled HLD. His LDL has been consistently > 200. He has previously been diagnosed with familial combined hyperlipidemia. He has not tolerated any statins previously. He is not interested in taking injections every two weeks.  He smoked for a short period when he was young. He is not diabetic. BP is elevated today. He states his father had severely elevated cholesterol and  at the age of 92 due to hemorrhage during surgery while he was on blood thinners. He does not exercise much. He does not follow any specific diet.    Past Medical History:   Diagnosis Date    Anemia 10/11/2013    Anxiety     Bleeding hemorrhoids     Borderline hypertension 10/19/2013    Degenerative disc disease     cervical and lumbar    Former smoker 1-2 packs daily for < 10 years 2015    Former smoker 1-2 packs daily for < 10 years 2015    Hyperlipidemia     Iron deficiency anemia 2015    Non morbid obesity due to excess calories 2016    Nuclear sclerosis - Both Eyes 2013    Psychiatric exam requested by authority     Psychiatric problem     Sleep apnea 2012    Sleep difficulties     trouble staying asleep     Statin myopathy 10/10/2018    Therapy      Past Surgical History:   Procedure Laterality Date    KNEE SURGERY Left 2/10/15    Dr. Fine     Social History     Socioeconomic History    Marital status:    Tobacco Use    Smoking status: Former     Types: Cigars     Quit date:      Years since quittin.0    Smokeless tobacco: Never    Tobacco comments:     once every 3 months    Substance and Sexual Activity    Alcohol use: Yes     Comment: occasionally, every so  many weekends, [employer] game     Drug use: No    Sexual activity: Yes     Partners: Female     Social Determinants of Health     Financial Resource Strain: Low Risk  (2/1/2021)    Overall Financial Resource Strain (CARDIA)     Difficulty of Paying Living Expenses: Not hard at all   Food Insecurity: No Food Insecurity (2/1/2021)    Hunger Vital Sign     Worried About Running Out of Food in the Last Year: Never true     Ran Out of Food in the Last Year: Never true   Transportation Needs: No Transportation Needs (2/1/2021)    PRAPARE - Transportation     Lack of Transportation (Medical): No     Lack of Transportation (Non-Medical): No   Physical Activity: Unknown (2/1/2021)    Exercise Vital Sign     Days of Exercise per Week: 1 day   Stress: Stress Concern Present (2/1/2021)    [employer] - Occupational Stress Questionnaire     Feeling of Stress : To some extent   Social Connections: Unknown (2/1/2021)    Social Connection and Isolation Panel [NHANES]     Frequency of Communication with Friends and Family: More than three times a week     Frequency of Social Gatherings with Friends and Family: Once a week     Active Member of Clubs or Organizations: Yes     Attends Club or Organization Meetings: More than 4 times per year     Marital Status:      Family History   Problem Relation Age of Onset    Kidney disease Mother     Glaucoma Paternal Aunt     Cataracts Paternal Aunt     Cancer Sister     Heart disease Brother         rheumatic fever    Hepatitis Brother     Peripheral vascular disease Brother     Blindness Neg Hx     Amblyopia Neg Hx     Macular degeneration Neg Hx     Retinal detachment Neg Hx     Strabismus Neg Hx     Diabetes Neg Hx     Hypertension Neg Hx     Stroke Neg Hx     Thyroid disease Neg Hx        Review of patient's allergies indicates:   Allergen Reactions    No known allergies        Medication List with Changes/Refills   Current Medications    CYCLOBENZAPRINE  "(FLEXERIL) 10 MG TABLET    Take 1 tablet (10 mg total) by mouth nightly. Prn severe muscle spasms    DIAZEPAM (VALIUM) 5 MG TABLET    May take half an hour before procedure- for anxiety    FLUTICASONE PROPIONATE (FLONASE) 50 MCG/ACTUATION NASAL SPRAY    SHAKE LIQUID AND USE 1 SPRAY(50 MCG) IN EACH NOSTRIL EVERY DAY    LIDOCAINE (LIDODERM) 5 %    Place 1 patch onto the skin once daily. Wear patch for 12 hours and  must have a 12 hour period without a patch    MULTIVITAMIN (THERAGRAN) PER TABLET    Take 1 tablet by mouth once daily.    PANTOPRAZOLE (PROTONIX) 40 MG TABLET    Take 1 tablet (40 mg total) by mouth once daily.    SILDENAFIL (VIAGRA) 100 MG TABLET    Take 1 tablet (100 mg total) by mouth daily as needed for Erectile Dysfunction (1 hour prior to intercourse on an empty stomach).    TAMSULOSIN (FLOMAX) 0.4 MG CAP    Take 1 capsule (0.4 mg total) by mouth once daily.    TRIAMCINOLONE ACETONIDE 0.1% (KENALOG) 0.1 % CREAM    Apply twice daily for 2 weeks to affected area       Review of Systems  Constitution: Denies chills, fever, and sweats.  HENT: Denies headaches or blurry vision.  Cardiovascular: denies chest pain or RICO  Respiratory: Denies cough or shortness of breath.  Gastrointestinal: Denies abdominal pain, nausea, or vomiting.  Musculoskeletal: Denies muscle cramps.  Neurological: Denies dizziness or focal weakness.  Psychiatric/Behavioral: Normal mental status.  Hematologic/Lymphatic: Denies bleeding problem or easy bruising/bleeding.  Skin: Denies rash or suspicious lesions    Physical Examination  BP (!) 147/89   Pulse 78   Ht 5' 11" (1.803 m)   Wt 103.5 kg (228 lb 2.8 oz)   SpO2 96%   BMI 31.82 kg/m²     Constitutional: Awake, alert, oriented, no acute distress, conversant  HEENT: Sclera anicteric, Pupils equal, round and reactive to light, extraocular motions intact  Neck: No JVD, no carotid bruits  Cardiovascular: normal rate, regular rhythm  Pulmonary: Clear to auscultation " "bilaterally  Abdominal: Abdomen soft, nontender, nondistended  Skin: No ecchymosis, erythema, or ulcers  Psych: Alert and oriented x 3, appropriate affect  Neuro: CNII-XII intact, no focal deficits    Labs:  Most Recent Data  CBC:   Lab Results   Component Value Date    WBC 5.08 12/06/2023    HGB 14.3 12/06/2023    HCT 41.9 12/06/2023     12/06/2023    MCV 86 12/06/2023    RDW 14.7 (H) 12/06/2023     BMP:   Lab Results   Component Value Date     12/06/2023    K 4.5 12/06/2023     12/06/2023    CO2 26 12/06/2023    BUN 9 12/06/2023    CREATININE 0.9 12/06/2023     12/06/2023    CALCIUM 9.3 12/06/2023     LFTS;   Lab Results   Component Value Date    PROT 7.9 12/06/2023    ALBUMIN 3.9 12/06/2023    BILITOT 0.5 12/06/2023    AST 28 12/06/2023    ALKPHOS 97 12/06/2023    ALT 27 12/06/2023     COAGS: No results found for: "INR", "PROTIME", "PTT"  FLP:   Lab Results   Component Value Date    CHOL 294 (H) 12/06/2023    HDL 42 12/06/2023    LDLCALC 213.8 (H) 12/06/2023    TRIG 191 (H) 12/06/2023    CHOLHDL 14.3 (L) 12/06/2023     CARDIAC: No results found for: "TROPONINI", "CKTOTAL", "CKMB", "BNP"      Assessment/Plan:  Dhruv was seen today for evaluation of hyperlipidemia    Diagnoses and all orders for this visit:    Mixed hyperlipidemia  Discussed the potential options of inclisiran injections every 6 months vs oral bempedoic acid/ezetimibe. Patient would like to research both options prior to making a decision. Agree with calcium scoring. Instructed patient to call the cardiology clinic if he is interested in starting either medication. I will call him with the results of the calcium scoring.    Follow up as needed      Total duration of face to face visit time 45 minutes.  Total time spent counseling greater than fifty percent of total visit time.  Counseling included discussion regarding imaging findings, diagnosis, possibilities, treatment options, risks and benefits.  The patient had many " questions regarding the options and long-term effects.    Sophie Ambrose MD  PGY IV Cardiology

## 2024-01-12 ENCOUNTER — PATIENT MESSAGE (OUTPATIENT)
Dept: DERMATOLOGY | Facility: CLINIC | Age: 69
End: 2024-01-12
Payer: MEDICARE

## 2024-01-25 ENCOUNTER — HOSPITAL ENCOUNTER (OUTPATIENT)
Dept: RADIOLOGY | Facility: HOSPITAL | Age: 69
Discharge: HOME | End: 2024-01-25
Attending: STUDENT IN AN ORGANIZED HEALTH CARE EDUCATION/TRAINING PROGRAM
Payer: MEDICARE

## 2024-01-25 DIAGNOSIS — Z13.6 ENCOUNTER FOR SCREENING FOR CARDIOVASCULAR DISORDERS: ICD-10-CM

## 2024-01-25 PROCEDURE — 75571 CT HRT W/O DYE W/CA TEST: CPT | Mod: TC,HCNC

## 2024-01-25 PROCEDURE — 75571 CT HRT W/O DYE W/CA TEST: CPT | Mod: 26,HCNC,, | Performed by: STUDENT IN AN ORGANIZED HEALTH CARE EDUCATION/TRAINING PROGRAM

## 2024-01-29 ENCOUNTER — TELEPHONE (OUTPATIENT)
Dept: SLEEP MEDICINE | Facility: OTHER | Age: 69
End: 2024-01-29
Payer: MEDICARE

## 2024-01-29 NOTE — TELEPHONE ENCOUNTER
Patient canceled the home sleep study beginning of Jan,said they would call back.  I sent out a reminder through my chart to reschedule.

## 2024-02-01 ENCOUNTER — TELEPHONE (OUTPATIENT)
Dept: CARDIOLOGY | Facility: HOSPITAL | Age: 69
End: 2024-02-01
Payer: MEDICARE

## 2024-02-01 NOTE — TELEPHONE ENCOUNTER
Discussed results of CT calcium score. Advised patient that we need to start therapy for his high cholesterol. He does not want any injections. He would like to try bempedoic acid. Prescription sent. Advised to exercise.

## 2024-02-05 ENCOUNTER — OFFICE VISIT (OUTPATIENT)
Dept: SLEEP MEDICINE | Facility: CLINIC | Age: 69
End: 2024-02-05
Payer: MEDICARE

## 2024-02-05 VITALS
SYSTOLIC BLOOD PRESSURE: 134 MMHG | HEART RATE: 68 BPM | DIASTOLIC BLOOD PRESSURE: 90 MMHG | BODY MASS INDEX: 31.79 KG/M2 | WEIGHT: 227.06 LBS | HEIGHT: 71 IN

## 2024-02-05 DIAGNOSIS — G47.33 OBSTRUCTIVE SLEEP APNEA SYNDROME: ICD-10-CM

## 2024-02-05 DIAGNOSIS — R06.83 SNORING: ICD-10-CM

## 2024-02-05 DIAGNOSIS — G47.33 OSA (OBSTRUCTIVE SLEEP APNEA): ICD-10-CM

## 2024-02-05 DIAGNOSIS — G47.10 HYPERSOMNOLENCE: Primary | ICD-10-CM

## 2024-02-05 PROCEDURE — 99204 OFFICE O/P NEW MOD 45 MIN: CPT | Mod: HCNC,S$GLB,, | Performed by: INTERNAL MEDICINE

## 2024-02-05 PROCEDURE — 1125F AMNT PAIN NOTED PAIN PRSNT: CPT | Mod: HCNC,CPTII,S$GLB, | Performed by: INTERNAL MEDICINE

## 2024-02-05 PROCEDURE — 3288F FALL RISK ASSESSMENT DOCD: CPT | Mod: HCNC,CPTII,S$GLB, | Performed by: INTERNAL MEDICINE

## 2024-02-05 PROCEDURE — 99999 PR PBB SHADOW E&M-EST. PATIENT-LVL III: CPT | Mod: PBBFAC,HCNC,, | Performed by: INTERNAL MEDICINE

## 2024-02-05 PROCEDURE — 3075F SYST BP GE 130 - 139MM HG: CPT | Mod: HCNC,CPTII,S$GLB, | Performed by: INTERNAL MEDICINE

## 2024-02-05 PROCEDURE — 1101F PT FALLS ASSESS-DOCD LE1/YR: CPT | Mod: HCNC,CPTII,S$GLB, | Performed by: INTERNAL MEDICINE

## 2024-02-05 PROCEDURE — 3080F DIAST BP >= 90 MM HG: CPT | Mod: HCNC,CPTII,S$GLB, | Performed by: INTERNAL MEDICINE

## 2024-02-05 PROCEDURE — 3008F BODY MASS INDEX DOCD: CPT | Mod: HCNC,CPTII,S$GLB, | Performed by: INTERNAL MEDICINE

## 2024-02-05 PROCEDURE — 1159F MED LIST DOCD IN RCRD: CPT | Mod: HCNC,CPTII,S$GLB, | Performed by: INTERNAL MEDICINE

## 2024-02-05 NOTE — PROGRESS NOTES
Referred by Cassandra Cruz MD     NEW PATIENT VISIT    Dhruv Devries  is a pleasant 68 y.o. male  with PMH significant for ED, vit D def, IFG, GERD, RICKI dx 2010 who presents 2/5/2024 for sleep evaluation.    CPAP intolerance: hard to breathe with it, hard to clean, loud, suspects it caused an inner ear infection, machine was recalled.          8/15/2018     8:14 AM   Sleep Clinic New Patient   What time do you go to bed on a week day? (Give a range) 9pm   What time do you go to bed on a day off? (Give a range) varies when go out.  but try to do 9pm   How long does it take you to fall asleep? (Give a range) half an hour to 45 mins   On average, how many times per night do you wake up? atleast once   How long does it take you to fall back into sleep? (Give a range) it depends. half an hour   What time do you wake up to start your day on a week day? (Give a range) 4 or 5:30am   What time do you wake up to start your day on a day off? (Give a range) 9-10am   What time do you get out of bed? (Give a range) weekday 4-5:30, when off wake up at 9-10 or later     Past Medical History:   Diagnosis Date    Anemia 10/11/2013    Anxiety     Bleeding hemorrhoids     Borderline hypertension 10/19/2013    Degenerative disc disease     cervical and lumbar    Former smoker 1-2 packs daily for < 10 years 01/23/2015    Former smoker 1-2 packs daily for < 10 years 01/23/2015    Hyperlipidemia     Iron deficiency anemia 05/21/2015    Non morbid obesity due to excess calories 06/17/2016    Nuclear sclerosis - Both Eyes 02/25/2013    Psychiatric exam requested by authority     Psychiatric problem     Sleep apnea 07/26/2012    Sleep difficulties     trouble staying asleep     Statin myopathy 10/10/2018    Therapy      Patient Active Problem List   Diagnosis    Vitamin D deficiency disease    Erectile dysfunction    Bleeding hemorrhoids    Anxiety    Obstructive sleep apnea syndrome: dx 2010; needs CPAP- see sleep assessment 7/17     Nuclear sclerosis - Both Eyes    Spondylosis of lumbar region without myelopathy or radiculopathy: see MRI 2018    DJD (degenerative joint disease) of cervical spine    Genu varum of left lower extremity    Former smoker 1-2 packs daily for < 10 years    Class 1 obesity due to excess calories without serious comorbidity with body mass index (BMI) of 30.0 to 30.9 in adult    Primary osteoarthritis of left knee    IFG (impaired fasting glucose)    Renal cyst, acquired, right: see u/s 11/18; stable 12/22    Renal stones: left side non obstructing see u/s 11/18; stable 12/22    Mixed hyperlipidemia    Non compliance w medication regimen    Gastroesophageal reflux disease without esophagitis       Current Outpatient Medications:     bempedoic acid-ezetimibe 180-10 mg Tab, Take 1 tablet by mouth once daily., Disp: 90 tablet, Rfl: 3    cyclobenzaprine (FLEXERIL) 10 MG tablet, Take 1 tablet (10 mg total) by mouth nightly. Prn severe muscle spasms, Disp: 30 tablet, Rfl: 0    diazePAM (VALIUM) 5 MG tablet, May take half an hour before procedure- for anxiety, Disp: 30 tablet, Rfl: 0    fluticasone propionate (FLONASE) 50 mcg/actuation nasal spray, SHAKE LIQUID AND USE 1 SPRAY(50 MCG) IN EACH NOSTRIL EVERY DAY, Disp: 48 g, Rfl: 1    LIDOcaine (LIDODERM) 5 %, Place 1 patch onto the skin once daily. Wear patch for 12 hours and  must have a 12 hour period without a patch, Disp: 30 patch, Rfl: 2    multivitamin (THERAGRAN) per tablet, Take 1 tablet by mouth once daily., Disp: , Rfl:     pantoprazole (PROTONIX) 40 MG tablet, Take 1 tablet (40 mg total) by mouth once daily., Disp: 30 tablet, Rfl: 1    sildenafiL (VIAGRA) 100 MG tablet, Take 1 tablet (100 mg total) by mouth daily as needed for Erectile Dysfunction (1 hour prior to intercourse on an empty stomach)., Disp: 10 tablet, Rfl: 11    tamsulosin (FLOMAX) 0.4 mg Cap, Take 1 capsule (0.4 mg total) by mouth once daily., Disp: 90 capsule, Rfl: 3    triamcinolone acetonide 0.1%  "(KENALOG) 0.1 % cream, Apply twice daily for 2 weeks to affected area, Disp: 30 g, Rfl: 1       Vitals:    02/05/24 1406   Weight: 103 kg (227 lb 1.2 oz)   Height: 5' 11" (1.803 m)     Physical Exam:    GEN:   Well-appearing  Psych:  Appropriate affect, demonstrates insight  SKIN:  No rash on the face or bridge of the nose      LABS:   Lab Results   Component Value Date    HGB 14.3 12/06/2023    CO2 26 12/06/2023         RECORDS REVIEWED:        ASSESSMENT        8/15/2018     8:09 AM   EPWORTH SLEEPINESS SCALE   Sitting and reading 2   Watching TV 3   Sitting, inactive in a public place (e.g. a theatre or a meeting) 1   As a passenger in a car for an hour without a break 0   Lying down to rest in the afternoon when circumstances permit 2   Sitting and talking to someone 1   Sitting quietly after a lunch without alcohol 2   In a car, while stopped for a few minutes in traffic 0   Total score 11       PROBLEM DESCRIPTION/ Sx on Presentation  STATUS   hx RICKI circa 2010   + snoring,  + snoring arousals many years ago, No witnessed  apneas  Prone sleep: no    SPLIT 1.27.2010: AHI 47, nelly 76%  8cwp lat REM, 10 + S N2 , no supine REM        New   Daytime Sx   + sleepiness when inactive   ESS 11/24 on intake  New   Nocturia   x once or more per sleep period  New   Other issues:     PLAN     -we discussed how PAP therapy is the most effective treatment of RICKI and ways we could try to improve their tolerance of it.  -we discussed alternatives to PAP, including hypoglossal nerve stimulator, mandibular advancement device ("mouthguard"), upper airway surgery, weight loss, and other adjunctive therapies.  -at this point, the patient would like to try BiPAP with a better mask and not using the humidity  -will proceed with a BiPAP titration due to the patient's pressure intolerance with CPAP despite a comfortable interface.  -discussed another trial of therapy therapy if RICKI present and the patient is  open to a trial of CPAP " therapy         RTC:  will arrange RTC depending on results of sleep testing     The patient was given open opportunity to ask questions and/or express concerns about treatment plan.   All questions/concerns were discussed.     Two patient identifiers used prior to evaluation.

## 2024-02-07 ENCOUNTER — PATIENT MESSAGE (OUTPATIENT)
Dept: SLEEP MEDICINE | Facility: OTHER | Age: 69
End: 2024-02-07
Payer: MEDICARE

## 2024-02-21 ENCOUNTER — HOSPITAL ENCOUNTER (OUTPATIENT)
Dept: SLEEP MEDICINE | Facility: OTHER | Age: 69
Discharge: HOME | End: 2024-02-21
Attending: INTERNAL MEDICINE
Payer: MEDICARE

## 2024-02-21 DIAGNOSIS — G47.10 HYPERSOMNOLENCE: ICD-10-CM

## 2024-02-21 DIAGNOSIS — G47.33 OBSTRUCTIVE SLEEP APNEA SYNDROME: ICD-10-CM

## 2024-02-21 DIAGNOSIS — R06.83 SNORING: ICD-10-CM

## 2024-02-21 DIAGNOSIS — G47.33 OSA (OBSTRUCTIVE SLEEP APNEA): ICD-10-CM

## 2024-02-21 PROCEDURE — 95811 POLYSOM 6/>YRS CPAP 4/> PARM: CPT | Mod: HCNC

## 2024-02-22 NOTE — PROGRESS NOTES
Dhruv Devries to [facility] on 2/212024 for an overnight BiPAP titration study.     Pt wearing a M Resmed P10 under nose nasal mask, no chin strap.     HOB at 35%  Pt also wore dental device    Post study information given to pt in AM

## 2024-02-24 NOTE — PROCEDURES
"[facility]    Titration Interpretation Report    Patient Name:  ILIANA CARPENTER  MRN#:  [id number]  :  1955  Study Date:  2024  Referring Provider:  RAMONA ZHANG MD    The patient is a 68 year old Male who is 5' 11" and weighs 227.0 lbs.  His BMI equals 31.8.  A full night PAP titration was performed.    Polysomnogram Data  A full night polysomnogram recorded the standard physiologic parameters including EEG, EOG, EMG, EKG, nasal and oral airflow.  Respiratory parameters of chest and abdominal movements were recorded with (RIP) Respiratory Inductance Plethsmography.  Oxygen saturation was recorded by pulse oximetry.    Titration Summary  The patient was titrated at pressures ranging from 8/4/0* cm/H20 with supplemental oxygen at - up to 13/9/0* cm/H20 with supplemental oxygen at -.  The last pressure used in the study was 13/9/0* cm/H20 with supplemental oxygen at -.    Sleep Architecture  The total recording time of the polysomnogram was 460.4 minutes.  The total sleep time was 368.0 minutes.  The patient spent 16.2% of total sleep time in Stage N1, 62.6% in Stage N2, 3.4% in Stages N3, and 17.8% in REM.  Sleep latency was 21.0 minutes.  REM latency was 62.5 minutes.  Sleep Efficiency was 79.9%.  Total wake time was 92.5 minutes for a total wake percentage of 15.9%.  Wake after Sleep Onset was 71.0 minutes.    Respiratory Summary  The polysomnogram revealed a presence of 1 obstructive, 7 central, and - mixed apneas resulting in Total Apnea index of 1.3 events per hour.  There were 17 hypopneas resulting in Total Hypopnea index of 2.8 events per hour.  The combined Apnea/Hypopnea index was 4.1 events per hour.  There were a total of 11 RERA events resulting in a Respiratory Disturbance Index (RDI) of 5.9 events per hour.     Mean oxygen saturation was 93.9%.  The lowest oxygen saturation during sleep was 88.0%.  Time spent ?88% oxygen saturation was 0.2 minutes (0.1%).    End Tidal CO2 " "during sleep ranged from - to - mmHg. End Tidal CO2 was greater than 50 mmHg for - minutes and greater than 55 mmHg for - minutes.  Transcutaneous CO2 during sleep ranged from - to - mmHg. Transcutaneous CO2 was greater than 50 mmHg for - minutes and greater than 55 mmHg for - minutes.    Limb Movement Activity  There were 314 limb movements recorded.  Of this total, 314 were classified as PLMs.  Of the PLMs, 11 were associated with arousals.  The Limb Movement index was 51.2 per hour while the PLM index was 51.2 per hour and PLM with arousals index was 1.8 per hour.    Cardiac: single lead EKG revealed normal sinus rhythm     PAP titration:    Mask used: M Resmed P10 under nose nasal mask, no chin strap.      HOB at 35%  Pt also wore dental device    PAP = 9/5 cwp was largely effective in supine N2 sleep.  PAP = 11/7 cwp was partially effective in lateral REM sleep.    Oxygenation:  At therapeutic levels of PAP therapy, there was no baseline hypoxemia.    Impression:  -obstructive sleep apnea    Recommendations:    -initial BiPAP auto settings EPAP min = 5 cwp, PS = 4 cwp, and IPAP max = 12 cwp.  -EPAP max should be increased to 7 cwp or higher depending on patient tolerance.  -the patient has follow up with Sleep Medicine        Tawanda Zhang MD    (This Sleep Study was interpreted by a Board Certified Sleep Specialist who conducted an epoch-by-epoch review of the entire raw data recording.)  (The indication for this sleep study was reviewed and deemed appropriate by AASM Practice Parameters or other reasons by a Board Certified Sleep Specialist.)      [facility]    Titration Report    Patient Name: ILIANA CARPENTER Study Date: 2/21/2024   YOB: 1955 MRN #: [id number]   Age: 68 year HAMZAH #: [id number]   Sex: Male Referring Provider: RAMONA ZHANG MD   Height: 5' 11" Recording Tech: Juan Mcgill RPSGT   Weight: 227.0 lbs Scoring Tech: Fuentes Turner RRT RPSGT   BMI: 31.8 Interpreting " Physician: -   ESS: - Neck Circumference: -     Study Overview    Lights Off: 09:45:08 PM  Count Index   Lights On: 05:25:30 AM Awakenings: 41 6.7   Time in Bed: 460.4 min. Arousals: 94 15.3   Total Sleep Time: 368.0 min. Apneas & Hypopneas: 25 4.1    Sleep Efficiency: 79.9% Limb Movements: 314 51.2   Sleep Latency: 21.0 min. Snores: - -   Wake After Sleep Onset: 71.0 min. Desaturations: 37 6.0    REM Latency from Sleep Onset: 62.5 min. Minimum SpO2 TST: 88.0%      Sleep Architecture   % of Time in Bed  Stages Time (mins) % Sleep Time   Wake 92.5    Stage N1 59.5 16.2%   Stage N2 230.5 62.6%   Stage N3 12.5 3.4%   REM 65.5 17.8%         Arousal Summary     NREM REM Sleep Index   Respiratory Arousals 5 3 8 1.3   PLM Arousals 11 - 11 1.8   Isolated Limb Movement Arousals - - - -   Spontaneous Arousals 65 10 75 12.2   Total 81 13 94 15.3       Limb Movement Summary     Count Index   Isolated Limb Movements - -   Periodic Limb Movements (PLMs) 314 51.2   Total Limb Movements 314 51.2   Respiratory Summary     By Sleep Stage By Body Position Total    NREM REM Supine Non-Supine    Time (min) 302.5 65.5 214.0 154.0 368.0           Obstructive Apnea 1 - 1 - 1   Mixed Apnea - - - - -   Central Apnea 7 - 5 2 7   Central Apnea Index 1.4 - 1.6 1.0 1.4   Total Apneas 8 - 6 2 8   Total Apnea Index 1.6 - 1.7 0.8 1.3           Total Hypopnea 10 7 16 1 17   Total Hypopnea Index 2.0 6.4 4.5 0.4 2.8           Apnea & Hypopnea 18 7 22 3 25   Apnea & Hypopnea Index 3.6 6.4 6.2 1.2 4.1           RERAs 7 4 11 - 11   RERA Index 1.4 3.7 3.1 - 1.8           RDI 5.0 10.1 9.3 1.2 5.9     Scoring Criteria: Hypopneas scored at 4% desaturation criteria.    Respiratory Event Durations     Apnea Hypopnea    NREM REM NREM REM   Average (seconds) 13.5 - 14.5 14.4   Maximum (seconds) 19.1 - 17.5 16.9       Oxygen Saturation Summary     Wake NREM REM TST Total   Average SpO2 94.9% 93.7% 93.6% 93.7% 93.9%   Minimum SpO2 88.0% 90.0% 88.0% 88.0% 88.0%    Maximum SpO2 98.0% 98.0% 98.0% 98.0% 98.0%     Oxygen Saturation Distribution    Range (%) Time in range (min) Time in range (%)    90.0 - 100.0 454.3 99.1%   80.0 - 90.0 4.1 0.9%   70.0 - 80.0 - -   60.0 - 70.0 - -   50.0 - 60.0 - -   0.0 - 50.0 - -   Time Spent ?88% SpO2    Range (%) Time in range (min) Time in range (%)   0.0 - 88.0 0.2 0.1%          Count Index   Desaturations 37 6.0      Cardiac Summary     Wake NREM REM Sleep Total   Average Pulse Rate (BPM) 58.5 55.4 56.6 55.6 56.2   Minimum Pulse Rate (BPM) 49.0 48.0 50.0 48.0 48.0   Maximum Pulse Rate (BPM) 81.0 69.0 71.0 71.0 81.0     Pulse Rate Distribution    Range (bpm) Time in range (min) Time in range (%)   0.0 - 40.0 - -   40.0 - 60.0 404.7 88.0%   60.0 - 80.0 55.0 12.0%   80.0 - 100.0 0.0 0.0%   100.0 - 120.0 - -   120.0 - 140.0 - -   140.0 - 200.0 - -     EtCO2 Summary    Stage Min (mmHg) Average (mmHg) Max (mmHg)   Wake - - -   NREM(1+2+3) - - -   REM - - -     Range (mmHg) Time in range (min) Time in range (%)   - - -   - - -   - - -   - - -   - - -     TcCO2 Summary    Stage Min (mmHg) Average (mmHg) Max (mmHg)   Wake - - -   NREM(1+2+3) - - -   REM - - -     Range (mmHg) Time in range (min) Time in range (%)   20.0 - 40.0 - -   40.0 - 50.0 - -   50.0 - 55.0 - -   55.0 - 100.0 - -   Excluded data <20.0 & >65.0 460.5 100.0%     Comments    -    Titration Summary    PAP Device PAP Level O2 Level Time (min) TST (min) NREM (min) REM (min) Wake (min) Sleep Eff% OA# CA# MA# Hyp# AHI RERA RDI Min SpO2 SpO2 ?88% (min) Ar. Index   BiLevel 8/4/0 - 50.5 12.5 12.5 0.0 38.0 24.8% - - - 3 14.4 2 24.0 90.0  0.0 52.8   BiLevel 9/5/0 - 47.5 47.0 34.0 13.0 0.5 98.9% - - - 5 6.4 3 10.2 88.0  0.2 15.3   BiLevel 10/6/0 - 49.5 48.0 35.0 13.0 1.5 97.0% 1 - - 6 8.8 1 10.0 89.0  0.0 11.3   BiLevel 11/7/0 - 146.5 139.5 104.0 35.5 7.0 95.2% - 1 - - 0.4 - 0.4 91.0  0.0 8.2   BiLevel 12/8/0 - 29.0 29.0 29.0 0.0 0.0 100.0% - - - 1 2.1 1 4.1 91.0  0.0 12.4   BiLevel 13/9/0  - 137.5 92.0 88.0 4.0 45.5 66.9% - 6 - 2 5.2 4 7.8 91.0  0.0 24.1

## 2024-03-03 PROCEDURE — 95811 POLYSOM 6/>YRS CPAP 4/> PARM: CPT | Mod: 26,HCNC,, | Performed by: INTERNAL MEDICINE

## 2024-03-05 ENCOUNTER — PATIENT MESSAGE (OUTPATIENT)
Dept: SLEEP MEDICINE | Facility: CLINIC | Age: 69
End: 2024-03-05
Payer: MEDICARE

## 2024-03-05 DIAGNOSIS — G47.33 OSA (OBSTRUCTIVE SLEEP APNEA): ICD-10-CM

## 2024-03-05 DIAGNOSIS — Z78.9 INTOLERANCE OF CONTINUOUS POSITIVE AIRWAY PRESSURE (CPAP) VENTILATION: Primary | ICD-10-CM

## 2024-03-13 ENCOUNTER — HOSPITAL ENCOUNTER (OUTPATIENT)
Dept: RADIOLOGY | Facility: HOSPITAL | Age: 69
Discharge: HOME | End: 2024-03-13
Attending: ORTHOPAEDIC SURGERY
Payer: MEDICARE

## 2024-03-13 ENCOUNTER — OFFICE VISIT (OUTPATIENT)
Dept: SPORTS MEDICINE | Facility: CLINIC | Age: 69
End: 2024-03-13
Payer: MEDICARE

## 2024-03-13 VITALS
BODY MASS INDEX: 31.08 KG/M2 | DIASTOLIC BLOOD PRESSURE: 92 MMHG | WEIGHT: 222 LBS | SYSTOLIC BLOOD PRESSURE: 134 MMHG | HEART RATE: 79 BPM | HEIGHT: 71 IN

## 2024-03-13 DIAGNOSIS — M17.12 ARTHRITIS OF LEFT KNEE: ICD-10-CM

## 2024-03-13 DIAGNOSIS — M25.562 LEFT KNEE PAIN, UNSPECIFIED CHRONICITY: Primary | ICD-10-CM

## 2024-03-13 DIAGNOSIS — M25.562 LEFT KNEE PAIN, UNSPECIFIED CHRONICITY: ICD-10-CM

## 2024-03-13 PROCEDURE — 99999 PR PBB SHADOW E&M-EST. PATIENT-LVL III: CPT | Mod: PBBFAC,HCNC,, | Performed by: ORTHOPAEDIC SURGERY

## 2024-03-13 PROCEDURE — 99214 OFFICE O/P EST MOD 30 MIN: CPT | Mod: S$GLB,,, | Performed by: ORTHOPAEDIC SURGERY

## 2024-03-13 PROCEDURE — 73564 X-RAY EXAM KNEE 4 OR MORE: CPT | Mod: 26,50,HCNC, | Performed by: RADIOLOGY

## 2024-03-13 PROCEDURE — 3075F SYST BP GE 130 - 139MM HG: CPT | Mod: CPTII,S$GLB,, | Performed by: ORTHOPAEDIC SURGERY

## 2024-03-13 PROCEDURE — 3080F DIAST BP >= 90 MM HG: CPT | Mod: CPTII,S$GLB,, | Performed by: ORTHOPAEDIC SURGERY

## 2024-03-13 PROCEDURE — 3008F BODY MASS INDEX DOCD: CPT | Mod: CPTII,S$GLB,, | Performed by: ORTHOPAEDIC SURGERY

## 2024-03-13 PROCEDURE — 1159F MED LIST DOCD IN RCRD: CPT | Mod: CPTII,S$GLB,, | Performed by: ORTHOPAEDIC SURGERY

## 2024-03-13 PROCEDURE — 1101F PT FALLS ASSESS-DOCD LE1/YR: CPT | Mod: CPTII,S$GLB,, | Performed by: ORTHOPAEDIC SURGERY

## 2024-03-13 PROCEDURE — 3288F FALL RISK ASSESSMENT DOCD: CPT | Mod: CPTII,S$GLB,, | Performed by: ORTHOPAEDIC SURGERY

## 2024-03-13 PROCEDURE — 1125F AMNT PAIN NOTED PAIN PRSNT: CPT | Mod: CPTII,S$GLB,, | Performed by: ORTHOPAEDIC SURGERY

## 2024-03-13 PROCEDURE — 1160F RVW MEDS BY RX/DR IN RCRD: CPT | Mod: CPTII,S$GLB,, | Performed by: ORTHOPAEDIC SURGERY

## 2024-03-13 PROCEDURE — 73564 X-RAY EXAM KNEE 4 OR MORE: CPT | Mod: TC,50,HCNC

## 2024-03-13 NOTE — PROGRESS NOTES
Subjective:          Chief Complaint: Dhruv Fine is a 68 y.o. male who had concerns including Pain of the Left Knee.    Mr. Dhruv Fine comes for follow up of left knee. He has not been seen in our clinic  recently but presents with plans for left knee Synvisc one injection; His last injection was January 2023.                                                                   Review of Systems   Constitutional: Negative. Negative for fever and night sweats.   HENT: Negative.  Negative for hearing loss.    Eyes: Negative.  Negative for blurred vision and visual disturbance.   Cardiovascular: Negative.  Negative for chest pain and leg swelling.   Respiratory: Negative.  Negative for shortness of breath.    Endocrine: Negative.  Negative for polyuria.   Hematologic/Lymphatic: Negative.  Negative for bleeding problem.   Skin: Negative.  Negative for rash.   Musculoskeletal:  Positive for joint pain. Negative for back pain, joint swelling, muscle cramps and muscle weakness.   Gastrointestinal: Negative.  Negative for melena.   Genitourinary: Negative.  Negative for hematuria.   Neurological: Negative.  Negative for loss of balance, numbness and paresthesias.   Psychiatric/Behavioral: Negative.  Negative for altered mental status.    Allergic/Immunologic: Negative.        Pain Related Questions  Over the past 3 days, what was your average pain during activity? (I.e. running, jogging, walking, climbing stairs, getting dressed, ect.): 1  Over the past 3 days, what was your highest pain level?: 2  Over the past 3 days, what was your lowest pain level? : 0    Other  How many nights a week are you awakened by your affected body part?: 2  Was the patient's HEIGHT measured or patient reported?: Patient Reported  Was the patient's WEIGHT measured or patient reported?: Measured      Objective:        General: Dhruv is well-developed, well-nourished, appears stated age, in no acute distress, alert and oriented to time,  place and person.     General    Vitals reviewed.  Constitutional: He is oriented to person, place, and time. He appears well-developed and well-nourished. No distress.   HENT:   Mouth/Throat: No oropharyngeal exudate.   Eyes: Right eye exhibits no discharge. Left eye exhibits no discharge.   Pulmonary/Chest: Effort normal and breath sounds normal. No respiratory distress.   Neurological: He is alert and oriented to person, place, and time. He has normal reflexes. No cranial nerve deficit. Coordination normal.   Psychiatric: He has a normal mood and affect. His behavior is normal. Judgment and thought content normal.     General Musculoskeletal Exam   Gait: normal       Right Knee Exam     Inspection   Erythema: absent  Scars: absent  Swelling: absent  Effusion: absent  Deformity: absent  Bruising: absent    Tenderness   The patient is experiencing no tenderness.     Range of Motion   Extension:  0   Flexion:  130     Tests   Meniscus   Rudy:  Medial - negative Lateral - negative  Ligament Examination   Lachman: normal (-1 to 2mm)   PCL-Posterior Drawer: normal (0 to 2mm)     MCL - Valgus: normal (0 to 2mm)  LCL - Varus: normal  Pivot Shift: normal (Equal)  Reverse Pivot Shift: normal (Equal)  Dial Test at 30 degrees: normal (< 5 degrees)  Dial Test at 90 degrees: normal (< 5 degrees)  Posterior Sag Test: negative  Posterolateral Corner: unstable (>15 degrees difference)  Patella   Patellar apprehension: negative  Passive Patellar Tilt: neutral  Patellar Tracking: normal  Patellar Glide (quadrants): Lateral - 1   Medial - 2  Q-Angle at 90 degrees: normal  Patellar Grind: negative  J-Sign: none    Other   Meniscal Cyst: absent  Popliteal (Baker's) Cyst: absent  Sensation: normal    Left Knee Exam     Inspection   Erythema: absent  Scars: present  Swelling: absent  Effusion: absent  Deformity: absent  Bruising: absent    Tenderness   The patient tender to palpation of the medial joint line and lateral joint  line.    Range of Motion   Extension:  5 (3) abnormal   Flexion:  140     Tests   Meniscus   Rudy:  Medial - negative Lateral - negative  Stability   Lachman: normal (-1 to 2mm)   PCL-Posterior Drawer: normal (0 to 2mm)  MCL - Valgus: normal (0 to 2mm)  LCL - Varus: normal (0 to 2mm)  Pivot Shift: normal (Equal)  Reverse Pivot Shift: normal (Equal)  Dial Test at 30 degrees: normal (< 5 degrees)  Dial Test at 90 degrees: normal (< 5 degrees)  Posterior Sag Test: negative  Posterolateral Corner: unstable (>15 degrees difference)  Patella   Patellar apprehension: negative  Passive Patellar Tilt: neutral  Patellar Tracking: normal  Patellar Glide (Quadrants): Lateral - 1 Medial - 2  Q-Angle at 90 degrees: normal  Patellar Grind: negative  J-Sign: J sign absent    Other   Meniscal Cyst: absent  Popliteal (Baker's) Cyst: absent  Sensation: normal    Right Hip Exam     Tests   Patricia: negative  Left Hip Exam     Tests   Patricia: negative          Muscle Strength   Right Lower Extremity   Hip Abduction: 5/5   Quadriceps:  5/5   Hamstrin/5   Left Lower Extremity   Hip Abduction: 5/5   Quadriceps:  5/5   Hamstrin/5     Reflexes     Left Side  Achilles:  2+  Quadriceps:  2+    Right Side   Achilles:  2+  Quadriceps:  2+    Vascular Exam     Right Pulses  Dorsalis Pedis:      2+  Posterior Tibial:      2+        Left Pulses  Dorsalis Pedis:      2+  Posterior Tibial:      2+      Radiographic Findings:    CT Cardiac Scoring  Narrative: EXAMINATION:  CT CALCIUM SCORING CARDIAC    CLINICAL HISTORY:  CAD screening, intermediate CAD risk, treadmill candidate;  Encounter for screening for cardiovascular disorders    TECHNIQUE:  Multidetector CT scanner was utilized.  No IV contrast.    Noncontrast phase - 2.5 mm, heart, prospective ECG gating, coronary calcium scoring protocol    2-D/3-D processing - thin section calcium score evaluation performed.    Technical Quality: Excellent (no  artifacts)    COMPARISON:  None.    FINDINGS:  Statements: Lack of intravenous contrast compromises evaluation of vasculature structures.    Agatston scores for individual coronary arteries are as follows:    Left main: 0    Left anterior descending (LAD): 192    Left circumflex: 45    Right coronary artery (RCA): 429    PDA: 8    SCORE SUMMARY    Your total calcium score is 674    Extra-coronary calcification: Mild calcification of the aortic valve.  No calcification within the mitral annulus, pericardium, or myocardium.    Other: No significant extra-coronary findings.  Impression: 1. Calcium score (Agatston):   674. The Agatston score is approximately in the 90  th percentile for age and gender.  Calcium Score Interpretation  0 - Negative examination; no identifiable atherosclerotic plaque. Very low risk of cardiac events in the next 5 years.  1-10 - Minimal plaque burden. Low risk of cardiac events in the next 5 years.   - Mild plaque burden. Mild risk of cardiac events in the next 5 years.  101-400 - Moderate plaque burden. Moderate risk of cardiac events in the next 5 years.  Over 400 - Extensive plaque burden. High risk of cardiac events in the next 5 years.  Percentile Ranking = meaning that in a published study, of people of the same gender and similar age had the same or lower calcium score.    Electronically signed by: Giovany Allen  Date:    01/30/2024  Time:    08:35         These findings were discussed and reviewed with the patient.          Assessment:       Encounter Diagnosis   Name Primary?    Left knee pain, unspecified chronicity Yes              Plan:       1. RTC in 3 months with Dr. Sam Fine. IKDC, SF-12 and KOOS was filled out today in clinic. Patient will fill out IKDC, SF-12 and KOOS on return.    2. Medications: Refills of the following Rx were sent to patients preferred Pharmacy:  No Refills Needed Today    3. Physical Therapy:     4. HEP: N/A    5.  Procedures/Procedural Planning:   Prior authorization for left Knee Synvisc-One to be completed the next several weeks placed today.  We have discussed a variety of treatment options including medications, injections, physical therapy and other alternative treatments. Patient's pain is refractory to long-term use of PO/topical NSAIDs, physical therapy 6+ weeks, aerobic exercise, weight-loss, walking aids, visco-supplementation, and multiple corticosteroid injections, and the current treatment is the only effective treatment recommended at this time. Also recommending to continue HEP.  Patient agrees with treatment plan. Radiographs show osteoarthritis of bilateral knees with Kellgren Ej scale of 2    6. DME: N/A    7. Work/Sport Status: full work duty     8. Visit Summary: Patient would like to proceed with Synvisc-one injection in 3-4 weeks for left knee. He documents strong functional improvement and improvement in symptoms with synvisc-one injection.                                       [facility] patient questionnaires have been collected today.

## 2024-04-05 NOTE — PROGRESS NOTES
Subjective:          Chief Complaint: Dhruv Fine is a 68 y.o. male who had no chief complaint listed for this encounter.    Mr. Dhruv Fine comes for follow up of left knee, he is here to receive Synvisc-One injection.       Review of Systems   Constitutional: Negative. Negative for fever and night sweats.   HENT: Negative.  Negative for hearing loss.    Eyes: Negative.  Negative for blurred vision and visual disturbance.   Cardiovascular: Negative.  Negative for chest pain and leg swelling.   Respiratory: Negative.  Negative for shortness of breath.    Endocrine: Negative.  Negative for polyuria.   Hematologic/Lymphatic: Negative.  Negative for bleeding problem.   Skin: Negative.  Negative for rash.   Musculoskeletal:  Positive for joint pain. Negative for back pain, joint swelling, muscle cramps and muscle weakness.   Gastrointestinal: Negative.  Negative for melena.   Genitourinary: Negative.  Negative for hematuria.   Neurological: Negative.  Negative for loss of balance, numbness and paresthesias.   Psychiatric/Behavioral: Negative.  Negative for altered mental status.    Allergic/Immunologic: Negative.                    Objective:        General: Dhruv is well-developed, well-nourished, appears stated age, in no acute distress, alert and oriented to time, place and person.     General    Vitals reviewed.  Constitutional: He is oriented to person, place, and time. He appears well-developed and well-nourished. No distress.   HENT:   Mouth/Throat: No oropharyngeal exudate.   Eyes: Right eye exhibits no discharge. Left eye exhibits no discharge.   Pulmonary/Chest: Effort normal and breath sounds normal. No respiratory distress.   Neurological: He is alert and oriented to person, place, and time. He has normal reflexes. No cranial nerve deficit. Coordination normal.   Psychiatric: He has a normal mood and affect. His behavior is normal. Judgment and thought content normal.     General Musculoskeletal Exam    Gait: normal       Right Knee Exam     Inspection   Erythema: absent  Scars: absent  Swelling: absent  Effusion: absent  Deformity: absent  Bruising: absent    Tenderness   The patient is experiencing no tenderness.     Range of Motion   Extension:  0   Flexion:  130     Tests   Meniscus   Rudy:  Medial - negative Lateral - negative  Ligament Examination   Lachman: normal (-1 to 2mm)   PCL-Posterior Drawer: normal (0 to 2mm)     MCL - Valgus: normal (0 to 2mm)  LCL - Varus: normal  Pivot Shift: normal (Equal)  Reverse Pivot Shift: normal (Equal)  Dial Test at 30 degrees: normal (< 5 degrees)  Dial Test at 90 degrees: normal (< 5 degrees)  Posterior Sag Test: negative  Posterolateral Corner: unstable (>15 degrees difference)  Patella   Patellar apprehension: negative  Passive Patellar Tilt: neutral  Patellar Tracking: normal  Patellar Glide (quadrants): Lateral - 1   Medial - 2  Q-Angle at 90 degrees: normal  Patellar Grind: negative  J-Sign: none    Other   Meniscal Cyst: absent  Popliteal (Baker's) Cyst: absent  Sensation: normal    Left Knee Exam     Inspection   Erythema: absent  Scars: present  Swelling: absent  Effusion: absent  Deformity: absent  Bruising: absent    Tenderness   The patient tender to palpation of the medial joint line and lateral joint line.    Range of Motion   Extension:  5 (3) abnormal   Flexion:  140     Tests   Meniscus   Rudy:  Medial - negative Lateral - negative  Stability   Lachman: normal (-1 to 2mm)   PCL-Posterior Drawer: normal (0 to 2mm)  MCL - Valgus: normal (0 to 2mm)  LCL - Varus: normal (0 to 2mm)  Pivot Shift: normal (Equal)  Reverse Pivot Shift: normal (Equal)  Dial Test at 30 degrees: normal (< 5 degrees)  Dial Test at 90 degrees: normal (< 5 degrees)  Posterior Sag Test: negative  Posterolateral Corner: unstable (>15 degrees difference)  Patella   Patellar apprehension: negative  Passive Patellar Tilt: neutral  Patellar Tracking: normal  Patellar Glide  (Quadrants): Lateral - 1 Medial - 2  Q-Angle at 90 degrees: normal  Patellar Grind: negative  J-Sign: J sign absent    Other   Meniscal Cyst: absent  Popliteal (Baker's) Cyst: absent  Sensation: normal    Right Hip Exam     Tests   Patricia: negative  Left Hip Exam     Tests   Patricia: negative          Muscle Strength   Right Lower Extremity   Hip Abduction: 5/5   Quadriceps:  5/5   Hamstrin/5   Left Lower Extremity   Hip Abduction: 5/5   Quadriceps:  5/5   Hamstrin/5     Reflexes     Left Side  Achilles:  2+  Quadriceps:  2+    Right Side   Achilles:  2+  Quadriceps:  2+    Vascular Exam     Right Pulses  Dorsalis Pedis:      2+  Posterior Tibial:      2+        Left Pulses  Dorsalis Pedis:      2+  Posterior Tibial:      2+        Radiographic Findings:    X-ray Knee Ortho Bilateral with Flexion  Narrative: EXAMINATION:  XR KNEE ORTHO BILAT WITH FLEXION    CLINICAL HISTORY:  Pain in left knee    TECHNIQUE:  AP standing of both knees, PA flexion standing views of both knees, and Merchant views of both knees were performed.  Lateral views of both knees were also performed.    COMPARISON:  2023    FINDINGS:  Progression of degenerative joint disease particularly medial compartment and patellofemoral joint on left.  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  Impression: No new fracture, dislocation or joint effusion.    Electronically signed by: Carlos Chaudhry MD  Date:    2024  Time:    13:58         These findings were discussed and reviewed with the patient.          Assessment:       Encounter Diagnoses   Name Primary?    Left knee pain, unspecified chronicity Yes    Primary osteoarthritis of left knee                 Plan:       1. RTC in 6 months with Dr. Sam Fine. IKDC, SF-12 and KOOS was filled out today in clinic. Patient will fill out IKDC, SF-12 and KOOS on return.    2. Medications:  Refills of the following Rx were sent to patients preferred Pharmacy:  No Refills Needed Today    3. Physical Therapy:     4. HEP: N/A    5. Procedures/Procedural Planning:   We reviewed with Dhruv today, the pathology and natural history of his diagnosis. We had an extensive discussion as to the conservative treatment and management of their condition. We also discussed the variety of treatment options to include medication, physical therapy, diagnostic testing as well as other treatments.The decision was made to go forward with:     knee - left    Synvisc-One performed today, see procedure note.      6. DME: Medial     7. Work/Sport Status:     8. Visit Summary: Patient had Synvisc-one injection left knee                                              [facility] patient questionnaires have been collected today.

## 2024-04-08 ENCOUNTER — OFFICE VISIT (OUTPATIENT)
Dept: SPORTS MEDICINE | Facility: CLINIC | Age: 69
End: 2024-04-08
Payer: MEDICARE

## 2024-04-08 VITALS
WEIGHT: 224.88 LBS | HEART RATE: 96 BPM | BODY MASS INDEX: 31.81 KG/M2 | SYSTOLIC BLOOD PRESSURE: 116 MMHG | DIASTOLIC BLOOD PRESSURE: 80 MMHG

## 2024-04-08 DIAGNOSIS — M25.562 LEFT KNEE PAIN, UNSPECIFIED CHRONICITY: Primary | ICD-10-CM

## 2024-04-08 DIAGNOSIS — M17.12 PRIMARY OSTEOARTHRITIS OF LEFT KNEE: ICD-10-CM

## 2024-04-08 PROCEDURE — 3079F DIAST BP 80-89 MM HG: CPT | Mod: HCNC,CPTII,S$GLB, | Performed by: ORTHOPAEDIC SURGERY

## 2024-04-08 PROCEDURE — 20611 DRAIN/INJ JOINT/BURSA W/US: CPT | Mod: HCNC,LT,S$GLB, | Performed by: ORTHOPAEDIC SURGERY

## 2024-04-08 PROCEDURE — 99499 UNLISTED E&M SERVICE: CPT | Mod: HCNC,S$GLB,, | Performed by: ORTHOPAEDIC SURGERY

## 2024-04-08 PROCEDURE — 1126F AMNT PAIN NOTED NONE PRSNT: CPT | Mod: HCNC,CPTII,S$GLB, | Performed by: ORTHOPAEDIC SURGERY

## 2024-04-08 PROCEDURE — 3074F SYST BP LT 130 MM HG: CPT | Mod: HCNC,CPTII,S$GLB, | Performed by: ORTHOPAEDIC SURGERY

## 2024-04-08 PROCEDURE — 99999 PR PBB SHADOW E&M-EST. PATIENT-LVL II: CPT | Mod: PBBFAC,HCNC,, | Performed by: ORTHOPAEDIC SURGERY

## 2024-04-08 PROCEDURE — 3008F BODY MASS INDEX DOCD: CPT | Mod: HCNC,CPTII,S$GLB, | Performed by: ORTHOPAEDIC SURGERY

## 2024-04-08 NOTE — PROCEDURES
"Large Joint Aspiration/Injection: L knee    Date/Time: 4/8/2024 2:00 PM    Performed by: Sam Fine MD  Authorized by: Sam Fine MD    Consent Done?:  Yes (Verbal)  Indications:  Pain and arthritis  Site marked: the procedure site was marked    Timeout: prior to procedure the correct patient, procedure, and site was verified    Prep: patient was prepped and draped in usual sterile fashion    Local anesthesia used?: No    Local anesthetic:  Topical anesthetic    Details:  Needle Size:  22 G  Ultrasonic Guidance for needle placement?: Yes    Images are saved and documented.  Approach: superiorlateral.  Location:  Knee  Site:  L knee  Medications:  48 mg hylan g-f 20 48 mg/6 mL  Patient tolerance:  Patient tolerated the procedure well with no immediate complications     Description of ultrasound utilization for needle guidance:   Ultrasound guidance used for needle localization. Images saved and stored for documentation. The knee joint was visualized. Dynamic visualization of the 22g x 1.5" needle was continuous throughout the procedure.     "

## 2024-06-10 ENCOUNTER — PATIENT MESSAGE (OUTPATIENT)
Dept: INTERNAL MEDICINE | Facility: CLINIC | Age: 69
End: 2024-06-10
Payer: MEDICARE

## 2024-08-05 ENCOUNTER — PATIENT MESSAGE (OUTPATIENT)
Dept: DERMATOLOGY | Facility: CLINIC | Age: 69
End: 2024-08-05
Payer: MEDICARE

## 2024-08-08 ENCOUNTER — OFFICE VISIT (OUTPATIENT)
Dept: DERMATOLOGY | Facility: CLINIC | Age: 69
End: 2024-08-08
Payer: MEDICARE

## 2024-08-08 DIAGNOSIS — L82.1 SEBORRHEIC KERATOSES: ICD-10-CM

## 2024-08-08 DIAGNOSIS — L81.4 LENTIGINES: Primary | ICD-10-CM

## 2024-08-08 PROCEDURE — 1126F AMNT PAIN NOTED NONE PRSNT: CPT | Mod: HCNC,CPTII,S$GLB, | Performed by: PHYSICIAN ASSISTANT

## 2024-08-08 PROCEDURE — 99213 OFFICE O/P EST LOW 20 MIN: CPT | Mod: HCNC,S$GLB,, | Performed by: PHYSICIAN ASSISTANT

## 2024-08-08 PROCEDURE — 3288F FALL RISK ASSESSMENT DOCD: CPT | Mod: HCNC,CPTII,S$GLB, | Performed by: PHYSICIAN ASSISTANT

## 2024-08-08 PROCEDURE — 1101F PT FALLS ASSESS-DOCD LE1/YR: CPT | Mod: HCNC,CPTII,S$GLB, | Performed by: PHYSICIAN ASSISTANT

## 2024-08-08 PROCEDURE — 1159F MED LIST DOCD IN RCRD: CPT | Mod: HCNC,CPTII,S$GLB, | Performed by: PHYSICIAN ASSISTANT

## 2024-08-08 PROCEDURE — 99999 PR PBB SHADOW E&M-EST. PATIENT-LVL III: CPT | Mod: PBBFAC,HCNC,, | Performed by: PHYSICIAN ASSISTANT

## 2024-08-08 PROCEDURE — 1160F RVW MEDS BY RX/DR IN RCRD: CPT | Mod: HCNC,CPTII,S$GLB, | Performed by: PHYSICIAN ASSISTANT

## 2024-08-08 NOTE — PROGRESS NOTES
Subjective:      Patient ID:  Dhruv Devries is a 68 y.o. male who presents for   Chief Complaint   Patient presents with    Spot     On leg. Has been present for a few months.      History of Present Illness: The patient presents with chief complaint of lesion.  Location: right posterior leg  Duration: 3 months  Signs/Symptoms: brown spot. Denies itching, dryness, tenderness, growing, or bleeding.    Prior treatments: none    The patient denies personal or family history of skin cancer.            Review of Systems   Constitutional:  Negative for fever and chills.   Gastrointestinal:  Negative for nausea and vomiting.   Skin:  Positive for activity-related sunscreen use. Negative for itching, rash, dry skin, sun sensitivity, daily sunscreen use and recent sunburn.   Hematologic/Lymphatic: Does not bruise/bleed easily.       Objective:   Physical Exam   Constitutional: He appears well-developed and well-nourished. No distress.   Neurological: He is alert and oriented to person, place, and time. He is not disoriented.   Psychiatric: He has a normal mood and affect.   Skin:   Areas Examined (abnormalities noted in diagram):   Head / Face Inspection Performed  Neck Inspection Performed  Chest / Axilla Inspection Performed  Back Inspection Performed  RUE Inspected  LUE Inspection Performed  RLE Inspected  LLE Inspection Performed                 Diagram Legend     Erythematous scaling macule/papule c/w actinic keratosis       Vascular papule c/w angioma      Pigmented verrucoid papule/plaque c/w seborrheic keratosis      Yellow umbilicated papule c/w sebaceous hyperplasia      Irregularly shaped tan macule c/w lentigo     1-2 mm smooth white papules consistent with Milia      Movable subcutaneous cyst with punctum c/w epidermal inclusion cyst      Subcutaneous movable cyst c/w pilar cyst      Firm pink to brown papule c/w dermatofibroma      Pedunculated fleshy papule(s) c/w skin tag(s)      Evenly pigmented macule  c/w junctional nevus     Mildly variegated pigmented, slightly irregular-bordered macule c/w mildly atypical nevus      Flesh colored to evenly pigmented papule c/w intradermal nevus       Pink pearly papule/plaque c/w basal cell carcinoma      Erythematous hyperkeratotic cursted plaque c/w SCC      Surgical scar with no sign of skin cancer recurrence      Open and closed comedones      Inflammatory papules and pustules      Verrucoid papule consistent consistent with wart     Erythematous eczematous patches and plaques     Dystrophic onycholytic nail with subungual debris c/w onychomycosis     Umbilicated papule    Erythematous-base heme-crusted tan verrucoid plaque consistent with inflamed seborrheic keratosis     Erythematous Silvery Scaling Plaque c/w Psoriasis     See annotation      Assessment / Plan:        Lentigines  Continue photoprotection and sun protective clothing. Encouraged regular skin exams at least annually.     Seborrheic keratoses  Reassurance given.  Lesions are benign.  [facility] brochure also provided.            Follow up in about 1 year (around 8/8/2025) for Full Skin Exam.

## 2024-12-02 ENCOUNTER — PATIENT MESSAGE (OUTPATIENT)
Dept: ADMINISTRATIVE | Facility: HOSPITAL | Age: 69
End: 2024-12-02
Payer: MEDICARE

## 2024-12-03 ENCOUNTER — TELEPHONE (OUTPATIENT)
Dept: INTERNAL MEDICINE | Facility: CLINIC | Age: 69
End: 2024-12-03
Payer: MEDICARE

## 2024-12-03 ENCOUNTER — PATIENT OUTREACH (OUTPATIENT)
Dept: ADMINISTRATIVE | Facility: HOSPITAL | Age: 69
End: 2024-12-03
Payer: MEDICARE

## 2024-12-03 DIAGNOSIS — Z12.11 ENCOUNTER FOR SCREENING FOR MALIGNANT NEOPLASM OF COLON: Primary | ICD-10-CM

## 2024-12-03 NOTE — TELEPHONE ENCOUNTER
Please call, I got a message to sign an order for him which I did, for his colonoscopy    However, I have not seen him in over a year    If possible, please schedule him for his annual exam with Susana in the next 3 weeks, or okay to work in with me in the next 3 weeks.  If he is not able to do that, I can see him early in 2025, thank you

## 2024-12-03 NOTE — PROGRESS NOTES
Health Maintenance Due   Topic Date Due    High Dose Statin  Never done    Colorectal Cancer Screening  07/17/2024    TETANUS VACCINE  08/21/2024    COVID-19 Vaccine (5 - 2024-25 season) 09/01/2024    Hemoglobin A1c (Prediabetes)  12/06/2024       Chart reviewed and updated. Reconciled immunizations. Placed referral for colonoscopy per campaigns outreach.    Eva Gonzalez LPN   Clinical Care Coordinator  [facility]ness

## 2024-12-12 ENCOUNTER — CLINICAL SUPPORT (OUTPATIENT)
Dept: ENDOSCOPY | Facility: HOSPITAL | Age: 69
End: 2024-12-12
Attending: INTERNAL MEDICINE
Payer: MEDICARE

## 2024-12-12 ENCOUNTER — TELEPHONE (OUTPATIENT)
Dept: ENDOSCOPY | Facility: HOSPITAL | Age: 69
End: 2024-12-12

## 2024-12-12 VITALS — HEIGHT: 70 IN | BODY MASS INDEX: 32.07 KG/M2 | WEIGHT: 224 LBS

## 2024-12-12 DIAGNOSIS — Z12.11 SPECIAL SCREENING FOR MALIGNANT NEOPLASMS, COLON: Primary | ICD-10-CM

## 2024-12-12 DIAGNOSIS — Z12.11 ENCOUNTER FOR SCREENING FOR MALIGNANT NEOPLASM OF COLON: ICD-10-CM

## 2024-12-12 RX ORDER — SODIUM, POTASSIUM,MAG SULFATES 17.5-3.13G
1 SOLUTION, RECONSTITUTED, ORAL ORAL DAILY
Qty: 1 KIT | Refills: 0 | Status: SHIPPED | OUTPATIENT
Start: 2024-12-12 | End: 2024-12-14

## 2024-12-12 NOTE — TELEPHONE ENCOUNTER
Referral for procedure from PAT appointment      Spoke to pt to schedule procedure(s) Colonoscopy       Physician to perform procedure(s) Dr. SOILA Tinoco  Date of Procedure (s) 12/18/24  Arrival Time 8:00 AM  Time of Procedure(s) 9:00 AM   Location of Procedure(s) [facility] [address]  Type of Rx Prep sent to patient: Suprep  Instructions provided to patient via [facility]    Patient was informed on the following information and verbalized understanding. Screening questionnaire reviewed with patient and complete. If procedure requires anesthesia, a responsible adult needs to be present to accompany the patient home, patient cannot drive after receiving anesthesia. Appointment details are tentative, especially check-in time. Patient will receive a prep-op call 7 days prior to confirm check-in time for procedure. If applicable the patient should contact their pharmacy to verify Rx for procedure prep is ready for pick-up. Patient was advised to call the scheduling department at [phone] if pharmacy states no Rx is available. Patient was advised to call the endoscopy scheduling department if any questions or concerns arise.      [facility] Scheduling Department

## 2024-12-13 ENCOUNTER — PATIENT MESSAGE (OUTPATIENT)
Dept: ENDOSCOPY | Facility: HOSPITAL | Age: 69
End: 2024-12-13
Payer: MEDICARE

## 2024-12-13 ENCOUNTER — TELEPHONE (OUTPATIENT)
Dept: ENDOSCOPY | Facility: HOSPITAL | Age: 69
End: 2024-12-13
Payer: MEDICARE

## 2024-12-13 NOTE — TELEPHONE ENCOUNTER
Referral for procedure from Telephone call - direct access patient      Spoke to patient to schedule procedure(s) Colonoscopy       Physician to perform procedure(s) Dr. SOILA Tinoco  Date of Procedure (s) 2/5/25  Arrival Time 8:00 AM  Time of Procedure(s) 9:00 AM   Location of Procedure(s) [facility] [address]  Type of Rx Prep sent to patient: Suprep  Instructions provided to patient via [facility]    Patient was informed on the following information and verbalized understanding. Screening questionnaire reviewed with patient and complete. If procedure requires anesthesia, a responsible adult needs to be present to accompany the patient home, patient cannot drive after receiving anesthesia. Appointment details are tentative, especially check-in time. Patient will receive a prep-op call 7 days prior to confirm check-in time for procedure. If applicable the patient should contact their pharmacy to verify Rx for procedure prep is ready for pick-up. Patient was advised to call the scheduling department at [phone] if pharmacy states no Rx is available. Patient was advised to call the endoscopy scheduling department if any questions or concerns arise.      [facility] Scheduling Department

## 2024-12-14 ENCOUNTER — TELEPHONE (OUTPATIENT)
Dept: ENDOSCOPY | Facility: HOSPITAL | Age: 69
End: 2024-12-14
Payer: MEDICARE

## 2024-12-14 NOTE — TELEPHONE ENCOUNTER
----- Message from NURY Mcknight sent at 12/13/2024  4:31 PM CST -----    ----- Message -----  From: Naldo Meade  Sent: 12/12/2024  11:10 AM CST  To: [facility] Endoscopy Schedulers    Name Of Caller: Dhruv        Provider Name:        Does patient feel the need to be seen today? Has appt today        Relationship to the Pt?: patient        Contact Preference?:  [phone]        What is the nature of the call?:  Patient has a virtual audio only appointment scheduled for today at 11am, patient states that he has not received that phone call yet.

## 2024-12-30 ENCOUNTER — OFFICE VISIT (OUTPATIENT)
Dept: URGENT CARE | Facility: CLINIC | Age: 69
End: 2024-12-30
Payer: MEDICARE

## 2024-12-30 VITALS
HEIGHT: 70 IN | DIASTOLIC BLOOD PRESSURE: 81 MMHG | BODY MASS INDEX: 32.76 KG/M2 | WEIGHT: 228.81 LBS | RESPIRATION RATE: 17 BRPM | SYSTOLIC BLOOD PRESSURE: 136 MMHG | TEMPERATURE: 99 F | HEART RATE: 93 BPM | OXYGEN SATURATION: 98 %

## 2024-12-30 DIAGNOSIS — R50.9 FEVER, UNSPECIFIED FEVER CAUSE: ICD-10-CM

## 2024-12-30 DIAGNOSIS — J06.9 VIRAL UPPER RESPIRATORY TRACT INFECTION WITH COUGH: Primary | ICD-10-CM

## 2024-12-30 LAB
CTP QC/QA: YES
CTP QC/QA: YES
POC MOLECULAR INFLUENZA A AGN: NEGATIVE
POC MOLECULAR INFLUENZA B AGN: NEGATIVE
SARS-COV-2 AG RESP QL IA.RAPID: NEGATIVE

## 2024-12-30 PROCEDURE — 87502 INFLUENZA DNA AMP PROBE: CPT | Mod: QW,S$GLB,, | Performed by: PHYSICIAN ASSISTANT

## 2024-12-30 PROCEDURE — 99214 OFFICE O/P EST MOD 30 MIN: CPT | Mod: S$GLB,,, | Performed by: PHYSICIAN ASSISTANT

## 2024-12-30 PROCEDURE — 87811 SARS-COV-2 COVID19 W/OPTIC: CPT | Mod: QW,S$GLB,, | Performed by: PHYSICIAN ASSISTANT

## 2024-12-30 RX ORDER — BENZONATATE 200 MG/1
200 CAPSULE ORAL 3 TIMES DAILY PRN
Qty: 30 CAPSULE | Refills: 0 | Status: SHIPPED | OUTPATIENT
Start: 2024-12-30 | End: 2025-01-09

## 2024-12-30 RX ORDER — PROMETHAZINE HYDROCHLORIDE AND DEXTROMETHORPHAN HYDROBROMIDE 6.25; 15 MG/5ML; MG/5ML
5 SYRUP ORAL NIGHTLY PRN
Qty: 118 ML | Refills: 0 | Status: SHIPPED | OUTPATIENT
Start: 2024-12-30 | End: 2025-01-09

## 2024-12-30 RX ORDER — PREDNISONE 20 MG/1
40 TABLET ORAL DAILY
Qty: 6 TABLET | Refills: 0 | Status: SHIPPED | OUTPATIENT
Start: 2024-12-30 | End: 2025-01-02

## 2024-12-30 RX ORDER — SODIUM, POTASSIUM,MAG SULFATES 17.5-3.13G
SOLUTION, RECONSTITUTED, ORAL ORAL
COMMUNITY
Start: 2024-12-19

## 2024-12-30 NOTE — PROGRESS NOTES
"Subjective:      Patient ID: Dhruv Devries is a 69 y.o. male.    Vitals:  height is 5' 10" (1.778 m) and weight is 103.8 kg (228 lb 13.4 oz). His oral temperature is 99.2 °F (37.3 °C). His blood pressure is 136/81 and his pulse is 93. His respiration is 17 and oxygen saturation is 98%.     Chief Complaint: Sinus Problem    Pt presents today w/ cough, nasal congestion, subjective fever, fatigue, aches, headaches, hoarseness, rhinorrhea.   Pt tried flonase and vitamin C ;mild relief.  URI symptoms began 4-5 days ago.  Felt like he might have been getting sick for a couple of days prior to onset but had no URI symptoms at that time.    Sinus Problem  This is a new problem. The current episode started in the past 7 days. The problem is unchanged. Associated symptoms include congestion, coughing, headaches and a hoarse voice. Pertinent negatives include no chills, diaphoresis, ear pain, neck pain, shortness of breath, sinus pressure, sneezing, sore throat or swollen glands. (Body aches ) Treatments tried: flonase and vitamin C. The treatment provided mild relief.       Constitution: Positive for fatigue and fever. Negative for chills and sweating.   HENT:  Positive for congestion, postnasal drip and voice change. Negative for ear pain, foreign body in ear, dental problem, sinus pressure and sore throat.    Neck: Negative for neck pain and neck stiffness.   Cardiovascular:  Negative for chest pain, leg swelling, palpitations and sob on exertion.   Eyes:  Negative for eye itching, eye pain, eye redness and photophobia.   Respiratory:  Positive for cough. Negative for chest tightness, sputum production and shortness of breath.    Gastrointestinal:  Negative for abdominal pain, nausea, vomiting and diarrhea.   Genitourinary:  Negative for dysuria, frequency, urgency, flank pain and hematuria.   Musculoskeletal:  Positive for muscle ache. Negative for pain and joint pain.   Skin:  Negative for color change and rash. "   Allergic/Immunologic: Negative for sneezing.   Neurological:  Positive for headaches. Negative for dizziness, passing out, loss of balance, disorientation and numbness.   Psychiatric/Behavioral:  Negative for disorientation.       Objective:     Physical Exam   Constitutional: He is oriented to person, place, and time. He appears well-developed. He is cooperative.  Non-toxic appearance. He does not appear ill. No distress.   HENT:   Head: Normocephalic and atraumatic.   Ears:   Right Ear: Hearing, tympanic membrane, external ear and ear canal normal.   Left Ear: Hearing, tympanic membrane, external ear and ear canal normal.   Nose: Mucosal edema and rhinorrhea present. No nasal deformity. No epistaxis. Right sinus exhibits no maxillary sinus tenderness and no frontal sinus tenderness. Left sinus exhibits no maxillary sinus tenderness and no frontal sinus tenderness.   Mouth/Throat: Uvula is midline, oropharynx is clear and moist and mucous membranes are normal. No trismus in the jaw. Normal dentition. No uvula swelling. No oropharyngeal exudate, posterior oropharyngeal edema or posterior oropharyngeal erythema. No tonsillar exudate.   Eyes: Conjunctivae and lids are normal. No scleral icterus.   Neck: Trachea normal and phonation normal. Neck supple. No edema present. No erythema present. No neck rigidity present.   Cardiovascular: Normal rate, regular rhythm, normal heart sounds and normal pulses.   Pulmonary/Chest: Effort normal and breath sounds normal. No accessory muscle usage or stridor. No tachypnea and no bradypnea. No respiratory distress. He has no decreased breath sounds. He has no wheezes. He has no rhonchi. He has no rales.   Abdominal: Normal appearance.   Musculoskeletal: Normal range of motion.         General: No deformity. Normal range of motion.   Lymphadenopathy:     He has no cervical adenopathy.   Neurological: He is alert and oriented to person, place, and time. He exhibits normal muscle  tone. Coordination normal.   Skin: Skin is warm, dry, intact, not diaphoretic and not pale.   Psychiatric: His speech is normal and behavior is normal. Judgment and thought content normal.   Nursing note and vitals reviewed.      Results for orders placed or performed in visit on 12/30/24   POCT Influenza A/B MOLECULAR    Collection Time: 12/30/24 12:07 PM   Result Value Ref Range    POC Molecular Influenza A Ag Negative Negative    POC Molecular Influenza B Ag Negative Negative     Acceptable Yes    SARS Coronavirus 2 Antigen, POCT Manual Read    Collection Time: 12/30/24 12:15 PM   Result Value Ref Range    SARS Coronavirus 2 Antigen Negative Negative     Acceptable Yes      *Note: Due to a large number of results and/or encounters for the requested time period, some results have not been displayed. A complete set of results can be found in Results Review.       Assessment:     1. Viral upper respiratory tract infection with cough    2. Fever, unspecified fever cause        Plan:       Viral upper respiratory tract infection with cough  -     predniSONE (DELTASONE) 20 MG tablet; Take 2 tablets (40 mg total) by mouth once daily. for 3 days  Dispense: 6 tablet; Refill: 0  -     benzonatate (TESSALON) 200 MG capsule; Take 1 capsule (200 mg total) by mouth 3 (three) times daily as needed for Cough.  Dispense: 30 capsule; Refill: 0  -     promethazine-dextromethorphan (PROMETHAZINE-DM) 6.25-15 mg/5 mL Syrp; Take 5 mLs by mouth nightly as needed (cough).  Dispense: 118 mL; Refill: 0    Fever, unspecified fever cause  -     POCT Influenza A/B MOLECULAR  -     SARS Coronavirus 2 Antigen, POCT Manual Read      - Discussed likely viral etiology, home care, tx options, and given follow up precautions.  I have reviewed the patient's chart to view previous visits, labs, and imaging to assess PMH and look for any trends or previous treatments.

## 2024-12-30 NOTE — PATIENT INSTRUCTIONS
- Rest.    - Drink plenty of fluids.  - Viral upper respiratory infections typically run their course in 10-14 days.     - Tylenol (acetaminophen) or Ibuprofen as directed as needed for fever/pain. Avoid tylenol if you have a history of liver disease. Do not take ibuprofen if you have a history of GI bleeding, kidney disease, gastric surgery, or if you take blood thinners.     - You can take over-the-counter claritin, zyrtec, allegra, or xyzal as directed. These are antihistamines that can help with runny nose, nasal congestion, sneezing, and helps to dry up post-nasal drip, which usually causes sore throat and cough.    - You can use Flonase (fluticasone) nasal spray as directed for sinus congestion and postnasal drip. This is a steroid nasal spray that works locally over time to decrease the inflammation in your nose/sinuses and help with allergic symptoms. This is not an quick- relief spray like afrin, but it works well if used daily.  Discontinue if you develop nose bleed  - use OTC nasal saline prior to Flonase.  - you can use OTC nasal saline such as Ocean Spray Nasal Saline 1-3 puffs each nostril every 2-3 hours then blow out onto tissue. This is to irrigate the nasal passage way to clear the sinus openings. Use until sinus problem resolved.    - you can take plain OTC Mucinex (guaifenesin) 1200 mg twice a day to help loosen mucous.     -warm salt water gargles can help with sore throat    - warm tea with honey can help with cough. Honey is a natural cough suppressant.    - You received a steroid (prednisone) today.  This can elevate your blood pressure, elevate your blood sugar, water weight gain, nervous energy, redness to the face and dimpling of the skin where the shot goes in.   - Do not use steroids more than 3 times per year.   - If you have diabetes, please check you blood sugar frequently.  - If you have high blood pressure, please check your blood pressure frequently.     - Take the tessalon  (benzonatate) as needed as prescribed for cough   - Only take the promethazine- DM as directed, as needed at night for cough. This medication may cause drowsiness.      - Follow up with your PCP or specialty clinic as directed in the next 1-2 weeks if not improved or as needed.  You can call [phone] to schedule an appointment with the appropriate provider.      - Go to the ER if you develop new or worsening symptoms.     - You must understand that you have received an Urgent Care treatment only and that you may be released before all of your medical problems are known or treated.   - You, the patient, will arrange for follow up care as instructed.   - If your condition worsens or fails to improve we recommend that you receive another evaluation at the ER immediately or contact your PCP to discuss your concerns or return here.

## 2025-01-29 ENCOUNTER — TELEPHONE (OUTPATIENT)
Dept: ENDOSCOPY | Facility: HOSPITAL | Age: 70
End: 2025-01-29
Payer: MEDICARE

## 2025-01-29 NOTE — TELEPHONE ENCOUNTER
Spoke to pt for pre-call to confirm scheduled Colonoscopy and patient verbalized understanding of the following:       Date of Procedure (s)  verified 2/5/25  Arrival Time 7:45 AM verified.  Location of Procedure(s) [address] verified.    Pt confirmed receipt of prep instructions and Rx prep (if applicable).  Instructions provided to patient via [facility]  Pt confirmed ride home after procedure if procedure requires anesthesia.   Pre-call screening questionnaire reviewed and completed with patient.   Appointment details are tentative, including check-in time.  If the patient begins taking any blood thinning medications, injectable weight loss/diabetes medications (other than insulin), or Adipex (phentermine) patient was instructed to contact the endoscopy scheduling department as soon as possible.  Patient was advised to call the endoscopy scheduling department if any questions or concerns arise.       [facility] Scheduling Department

## 2025-01-30 ENCOUNTER — OFFICE VISIT (OUTPATIENT)
Dept: DERMATOLOGY | Facility: CLINIC | Age: 70
End: 2025-01-30
Payer: MEDICARE

## 2025-01-30 DIAGNOSIS — D22.9 NEVUS: ICD-10-CM

## 2025-01-30 DIAGNOSIS — L81.4 LENTIGINES: Primary | ICD-10-CM

## 2025-01-30 DIAGNOSIS — D18.01 CHERRY ANGIOMA: ICD-10-CM

## 2025-01-30 DIAGNOSIS — L82.1 SEBORRHEIC KERATOSES: ICD-10-CM

## 2025-01-30 DIAGNOSIS — L91.8 SKIN TAG: ICD-10-CM

## 2025-01-30 PROCEDURE — 1101F PT FALLS ASSESS-DOCD LE1/YR: CPT | Mod: HCNC,CPTII,S$GLB, | Performed by: DERMATOLOGY

## 2025-01-30 PROCEDURE — 99213 OFFICE O/P EST LOW 20 MIN: CPT | Mod: HCNC,S$GLB,, | Performed by: DERMATOLOGY

## 2025-01-30 PROCEDURE — 1159F MED LIST DOCD IN RCRD: CPT | Mod: HCNC,CPTII,S$GLB, | Performed by: DERMATOLOGY

## 2025-01-30 PROCEDURE — 1125F AMNT PAIN NOTED PAIN PRSNT: CPT | Mod: HCNC,CPTII,S$GLB, | Performed by: DERMATOLOGY

## 2025-01-30 PROCEDURE — 3288F FALL RISK ASSESSMENT DOCD: CPT | Mod: HCNC,CPTII,S$GLB, | Performed by: DERMATOLOGY

## 2025-01-30 PROCEDURE — 1160F RVW MEDS BY RX/DR IN RCRD: CPT | Mod: HCNC,CPTII,S$GLB, | Performed by: DERMATOLOGY

## 2025-01-30 PROCEDURE — 99999 PR PBB SHADOW E&M-EST. PATIENT-LVL III: CPT | Mod: PBBFAC,HCNC,, | Performed by: DERMATOLOGY

## 2025-01-30 NOTE — PROGRESS NOTES
Subjective:      Patient ID:  Dhruv Devries is a 69 y.o. male who presents for   Chief Complaint   Patient presents with    Skin Check     ubse     Patient here for TOTAL Body Skin Exam    Last seen by dermatologist: 8/8/2024    none - personal history of atypical moles removed  none - personal history of MM   none - family history of MM  yes - childhood blistering sunburns  none - tanning bed use  none - personal history of NMSC    Patient with specific complaint of lesion(s)  Location: L buttock  Duration: months  Symptoms: rough  Relieving factors/Previous treatments: TAC 0.1%    Patient with new area of concern:   Location: R leg- rough  Previous treatments: TAC 0.1                Review of Systems    Objective:   Physical Exam   Constitutional: He appears well-developed and well-nourished. No distress.   Neurological: He is alert and oriented to person, place, and time. He is not disoriented.   Psychiatric: He has a normal mood and affect.   Skin:   Areas Examined (abnormalities noted in diagram):   Head / Face Inspection Performed  Neck Inspection Performed  Chest / Axilla Inspection Performed  Abdomen Inspection Performed  Back Inspection Performed  RUE Inspected  LUE Inspection Performed  LLE Inspection Performed                     Diagram Legend     Erythematous scaling macule/papule c/w actinic keratosis       Vascular papule c/w angioma      Pigmented verrucoid papule/plaque c/w seborrheic keratosis      Yellow umbilicated papule c/w sebaceous hyperplasia      Irregularly shaped tan macule c/w lentigo     1-2 mm smooth white papules consistent with Milia      Movable subcutaneous cyst with punctum c/w epidermal inclusion cyst      Subcutaneous movable cyst c/w pilar cyst      Firm pink to brown papule c/w dermatofibroma      Pedunculated fleshy papule(s) c/w skin tag(s)      Evenly pigmented macule c/w junctional nevus     Mildly variegated pigmented, slightly irregular-bordered macule c/w mildly  atypical nevus      Flesh colored to evenly pigmented papule c/w intradermal nevus       Pink pearly papule/plaque c/w basal cell carcinoma      Erythematous hyperkeratotic cursted plaque c/w SCC      Surgical scar with no sign of skin cancer recurrence      Open and closed comedones      Inflammatory papules and pustules      Verrucoid papule consistent consistent with wart     Erythematous eczematous patches and plaques     Dystrophic onycholytic nail with subungual debris c/w onychomycosis     Umbilicated papule    Erythematous-base heme-crusted tan verrucoid plaque consistent with inflamed seborrheic keratosis     Erythematous Silvery Scaling Plaque c/w Psoriasis     See annotation      Assessment / Plan:        Lentigines  This is a benign hyperpigmented sun induced lesion. Recommend daily sun protection/avoidance and use of at least SPF 30, broad spectrum sunscreen (OTC drug) will reduce the number of new lesions. Treatment of these benign lesions are considered cosmetic.  The nature of sun-induced photo-aging and skin cancers is discussed.  Sun avoidance, protective clothing, and the use of 30-SPF sunscreens is advised. Observe closely for skin damage/changes, and call if such occurs.    Seborrheic keratoses  These are benign inherited growths without a malignant potential. Reassurance given to patient. No treatment is necessary.   No need to apply tac cream to lesions    Skin tag  Reassurance given to patient. No treatment is necessary.   Treatment of benign, asymptomatic lesions may be considered cosmetic.    Cherry angioma  These are benign vascular lesions that are inherited.  Treatment is not necessary.    Nevus  Discussed ABCDE's of nevi.  Monitor for new mole or moles that are becoming bigger, darker, irritated, or developing irregular borders. Brochure provided. Instructed patient to observe lesion(s) for changes and follow up in clinic if changes are noted. Patient to monitor skin at home for new or  changing lesions.       Upper body skin examination performed today including at least 6 points as noted in physical examination. No lesions suspicious for malignancy noted.    Recommend daily sun protection/avoidance and use of at least SPF 30, broad spectrum sunscreen (OTC drug).              Follow up if symptoms worsen or fail to improve.

## 2025-02-03 ENCOUNTER — LAB VISIT (OUTPATIENT)
Dept: LAB | Facility: HOSPITAL | Age: 70
End: 2025-02-03
Payer: MEDICARE

## 2025-02-03 ENCOUNTER — OFFICE VISIT (OUTPATIENT)
Dept: UROLOGY | Facility: CLINIC | Age: 70
End: 2025-02-03
Payer: MEDICARE

## 2025-02-03 VITALS
HEART RATE: 68 BPM | DIASTOLIC BLOOD PRESSURE: 78 MMHG | SYSTOLIC BLOOD PRESSURE: 128 MMHG | HEIGHT: 71 IN | WEIGHT: 230.63 LBS | BODY MASS INDEX: 32.29 KG/M2

## 2025-02-03 DIAGNOSIS — N40.0 BENIGN PROSTATIC HYPERPLASIA, UNSPECIFIED WHETHER LOWER URINARY TRACT SYMPTOMS PRESENT: Primary | ICD-10-CM

## 2025-02-03 DIAGNOSIS — N40.0 BENIGN PROSTATIC HYPERPLASIA, UNSPECIFIED WHETHER LOWER URINARY TRACT SYMPTOMS PRESENT: ICD-10-CM

## 2025-02-03 DIAGNOSIS — N52.9 ERECTILE DYSFUNCTION, UNSPECIFIED ERECTILE DYSFUNCTION TYPE: ICD-10-CM

## 2025-02-03 LAB — COMPLEXED PSA SERPL-MCNC: 1.2 NG/ML (ref 0–4)

## 2025-02-03 PROCEDURE — 99999 PR PBB SHADOW E&M-EST. PATIENT-LVL III: CPT | Mod: PBBFAC,HCNC,,

## 2025-02-03 PROCEDURE — 3008F BODY MASS INDEX DOCD: CPT | Mod: HCNC,CPTII,S$GLB,

## 2025-02-03 PROCEDURE — 1101F PT FALLS ASSESS-DOCD LE1/YR: CPT | Mod: HCNC,CPTII,S$GLB,

## 2025-02-03 PROCEDURE — 1159F MED LIST DOCD IN RCRD: CPT | Mod: HCNC,CPTII,S$GLB,

## 2025-02-03 PROCEDURE — 84153 ASSAY OF PSA TOTAL: CPT | Mod: HCNC

## 2025-02-03 PROCEDURE — 3288F FALL RISK ASSESSMENT DOCD: CPT | Mod: HCNC,CPTII,S$GLB,

## 2025-02-03 PROCEDURE — 99214 OFFICE O/P EST MOD 30 MIN: CPT | Mod: HCNC,S$GLB,,

## 2025-02-03 PROCEDURE — 1126F AMNT PAIN NOTED NONE PRSNT: CPT | Mod: HCNC,CPTII,S$GLB,

## 2025-02-03 PROCEDURE — 36415 COLL VENOUS BLD VENIPUNCTURE: CPT | Mod: HCNC

## 2025-02-03 PROCEDURE — 3074F SYST BP LT 130 MM HG: CPT | Mod: HCNC,CPTII,S$GLB,

## 2025-02-03 PROCEDURE — 3078F DIAST BP <80 MM HG: CPT | Mod: HCNC,CPTII,S$GLB,

## 2025-02-03 RX ORDER — TAMSULOSIN HYDROCHLORIDE 0.4 MG/1
0.4 CAPSULE ORAL DAILY
Qty: 90 CAPSULE | Refills: 3 | Status: SHIPPED | OUTPATIENT
Start: 2025-02-03 | End: 2026-02-03

## 2025-02-03 RX ORDER — TAMSULOSIN HYDROCHLORIDE 0.4 MG/1
0.4 CAPSULE ORAL DAILY
Qty: 90 CAPSULE | Refills: 3 | Status: SHIPPED | OUTPATIENT
Start: 2025-02-03 | End: 2025-02-03 | Stop reason: SDUPTHER

## 2025-02-03 RX ORDER — SILDENAFIL 100 MG/1
100 TABLET, FILM COATED ORAL DAILY PRN
Qty: 10 TABLET | Refills: 11 | Status: SHIPPED | OUTPATIENT
Start: 2025-02-03 | End: 2026-02-03

## 2025-02-03 NOTE — PROGRESS NOTES
CHIEF COMPLAINT:  Medication refill       HISTORY OF PRESENTING ILLINESS:  Dhruv Devries is a 69 y.o. male is an established pt with the urology dept who was last seen by RAMONA Kidd on 12/12/2023. He has a PMHx of anemia, anxiety, hemorrhoids, DDD cervical and lumbar, obesity, HLD, sleep apnea, nuclear sclerosis. He is here today for a medication refill. He uses Flomax daily and has been doing well on it. He feel he is able to completely empty his bladder. Nocturia 0-3 x's depending on fluid intake. He has an order for Viagra but currently isn't active, likes to have it just in case.  He denies a FH of prostate cancer.         REVIEW OF SYSTEMS:  Review of Systems   Constitutional:  Negative for chills and fever.   Genitourinary:  Negative for frequency, hematuria and urgency.         PATIENT HISTORY:    Past Medical History:   Diagnosis Date    Anemia 10/11/2013    Anxiety     Bleeding hemorrhoids     Borderline hypertension 10/19/2013    Degenerative disc disease     cervical and lumbar    Former smoker 1-2 packs daily for < 10 years 01/23/2015    Former smoker 1-2 packs daily for < 10 years 01/23/2015    Hyperlipidemia     Iron deficiency anemia 05/21/2015    Non morbid obesity due to excess calories 06/17/2016    Nuclear sclerosis - Both Eyes 02/25/2013    Psychiatric exam requested by authority     Psychiatric problem     Sleep apnea 07/26/2012    Sleep difficulties     trouble staying asleep     Statin myopathy 10/10/2018    Therapy        Past Surgical History:   Procedure Laterality Date    KNEE SURGERY Left 2/10/15    Dr. Fine       Family History   Problem Relation Name Age of Onset    Kidney disease Mother      Glaucoma Paternal Aunt      Cataracts Paternal Aunt      Cancer Sister Iris     Heart disease Brother          rheumatic fever    Hepatitis Brother      Peripheral vascular disease Brother      Blindness Neg Hx      Amblyopia Neg Hx      Macular degeneration Neg Hx      Retinal detachment Neg Hx       Strabismus Neg Hx      Diabetes Neg Hx      Hypertension Neg Hx      Stroke Neg Hx      Thyroid disease Neg Hx         Social History     Socioeconomic History    Marital status:    Tobacco Use    Smoking status: Former     Types: Cigars     Quit date:      Years since quittin.1    Smokeless tobacco: Never    Tobacco comments:     once every 3 months    Substance and Sexual Activity    Alcohol use: Yes     Comment: occasionally, every so many weekends, [employer] game     Drug use: No    Sexual activity: Yes     Partners: Female     Social Drivers of Health     Financial Resource Strain: Low Risk  (2021)    Overall Financial Resource Strain (CARDIA)     Difficulty of Paying Living Expenses: Not hard at all   Food Insecurity: No Food Insecurity (2021)    Hunger Vital Sign     Worried About Running Out of Food in the Last Year: Never true     Ran Out of Food in the Last Year: Never true   Transportation Needs: No Transportation Needs (2021)    PRAPARE - Transportation     Lack of Transportation (Medical): No     Lack of Transportation (Non-Medical): No   Physical Activity: Unknown (2021)    Exercise Vital Sign     Days of Exercise per Week: 1 day   Stress: Stress Concern Present (2021)    [facility] [place] [facility] - Occupational Stress Questionnaire     Feeling of Stress : To some extent       Allergies:  No known allergies    Medications:    Current Outpatient Medications:     sodium,potassium,mag sulfates (SUPREP BOWEL PREP KIT) 17.5-3.13-1.6 gram SolR, , Disp: , Rfl:     cyclobenzaprine (FLEXERIL) 10 MG tablet, Take 1 tablet (10 mg total) by mouth nightly. Prn severe muscle spasms, Disp: 30 tablet, Rfl: 0    diazePAM (VALIUM) 5 MG tablet, May take half an hour before procedure- for anxiety, Disp: 30 tablet, Rfl: 0    fluticasone propionate (FLONASE) 50 mcg/actuation nasal spray, SHAKE LIQUID AND USE 1 SPRAY(50 MCG) IN EACH NOSTRIL EVERY DAY, Disp: 48 g, Rfl: 1     LIDOcaine (LIDODERM) 5 %, Place 1 patch onto the skin once daily. Wear patch for 12 hours and  must have a 12 hour period without a patch, Disp: 30 patch, Rfl: 2    multivitamin (THERAGRAN) per tablet, Take 1 tablet by mouth once daily., Disp: , Rfl:     pantoprazole (PROTONIX) 40 MG tablet, Take 1 tablet (40 mg total) by mouth once daily. (Patient not taking: Reported on 12/30/2024), Disp: 30 tablet, Rfl: 1    sildenafiL (VIAGRA) 100 MG tablet, Take 1 tablet (100 mg total) by mouth daily as needed for Erectile Dysfunction (1 hour prior to intercourse on an empty stomach)., Disp: 10 tablet, Rfl: 11    tamsulosin (FLOMAX) 0.4 mg Cap, Take 1 capsule (0.4 mg total) by mouth once daily., Disp: 90 capsule, Rfl: 3    triamcinolone acetonide 0.1% (KENALOG) 0.1 % cream, Apply twice daily for 2 weeks to affected area, Disp: 30 g, Rfl: 1    PHYSICAL EXAMINATION:  Physical Exam      LABS:      In office UA today was       Lab Results   Component Value Date    PSA 1.1 12/06/2023    PSA 1.4 11/22/2022    PSA 1.1 02/10/2021       Lab Results   Component Value Date    CREATININE 0.9 12/06/2023    EGFRNORACEVR >60.0 12/06/2023               IMPRESSION:    Encounter Diagnoses   Name Primary?    Benign prostatic hyperplasia, unspecified whether lower urinary tract symptoms present Yes    Erectile dysfunction, unspecified erectile dysfunction type          Assessment:       1. Benign prostatic hyperplasia, unspecified whether lower urinary tract symptoms present    2. Erectile dysfunction, unspecified erectile dysfunction type        Plan:       Refilled Sildenafil and Flomax- both sent to [facility].   PSA today   Follow up in 1 year with a PSA.    I spent a total of 30 minutes on the day of the visit. This includes face to face time and non-face to face time preparing to see the patient (eg, review of tests), obtaining and/or reviewing separately obtained history, documenting clinical information in the electronic or other  health record, independently interpreting results and communicating results to the patient/family/caregiver, or care coordinator  Reviewed the possible contributory factors.

## 2025-02-05 ENCOUNTER — HOSPITAL ENCOUNTER (OUTPATIENT)
Facility: HOSPITAL | Age: 70
Discharge: HOME | End: 2025-02-05
Attending: STUDENT IN AN ORGANIZED HEALTH CARE EDUCATION/TRAINING PROGRAM | Admitting: STUDENT IN AN ORGANIZED HEALTH CARE EDUCATION/TRAINING PROGRAM
Payer: MEDICARE

## 2025-02-05 ENCOUNTER — ANESTHESIA (OUTPATIENT)
Dept: ENDOSCOPY | Facility: HOSPITAL | Age: 70
End: 2025-02-05
Payer: MEDICARE

## 2025-02-05 ENCOUNTER — ANESTHESIA EVENT (OUTPATIENT)
Dept: ENDOSCOPY | Facility: HOSPITAL | Age: 70
End: 2025-02-05
Payer: MEDICARE

## 2025-02-05 VITALS
SYSTOLIC BLOOD PRESSURE: 136 MMHG | HEIGHT: 71 IN | DIASTOLIC BLOOD PRESSURE: 90 MMHG | TEMPERATURE: 98 F | HEART RATE: 71 BPM | BODY MASS INDEX: 31.89 KG/M2 | RESPIRATION RATE: 18 BRPM | OXYGEN SATURATION: 96 % | WEIGHT: 227.75 LBS

## 2025-02-05 DIAGNOSIS — Z12.11 ENCOUNTER FOR SCREENING COLONOSCOPY: Primary | ICD-10-CM

## 2025-02-05 DIAGNOSIS — R73.03 PREDIABETES: ICD-10-CM

## 2025-02-05 PROCEDURE — G0121 COLON CA SCRN NOT HI RSK IND: HCPCS | Mod: HCNC | Performed by: STUDENT IN AN ORGANIZED HEALTH CARE EDUCATION/TRAINING PROGRAM

## 2025-02-05 PROCEDURE — G0121 COLON CA SCRN NOT HI RSK IND: HCPCS | Mod: HCNC,,, | Performed by: STUDENT IN AN ORGANIZED HEALTH CARE EDUCATION/TRAINING PROGRAM

## 2025-02-05 PROCEDURE — 37000009 HC ANESTHESIA EA ADD 15 MINS: Mod: HCNC | Performed by: STUDENT IN AN ORGANIZED HEALTH CARE EDUCATION/TRAINING PROGRAM

## 2025-02-05 PROCEDURE — 37000008 HC ANESTHESIA 1ST 15 MINUTES: Mod: HCNC | Performed by: STUDENT IN AN ORGANIZED HEALTH CARE EDUCATION/TRAINING PROGRAM

## 2025-02-05 PROCEDURE — E9220 PRA ENDO ANESTHESIA: HCPCS | Mod: HCNC,,, | Performed by: NURSE ANESTHETIST, CERTIFIED REGISTERED

## 2025-02-05 PROCEDURE — 63600175 PHARM REV CODE 636 W HCPCS: Mod: HCNC | Performed by: NURSE ANESTHETIST, CERTIFIED REGISTERED

## 2025-02-05 RX ORDER — PROPOFOL 10 MG/ML
VIAL (ML) INTRAVENOUS CONTINUOUS PRN
Status: DISCONTINUED | OUTPATIENT
Start: 2025-02-05 | End: 2025-02-05

## 2025-02-05 RX ORDER — LIDOCAINE HYDROCHLORIDE 20 MG/ML
INJECTION INTRAVENOUS
Status: DISCONTINUED | OUTPATIENT
Start: 2025-02-05 | End: 2025-02-05

## 2025-02-05 RX ORDER — PROPOFOL 10 MG/ML
VIAL (ML) INTRAVENOUS
Status: DISCONTINUED | OUTPATIENT
Start: 2025-02-05 | End: 2025-02-05

## 2025-02-05 RX ORDER — SODIUM CHLORIDE 9 MG/ML
INJECTION, SOLUTION INTRAVENOUS CONTINUOUS
Status: DISCONTINUED | OUTPATIENT
Start: 2025-02-05 | End: 2025-02-05 | Stop reason: HOSPADM

## 2025-02-05 RX ADMIN — LIDOCAINE HYDROCHLORIDE 30 MG: 20 INJECTION INTRAVENOUS at 09:02

## 2025-02-05 RX ADMIN — PROPOFOL 70 MG: 10 INJECTION, EMULSION INTRAVENOUS at 09:02

## 2025-02-05 RX ADMIN — PROPOFOL 150 MCG/KG/MIN: 10 INJECTION, EMULSION INTRAVENOUS at 09:02

## 2025-02-05 NOTE — H&P
Short Stay Endoscopy History and Physical    PCP - Cassandra Cruz MD    Procedure - Colonoscopy  ASA - per anesthesia  Mallampati - per anesthesia  History of Anesthesia problems - no  Family history Anesthesia problems - no   Plan of anesthesia - per anesthesia    HPI:  This is a 69 y.o. male here for evaluation of : asymptomatic screening exam      ROS:  Constitutional: No fevers, chills, No weight loss  CV: No chest pain  Pulm: No cough, No shortness of breath  GI: see HPI  Derm: No rash    Medical History:  has a past medical history of Anemia (10/11/2013), Anxiety, Bleeding hemorrhoids, Borderline hypertension (10/19/2013), Degenerative disc disease, Former smoker 1-2 packs daily for < 10 years (01/23/2015), Former smoker 1-2 packs daily for < 10 years (01/23/2015), Hyperlipidemia, Iron deficiency anemia (05/21/2015), Non morbid obesity due to excess calories (06/17/2016), Nuclear sclerosis - Both Eyes (02/25/2013), Psychiatric exam requested by authority, Psychiatric problem, Sleep apnea (07/26/2012), Sleep difficulties, Statin myopathy (10/10/2018), and Therapy.    Surgical History:  has a past surgical history that includes Knee surgery (Left, 2/10/15).    Family History: family history includes Cancer in his sister; Cataracts in his paternal aunt; Glaucoma in his paternal aunt; Heart disease in his brother; Hepatitis in his brother; Kidney disease in his mother; Peripheral vascular disease in his brother.. Otherwise no colon cancer, inflammatory bowel disease, or GI malignancies.    Social History:  reports that he quit smoking about 41 years ago. His smoking use included cigars. He has never used smokeless tobacco. He reports current alcohol use. He reports that he does not use drugs.    Review of patient's allergies indicates:   Allergen Reactions    No known allergies        Medications:   Medications Prior to Admission   Medication Sig Dispense Refill Last Dose/Taking    fluticasone propionate  (FLONASE) 50 mcg/actuation nasal spray SHAKE LIQUID AND USE 1 SPRAY(50 MCG) IN EACH NOSTRIL EVERY DAY 48 g 1 Past Month    LIDOcaine (LIDODERM) 5 % Place 1 patch onto the skin once daily. Wear patch for 12 hours and  must have a 12 hour period without a patch 30 patch 2 Past Week    multivitamin (THERAGRAN) per tablet Take 1 tablet by mouth once daily.   Past Week    tamsulosin (FLOMAX) 0.4 mg Cap Take 1 capsule (0.4 mg total) by mouth once daily. 90 capsule 3 Past Week    cyclobenzaprine (FLEXERIL) 10 MG tablet Take 1 tablet (10 mg total) by mouth nightly. Prn severe muscle spasms 30 tablet 0 More than a month    diazePAM (VALIUM) 5 MG tablet May take half an hour before procedure- for anxiety 30 tablet 0 More than a month    pantoprazole (PROTONIX) 40 MG tablet Take 1 tablet (40 mg total) by mouth once daily. (Patient not taking: Reported on 12/30/2024) 30 tablet 1     sildenafiL (VIAGRA) 100 MG tablet Take 1 tablet (100 mg total) by mouth daily as needed for Erectile Dysfunction (1 hour prior to intercourse on an empty stomach). 10 tablet 11     sodium,potassium,mag sulfates (SUPREP BOWEL PREP KIT) 17.5-3.13-1.6 gram SolR        triamcinolone acetonide 0.1% (KENALOG) 0.1 % cream Apply twice daily for 2 weeks to affected area 30 g 1          Physical Exam:    Vital Signs:   Vitals:    02/05/25 0827   BP: (!) 140/75   Pulse: 78   Resp: 12   Temp: 98.4 °F (36.9 °C)       General Appearance: Well appearing, no acute distress  Eyes:    No scleral icterus  ENT: Neck supple, Lips, mucosa, and tongue normal; teeth and gums normal  Lungs: Pulmonary effort is normal. No respiratory distress.   Heart:  Normal rate, regular rhythm  Abdomen: Soft, non tender, non distended.  Extremities: 2+ pulses, no clubbing, cyanosis or edema  Skin: No rash      Labs:  Lab Results   Component Value Date    WBC 5.08 12/06/2023    HGB 14.3 12/06/2023    HCT 41.9 12/06/2023     12/06/2023    CHOL 294 (H) 12/06/2023    TRIG 191 (H)  12/06/2023    HDL 42 12/06/2023    ALT 27 12/06/2023    AST 28 12/06/2023     12/06/2023    K 4.5 12/06/2023     12/06/2023    CREATININE 0.9 12/06/2023    BUN 9 12/06/2023    CO2 26 12/06/2023    TSH 3.694 12/06/2023    PSA 1.1 12/06/2023    HGBA1C 5.5 12/06/2023       I have explained the risks and benefits of endoscopy procedures to the patient including but not limited to bleeding, perforation, infection, and death.  The patient was asked if they understand and allowed to ask any further questions to their satisfaction.    Maxwell Tinoco MD

## 2025-02-05 NOTE — PROVATION PATIENT INSTRUCTIONS
Discharge Summary/Instructions after an Endoscopic Procedure  Patient Name: Dhruv Fine  Patient MRN: [id number]  Patient YOB: 1955  Wednesday, February 5, 2025  Maxwell Tinoco MD  Dear patient,  As a result of recent federal legislation (The Federal Cures Act), you may   receive lab or pathology results from your procedure in your [facility]   account before your physician is able to contact you. Your physician or   their representative will relay the results to you with their   recommendations at their soonest availability.  Thank you,  RESTRICTIONS:  During your procedure today, you received medications for sedation.  These   medications may affect your judgment, balance and coordination.  Therefore,   for 24 hours, you have the following restrictions:   - DO NOT drive a car, operate machinery, make legal/financial decisions,   sign important papers or drink alcohol.    ACTIVITY:  Today: no heavy lifting, straining or running due to procedural   sedation/anesthesia.  The following day: return to full activity including work.  DIET:  Eat and drink normally unless instructed otherwise.     TREATMENT FOR COMMON SIDE EFFECTS:  - Mild abdominal pain, nausea, belching, bloating or excessive gas:  rest,   eat lightly and use a heating pad.  - Sore Throat: treat with throat lozenges and/or gargle with warm salt   water.  - Because air was used during the procedure, expelling large amounts of air   from your rectum or belching is normal.  - If a bowel prep was taken, you may not have a bowel movement for 1-3 days.    This is normal.  SYMPTOMS TO WATCH FOR AND REPORT TO YOUR PHYSICIAN:  1. Abdominal pain or bloating, other than gas cramps.  2. Chest pain.  3. Back pain.  4. Signs of infection such as: chills or fever occurring within 24 hours   after the procedure.  5. Rectal bleeding, which would show as bright red, maroon, or black stools.   (A tablespoon of blood from the rectum is not serious, especially  if   hemorrhoids are present.)  6. Vomiting.  7. Weakness or dizziness.  GO DIRECTLY TO THE NEAREST EMERGENCY ROOM IF YOU HAVE ANY OF THE FOLLOWING:      Difficulty breathing              Chills and/or fever over 101 F   Persistent vomiting and/or vomiting blood   Severe abdominal pain   Severe chest pain   Black, tarry stools   Bleeding- more than one tablespoon   Any other symptom or condition that you feel may need urgent attention  Your doctor recommends these additional instructions:  If any biopsies were taken, your doctors clinic will contact you in 1 to 2   weeks with any results.  - The patient will be observed post-procedure, until all discharge criteria   are met.   - Discharge patient to home.   - Resume previous diet.   - Continue present medications.   - Patient has a contact number available for emergencies.  The signs and   symptoms of potential delayed complications were discussed with the   patient.  Return to normal activities tomorrow.  Written discharge   instructions were provided to the patient.   - Consider repeat colonoscopy in 10 years for screening purposes pending   overall health.  For questions, problems or results please call your physician - Maxwell Tinoco MD at Work:  [phone], Work:  [phone].  [facility], EMERGENCY ROOM PHONE NUMBER: [phone]  IF A COMPLICATION OR EMERGENCY SITUATION ARISES AND YOU ARE UNABLE TO REACH   YOUR PHYSICIAN - GO DIRECTLY TO THE EMERGENCY ROOM.  MD Maxwell Angel MD  2/5/2025 9:54:54 AM  This report has been verified and signed electronically.  Dear patient,  As a result of recent federal legislation (The Federal Cures Act), you may   receive lab or pathology results from your procedure in your [facility]   account before your physician is able to contact you. Your physician or   their representative will relay the results to you with their   recommendations at their soonest availability.  Thank  you,  PROVATION

## 2025-02-05 NOTE — TRANSFER OF CARE
"Anesthesia Transfer of Care Note    Patient: Dhruv Devries    Procedure(s) Performed: Procedure(s) (LRB):  COLONOSCOPY, SCREENING, HIGH RISK PATIENT (N/A)    Patient location: PACU    Anesthesia Type: general    Transport from OR: Transported from OR on room air with adequate spontaneous ventilation    Post pain: adequate analgesia    Post assessment: no apparent anesthetic complications and tolerated procedure well    Post vital signs: stable    Level of consciousness: sedated    Nausea/Vomiting: no nausea/vomiting    Complications: none    Transfer of care protocol was followed      Last vitals: Visit Vitals  BP (!) 140/75 (BP Location: Left arm, Patient Position: Lying)   Pulse 78   Temp 36.9 °C (98.4 °F) (Temporal)   Resp 12   Ht 5' 11" (1.803 m)   Wt 103.3 kg (227 lb 11.8 oz)   SpO2 96%   BMI 31.76 kg/m²     "

## 2025-02-05 NOTE — ANESTHESIA PREPROCEDURE EVALUATION
02/05/2025  Dhruv Devries is a 69 y.o., male.      Patient Name: Dhruv Devries  YOB: 1955  MRN: [id number]  [facility]: [id number]      Code Status: No Order   Date of Procedure: 2/5/2025  Anesthesia: Choice Procedure: Procedure(s) (LRB):  COLONOSCOPY, SCREENING, HIGH RISK PATIENT (N/A)  Pre-Operative Diagnosis: Encounter for screening for malignant neoplasm of colon [Z12.11]  Proceduralist: Surgeons and Role:     * Maxwell Tinoco MD - Primary        SUBJECTIVE:   Dhruv Devries is a 69 y.o. male who  has a past medical history of Anemia (10/11/2013), Anxiety, Bleeding hemorrhoids, Borderline hypertension (10/19/2013), Degenerative disc disease, Former smoker 1-2 packs daily for < 10 years (01/23/2015), Former smoker 1-2 packs daily for < 10 years (01/23/2015), Hyperlipidemia, Iron deficiency anemia (05/21/2015), Non morbid obesity due to excess calories (06/17/2016), Nuclear sclerosis - Both Eyes (02/25/2013), Psychiatric exam requested by authority, Psychiatric problem, Sleep apnea (07/26/2012), Sleep difficulties, Statin myopathy (10/10/2018), and Therapy. No notes on file    ALLERGIES:     Review of patient's allergies indicates:   Allergen Reactions    No known allergies      MEDICATIONS:     Current Facility-Administered Medications   Medication Dose Route Frequency Provider Last Rate Last Admin    0.9% NaCl infusion   Intravenous Continuous Maxwell Tinoco MD              History:     Patient Active Problem List   Diagnosis    Vitamin D deficiency disease    Erectile dysfunction    Bleeding hemorrhoids    Anxiety    Obstructive sleep apnea syndrome: dx 2010; needs CPAP- see sleep assessment 7/17    Nuclear sclerosis - Both Eyes    Spondylosis of lumbar region without myelopathy or radiculopathy: see MRI 2018    DJD (degenerative joint disease) of cervical spine    Genu varum of left  lower extremity    Former smoker 1-2 packs daily for < 10 years    Class 1 obesity due to excess calories without serious comorbidity with body mass index (BMI) of 30.0 to 30.9 in adult    Primary osteoarthritis of left knee    IFG (impaired fasting glucose)    Renal cyst, acquired, right: see u/s 11/18; stable 12/22    Renal stones: left side non obstructing see u/s 11/18; stable 12/22    Mixed hyperlipidemia    Non compliance w medication regimen    Gastroesophageal reflux disease without esophagitis     Past Medical History:   Diagnosis Date    Anemia 10/11/2013    Anxiety     Bleeding hemorrhoids     Borderline hypertension 10/19/2013    Degenerative disc disease     cervical and lumbar    Former smoker 1-2 packs daily for < 10 years 01/23/2015    Former smoker 1-2 packs daily for < 10 years 01/23/2015    Hyperlipidemia     Iron deficiency anemia 05/21/2015    Non morbid obesity due to excess calories 06/17/2016    Nuclear sclerosis - Both Eyes 02/25/2013    Psychiatric exam requested by authority     Psychiatric problem     Sleep apnea 07/26/2012    Sleep difficulties     trouble staying asleep     Statin myopathy 10/10/2018    Therapy      Surgical History:    has a past surgical history that includes Knee surgery (Left, 2/10/15).   Social History:    reports being sexually active and has had partner(s) who are female.  reports that he quit smoking about 41 years ago. His smoking use included cigars. He has never used smokeless tobacco. He reports current alcohol use. He reports that he does not use drugs.       Pre-op Assessment    I have reviewed the Patient Summary Reports.     I have reviewed the Nursing Notes. I have reviewed the NPO Status.   I have reviewed the Medications.     Review of Systems  Anesthesia Hx:  No problems with previous Anesthesia             Denies Family Hx of Anesthesia complications.    Denies Personal Hx of Anesthesia complications.                     Hematology/Oncology:  Hematology Normal                                     Cardiovascular:  Cardiovascular Normal                                              Pulmonary:        Sleep Apnea                Hepatic/GI:     GERD                Musculoskeletal:  Arthritis               Neurological:  Neurology Normal                                      Dermatological:  Skin Normal        Physical Exam  General: Cooperative, Alert and Oriented    Airway:  Mallampati: II   Mouth Opening: Normal  TM Distance: Normal  Tongue: Normal  Neck ROM: Normal ROM    Dental:  Intact        Anesthesia Plan  Type of Anesthesia, risks & benefits discussed:    Anesthesia Type: Gen Natural Airway  Intra-op Monitoring Plan: Standard ASA Monitors  Induction:  IV  Informed Consent: Informed consent signed with the Patient and all parties understand the risks and agree with anesthesia plan.  All questions answered.   ASA Score: 3  Day of Surgery Review of History & Physical: H&P Update referred to the surgeon/provider.I have interviewed and examined the patient. I have reviewed the patient's H&P dated: There are no significant changes.     Ready For Surgery From Anesthesia Perspective.     .

## 2025-02-05 NOTE — ANESTHESIA POSTPROCEDURE EVALUATION
Anesthesia Post Evaluation    Patient: Dhruv Devries    Procedure(s) Performed: Procedure(s) (LRB):  COLONOSCOPY, SCREENING, HIGH RISK PATIENT (N/A)    Final Anesthesia Type: general      Patient location during evaluation: GI PACU  Patient participation: Yes- Able to Participate  Level of consciousness: awake and alert  Post-procedure vital signs: reviewed and stable  Pain management: adequate  Airway patency: patent    PONV status at discharge: No PONV  Anesthetic complications: no      Cardiovascular status: blood pressure returned to baseline and stable  Respiratory status: unassisted, spontaneous ventilation and room air  Hydration status: euvolemic  Follow-up not needed.              Vitals Value Taken Time   /90 02/05/25 1010   Temp 36.6 °C (97.8 °F) 02/05/25 0953   Pulse 71 02/05/25 1010   Resp 18 02/05/25 1010   SpO2 96 % 02/05/25 1010         Event Time   Out of Recovery 10:16:45         Pain/Bebeto Score: Bebeto Score: 10 (2/5/2025  9:54 AM)

## 2025-02-16 NOTE — PROGRESS NOTES
Dhruv Devries presented for an initial Medicare AWV today. The following components were reviewed and updated:  Very pleasant gentleman.    Medical history  Family History  Social history  Allergies and Current Medications  Health Risk Assessment  Health Maintenance  Care Team    **See Completed Assessments for Annual Wellness visit with in the encounter summary    The following assessments were completed:  Depression Screening  Cognitive function Screening    Timed Get Up Test  Whisper Test      Opioid documentation:  Patient does not have a current opioid prescription.          Wt Readings from Last 3 Encounters:   02/18/25 103.5 kg (228 lb 2.8 oz)   02/05/25 103.3 kg (227 lb 11.8 oz)   02/03/25 104.6 kg (230 lb 9.6 oz)     Temp Readings from Last 3 Encounters:   02/05/25 97.8 °F (36.6 °C) (Temporal)   12/30/24 99.2 °F (37.3 °C) (Oral)   10/13/22 98.5 °F (36.9 °C)     BP Readings from Last 3 Encounters:   02/18/25 122/78   02/05/25 (!) 136/90   02/03/25 128/78     Pulse Readings from Last 3 Encounters:   02/18/25 74   02/05/25 71   02/03/25 68       Physical Exam  General: Well developed, well nourished. No distress.  HEENT: Head is normocephalic, atraumatic  Eyes: Clear conjunctiva.  Neck: Supple, symmetrical neck; trachea midline.  Extremities: No LE edema. Pulses 2+ and symmetric.   Skin: Skin color, texture, turgor normal. No rashes.  Musculoskeletal: Normal gait.   Neurologic: Normal strength and tone. No focal numbness or weakness.   Psychiatric: Not depressed.    Diagnoses and health risks identified today and associated recommendations/orders:  1 Encounter for Medicare annual wellness exam  - Ambulatory Referral/Consult to Enhanced Annual Wellness Visit (eAWV)     2 Other reduced mobility  Chronic, stable. No falls. Followed by PCP    3 Neck pain  Chronic, stable. Flexaril. Followed by PCP  -     LIDOcaine (LIDODERM) 5 %; Place 1 patch onto the skin once daily. Wear patch for 12 hours and  must have a  12 hour period without a patch  Dispense: 30 patch; Refill: 2    4 Anxiety  Chronic, stable. Followed by PCP    5 Gastroesophageal reflux disease without esophagitis  Chronic, stable. Followed by PCP    6 Mixed hyperlipidemia  Lab Results   Component Value Date    CHOL 294 (H) 12/06/2023    CHOL 302 (H) 12/09/2022    CHOL 289 (H) 02/10/2021     Lab Results   Component Value Date    HDL 42 12/06/2023    HDL 42 12/09/2022    HDL 47 02/10/2021     Lab Results   Component Value Date    LDLCALC 213.8 (H) 12/06/2023    LDLCALC 232.8 (H) 12/09/2022    LDLCALC 203.0 (H) 02/10/2021     Lab Results   Component Value Date    TRIG 191 (H) 12/06/2023    TRIG 136 12/09/2022    TRIG 195 (H) 02/10/2021     Lab Results   Component Value Date    CHOLHDL 14.3 (L) 12/06/2023    CHOLHDL 13.9 (L) 12/09/2022    CHOLHDL 16.3 (L) 02/10/2021   Chronic. Reports a chronic, intolerance to 'statins'. Higher risk given he and his family's history. Noted referred to Cardiology per Dr. Cruz, and has been encouraged to follow up with Cardiology as planned.  Followed by PCP    7 Obstructive sleep apnea syndrome: dx 2010; needs CPAP- see sleep assessment 7/17  Chronic,stable. Followed by PCP    8 Primary osteoarthritis of left knee  -     LIDOcaine (LIDODERM) 5 %; Place 1 patch onto the skin once daily. Wear patch for 12 hours and  must have a 12 hour period without a patch  Dispense: 30 patch; Refill: 2  Chronic, stable. Followed by PCP and Dr. ARELY Fine.     Vaccines:   TDAP: he will [place] back with his primary team if needing; he was uncertain if received in the past 10 years.     Provided Dhruv with a 5-10 year written screening schedule and personal prevention plan. Recommendations were developed using the USPSTF age appropriate recommendations. Education, counseling, and referrals were provided as needed.  After Visit Summary printed and given to patient which includes a list of additional screenings\tests needed.    Last seen by PCP: 12/2023;  due annual. Encouraged to schedule a follow up. He will reach out to us, if needing to have an annual with one of PCPs here in the practice.   No future appointments.       Medication List            Accurate as of February 18, 2025 12:35 PM. If you have any questions, ask your nurse or doctor.                CONTINUE taking these medications      cyclobenzaprine 10 MG tablet  Commonly known as: FLEXERIL  Take 1 tablet (10 mg total) by mouth nightly. Prn severe muscle spasms     diazePAM 5 MG tablet  Commonly known as: VALIUM  May take half an hour before procedure- for anxiety     fluticasone propionate 50 mcg/actuation nasal spray  Commonly known as: FLONASE  SHAKE LIQUID AND USE 1 SPRAY(50 MCG) IN EACH NOSTRIL EVERY DAY     LIDOcaine 5 %  Commonly known as: LIDODERM  Place 1 patch onto the skin once daily. Wear patch for 12 hours and  must have a 12 hour period without a patch     multivitamin per tablet  Commonly known as: THERAGRAN     pantoprazole 40 MG tablet  Commonly known as: PROTONIX  Take 1 tablet (40 mg total) by mouth once daily.     sildenafiL 100 MG tablet  Commonly known as: VIAGRA  Take 1 tablet (100 mg total) by mouth daily as needed for Erectile Dysfunction (1 hour prior to intercourse on an empty stomach).     sodium,potassium,mag sulfates 17.5-3.13-1.6 gram Solr  Commonly known as: SUPREP BOWEL PREP KIT     tamsulosin 0.4 mg Cap  Commonly known as: FLOMAX  Take 1 capsule (0.4 mg total) by mouth once daily.     triamcinolone acetonide 0.1% 0.1 % cream  Commonly known as: KENALOG  Apply twice daily for 2 weeks to affected area              LUKE Engel      I offered to discuss advanced care planning, including how to pick a person who would make decisions for you if you were unable to make them for yourself, called a health care power of , and what kind of decisions you might make such as use of life sustaining treatments such as ventilators and tube feeding when faced  with a life limiting illness recorded on a living will that they will need to know. (How you want to be cared for as you near the end of your natural life)     X  Patient has advanced directives on file, which we reviewed, and they do not wish to make changes. (Daughter: Annika)

## 2025-02-18 ENCOUNTER — OFFICE VISIT (OUTPATIENT)
Dept: INTERNAL MEDICINE | Facility: CLINIC | Age: 70
End: 2025-02-18
Payer: MEDICARE

## 2025-02-18 VITALS
SYSTOLIC BLOOD PRESSURE: 122 MMHG | DIASTOLIC BLOOD PRESSURE: 78 MMHG | OXYGEN SATURATION: 96 % | HEART RATE: 74 BPM | HEIGHT: 71 IN | BODY MASS INDEX: 31.95 KG/M2 | WEIGHT: 228.19 LBS

## 2025-02-18 DIAGNOSIS — Z74.09 OTHER REDUCED MOBILITY: ICD-10-CM

## 2025-02-18 DIAGNOSIS — Z00.00 ENCOUNTER FOR MEDICARE ANNUAL WELLNESS EXAM: Primary | ICD-10-CM

## 2025-02-18 DIAGNOSIS — M17.12 PRIMARY OSTEOARTHRITIS OF LEFT KNEE: ICD-10-CM

## 2025-02-18 DIAGNOSIS — E78.2 MIXED HYPERLIPIDEMIA: ICD-10-CM

## 2025-02-18 DIAGNOSIS — F41.9 ANXIETY: ICD-10-CM

## 2025-02-18 DIAGNOSIS — G47.33 OBSTRUCTIVE SLEEP APNEA SYNDROME: ICD-10-CM

## 2025-02-18 DIAGNOSIS — M54.2 NECK PAIN: ICD-10-CM

## 2025-02-18 DIAGNOSIS — K21.9 GASTROESOPHAGEAL REFLUX DISEASE WITHOUT ESOPHAGITIS: ICD-10-CM

## 2025-02-18 RX ORDER — LIDOCAINE 50 MG/G
1 PATCH TOPICAL DAILY
Qty: 30 PATCH | Refills: 2 | Status: SHIPPED | OUTPATIENT
Start: 2025-02-18

## 2025-02-18 NOTE — PATIENT INSTRUCTIONS
Counseling and Referral of Other Preventative  (Italic type indicates deductible and co-insurance are waived)    Patient Name: Dhruv Devries  Today's Date: 2/18/2025    Health Maintenance       Date Due Completion Date    RSV Vaccine (Age 60+ and Pregnant patients) (1 - Risk 60-74 years 1-dose series) Never done ---    TETANUS VACCINE 08/21/2024 8/21/2014    COVID-19 Vaccine (5 - 2024-25 season) 09/01/2024 4/7/2022    Hemoglobin A1c (Prediabetes) 12/06/2024 12/6/2023    PROSTATE-SPECIFIC ANTIGEN 02/03/2026 2/3/2025    Lipid Panel 12/06/2028 12/6/2023    Colorectal Cancer Screening 02/05/2035 2/5/2025    Override on 7/26/2005: Done        No orders of the defined types were placed in this encounter.      The following information is provided to all patients.  This information is to help you find resources for any of the problems found today that may be affecting your health:                  Living healthy guide: www.[facility].[place].gov      Understanding Diabetes: www.diabetes.org      Eating healthy: www.cdc.gov/healthyweight      CDC home safety checklist: www.cdc.gov/steadi/patient.html      Agency on Aging: www.goea.[place].gov      Alcoholics anonymous (AA): www.aa.org      Physical Activity: www.miguel.nih.gov/sh5cjky      Tobacco use: www.quitwithusla.org

## 2025-03-16 ENCOUNTER — OFFICE VISIT (OUTPATIENT)
Dept: URGENT CARE | Facility: CLINIC | Age: 70
End: 2025-03-16
Payer: MEDICARE

## 2025-03-16 VITALS
BODY MASS INDEX: 32.78 KG/M2 | RESPIRATION RATE: 17 BRPM | WEIGHT: 234.13 LBS | TEMPERATURE: 99 F | OXYGEN SATURATION: 97 % | SYSTOLIC BLOOD PRESSURE: 119 MMHG | DIASTOLIC BLOOD PRESSURE: 72 MMHG | HEIGHT: 71 IN | HEART RATE: 74 BPM

## 2025-03-16 DIAGNOSIS — H01.004 BLEPHARITIS OF LEFT UPPER EYELID, UNSPECIFIED TYPE: ICD-10-CM

## 2025-03-16 DIAGNOSIS — S05.02XA ABRASION OF LEFT CORNEA, INITIAL ENCOUNTER: Primary | ICD-10-CM

## 2025-03-16 PROCEDURE — 99213 OFFICE O/P EST LOW 20 MIN: CPT | Mod: S$GLB,,,

## 2025-03-16 RX ORDER — ERYTHROMYCIN 5 MG/G
OINTMENT OPHTHALMIC 4 TIMES DAILY
Qty: 3.5 G | Refills: 0 | Status: SHIPPED | OUTPATIENT
Start: 2025-03-16 | End: 2025-03-23

## 2025-03-16 RX ORDER — ALPRAZOLAM 0.25 MG/1
TABLET ORAL
COMMUNITY
Start: 2025-01-29

## 2025-03-16 RX ORDER — METHYLPREDNISOLONE 4 MG/1
TABLET ORAL
COMMUNITY
Start: 2025-01-29

## 2025-03-16 RX ORDER — MELOXICAM 7.5 MG/1
7.5 TABLET ORAL
COMMUNITY
Start: 2025-03-11

## 2025-03-16 NOTE — PATIENT INSTRUCTIONS
Apply erythromycin eye ointment 4 times a day for 7 days.  Apply warm compress to left upper eyelid to decrease swelling.    When do I need to call the doctor?   You have ongoing pain in your eye or are bothered by bright lights after 1 to 2 days.  Your eye pain starts to get worse.  You have a fever of 100.4°F (38°C) or higher or chills.  You have signs of an eye infection like swelling, redness, warmth, pain, or drainage from the eye.  You have new or worsening symptoms.  - Rest.    - Drink plenty of fluids.    - Acetaminophen (tylenol) or Ibuprofen (advil,motrin) as directed as needed for fever/pain. Avoid tylenol if you have a history of liver disease. Do not take ibuprofen if you have a history of GI bleeding, kidney disease, or if you take blood thinners.     - Follow up with your PCP or specialty clinic as directed in the next 1-2 weeks if not improved or as needed.  You can call [phone] to schedule an appointment with the appropriate provider.    - Go to the ER or seek medical attention immediately if you develop new or worsening symptoms.     - You must understand that you have received an Urgent Care treatment only and that you may be released before all of your medical problems are known or treated.   - You, the patient, will arrange for follow up care as instructed.   - If your condition worsens or fails to improve we recommend that you receive another evaluation at the ER immediately or contact your PCP to discuss your concerns or return here.

## 2025-03-16 NOTE — PROGRESS NOTES
"Subjective:      Patient ID: Dhruv Devries is a 69 y.o. male.    Vitals:  height is 5' 11" (1.803 m) and weight is 106.2 kg (234 lb 2.1 oz). His oral temperature is 98.5 °F (36.9 °C). His blood pressure is 119/72 and his pulse is 74. His respiration is 17 and oxygen saturation is 97%.     Chief Complaint: Foreign Body in Eye    69-year-old male presents today w/ foreign body sensation in left eye; onset yesterday 03/15/25. Pt reports he suspects debris flew into his eye from the wind while walking outside. Today woke up with redness, watery discharge, and swelling of the left upper eyelid.  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  Denies loss or change in vision. PERRLA.    Foreign Body in Eye  This is a new problem. The current episode started yesterday. The problem occurs constantly. The problem has been unchanged. Pertinent negatives include no abdominal pain, anorexia, arthralgias, change in bowel habit, chest pain, chills, congestion, coughing, diaphoresis, fatigue, fever, headaches, joint swelling, myalgias, nausea, neck pain, numbness, rash, sore throat, swollen glands, urinary symptoms, vertigo, visual change, vomiting or weakness. Exacerbated by: to touch. Treatments tried: eye flush. The treatment provided no relief.       Constitution: Negative for activity change, appetite change, chills, sweating, fatigue and fever.   HENT:  Negative for ear pain, ear discharge, congestion, nosebleeds, foreign body in nose, postnasal drip, sinus pain, sinus pressure, sore throat and trouble swallowing.    Neck: Negative for neck pain, neck stiffness and painful lymph nodes.   Cardiovascular:  Negative for chest trauma, chest pain and leg swelling.   Eyes:  Positive for eye discharge, eye pain, eye redness and eyelid swelling. Negative for eye trauma, foreign body in eye, eye itching, photophobia, vision loss, double vision and blurred vision.   Respiratory:  Negative for sleep apnea, chest tightness " and cough.    Gastrointestinal:  Negative for abdominal trauma, abdominal pain, abdominal bloating, history of abdominal surgery, nausea and vomiting.   Endocrine: hair loss and cold intolerance.   Genitourinary:  Negative for dysuria, frequency, urgency and urine decreased.   Musculoskeletal:  Negative for pain, trauma, joint pain, joint swelling and muscle ache.   Skin:  Negative for color change, pale, rash, wound and abrasion.   Allergic/Immunologic: Negative for environmental allergies, seasonal allergies and food allergies.   Neurological:  Negative for dizziness, history of vertigo, light-headedness, headaches, disorientation, altered mental status and numbness.   Hematologic/Lymphatic: Negative for swollen lymph nodes, easy bruising/bleeding and trouble clotting. Does not bruise/bleed easily.   Psychiatric/Behavioral:  Negative for altered mental status, disorientation, confusion and agitation.       Objective:     Physical Exam   Constitutional: He is oriented to person, place, and time. He appears well-developed. He is cooperative.  Non-toxic appearance. He does not appear ill. No distress.   HENT:   Head: Normocephalic and atraumatic.   Ears:   Right Ear: Hearing, tympanic membrane, external ear and ear canal normal.   Left Ear: Hearing, tympanic membrane, external ear and ear canal normal.   Nose: Nose normal. No mucosal edema, rhinorrhea or nasal deformity. No epistaxis. Right sinus exhibits no maxillary sinus tenderness and no frontal sinus tenderness. Left sinus exhibits no maxillary sinus tenderness and no frontal sinus tenderness.   Mouth/Throat: Uvula is midline, oropharynx is clear and moist and mucous membranes are normal. No trismus in the jaw. Normal dentition. No uvula swelling. No oropharyngeal exudate, posterior oropharyngeal edema or posterior oropharyngeal erythema.   Eyes: Conjunctivae and lids are normal. No foreign body present in the left eye. No scleral icterus.   Slit lamp exam:        The left eye shows corneal abrasion and fluorescein uptake.   Neck: Trachea normal and phonation normal. Neck supple. No edema present. No erythema present. No neck rigidity present.   Cardiovascular: Normal rate, regular rhythm, normal heart sounds and normal pulses.   Pulmonary/Chest: Effort normal and breath sounds normal. No stridor. No respiratory distress. He has no decreased breath sounds. He has no wheezes. He has no rhonchi. He has no rales. He exhibits no tenderness.   Abdominal: Normal appearance and bowel sounds are normal. Soft.   Musculoskeletal: Normal range of motion.         General: No deformity. Normal range of motion.   Neurological: He is alert and oriented to person, place, and time. He exhibits normal muscle tone. Coordination normal.   Skin: Skin is warm, dry, intact, not diaphoretic and not pale.   Psychiatric: His speech is normal and behavior is normal. Judgment and thought content normal.   Nursing note and vitals reviewed.      Assessment:     1. Abrasion of left cornea, initial encounter    2. Blepharitis of left upper eyelid, unspecified type      Vision Screening    Right eye Left eye Both eyes   Without correction 20/50 20/40 20/40   With correction          Plan:       Abrasion of left cornea, initial encounter  -     erythromycin (ROMYCIN) ophthalmic ointment; Place into the left eye 4 (four) times daily. for 7 days  Dispense: 3.5 g; Refill: 0    Blepharitis of left upper eyelid, unspecified type  -     erythromycin (ROMYCIN) ophthalmic ointment; Place into the left eye 4 (four) times daily. for 7 days  Dispense: 3.5 g; Refill: 0